# Patient Record
Sex: MALE | Race: WHITE | NOT HISPANIC OR LATINO | Employment: OTHER | URBAN - METROPOLITAN AREA
[De-identification: names, ages, dates, MRNs, and addresses within clinical notes are randomized per-mention and may not be internally consistent; named-entity substitution may affect disease eponyms.]

---

## 2017-01-12 ENCOUNTER — OFFICE VISIT (OUTPATIENT)
Dept: CARDIAC SURGERY | Facility: CLINIC | Age: 82
End: 2017-01-12

## 2017-01-12 VITALS
SYSTOLIC BLOOD PRESSURE: 119 MMHG | BODY MASS INDEX: 22.99 KG/M2 | HEIGHT: 69 IN | OXYGEN SATURATION: 98 % | WEIGHT: 155.2 LBS | RESPIRATION RATE: 20 BRPM | DIASTOLIC BLOOD PRESSURE: 66 MMHG | TEMPERATURE: 98.1 F | HEART RATE: 60 BPM

## 2017-01-12 DIAGNOSIS — I51.9 LV DYSFUNCTION: ICD-10-CM

## 2017-01-12 DIAGNOSIS — I25.10 CAD IN NATIVE ARTERY: ICD-10-CM

## 2017-01-12 DIAGNOSIS — I63.10 CEREBROVASCULAR ACCIDENT (CVA) DUE TO EMBOLISM OF PRECEREBRAL ARTERY (HCC): Primary | ICD-10-CM

## 2017-01-12 DIAGNOSIS — Z95.0 PACEMAKER: ICD-10-CM

## 2017-01-12 DIAGNOSIS — R06.09 DYSPNEA ON EXERTION: ICD-10-CM

## 2017-01-12 PROCEDURE — 99214 OFFICE O/P EST MOD 30 MIN: CPT | Performed by: THORACIC SURGERY (CARDIOTHORACIC VASCULAR SURGERY)

## 2017-01-12 RX ORDER — POLYETHYLENE GLYCOL 3350 17 G/17G
POWDER, FOR SOLUTION ORAL
Refills: 6 | COMMUNITY
Start: 2016-11-16 | End: 2019-01-01

## 2017-01-12 RX ORDER — ATENOLOL 25 MG/1
TABLET ORAL
COMMUNITY
Start: 2016-11-16 | End: 2017-01-12

## 2017-01-12 RX ORDER — CLOPIDOGREL BISULFATE 75 MG/1
TABLET ORAL
COMMUNITY
Start: 2016-11-16 | End: 2017-01-12 | Stop reason: ALTCHOICE

## 2017-01-12 RX ORDER — APIXABAN 2.5 MG/1
TABLET, FILM COATED ORAL
Refills: 5 | COMMUNITY
Start: 2016-12-16 | End: 2017-01-12

## 2017-01-12 RX ORDER — WARFARIN SODIUM 4 MG/1
TABLET ORAL
Refills: 1 | COMMUNITY
Start: 2016-11-04 | End: 2017-01-12

## 2017-01-12 RX ORDER — SULFAMETHOXAZOLE AND TRIMETHOPRIM 800; 160 MG/1; MG/1
TABLET ORAL
Refills: 0 | COMMUNITY
Start: 2016-12-20 | End: 2017-01-12 | Stop reason: ALTCHOICE

## 2017-01-12 RX ORDER — ISOSORBIDE MONONITRATE 30 MG/1
TABLET, EXTENDED RELEASE ORAL
Refills: 5 | COMMUNITY
Start: 2016-12-16 | End: 2019-01-01

## 2017-01-12 NOTE — MR AVS SNAPSHOT
Julio César Braxton   1/12/2017 11:30 AM   Office Visit    Dept Phone:  [phone]   Encounter #:  [id number]    Provider:  Santi Bean MD   Department:  [facility] CARDIAC SURGERY                Your Full Care Plan              Today's Medication Changes          These changes are accurate as of: 1/12/17 12:35 PM.  If you have any questions, ask your nurse or doctor.               Stop taking medication(s)listed here:     atenolol 25 MG tablet   Commonly known as:  TENORMIN   Stopped by:  Santi Bean MD           clopidogrel 75 MG tablet   Commonly known as:  PLAVIX   Stopped by:  Santi Bean MD           ELIQUIS 2.5 MG tablet tablet   Generic drug:  apixaban   Stopped by:  Santi Bean MD           gabapentin 300 MG capsule   Commonly known as:  NEURONTIN   Stopped by:  Santi Bean MD           metoprolol tartrate 25 MG tablet   Commonly known as:  LOPRESSOR   Stopped by:  Santi Bean MD           sulfamethoxazole-trimethoprim 800-160 MG per tablet   Commonly known as:  BACTRIM DS,SEPTRA DS   Stopped by:  Santi Bean MD           warfarin 4 MG tablet   Commonly known as:  COUMADIN   Stopped by:  Santi Bean MD           warfarin 5 MG tablet   Commonly known as:  COUMADIN   Stopped by:  Santi Bean MD                      Your Updated Medication List          This list is accurate as of: 1/12/17 12:35 PM.  Always use your most recent med list.                aspirin 81 MG chewable tablet   Chew 1 tablet Daily.       atorvastatin 40 MG tablet   Commonly known as:  LIPITOR   Take 1 tablet by mouth Every Night.       furosemide 20 MG tablet   Commonly known as:  LASIX   Take 1 tablet by mouth Daily.       isosorbide mononitrate 30 MG 24 hr tablet   Commonly known as:  IMDUR       metoprolol succinate XL 25 MG 24 hr tablet   Commonly known as:  TOPROL-XL   Take 1 tablet by mouth Daily.       polyethylene glycol powder   Commonly known  "as:  MIRALAX       pregabalin 50 MG capsule   Commonly known as:  LYRICA       ramipril 1.25 MG capsule   Commonly known as:  ALTACE   Take 1 capsule by mouth Daily.               Instructions     None    Patient Instructions History      Upcoming Appointments     Visit Type Date Time Department    NEW PATIENT 2017 11:30 AM [facility] [place]      PatientFocus Signup     [facility] PatientFocus allows you to send messages to your doctor, view your test results, renew your prescriptions, schedule appointments, and more. To sign up, go to [employer] and click on the Sign Up Now link in the New User? box. Enter your PatientFocus Activation Code exactly as it appears below along with the last four digits of your Social Security Number and your Date of Birth () to complete the sign-up process. If you do not sign up before the expiration date, you must request a new code.    PatientFocus Activation Code: [email or web address]  Expires: 2017 12:35 PM    If you have questions, you can email [email or web address] or call [phone] to talk to our PatientFocus staff. Remember, PatientFocus is NOT to be used for urgent needs. For medical emergencies, dial 911.               Other Info from Your Visit           Allergies     No Known Allergies      Vital Signs     Blood Pressure Pulse Temperature Respirations Height Weight    119/66 (BP Location: Right arm, Patient Position: Sitting, Cuff Size: Adult) 60 98.1 °F (36.7 °C) (Oral) 20 69\" (175.3 cm) 155 lb 3.2 oz (70.4 kg)    Oxygen Saturation Body Mass Index Smoking Status             98% 22.92 kg/m2 Former Smoker           "

## 2017-01-12 NOTE — LETTER
January 15, 2017     Eduardo Medina MD  [address]   [address]  [place] [address]    Patient: Julio César Braxton   YOB: 1932   Date of Visit: 1/12/2017       Dear Dr. Adam MD:    Thank you for referring Julio César Braxton to me for evaluation. Below are the relevant portions of my assessment and plan of care.    If you have questions, please do not hesitate to call me. I look forward to following Julio César along with you.         Sincerely,        Santi Bean MD        CC: MD Santi Hamilton MD  1/15/2017  1:08 PM  Sign at close encounter  1/15/2017      Chief Complaint:     Follow up evaluation of CAD    History of Present Illness:       Dear Dr. Mejia and Colleagues,  It was nice to see Julio César Braxton in follow up evaluation for his multivessel CAD. He is a 84 y.o. male with a history of a recent stroke treated with lytics, SSS with a PCM, who presents with progressive dyspnea and fatigue. He denies active chest pain, syncope, orthopnea or LE edema. He was admitted recently for the stroke and a cath was done as well that showed severe 3 vessel CAD with a 80 % mid LAD, 80 % OM and diffuse non dominant  RCA disease and EF of 35%. He is very active and takes care of a sick wife. 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 He is here for re-evaluation and possible surgical treatment    Patient Active Problem List   Diagnosis   • Stroke (cerebrum)   • CAD in native artery   • LV dysfunction   • Dyspnea on exertion   • Pacemaker       Past Medical History   Diagnosis Date   • Coronary artery disease    • Neuropathy    • Stroke        Past Surgical History   Procedure Laterality Date   • Back surgery     • Coronary stent placement     • Spinal fusion     • Cholecystectomy     • Angioplasty         No Known Allergies      Current Outpatient Prescriptions:   •  aspirin 81 MG chewable tablet, Chew 1 tablet Daily.,  Disp: 30 tablet, Rfl: 0  •  atorvastatin (LIPITOR) 40 MG tablet, Take 1 tablet by mouth Every Night., Disp: 30 tablet, Rfl: 0  •  furosemide (LASIX) 20 MG tablet, Take 1 tablet by mouth Daily., Disp: 30 tablet, Rfl: 0  •  isosorbide mononitrate (IMDUR) 30 MG 24 hr tablet, TK 1 T PO QD, Disp: , Rfl: 5  •  metoprolol succinate XL (TOPROL-XL) 25 MG 24 hr tablet, Take 1 tablet by mouth Daily., Disp: 30 tablet, Rfl: 0  •  polyethylene glycol (MIRALAX) powder, MIX 17GM IN 8 OZ OF WATER AND DRINK ONCE A DAY, Disp: , Rfl: 6  •  pregabalin (LYRICA) 50 MG capsule, Take 50 mg by mouth 2 (Two) Times a Day., Disp: , Rfl:   •  ramipril (ALTACE) 1.25 MG capsule, Take 1 capsule by mouth Daily., Disp: 30 capsule, Rfl: 0    Social History     Social History   • Marital status:      Spouse name: N/A   • Number of children: N/A   • Years of education: N/A     Occupational History   • Not on file.     Social History Main Topics   • Smoking status: Former Smoker     Types: Cigarettes   • Smokeless tobacco: Not on file      Comment: Quit 40 years go   • Alcohol use No   • Drug use: No   • Sexual activity: Not on file     Other Topics Concern   • Not on file     Social History Narrative    - spouse has Parkinson's.  He lives at home with spouse and grandson.       Family History   Problem Relation Age of Onset   • Coronary artery disease Brother      Review of Systems   Constitutional: Positive for fatigue.   Respiratory: Positive for shortness of breath. Negative for stridor.    Cardiovascular: Negative for chest pain and leg swelling.   Neurological: Negative for dizziness, tremors and weakness.   All other systems reviewed and are negative.      Physical Exam:    Vital Signs:  Weight: 155 lb 3.2 oz (70.4 kg)   Body mass index is 22.92 kg/(m^2).  Temp: 98.1 °F (36.7 °C)   Heart Rate: 60   BP: 119/66     Physical Exam   Constitutional: He is oriented to person, place, and time. He appears well-developed and well-nourished.    HENT:   Head: Normocephalic and atraumatic.   Nose: Nose normal.   Mouth/Throat: Oropharynx is clear and moist.   Eyes: Conjunctivae are normal. Pupils are equal, round, and reactive to light.   Neck: Normal range of motion. Neck supple. No JVD present. No thyromegaly present.   Cardiovascular: Normal rate, regular rhythm, normal heart sounds and intact distal pulses.  Exam reveals no gallop and no friction rub.    No murmur heard.  Pulmonary/Chest: Effort normal and breath sounds normal. He has no rales.   Abdominal: Soft. Bowel sounds are normal. He exhibits no distension and no mass. There is no tenderness.   Musculoskeletal: Normal range of motion. He exhibits no tenderness or deformity.   Lymphadenopathy:     He has no cervical adenopathy.   Neurological: He is alert and oriented to person, place, and time. He has normal reflexes.   Skin: Skin is warm and dry. No rash noted. No erythema.   Psychiatric: He has a normal mood and affect. His behavior is normal.        Assessment:     Problem List Items Addressed This Visit        Cardiovascular and Mediastinum    Stroke (cerebrum) - Primary    CAD in native artery    Relevant Medications    isosorbide mononitrate (IMDUR) 30 MG 24 hr tablet    LV dysfunction    Relevant Medications    isosorbide mononitrate (IMDUR) 30 MG 24 hr tablet       Respiratory    Dyspnea on exertion       Other    Pacemaker        S/P stroke with recovery  3 vessel CAD with LV dysfunction, minimal symptoms  S/P PCM insertion    Recommendation/Plan:     I discussed the risks and benefits and alternatives of surgery. He is not very symptomatic and he is concerned that if a complication occurs who will take care of his wife. I understand the situation and probably medical treatment is the best option for him at this time.    Thank you for allowing me to participate in his care.    Regards,    Santi Bean M.D.

## 2017-01-15 PROBLEM — I51.9 LV DYSFUNCTION: Status: ACTIVE | Noted: 2017-01-15

## 2017-01-15 PROBLEM — R06.09 DYSPNEA ON EXERTION: Status: ACTIVE | Noted: 2017-01-15

## 2017-01-15 PROBLEM — I25.10 CAD IN NATIVE ARTERY: Status: ACTIVE | Noted: 2017-01-15

## 2017-01-15 PROBLEM — Z95.0 PACEMAKER: Status: ACTIVE | Noted: 2017-01-15

## 2017-01-15 NOTE — PROGRESS NOTES
1/15/2017      Chief Complaint:     Follow up evaluation of CAD    History of Present Illness:       Dear Dr. Mejia and Colleagues,  It was nice to see Julio César Braxton in follow up evaluation for his multivessel CAD. He is a 84 y.o. male with a history of a recent stroke treated with lytics, SSS with a PCM, who presents with progressive dyspnea and fatigue. He denies active chest pain, syncope, orthopnea or LE edema. He was admitted recently for the stroke and a cath was done as well that showed severe 3 vessel CAD with a 80 % mid LAD, 80 % OM and diffuse non dominant  RCA disease and EF of 35%. He is very active and takes care of a sick wife. 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 He is here for re-evaluation and possible surgical treatment    Patient Active Problem List   Diagnosis   • Stroke (cerebrum)   • CAD in native artery   • LV dysfunction   • Dyspnea on exertion   • Pacemaker       Past Medical History   Diagnosis Date   • Coronary artery disease    • Neuropathy    • Stroke        Past Surgical History   Procedure Laterality Date   • Back surgery     • Coronary stent placement     • Spinal fusion     • Cholecystectomy     • Angioplasty         No Known Allergies      Current Outpatient Prescriptions:   •  aspirin 81 MG chewable tablet, Chew 1 tablet Daily., Disp: 30 tablet, Rfl: 0  •  atorvastatin (LIPITOR) 40 MG tablet, Take 1 tablet by mouth Every Night., Disp: 30 tablet, Rfl: 0  •  furosemide (LASIX) 20 MG tablet, Take 1 tablet by mouth Daily., Disp: 30 tablet, Rfl: 0  •  isosorbide mononitrate (IMDUR) 30 MG 24 hr tablet, TK 1 T PO QD, Disp: , Rfl: 5  •  metoprolol succinate XL (TOPROL-XL) 25 MG 24 hr tablet, Take 1 tablet by mouth Daily., Disp: 30 tablet, Rfl: 0  •  polyethylene glycol (MIRALAX) powder, MIX 17GM IN 8 OZ OF WATER AND DRINK ONCE A DAY, Disp: , Rfl: 6  •  pregabalin (LYRICA) 50 MG capsule, Take 50 mg by mouth 2 (Two)  Times a Day., Disp: , Rfl:   •  ramipril (ALTACE) 1.25 MG capsule, Take 1 capsule by mouth Daily., Disp: 30 capsule, Rfl: 0    Social History     Social History   • Marital status:      Spouse name: N/A   • Number of children: N/A   • Years of education: N/A     Occupational History   • Not on file.     Social History Main Topics   • Smoking status: Former Smoker     Types: Cigarettes   • Smokeless tobacco: Not on file      Comment: Quit 40 years go   • Alcohol use No   • Drug use: No   • Sexual activity: Not on file     Other Topics Concern   • Not on file     Social History Narrative    - spouse has Parkinson's.  He lives at home with spouse and grandson.       Family History   Problem Relation Age of Onset   • Coronary artery disease Brother      Review of Systems   Constitutional: Positive for fatigue.   Respiratory: Positive for shortness of breath. Negative for stridor.    Cardiovascular: Negative for chest pain and leg swelling.   Neurological: Negative for dizziness, tremors and weakness.   All other systems reviewed and are negative.      Physical Exam:    Vital Signs:  Weight: 155 lb 3.2 oz (70.4 kg)   Body mass index is 22.92 kg/(m^2).  Temp: 98.1 °F (36.7 °C)   Heart Rate: 60   BP: 119/66     Physical Exam   Constitutional: He is oriented to person, place, and time. He appears well-developed and well-nourished.   HENT:   Head: Normocephalic and atraumatic.   Nose: Nose normal.   Mouth/Throat: Oropharynx is clear and moist.   Eyes: Conjunctivae are normal. Pupils are equal, round, and reactive to light.   Neck: Normal range of motion. Neck supple. No JVD present. No thyromegaly present.   Cardiovascular: Normal rate, regular rhythm, normal heart sounds and intact distal pulses.  Exam reveals no gallop and no friction rub.    No murmur heard.  Pulmonary/Chest: Effort normal and breath sounds normal. He has no rales.   Abdominal: Soft. Bowel sounds are normal. He exhibits no distension and no  mass. There is no tenderness.   Musculoskeletal: Normal range of motion. He exhibits no tenderness or deformity.   Lymphadenopathy:     He has no cervical adenopathy.   Neurological: He is alert and oriented to person, place, and time. He has normal reflexes.   Skin: Skin is warm and dry. No rash noted. No erythema.   Psychiatric: He has a normal mood and affect. His behavior is normal.        Assessment:     Problem List Items Addressed This Visit        Cardiovascular and Mediastinum    Stroke (cerebrum) - Primary    CAD in native artery    Relevant Medications    isosorbide mononitrate (IMDUR) 30 MG 24 hr tablet    LV dysfunction    Relevant Medications    isosorbide mononitrate (IMDUR) 30 MG 24 hr tablet       Respiratory    Dyspnea on exertion       Other    Pacemaker        S/P stroke with recovery  3 vessel CAD with LV dysfunction, minimal symptoms  S/P PCM insertion    Recommendation/Plan:     I discussed the risks and benefits and alternatives of surgery. He is not very symptomatic and he is concerned that if a complication occurs who will take care of his wife. I understand the situation and probably medical treatment is the best option for him at this time.    Thank you for allowing me to participate in his care.    Regards,    Santi Bean M.D.

## 2017-02-02 ENCOUNTER — TELEPHONE (OUTPATIENT)
Dept: NEUROLOGY | Facility: CLINIC | Age: 82
End: 2017-02-02

## 2017-02-02 NOTE — TELEPHONE ENCOUNTER
S/W Mr. Braxton.  He is doing well.  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  He told me he is seeing Dr. Bean for cardiac issues.  He has an appt with Malu CHAVES on Feb. 21, 2017 at 3:30 PM.  He told me his appt with Dr. Bean is after this.  He needs a 3 vessel CABG.  I reminded him to let us know when the CABG will be done.  He has been changed to Eliquis 2.5mg 2 times a day and placed on Isosorbide ER 30 mg daily.  I also asked him to let Dr. Bean know all the meds he is taking.  To call 911 with any stroke signs or symptoms.  MRS 1.  He also told me he has had no stroke symptoms.  He can call me with any questions or concerns.  LOIDA Lozano RN

## 2017-02-06 ENCOUNTER — TELEPHONE (OUTPATIENT)
Dept: NEUROLOGY | Facility: CLINIC | Age: 82
End: 2017-02-06

## 2017-02-06 NOTE — TELEPHONE ENCOUNTER
S/W Mr. Braxton.  He told me he wants to F/U with his neurologist in [place] but he forgot his name.  He also is going to call me with the date for his CABG with .  LOIDA Lozano RN

## 2017-02-07 ENCOUNTER — TELEPHONE (OUTPATIENT)
Dept: CARDIAC SURGERY | Facility: CLINIC | Age: 82
End: 2017-02-07

## 2017-02-10 ENCOUNTER — TELEPHONE (OUTPATIENT)
Dept: CARDIAC SURGERY | Facility: CLINIC | Age: 82
End: 2017-02-10

## 2017-02-10 NOTE — TELEPHONE ENCOUNTER
Call placed to pt to discuss meds prior to surgery.  There are notes in EPIC that he is on Eliquis.  We do not have that listed on Dr. Bean's office note.      I left message for him to call me on Monday at office.

## 2017-02-17 ENCOUNTER — TELEPHONE (OUTPATIENT)
Dept: CARDIAC SURGERY | Facility: CLINIC | Age: 82
End: 2017-02-17

## 2017-02-17 NOTE — TELEPHONE ENCOUNTER
Asked Dr. Bean about bridging his Eliquis.  Dr. Bean reviewed his note and recommended medical mmgt.      [facility] called pt and d/w him via phone.  They decided medical mmgt and would follow up in a couple of months if he decided he wanted surgery.    [facility] told him to resume all his medications.    I asked Jian to cancel surgery.

## 2017-03-16 ENCOUNTER — HOSPITAL ENCOUNTER (OUTPATIENT)
Dept: LAB | Facility: HOSPITAL | Age: 82
Discharge: HOME | End: 2017-03-16
Attending: NURSE PRACTITIONER | Admitting: NURSE PRACTITIONER

## 2017-03-16 LAB
ANION GAP SERPL CALC-SCNC: 15.7 MMOL/L (ref 10–20)
BNP SERPL-MCNC: 381 PG/ML
BUN SERPL-MCNC: 22 MG/DL (ref 8–20)
BUN/CREAT SERPL: 18.3 (ref 6.2–20.3)
CALCIUM SERPL-MCNC: 8.6 MG/DL (ref 8.9–10.3)
CHLORIDE SERPL-SCNC: 100 MMOL/L (ref 101–111)
CONV CO2: 25 MMOL/L (ref 22–32)
CREAT UR-MCNC: 1.2 MG/DL (ref 0.7–1.2)
GLUCOSE SERPL-MCNC: 121 MG/DL (ref 65–99)
MAGNESIUM SERPL-MCNC: 2.1 MG/DL (ref 1.8–2.5)
POTASSIUM SERPL-SCNC: 4.7 MMOL/L (ref 3.6–5.1)
SODIUM SERPL-SCNC: 136 MMOL/L (ref 136–144)

## 2018-03-18 ENCOUNTER — HOSPITAL ENCOUNTER (OUTPATIENT)
Dept: FAMILY MEDICINE CLINIC | Facility: CLINIC | Age: 83
Setting detail: SPECIMEN
Discharge: HOME | End: 2018-03-18
Attending: INTERNAL MEDICINE | Admitting: INTERNAL MEDICINE

## 2018-03-18 LAB
BACTERIA SPEC AEROBE CULT: NORMAL
GRAM STN SPEC: NORMAL
Lab: NORMAL
MICRO REPORT STATUS: NORMAL
SPECIMEN SOURCE: NORMAL

## 2018-03-29 ENCOUNTER — HOSPITAL ENCOUNTER (OUTPATIENT)
Dept: FAMILY MEDICINE CLINIC | Facility: CLINIC | Age: 83
Setting detail: SPECIMEN
Discharge: HOME | End: 2018-03-29
Attending: FAMILY MEDICINE | Admitting: FAMILY MEDICINE

## 2018-03-29 LAB
ALBUMIN SERPL-MCNC: 3.9 G/DL (ref 3.5–4.8)
ALBUMIN/GLOB SERPL: 1.4 {RATIO} (ref 1–1.7)
ALP SERPL-CCNC: 88 IU/L (ref 32–91)
ALT SERPL-CCNC: 12 IU/L (ref 17–63)
ANION GAP SERPL CALC-SCNC: 13.2 MMOL/L (ref 10–20)
AST SERPL-CCNC: 23 IU/L (ref 15–41)
BASOPHILS # BLD AUTO: 0.1 10*3/UL (ref 0–0.2)
BASOPHILS NFR BLD AUTO: 1 % (ref 0–2)
BILIRUB SERPL-MCNC: 1 MG/DL (ref 0.3–1.2)
BUN SERPL-MCNC: 14 MG/DL (ref 8–20)
BUN/CREAT SERPL: 12.7 (ref 6.2–20.3)
CALCIUM SERPL-MCNC: 9.1 MG/DL (ref 8.9–10.3)
CHLORIDE SERPL-SCNC: 102 MMOL/L (ref 101–111)
CHOLEST SERPL-MCNC: 138 MG/DL
CHOLEST/HDLC SERPL: 3.6 {RATIO}
CONV CO2: 26 MMOL/L (ref 22–32)
CONV LDL CHOLESTEROL DIRECT: 71 MG/DL (ref 0–100)
CONV TOTAL PROTEIN: 6.6 G/DL (ref 6.1–7.9)
CREAT UR-MCNC: 1.1 MG/DL (ref 0.7–1.2)
DIFFERENTIAL METHOD BLD: (no result)
EOSINOPHIL # BLD AUTO: 0.2 10*3/UL (ref 0–0.3)
EOSINOPHIL # BLD AUTO: 3 % (ref 0–3)
ERYTHROCYTE [DISTWIDTH] IN BLOOD BY AUTOMATED COUNT: 14.9 % (ref 11.5–14.5)
GLOBULIN UR ELPH-MCNC: 2.7 G/DL (ref 2.5–3.8)
GLUCOSE SERPL-MCNC: 114 MG/DL (ref 65–99)
HCT VFR BLD AUTO: 39.8 % (ref 40–54)
HDLC SERPL-MCNC: 39 MG/DL
HGB BLD-MCNC: 13.3 G/DL (ref 14–18)
INR PPP: 2.4 (ref 2–3)
LDLC/HDLC SERPL: 1.8 {RATIO}
LIPID INTERPRETATION: ABNORMAL
LYMPHOCYTES # BLD AUTO: 1.8 10*3/UL (ref 0.8–4.8)
LYMPHOCYTES NFR BLD AUTO: 37 % (ref 18–42)
MCH RBC QN AUTO: 30 PG (ref 26–32)
MCHC RBC AUTO-ENTMCNC: 33.4 G/DL (ref 32–36)
MCV RBC AUTO: 89.7 FL (ref 80–94)
MONOCYTES # BLD AUTO: 0.5 10*3/UL (ref 0.1–1.3)
MONOCYTES NFR BLD AUTO: 10 % (ref 2–11)
NEUTROPHILS # BLD AUTO: 2.3 10*3/UL (ref 2.3–8.6)
NEUTROPHILS NFR BLD AUTO: 49 % (ref 50–75)
NRBC BLD AUTO-RTO: 0 /100{WBCS}
NRBC/RBC NFR BLD MANUAL: 0 10*3/UL
PLATELET # BLD AUTO: 156 10*3/UL (ref 150–450)
PMV BLD AUTO: 9.8 FL (ref 7.4–10.4)
POTASSIUM SERPL-SCNC: 4.2 MMOL/L (ref 3.6–5.1)
PROTHROMBIN TIME: 24 SEC (ref 19.4–28.5)
RBC # BLD AUTO: 4.44 10*6/UL (ref 4.6–6)
SODIUM SERPL-SCNC: 137 MMOL/L (ref 136–144)
TRIGL SERPL-MCNC: 154 MG/DL
VLDLC SERPL CALC-MCNC: 28 MG/DL
WBC # BLD AUTO: 4.9 10*3/UL (ref 4.5–11.5)

## 2018-08-30 ENCOUNTER — HOSPITAL ENCOUNTER (OUTPATIENT)
Dept: OTHER | Facility: HOSPITAL | Age: 83
Setting detail: SPECIMEN
Discharge: HOME | End: 2018-08-30
Attending: FAMILY MEDICINE | Admitting: FAMILY MEDICINE

## 2018-08-30 LAB
ALBUMIN SERPL-MCNC: 3.7 G/DL (ref 3.5–4.8)
ALBUMIN/GLOB SERPL: 1.4 {RATIO} (ref 1–1.7)
ALP SERPL-CCNC: 77 IU/L (ref 32–91)
ALT SERPL-CCNC: ABNORMAL IU/L (ref 17–63)
ANION GAP SERPL CALC-SCNC: 14.2 MMOL/L (ref 10–20)
AST SERPL-CCNC: ABNORMAL IU/L (ref 15–41)
BASOPHILS # BLD AUTO: 0.1 10*3/UL (ref 0–0.2)
BASOPHILS NFR BLD AUTO: 1 % (ref 0–2)
BILIRUB SERPL-MCNC: ABNORMAL MG/DL (ref 0.3–1.2)
BUN SERPL-MCNC: 20 MG/DL (ref 8–20)
BUN/CREAT SERPL: 20 (ref 6.2–20.3)
CALCIUM SERPL-MCNC: 9.2 MG/DL (ref 8.9–10.3)
CHLORIDE SERPL-SCNC: 98 MMOL/L (ref 101–111)
CONV CO2: 28 MMOL/L (ref 22–32)
CONV TOTAL PROTEIN: 6.4 G/DL (ref 6.1–7.9)
CREAT UR-MCNC: 1 MG/DL (ref 0.7–1.2)
DIFFERENTIAL METHOD BLD: (no result)
EOSINOPHIL # BLD AUTO: 0.2 10*3/UL (ref 0–0.3)
EOSINOPHIL # BLD AUTO: 2 % (ref 0–3)
ERYTHROCYTE [DISTWIDTH] IN BLOOD BY AUTOMATED COUNT: 16.5 % (ref 11.5–14.5)
GLOBULIN UR ELPH-MCNC: 2.7 G/DL (ref 2.5–3.8)
GLUCOSE SERPL-MCNC: 103 MG/DL (ref 65–99)
HCT VFR BLD AUTO: 40.4 % (ref 40–54)
HGB BLD-MCNC: 13.4 G/DL (ref 14–18)
LYMPHOCYTES # BLD AUTO: 1.8 10*3/UL (ref 0.8–4.8)
LYMPHOCYTES NFR BLD AUTO: 22 % (ref 18–42)
MCH RBC QN AUTO: 30 PG (ref 26–32)
MCHC RBC AUTO-ENTMCNC: 33.2 G/DL (ref 32–36)
MCV RBC AUTO: 90.3 FL (ref 80–94)
MONOCYTES # BLD AUTO: 0.6 10*3/UL (ref 0.1–1.3)
MONOCYTES NFR BLD AUTO: 7 % (ref 2–11)
NEUTROPHILS # BLD AUTO: 5.3 10*3/UL (ref 2.3–8.6)
NEUTROPHILS NFR BLD AUTO: 68 % (ref 50–75)
NRBC BLD AUTO-RTO: 0 /100{WBCS}
NRBC/RBC NFR BLD MANUAL: 0 10*3/UL
PLATELET # BLD AUTO: 169 10*3/UL (ref 150–450)
PMV BLD AUTO: 9.7 FL (ref 7.4–10.4)
POTASSIUM SERPL-SCNC: ABNORMAL MMOL/L (ref 3.6–5.1)
RBC # BLD AUTO: 4.48 10*6/UL (ref 4.6–6)
SODIUM SERPL-SCNC: 135 MMOL/L (ref 136–144)
WBC # BLD AUTO: 8 10*3/UL (ref 4.5–11.5)

## 2018-09-12 ENCOUNTER — HOSPITAL ENCOUNTER (OUTPATIENT)
Dept: OTHER | Facility: HOSPITAL | Age: 83
Setting detail: SPECIMEN
Discharge: HOME | End: 2018-09-12
Attending: FAMILY MEDICINE | Admitting: FAMILY MEDICINE

## 2018-09-12 LAB
ALBUMIN SERPL-MCNC: 3.9 G/DL (ref 3.5–4.8)
ALBUMIN/GLOB SERPL: 1.3 {RATIO} (ref 1–1.7)
ALP SERPL-CCNC: 77 IU/L (ref 32–91)
ALT SERPL-CCNC: 12 IU/L (ref 17–63)
ANION GAP SERPL CALC-SCNC: 14.1 MMOL/L (ref 10–20)
AST SERPL-CCNC: 17 IU/L (ref 15–41)
BASOPHILS # BLD AUTO: 0.1 10*3/UL (ref 0–0.2)
BASOPHILS NFR BLD AUTO: 1 % (ref 0–2)
BILIRUB SERPL-MCNC: 0.7 MG/DL (ref 0.3–1.2)
BUN SERPL-MCNC: 24 MG/DL (ref 8–20)
BUN/CREAT SERPL: 24 (ref 6.2–20.3)
CALCIUM SERPL-MCNC: 8.6 MG/DL (ref 8.9–10.3)
CHLORIDE SERPL-SCNC: 96 MMOL/L (ref 101–111)
CONV CO2: 29 MMOL/L (ref 22–32)
CONV TOTAL PROTEIN: 6.8 G/DL (ref 6.1–7.9)
CREAT UR-MCNC: 1 MG/DL (ref 0.7–1.2)
DIFFERENTIAL METHOD BLD: (no result)
EOSINOPHIL # BLD AUTO: 0.1 10*3/UL (ref 0–0.3)
EOSINOPHIL # BLD AUTO: 2 % (ref 0–3)
ERYTHROCYTE [DISTWIDTH] IN BLOOD BY AUTOMATED COUNT: 16 % (ref 11.5–14.5)
ERYTHROCYTE [SEDIMENTATION RATE] IN BLOOD BY WESTERGREN METHOD: 17 MM/HR (ref 0–20)
GLOBULIN UR ELPH-MCNC: 2.9 G/DL (ref 2.5–3.8)
GLUCOSE SERPL-MCNC: 119 MG/DL (ref 65–99)
HCT VFR BLD AUTO: 37.6 % (ref 40–54)
HGB BLD-MCNC: 12.9 G/DL (ref 14–18)
LYMPHOCYTES # BLD AUTO: 1.7 10*3/UL (ref 0.8–4.8)
LYMPHOCYTES NFR BLD AUTO: 27 % (ref 18–42)
MCH RBC QN AUTO: 31.3 PG (ref 26–32)
MCHC RBC AUTO-ENTMCNC: 34.3 G/DL (ref 32–36)
MCV RBC AUTO: 91.1 FL (ref 80–94)
MONOCYTES # BLD AUTO: 0.6 10*3/UL (ref 0.1–1.3)
MONOCYTES NFR BLD AUTO: 9 % (ref 2–11)
NEUTROPHILS # BLD AUTO: 3.8 10*3/UL (ref 2.3–8.6)
NEUTROPHILS NFR BLD AUTO: 61 % (ref 50–75)
NRBC BLD AUTO-RTO: 0 /100{WBCS}
NRBC/RBC NFR BLD MANUAL: 0 10*3/UL
PLATELET # BLD AUTO: 163 10*3/UL (ref 150–450)
PMV BLD AUTO: 9.8 FL (ref 7.4–10.4)
POTASSIUM SERPL-SCNC: 4.1 MMOL/L (ref 3.6–5.1)
RBC # BLD AUTO: 4.13 10*6/UL (ref 4.6–6)
SODIUM SERPL-SCNC: 135 MMOL/L (ref 136–144)
WBC # BLD AUTO: 6.3 10*3/UL (ref 4.5–11.5)

## 2018-09-14 LAB
IRON SATN MFR SERPL: 18 % (ref 20–50)
IRON SERPL-MCNC: 58 UG/DL (ref 45–182)
TIBC SERPL-MCNC: 316 UG/DL (ref 228–428)

## 2018-09-25 ENCOUNTER — HOSPITAL ENCOUNTER (OUTPATIENT)
Dept: FAMILY MEDICINE CLINIC | Facility: CLINIC | Age: 83
Setting detail: SPECIMEN
Discharge: HOME | End: 2018-09-25
Attending: FAMILY MEDICINE | Admitting: FAMILY MEDICINE

## 2018-09-25 LAB
ANION GAP SERPL CALC-SCNC: 13.3 MMOL/L (ref 10–20)
BUN SERPL-MCNC: 18 MG/DL (ref 8–20)
BUN/CREAT SERPL: 16.4 (ref 6.2–20.3)
CALCIUM SERPL-MCNC: 9 MG/DL (ref 8.9–10.3)
CHLORIDE SERPL-SCNC: 98 MMOL/L (ref 101–111)
CONV CO2: 29 MMOL/L (ref 22–32)
CREAT UR-MCNC: 1.1 MG/DL (ref 0.7–1.2)
GLUCOSE SERPL-MCNC: 108 MG/DL (ref 65–99)
INR PPP: 1.3
POTASSIUM SERPL-SCNC: 4.3 MMOL/L (ref 3.6–5.1)
PROTHROMBIN TIME: 14 SEC (ref 9.6–11.7)
SODIUM SERPL-SCNC: 136 MMOL/L (ref 136–144)

## 2019-01-01 ENCOUNTER — READMISSION MANAGEMENT (OUTPATIENT)
Dept: CALL CENTER | Facility: HOSPITAL | Age: 84
End: 2019-01-01

## 2019-01-01 ENCOUNTER — APPOINTMENT (OUTPATIENT)
Dept: GENERAL RADIOLOGY | Facility: HOSPITAL | Age: 84
End: 2019-01-01

## 2019-01-01 ENCOUNTER — HOSPITAL ENCOUNTER (EMERGENCY)
Facility: HOSPITAL | Age: 84
Discharge: NURSING, REHAB OR LONG-TERM CARE | End: 2019-10-10
Attending: EMERGENCY MEDICINE | Admitting: EMERGENCY MEDICINE

## 2019-01-01 ENCOUNTER — LAB REQUISITION (OUTPATIENT)
Dept: LAB | Facility: HOSPITAL | Age: 84
End: 2019-01-01

## 2019-01-01 ENCOUNTER — APPOINTMENT (OUTPATIENT)
Dept: CT IMAGING | Facility: HOSPITAL | Age: 84
End: 2019-01-01

## 2019-01-01 ENCOUNTER — HOSPITAL ENCOUNTER (INPATIENT)
Facility: HOSPITAL | Age: 84
LOS: 6 days | Discharge: NURSING, REHAB OR LONG-TERM CARE | End: 2019-09-13
Attending: EMERGENCY MEDICINE | Admitting: INTERNAL MEDICINE

## 2019-01-01 ENCOUNTER — HOSPITAL ENCOUNTER (INPATIENT)
Facility: HOSPITAL | Age: 84
LOS: 1 days | Discharge: HOME | End: 2019-08-04
Attending: EMERGENCY MEDICINE | Admitting: INTERNAL MEDICINE

## 2019-01-01 ENCOUNTER — EPISODE CHANGES (OUTPATIENT)
Dept: CASE MANAGEMENT | Facility: OTHER | Age: 84
End: 2019-01-01

## 2019-01-01 ENCOUNTER — APPOINTMENT (OUTPATIENT)
Dept: ULTRASOUND IMAGING | Facility: HOSPITAL | Age: 84
End: 2019-01-01

## 2019-01-01 ENCOUNTER — PATIENT OUTREACH (OUTPATIENT)
Dept: CASE MANAGEMENT | Facility: OTHER | Age: 84
End: 2019-01-01

## 2019-01-01 ENCOUNTER — TELEPHONE (OUTPATIENT)
Dept: FAMILY MEDICINE CLINIC | Facility: CLINIC | Age: 84
End: 2019-01-01

## 2019-01-01 ENCOUNTER — HOSPITAL ENCOUNTER (INPATIENT)
Facility: HOSPITAL | Age: 84
LOS: 5 days | End: 2019-10-19
Attending: EMERGENCY MEDICINE | Admitting: FAMILY MEDICINE

## 2019-01-01 ENCOUNTER — INPATIENT HOSPITAL (OUTPATIENT)
Dept: URBAN - METROPOLITAN AREA HOSPITAL 84 | Facility: HOSPITAL | Age: 84
End: 2019-01-01

## 2019-01-01 ENCOUNTER — HOSPITAL ENCOUNTER (INPATIENT)
Facility: HOSPITAL | Age: 84
LOS: 1 days | Discharge: HOME | End: 2019-07-17
Attending: EMERGENCY MEDICINE | Admitting: INTERNAL MEDICINE

## 2019-01-01 VITALS
TEMPERATURE: 97.6 F | BODY MASS INDEX: 19.61 KG/M2 | DIASTOLIC BLOOD PRESSURE: 52 MMHG | HEIGHT: 70 IN | OXYGEN SATURATION: 93 % | SYSTOLIC BLOOD PRESSURE: 123 MMHG | WEIGHT: 137 LBS | RESPIRATION RATE: 16 BRPM | HEART RATE: 70 BPM

## 2019-01-01 VITALS
DIASTOLIC BLOOD PRESSURE: 54 MMHG | BODY MASS INDEX: 18.56 KG/M2 | HEIGHT: 70 IN | RESPIRATION RATE: 19 BRPM | WEIGHT: 129.63 LBS | HEART RATE: 72 BPM | TEMPERATURE: 98.9 F | SYSTOLIC BLOOD PRESSURE: 107 MMHG | OXYGEN SATURATION: 94 %

## 2019-01-01 VITALS
TEMPERATURE: 97.5 F | RESPIRATION RATE: 15 BRPM | BODY MASS INDEX: 19.13 KG/M2 | HEART RATE: 53 BPM | DIASTOLIC BLOOD PRESSURE: 52 MMHG | HEIGHT: 70 IN | SYSTOLIC BLOOD PRESSURE: 109 MMHG | WEIGHT: 133.6 LBS | OXYGEN SATURATION: 97 %

## 2019-01-01 VITALS
SYSTOLIC BLOOD PRESSURE: 109 MMHG | TEMPERATURE: 98.6 F | DIASTOLIC BLOOD PRESSURE: 72 MMHG | WEIGHT: 130.51 LBS | RESPIRATION RATE: 28 BRPM | HEART RATE: 96 BPM | HEIGHT: 66 IN | BODY MASS INDEX: 20.98 KG/M2 | OXYGEN SATURATION: 99 %

## 2019-01-01 VITALS
SYSTOLIC BLOOD PRESSURE: 113 MMHG | HEART RATE: 87 BPM | TEMPERATURE: 97.5 F | HEIGHT: 70 IN | DIASTOLIC BLOOD PRESSURE: 49 MMHG | WEIGHT: 135.36 LBS | OXYGEN SATURATION: 97 % | RESPIRATION RATE: 16 BRPM | BODY MASS INDEX: 19.38 KG/M2

## 2019-01-01 DIAGNOSIS — S13.9XXA NECK SPRAIN, INITIAL ENCOUNTER: ICD-10-CM

## 2019-01-01 DIAGNOSIS — I20.0 UNSTABLE ANGINA (HCC): Primary | ICD-10-CM

## 2019-01-01 DIAGNOSIS — R13.12 DYSPHAGIA, OROPHARYNGEAL PHASE: ICD-10-CM

## 2019-01-01 DIAGNOSIS — R73.9 HYPERGLYCEMIA: ICD-10-CM

## 2019-01-01 DIAGNOSIS — A41.9 SEVERE SEPSIS (HCC): ICD-10-CM

## 2019-01-01 DIAGNOSIS — E87.6 HYPOKALEMIA: ICD-10-CM

## 2019-01-01 DIAGNOSIS — E87.0 HYPEROSMOLALITY AND HYPERNATREMIA: ICD-10-CM

## 2019-01-01 DIAGNOSIS — J69.0 ASPIRATION PNEUMONIA OF RIGHT LOWER LOBE, UNSPECIFIED ASPIRATION PNEUMONIA TYPE (HCC): ICD-10-CM

## 2019-01-01 DIAGNOSIS — A41.9 SEVERE SEPSIS (HCC): Primary | ICD-10-CM

## 2019-01-01 DIAGNOSIS — I25.10 CAD IN NATIVE ARTERY: ICD-10-CM

## 2019-01-01 DIAGNOSIS — J18.9 PNEUMONIA, UNSPECIFIED ORGANISM: ICD-10-CM

## 2019-01-01 DIAGNOSIS — R63.3 FEEDING DIFFICULTIES: ICD-10-CM

## 2019-01-01 DIAGNOSIS — S00.03XA CONTUSION OF SCALP, INITIAL ENCOUNTER: ICD-10-CM

## 2019-01-01 DIAGNOSIS — R07.9 CHEST PAIN, UNSPECIFIED TYPE: ICD-10-CM

## 2019-01-01 DIAGNOSIS — N28.9 ACUTE RENAL INSUFFICIENCY: ICD-10-CM

## 2019-01-01 DIAGNOSIS — R65.20 SEVERE SEPSIS (HCC): ICD-10-CM

## 2019-01-01 DIAGNOSIS — N39.0 ACUTE UTI: Primary | ICD-10-CM
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
## 2019-01-01 DIAGNOSIS — Z00.00 ROUTINE GENERAL MEDICAL EXAMINATION AT A HEALTH CARE FACILITY: ICD-10-CM

## 2019-01-01 DIAGNOSIS — R41.82 ALTERED MENTAL STATUS: ICD-10-CM

## 2019-01-01 DIAGNOSIS — R65.20 SEVERE SEPSIS (HCC): Primary | ICD-10-CM

## 2019-01-01 LAB
ALBUMIN SERPL-MCNC: 2.5 G/DL (ref 3.5–4.8)
ALBUMIN SERPL-MCNC: 2.8 G/DL (ref 3.5–5.2)
ALBUMIN SERPL-MCNC: 2.9 G/DL (ref 3.5–4.8)
ALBUMIN SERPL-MCNC: 2.9 G/DL (ref 3.5–4.8)
ALBUMIN SERPL-MCNC: 3 G/DL (ref 3.5–4.8)
ALBUMIN SERPL-MCNC: 3.4 G/DL (ref 3.5–5.2)
ALBUMIN SERPL-MCNC: 3.9 G/DL (ref 3.5–5.2)
ALBUMIN/GLOB SERPL: 0.8 G/DL (ref 1–1.7)
ALBUMIN/GLOB SERPL: 0.9 G/DL
ALBUMIN/GLOB SERPL: 1 G/DL (ref 1–1.7)
ALBUMIN/GLOB SERPL: 1.1 G/DL (ref 1–1.7)
ALBUMIN/GLOB SERPL: 1.2 G/DL (ref 1–1.7)
ALBUMIN/GLOB SERPL: 1.7 G/DL
ALBUMIN/GLOB SERPL: 1.9 G/DL
ALP SERPL-CCNC: 109 U/L (ref 32–91)
ALP SERPL-CCNC: 53 U/L (ref 32–91)
ALP SERPL-CCNC: 66 U/L (ref 32–91)
ALP SERPL-CCNC: 67 U/L (ref 32–91)
ALP SERPL-CCNC: 74 U/L (ref 39–117)
ALP SERPL-CCNC: 81 U/L (ref 39–117)
ALP SERPL-CCNC: 92 U/L (ref 39–117)
ALT SERPL W P-5'-P-CCNC: 10 U/L (ref 17–63)
ALT SERPL W P-5'-P-CCNC: 10 U/L (ref 1–41)
ALT SERPL W P-5'-P-CCNC: 10 U/L (ref 1–41)
ALT SERPL W P-5'-P-CCNC: 13 U/L (ref 17–63)
ALT SERPL W P-5'-P-CCNC: 15 U/L (ref 1–41)
ALT SERPL W P-5'-P-CCNC: 21 U/L (ref 17–63)
ALT SERPL W P-5'-P-CCNC: 25 U/L (ref 17–63)
ANION GAP SERPL CALCULATED.3IONS-SCNC: 12.3 MMOL/L (ref 5–15)
ANION GAP SERPL CALCULATED.3IONS-SCNC: 13.3 MMOL/L (ref 5–15)
ANION GAP SERPL CALCULATED.3IONS-SCNC: 13.6 MMOL/L (ref 5–15)
ANION GAP SERPL CALCULATED.3IONS-SCNC: 13.9 MMOL/L (ref 5–15)
ANION GAP SERPL CALCULATED.3IONS-SCNC: 14.4 MMOL/L (ref 5–15)
ANION GAP SERPL CALCULATED.3IONS-SCNC: 14.7 MMOL/L (ref 5–15)
ANION GAP SERPL CALCULATED.3IONS-SCNC: 14.8 MMOL/L (ref 5–15)
ANION GAP SERPL CALCULATED.3IONS-SCNC: 14.9 MMOL/L (ref 5–15)
ANION GAP SERPL CALCULATED.3IONS-SCNC: 15 MMOL/L (ref 5–15)
ANION GAP SERPL CALCULATED.3IONS-SCNC: 15.1 MMOL/L (ref 5–15)
ANION GAP SERPL CALCULATED.3IONS-SCNC: 15.3 MMOL/L (ref 5–15)
ANION GAP SERPL CALCULATED.3IONS-SCNC: 15.8 MMOL/L (ref 5–15)
ANION GAP SERPL CALCULATED.3IONS-SCNC: 16 MMOL/L (ref 5–15)
ANION GAP SERPL CALCULATED.3IONS-SCNC: 16.3 MMOL/L (ref 5–15)
ANION GAP SERPL CALCULATED.3IONS-SCNC: 16.4 MMOL/L (ref 5–15)
ANION GAP SERPL CALCULATED.3IONS-SCNC: 18.3 MMOL/L (ref 5–15)
ANION GAP SERPL CALCULATED.3IONS-SCNC: 18.8 MMOL/L (ref 5–15)
ANION GAP SERPL CALCULATED.3IONS-SCNC: 19.6 MMOL/L (ref 5–15)
ANION GAP SERPL CALCULATED.3IONS-SCNC: 20.9 MMOL/L (ref 5–15)
ANION GAP SERPL CALCULATED.3IONS-SCNC: 21.6 MMOL/L (ref 5–15)
ANISOCYTOSIS BLD QL: ABNORMAL
APTT PPP: 22.4 SECONDS (ref 24–31)
APTT PPP: 23.1 SECONDS (ref 24–31)
ARTERIAL PATENCY WRIST A: ABNORMAL
ARTERIAL PATENCY WRIST A: POSITIVE
AST SERPL-CCNC: 13 U/L (ref 1–40)
AST SERPL-CCNC: 14 U/L (ref 1–40)
AST SERPL-CCNC: 17 U/L (ref 1–40)
AST SERPL-CCNC: 18 U/L (ref 15–41)
AST SERPL-CCNC: 21 U/L (ref 15–41)
AST SERPL-CCNC: 35 U/L (ref 15–41)
AST SERPL-CCNC: 41 U/L (ref 15–41)
ATMOSPHERIC PRESS: ABNORMAL MM[HG]
ATMOSPHERIC PRESS: ABNORMAL MM[HG]
B PERT DNA SPEC QL NAA+PROBE: NOT DETECTED
BACTERIA SPEC AEROBE CULT: ABNORMAL
BACTERIA SPEC AEROBE CULT: ABNORMAL
BACTERIA SPEC AEROBE CULT: NORMAL
BACTERIA UR QL AUTO: ABNORMAL /HPF
BASE EXCESS BLDA CALC-SCNC: 6.2 MMOL/L (ref 0–3)
BASE EXCESS BLDA CALC-SCNC: 9.4 MMOL/L (ref 0–3)
BASOPHILS # BLD AUTO: 0 10*3/MM3 (ref 0–0.2)
BASOPHILS # BLD AUTO: 0.03 10*3/MM3 (ref 0–0.2)
BASOPHILS NFR BLD AUTO: 0 % (ref 0–1.5)
BASOPHILS NFR BLD AUTO: 0.2 % (ref 0–1.5)
BASOPHILS NFR BLD AUTO: 0.3 % (ref 0–1.5)
BASOPHILS NFR BLD AUTO: 0.3 % (ref 0–1.5)
BDY SITE: ABNORMAL
BDY SITE: ABNORMAL
BILIRUB SERPL-MCNC: 0.5 MG/DL (ref 0.3–1.2)
BILIRUB SERPL-MCNC: 0.6 MG/DL (ref 0.2–1.2)
BILIRUB SERPL-MCNC: 0.7 MG/DL (ref 0.2–1.2)
BILIRUB SERPL-MCNC: 0.7 MG/DL (ref 0.3–1.2)
BILIRUB SERPL-MCNC: 0.8 MG/DL (ref 0.2–1.2)
BILIRUB SERPL-MCNC: 1 MG/DL (ref 0.3–1.2)
BILIRUB SERPL-MCNC: 1.3 MG/DL (ref 0.3–1.2)
BILIRUB UR QL STRIP: NEGATIVE
BNP SERPL-MCNC: 375 PG/ML
BNP SERPL-MCNC: 402 PG/ML
BNP SERPL-MCNC: 668 PG/ML
BUN BLD-MCNC: 10 MG/DL (ref 8–20)
BUN BLD-MCNC: 10 MG/DL (ref 8–20)
BUN BLD-MCNC: 12 MG/DL (ref 8–20)
BUN BLD-MCNC: 13 MG/DL (ref 8–20)
BUN BLD-MCNC: 15 MG/DL (ref 8–20)
BUN BLD-MCNC: 17 MG/DL (ref 8–20)
BUN BLD-MCNC: 19 MG/DL (ref 8–20)
BUN BLD-MCNC: 23 MG/DL (ref 8–20)
BUN BLD-MCNC: 26 MG/DL (ref 8–20)
BUN BLD-MCNC: 33 MG/DL (ref 8–20)
BUN BLD-MCNC: 34 MG/DL (ref 8–20)
BUN BLD-MCNC: 36 MG/DL (ref 8–20)
BUN BLD-MCNC: 37 MG/DL (ref 8–20)
BUN BLD-MCNC: 46 MG/DL (ref 8–20)
BUN BLD-MCNC: 47 MG/DL (ref 8–23)
BUN BLD-MCNC: 50 MG/DL (ref 8–23)
BUN BLD-MCNC: 58 MG/DL (ref 8–23)
BUN BLD-MCNC: 60 MG/DL (ref 8–23)
BUN BLD-MCNC: 67 MG/DL (ref 8–23)
BUN BLD-MCNC: 7 MG/DL (ref 8–20)
BUN/CREAT SERPL: 11.1 (ref 6.2–20.3)
BUN/CREAT SERPL: 11.1 (ref 6.2–20.3)
BUN/CREAT SERPL: 11.8 (ref 6.2–20.3)
BUN/CREAT SERPL: 12 (ref 6.2–20.3)
BUN/CREAT SERPL: 16.7 (ref 6.2–20.3)
BUN/CREAT SERPL: 21.3 (ref 6.2–20.3)
BUN/CREAT SERPL: 23.6 (ref 6.2–20.3)
BUN/CREAT SERPL: 23.8 (ref 6.2–20.3)
BUN/CREAT SERPL: 25.6 (ref 6.2–20.3)
BUN/CREAT SERPL: 30.9 (ref 6.2–20.3)
BUN/CREAT SERPL: 32.9 (ref 6.2–20.3)
BUN/CREAT SERPL: 33 (ref 6.2–20.3)
BUN/CREAT SERPL: 36.3 (ref 7–25)
BUN/CREAT SERPL: 37 (ref 6.2–20.3)
BUN/CREAT SERPL: 38.8 (ref 7–25)
BUN/CREAT SERPL: 40 (ref 6.2–20.3)
BUN/CREAT SERPL: 44.4 (ref 7–25)
BUN/CREAT SERPL: 49.5 (ref 7–25)
BUN/CREAT SERPL: 50.8 (ref 7–25)
BUN/CREAT SERPL: 8.8 (ref 6.2–20.3)
C PNEUM DNA NPH QL NAA+NON-PROBE: NOT DETECTED
CA-I SERPL ISE-MCNC: 1.12 MMOL/L (ref 1.2–1.3)
CA-I SERPL ISE-MCNC: 1.22 MMOL/L (ref 1.2–1.3)
CA-I SERPL ISE-MCNC: 1.23 MMOL/L (ref 1.2–1.3)
CA-I SERPL ISE-MCNC: 1.24 MMOL/L (ref 1.2–1.3)
CALCIUM SPEC-SCNC: 7.4 MG/DL (ref 8.9–10.3)
CALCIUM SPEC-SCNC: 7.6 MG/DL (ref 8.9–10.3)
CALCIUM SPEC-SCNC: 7.7 MG/DL (ref 8.9–10.3)
CALCIUM SPEC-SCNC: 7.9 MG/DL (ref 8.9–10.3)
CALCIUM SPEC-SCNC: 8 MG/DL (ref 8.9–10.3)
CALCIUM SPEC-SCNC: 8 MG/DL (ref 8.9–10.3)
CALCIUM SPEC-SCNC: 8.1 MG/DL (ref 8.9–10.3)
CALCIUM SPEC-SCNC: 8.2 MG/DL (ref 8.6–10.5)
CALCIUM SPEC-SCNC: 8.2 MG/DL (ref 8.9–10.3)
CALCIUM SPEC-SCNC: 8.3 MG/DL (ref 8.9–10.3)
CALCIUM SPEC-SCNC: 8.3 MG/DL (ref 8.9–10.3)
CALCIUM SPEC-SCNC: 8.6 MG/DL (ref 8.9–10.3)
CALCIUM SPEC-SCNC: 8.7 MG/DL (ref 8.6–10.5)
CALCIUM SPEC-SCNC: 8.7 MG/DL (ref 8.6–10.5)
CALCIUM SPEC-SCNC: 9.2 MG/DL (ref 8.6–10.5)
CALCIUM SPEC-SCNC: 9.2 MG/DL (ref 8.6–10.5)
CALCIUM SPEC-SCNC: 9.4 MG/DL (ref 8.9–10.3)
CHLORIDE SERPL-SCNC: 100 MMOL/L (ref 101–111)
CHLORIDE SERPL-SCNC: 100 MMOL/L (ref 101–111)
CHLORIDE SERPL-SCNC: 102 MMOL/L (ref 101–111)
CHLORIDE SERPL-SCNC: 107 MMOL/L (ref 101–111)
CHLORIDE SERPL-SCNC: 113 MMOL/L (ref 98–107)
CHLORIDE SERPL-SCNC: 115 MMOL/L (ref 98–107)
CHLORIDE SERPL-SCNC: 118 MMOL/L (ref 98–107)
CHLORIDE SERPL-SCNC: 122 MMOL/L (ref 98–107)
CHLORIDE SERPL-SCNC: 85 MMOL/L (ref 98–107)
CHLORIDE SERPL-SCNC: 91 MMOL/L (ref 101–111)
CHLORIDE SERPL-SCNC: 91 MMOL/L (ref 101–111)
CHLORIDE SERPL-SCNC: 92 MMOL/L (ref 101–111)
CHLORIDE SERPL-SCNC: 93 MMOL/L (ref 101–111)
CHLORIDE SERPL-SCNC: 93 MMOL/L (ref 101–111)
CHLORIDE SERPL-SCNC: 95 MMOL/L (ref 101–111)
CHLORIDE SERPL-SCNC: 96 MMOL/L (ref 101–111)
CHLORIDE SERPL-SCNC: 96 MMOL/L (ref 101–111)
CHLORIDE SERPL-SCNC: 98 MMOL/L (ref 101–111)
CK SERPL-CCNC: 20 U/L (ref 20–200)
CK SERPL-CCNC: 36 U/L (ref 20–200)
CLARITY UR: ABNORMAL
CLARITY UR: CLEAR
CLARITY UR: CLEAR
CO2 BLDA-SCNC: 31.5 MMOL/L (ref 22–29)
CO2 BLDA-SCNC: 35.7 MMOL/L (ref 22–29)
CO2 SERPL-SCNC: 20 MMOL/L (ref 22–29)
CO2 SERPL-SCNC: 20 MMOL/L (ref 22–29)
CO2 SERPL-SCNC: 23 MMOL/L (ref 22–29)
CO2 SERPL-SCNC: 23 MMOL/L (ref 22–32)
CO2 SERPL-SCNC: 23 MMOL/L (ref 22–32)
CO2 SERPL-SCNC: 24 MMOL/L (ref 22–29)
CO2 SERPL-SCNC: 25 MMOL/L (ref 22–32)
CO2 SERPL-SCNC: 25 MMOL/L (ref 22–32)
CO2 SERPL-SCNC: 26 MMOL/L (ref 22–32)
CO2 SERPL-SCNC: 28 MMOL/L (ref 22–32)
CO2 SERPL-SCNC: 29 MMOL/L (ref 22–32)
CO2 SERPL-SCNC: 29 MMOL/L (ref 22–32)
CO2 SERPL-SCNC: 30 MMOL/L (ref 22–32)
CO2 SERPL-SCNC: 31 MMOL/L (ref 22–32)
CO2 SERPL-SCNC: 32 MMOL/L (ref 22–32)
CO2 SERPL-SCNC: 34.3 MMOL/L (ref 22–29)
COLOR UR: YELLOW
CREAT BLD-MCNC: 0.8 MG/DL (ref 0.7–1.2)
CREAT BLD-MCNC: 0.9 MG/DL (ref 0.7–1.2)
CREAT BLD-MCNC: 1 MG/DL (ref 0.7–1.2)
CREAT BLD-MCNC: 1.01 MG/DL (ref 0.76–1.27)
CREAT BLD-MCNC: 1.1 MG/DL (ref 0.7–1.2)
CREAT BLD-MCNC: 1.18 MG/DL (ref 0.76–1.27)
CREAT BLD-MCNC: 1.21 MG/DL (ref 0.76–1.27)
CREAT BLD-MCNC: 1.4 MG/DL (ref 0.7–1.2)
CREAT BLD-MCNC: 1.51 MG/DL (ref 0.76–1.27)
CREAT BLD-MCNC: 1.6 MG/DL (ref 0.76–1.27)
CRP SERPL-MCNC: 22.97 MG/DL (ref 0–0.7)
D-LACTATE SERPL-SCNC: 1.9 MMOL/L (ref 0.5–2.2)
D-LACTATE SERPL-SCNC: 2 MMOL/L (ref 0.5–2)
D-LACTATE SERPL-SCNC: 2.6 MMOL/L (ref 0.5–2)
D-LACTATE SERPL-SCNC: 3.2 MMOL/L (ref 0.5–2)
D-LACTATE SERPL-SCNC: 7 MMOL/L (ref 0.5–2.2)
DEPRECATED RDW RBC AUTO: 47.7 FL (ref 37–54)
DEPRECATED RDW RBC AUTO: 48.1 FL (ref 37–54)
DEPRECATED RDW RBC AUTO: 49 FL (ref 37–54)
DEPRECATED RDW RBC AUTO: 49.4 FL (ref 37–54)
DEPRECATED RDW RBC AUTO: 49.4 FL (ref 37–54)
DEPRECATED RDW RBC AUTO: 49.9 FL (ref 37–54)
DEPRECATED RDW RBC AUTO: 49.9 FL (ref 37–54)
DEPRECATED RDW RBC AUTO: 50.3 FL (ref 37–54)
DEPRECATED RDW RBC AUTO: 50.8 FL (ref 37–54)
DEPRECATED RDW RBC AUTO: 51.1 FL (ref 37–54)
DEPRECATED RDW RBC AUTO: 51.2 FL (ref 37–54)
DEPRECATED RDW RBC AUTO: 51.2 FL (ref 37–54)
DEPRECATED RDW RBC AUTO: 52.1 FL (ref 37–54)
DEPRECATED RDW RBC AUTO: 53.8 FL (ref 37–54)
DEPRECATED RDW RBC AUTO: 54.7 FL (ref 37–54)
DEPRECATED RDW RBC AUTO: 55.6 FL (ref 37–54)
DEPRECATED RDW RBC AUTO: 56.4 FL (ref 37–54)
DEPRECATED RDW RBC AUTO: 56.9 FL (ref 37–54)
EOSINOPHIL # BLD AUTO: 0 10*3/MM3 (ref 0–0.4)
EOSINOPHIL # BLD AUTO: 0.1 10*3/MM3 (ref 0–0.4)
EOSINOPHIL # BLD MANUAL: 0.14 10*3/MM3 (ref 0–0.4)
EOSINOPHIL # BLD MANUAL: 0.2 10*3/MM3 (ref 0–0.4)
EOSINOPHIL # BLD MANUAL: 0.21 10*3/MM3 (ref 0–0.4)
EOSINOPHIL NFR BLD AUTO: 0 % (ref 0.3–6.2)
EOSINOPHIL NFR BLD AUTO: 0 % (ref 0.3–6.2)
EOSINOPHIL NFR BLD AUTO: 0.2 % (ref 0.3–6.2)
EOSINOPHIL NFR BLD AUTO: 0.6 % (ref 0.3–6.2)
EOSINOPHIL NFR BLD MANUAL: 1 % (ref 0.3–6.2)
EOSINOPHIL NFR BLD MANUAL: 2 % (ref 0.3–6.2)
EOSINOPHIL NFR BLD MANUAL: 2 % (ref 0.3–6.2)
ERYTHROCYTE [DISTWIDTH] IN BLOOD BY AUTOMATED COUNT: 13.1 % (ref 12.3–15.4)
ERYTHROCYTE [DISTWIDTH] IN BLOOD BY AUTOMATED COUNT: 14.6 % (ref 12.3–15.4)
ERYTHROCYTE [DISTWIDTH] IN BLOOD BY AUTOMATED COUNT: 14.7 % (ref 12.3–15.4)
ERYTHROCYTE [DISTWIDTH] IN BLOOD BY AUTOMATED COUNT: 14.8 % (ref 12.3–15.4)
ERYTHROCYTE [DISTWIDTH] IN BLOOD BY AUTOMATED COUNT: 14.8 % (ref 12.3–15.4)
ERYTHROCYTE [DISTWIDTH] IN BLOOD BY AUTOMATED COUNT: 14.9 % (ref 12.3–15.4)
ERYTHROCYTE [DISTWIDTH] IN BLOOD BY AUTOMATED COUNT: 14.9 % (ref 12.3–15.4)
ERYTHROCYTE [DISTWIDTH] IN BLOOD BY AUTOMATED COUNT: 15 % (ref 12.3–15.4)
ERYTHROCYTE [DISTWIDTH] IN BLOOD BY AUTOMATED COUNT: 15.1 % (ref 12.3–15.4)
ERYTHROCYTE [DISTWIDTH] IN BLOOD BY AUTOMATED COUNT: 15.1 % (ref 12.3–15.4)
ERYTHROCYTE [DISTWIDTH] IN BLOOD BY AUTOMATED COUNT: 15.2 % (ref 12.3–15.4)
ERYTHROCYTE [DISTWIDTH] IN BLOOD BY AUTOMATED COUNT: 15.2 % (ref 12.3–15.4)
ERYTHROCYTE [DISTWIDTH] IN BLOOD BY AUTOMATED COUNT: 15.3 % (ref 12.3–15.4)
ERYTHROCYTE [DISTWIDTH] IN BLOOD BY AUTOMATED COUNT: 15.4 % (ref 12.3–15.4)
ERYTHROCYTE [DISTWIDTH] IN BLOOD BY AUTOMATED COUNT: 15.7 % (ref 12.3–15.4)
ERYTHROCYTE [DISTWIDTH] IN BLOOD BY AUTOMATED COUNT: 15.7 % (ref 12.3–15.4)
ERYTHROCYTE [DISTWIDTH] IN BLOOD BY AUTOMATED COUNT: 15.8 % (ref 12.3–15.4)
ERYTHROCYTE [DISTWIDTH] IN BLOOD BY AUTOMATED COUNT: 15.8 % (ref 12.3–15.4)
FLUAV H1 2009 PAND RNA NPH QL NAA+PROBE: NOT DETECTED
FLUAV H1 HA GENE NPH QL NAA+PROBE: NOT DETECTED
FLUAV H3 RNA NPH QL NAA+PROBE: NOT DETECTED
FLUAV SUBTYP SPEC NAA+PROBE: NOT DETECTED
FLUBV RNA ISLT QL NAA+PROBE: NOT DETECTED
FOLATE SERPL-MCNC: 17 NG/ML (ref 5.9–24.8)
GFR SERPL CREATININE-BSD FRML MDRD: 41 ML/MIN/1.73
GFR SERPL CREATININE-BSD FRML MDRD: 44 ML/MIN/1.73
GFR SERPL CREATININE-BSD FRML MDRD: 48 ML/MIN/1.73
GFR SERPL CREATININE-BSD FRML MDRD: 57 ML/MIN/1.73
GFR SERPL CREATININE-BSD FRML MDRD: 58 ML/MIN/1.73
GFR SERPL CREATININE-BSD FRML MDRD: 63 ML/MIN/1.73
GFR SERPL CREATININE-BSD FRML MDRD: 70 ML/MIN/1.73
GFR SERPL CREATININE-BSD FRML MDRD: 71 ML/MIN/1.73
GFR SERPL CREATININE-BSD FRML MDRD: 80 ML/MIN/1.73
GFR SERPL CREATININE-BSD FRML MDRD: 91 ML/MIN/1.73
GIANT PLATELETS: ABNORMAL
GLOBULIN UR ELPH-MCNC: 1.8 GM/DL
GLOBULIN UR ELPH-MCNC: 2.3 GM/DL
GLOBULIN UR ELPH-MCNC: 2.4 GM/DL (ref 2.5–3.8)
GLOBULIN UR ELPH-MCNC: 2.6 GM/DL (ref 2.5–3.8)
GLOBULIN UR ELPH-MCNC: 2.8 GM/DL (ref 2.5–3.8)
GLOBULIN UR ELPH-MCNC: 3.1 GM/DL
GLOBULIN UR ELPH-MCNC: 3.5 GM/DL (ref 2.5–3.8)
GLUCOSE BLD-MCNC: 1035 MG/DL (ref 65–99)
GLUCOSE BLD-MCNC: 107 MG/DL (ref 65–99)
GLUCOSE BLD-MCNC: 122 MG/DL (ref 65–99)
GLUCOSE BLD-MCNC: 123 MG/DL (ref 65–99)
GLUCOSE BLD-MCNC: 128 MG/DL (ref 65–99)
GLUCOSE BLD-MCNC: 136 MG/DL (ref 65–99)
GLUCOSE BLD-MCNC: 151 MG/DL (ref 65–99)
GLUCOSE BLD-MCNC: 159 MG/DL (ref 65–99)
GLUCOSE BLD-MCNC: 165 MG/DL (ref 65–99)
GLUCOSE BLD-MCNC: 183 MG/DL (ref 65–99)
GLUCOSE BLD-MCNC: 190 MG/DL (ref 65–99)
GLUCOSE BLD-MCNC: 195 MG/DL (ref 65–99)
GLUCOSE BLD-MCNC: 200 MG/DL (ref 65–99)
GLUCOSE BLD-MCNC: 208 MG/DL (ref 65–99)
GLUCOSE BLD-MCNC: 228 MG/DL (ref 65–99)
GLUCOSE BLD-MCNC: 253 MG/DL (ref 65–99)
GLUCOSE BLD-MCNC: 367 MG/DL (ref 65–99)
GLUCOSE BLD-MCNC: 433 MG/DL (ref 65–99)
GLUCOSE BLD-MCNC: 491 MG/DL (ref 65–99)
GLUCOSE BLD-MCNC: 575 MG/DL (ref 65–99)
GLUCOSE BLD-MCNC: 843 MG/DL (ref 65–99)
GLUCOSE BLDC GLUCOMTR-MCNC: 122 MG/DL (ref 70–105)
GLUCOSE BLDC GLUCOMTR-MCNC: 125 MG/DL (ref 70–105)
GLUCOSE BLDC GLUCOMTR-MCNC: 126 MG/DL (ref 70–105)
GLUCOSE BLDC GLUCOMTR-MCNC: 138 MG/DL (ref 70–105)
GLUCOSE BLDC GLUCOMTR-MCNC: 145 MG/DL (ref 70–105)
GLUCOSE BLDC GLUCOMTR-MCNC: 147 MG/DL (ref 70–105)
GLUCOSE BLDC GLUCOMTR-MCNC: 179 MG/DL (ref 70–105)
GLUCOSE BLDC GLUCOMTR-MCNC: 191 MG/DL (ref 70–105)
GLUCOSE BLDC GLUCOMTR-MCNC: 192 MG/DL (ref 70–105)
GLUCOSE BLDC GLUCOMTR-MCNC: 193 MG/DL (ref 70–105)
GLUCOSE BLDC GLUCOMTR-MCNC: 195 MG/DL (ref 70–105)
GLUCOSE BLDC GLUCOMTR-MCNC: 202 MG/DL (ref 70–105)
GLUCOSE BLDC GLUCOMTR-MCNC: 203 MG/DL (ref 70–105)
GLUCOSE BLDC GLUCOMTR-MCNC: 207 MG/DL (ref 70–105)
GLUCOSE BLDC GLUCOMTR-MCNC: 209 MG/DL (ref 70–105)
GLUCOSE BLDC GLUCOMTR-MCNC: 210 MG/DL (ref 70–105)
GLUCOSE BLDC GLUCOMTR-MCNC: 213 MG/DL (ref 70–105)
GLUCOSE BLDC GLUCOMTR-MCNC: 213 MG/DL (ref 70–105)
GLUCOSE BLDC GLUCOMTR-MCNC: 216 MG/DL (ref 70–105)
GLUCOSE BLDC GLUCOMTR-MCNC: 216 MG/DL (ref 70–105)
GLUCOSE BLDC GLUCOMTR-MCNC: 231 MG/DL (ref 70–105)
GLUCOSE BLDC GLUCOMTR-MCNC: 231 MG/DL (ref 70–105)
GLUCOSE BLDC GLUCOMTR-MCNC: 235 MG/DL (ref 70–105)
GLUCOSE BLDC GLUCOMTR-MCNC: 242 MG/DL (ref 70–105)
GLUCOSE BLDC GLUCOMTR-MCNC: 256 MG/DL (ref 70–105)
GLUCOSE BLDC GLUCOMTR-MCNC: 258 MG/DL (ref 70–105)
GLUCOSE BLDC GLUCOMTR-MCNC: 258 MG/DL (ref 70–105)
GLUCOSE BLDC GLUCOMTR-MCNC: 29 MG/DL (ref 70–105)
GLUCOSE BLDC GLUCOMTR-MCNC: 293 MG/DL (ref 70–105)
GLUCOSE BLDC GLUCOMTR-MCNC: 327 MG/DL (ref 70–105)
GLUCOSE BLDC GLUCOMTR-MCNC: 410 MG/DL (ref 70–105)
GLUCOSE BLDC GLUCOMTR-MCNC: 413 MG/DL (ref 70–105)
GLUCOSE BLDC GLUCOMTR-MCNC: 413 MG/DL (ref 70–105)
GLUCOSE BLDC GLUCOMTR-MCNC: 75 MG/DL (ref 70–105)
GLUCOSE BLDC GLUCOMTR-MCNC: 81 MG/DL (ref 70–105)
GLUCOSE BLDC GLUCOMTR-MCNC: >500 MG/DL (ref 70–105)
GLUCOSE BLDC GLUCOMTR-MCNC: >500 MG/DL (ref 70–105)
GLUCOSE UR STRIP-MCNC: ABNORMAL MG/DL
GLUCOSE UR STRIP-MCNC: ABNORMAL MG/DL
GLUCOSE UR STRIP-MCNC: NEGATIVE MG/DL
HADV DNA SPEC NAA+PROBE: NOT DETECTED
HBA1C MFR BLD: 6.3 % (ref 3.5–5.6)
HBA1C MFR BLD: 8.3 % (ref 3.5–5.6)
HCO3 BLDA-SCNC: 30.3 MMOL/L (ref 21–28)
HCO3 BLDA-SCNC: 34.2 MMOL/L (ref 21–28)
HCOV 229E RNA SPEC QL NAA+PROBE: NOT DETECTED
HCOV HKU1 RNA SPEC QL NAA+PROBE: NOT DETECTED
HCOV NL63 RNA SPEC QL NAA+PROBE: NOT DETECTED
HCOV OC43 RNA SPEC QL NAA+PROBE: NOT DETECTED
HCT VFR BLD AUTO: 28.2 % (ref 37.5–51)
HCT VFR BLD AUTO: 28.9 % (ref 37.5–51)
HCT VFR BLD AUTO: 29.3 % (ref 37.5–51)
HCT VFR BLD AUTO: 29.7 % (ref 37.5–51)
HCT VFR BLD AUTO: 30.1 % (ref 37.5–51)
HCT VFR BLD AUTO: 30.6 % (ref 37.5–51)
HCT VFR BLD AUTO: 31.3 % (ref 37.5–51)
HCT VFR BLD AUTO: 31.5 % (ref 37.5–51)
HCT VFR BLD AUTO: 31.8 % (ref 37.5–51)
HCT VFR BLD AUTO: 32.5 % (ref 37.5–51)
HCT VFR BLD AUTO: 32.6 % (ref 37.5–51)
HCT VFR BLD AUTO: 33.4 % (ref 37.5–51)
HCT VFR BLD AUTO: 33.7 % (ref 37.5–51)
HCT VFR BLD AUTO: 33.9 % (ref 37.5–51)
HCT VFR BLD AUTO: 34.5 % (ref 37.5–51)
HCT VFR BLD AUTO: 35.1 % (ref 37.5–51)
HCT VFR BLD AUTO: 35.9 % (ref 37.5–51)
HCT VFR BLD AUTO: 38.1 % (ref 37.5–51)
HCT VFR BLD AUTO: 39 % (ref 37.5–51)
HCT VFR BLD AUTO: 41 % (ref 37.5–51)
HEMODILUTION: NO
HEMODILUTION: NO
HGB BLD-MCNC: 10 G/DL (ref 13–17.7)
HGB BLD-MCNC: 10.2 G/DL (ref 13–17.7)
HGB BLD-MCNC: 10.2 G/DL (ref 13–17.7)
HGB BLD-MCNC: 10.3 G/DL (ref 13–17.7)
HGB BLD-MCNC: 10.5 G/DL (ref 13–17.7)
HGB BLD-MCNC: 10.7 G/DL (ref 13–17.7)
HGB BLD-MCNC: 10.9 G/DL (ref 13–17.7)
HGB BLD-MCNC: 11 G/DL (ref 13–17.7)
HGB BLD-MCNC: 11 G/DL (ref 13–17.7)
HGB BLD-MCNC: 11.4 G/DL (ref 13–17.7)
HGB BLD-MCNC: 11.5 G/DL (ref 13–17.7)
HGB BLD-MCNC: 11.6 G/DL (ref 13–17.7)
HGB BLD-MCNC: 11.7 G/DL (ref 13–17.7)
HGB BLD-MCNC: 11.8 G/DL (ref 13–17.7)
HGB BLD-MCNC: 12 G/DL (ref 13–17.7)
HGB BLD-MCNC: 12.6 G/DL (ref 13–17.7)
HGB BLD-MCNC: 12.6 G/DL (ref 13–17.7)
HGB BLD-MCNC: 13.1 G/DL (ref 13–17.7)
HGB BLD-MCNC: 9.6 G/DL (ref 13–17.7)
HGB BLD-MCNC: 9.6 G/DL (ref 13–17.7)
HGB UR QL STRIP.AUTO: ABNORMAL
HGB UR QL STRIP.AUTO: NEGATIVE
HGB UR QL STRIP.AUTO: NEGATIVE
HMPV RNA NPH QL NAA+NON-PROBE: NOT DETECTED
HOLD SPECIMEN: NORMAL
HOROWITZ INDEX BLD+IHG-RTO: 32 %
HOROWITZ INDEX BLD+IHG-RTO: <21 %
HPIV1 RNA SPEC QL NAA+PROBE: NOT DETECTED
HPIV2 RNA SPEC QL NAA+PROBE: NOT DETECTED
HPIV3 RNA NPH QL NAA+PROBE: NOT DETECTED
HPIV4 P GENE NPH QL NAA+PROBE: NOT DETECTED
HYALINE CASTS UR QL AUTO: ABNORMAL /LPF
IMM GRANULOCYTES # BLD AUTO: 0.3 10*3/MM3 (ref 0–0.05)
IMM GRANULOCYTES NFR BLD AUTO: 2.7 % (ref 0–0.5)
INR PPP: 0.9 (ref 0.9–1.1)
INR PPP: 0.93 (ref 0.9–1.1)
INR PPP: 0.94 (ref 0.9–1.1)
INR PPP: 0.96 (ref 0.9–1.1)
INR PPP: 0.97 (ref 0.9–1.1)
INR PPP: 0.98 (ref 0.9–1.1)
INR PPP: 0.99 (ref 0.9–1.1)
INR PPP: 1.01 (ref 0.9–1.1)
INR PPP: 1.02 (ref 0.9–1.1)
IRON 24H UR-MRATE: 52 MCG/DL (ref 59–158)
KETONES UR QL STRIP: NEGATIVE
LEUKOCYTE ESTERASE UR QL STRIP.AUTO: ABNORMAL
LEUKOCYTE ESTERASE UR QL STRIP.AUTO: NEGATIVE
LEUKOCYTE ESTERASE UR QL STRIP.AUTO: NEGATIVE
LYMPHOCYTES # BLD AUTO: 0.4 10*3/MM3 (ref 0.7–3.1)
LYMPHOCYTES # BLD AUTO: 0.8 10*3/MM3 (ref 0.7–3.1)
LYMPHOCYTES # BLD AUTO: 0.84 10*3/MM3 (ref 0.7–3.1)
LYMPHOCYTES # BLD AUTO: 1.5 10*3/MM3 (ref 0.7–3.1)
LYMPHOCYTES # BLD MANUAL: 0.74 10*3/MM3 (ref 0.7–3.1)
LYMPHOCYTES # BLD MANUAL: 0.76 10*3/MM3 (ref 0.7–3.1)
LYMPHOCYTES # BLD MANUAL: 0.82 10*3/MM3 (ref 0.7–3.1)
LYMPHOCYTES # BLD MANUAL: 1.12 10*3/MM3 (ref 0.7–3.1)
LYMPHOCYTES # BLD MANUAL: 1.16 10*3/MM3 (ref 0.7–3.1)
LYMPHOCYTES # BLD MANUAL: 1.17 10*3/MM3 (ref 0.7–3.1)
LYMPHOCYTES # BLD MANUAL: 1.31 10*3/MM3 (ref 0.7–3.1)
LYMPHOCYTES # BLD MANUAL: 1.54 10*3/MM3 (ref 0.7–3.1)
LYMPHOCYTES # BLD MANUAL: 1.55 10*3/MM3 (ref 0.7–3.1)
LYMPHOCYTES # BLD MANUAL: 1.95 10*3/MM3 (ref 0.7–3.1)
LYMPHOCYTES # BLD MANUAL: 1.96 10*3/MM3 (ref 0.7–3.1)
LYMPHOCYTES # BLD MANUAL: 1.99 10*3/MM3 (ref 0.7–3.1)
LYMPHOCYTES # BLD MANUAL: 2.03 10*3/MM3 (ref 0.7–3.1)
LYMPHOCYTES # BLD MANUAL: 2.1 10*3/MM3 (ref 0.7–3.1)
LYMPHOCYTES # BLD MANUAL: 2.17 10*3/MM3 (ref 0.7–3.1)
LYMPHOCYTES NFR BLD AUTO: 10.7 % (ref 19.6–45.3)
LYMPHOCYTES NFR BLD AUTO: 3.2 % (ref 19.6–45.3)
LYMPHOCYTES NFR BLD AUTO: 7.3 % (ref 19.6–45.3)
LYMPHOCYTES NFR BLD AUTO: 7.4 % (ref 19.6–45.3)
LYMPHOCYTES NFR BLD MANUAL: 1 % (ref 5–12)
LYMPHOCYTES NFR BLD MANUAL: 1 % (ref 5–12)
LYMPHOCYTES NFR BLD MANUAL: 10 % (ref 19.6–45.3)
LYMPHOCYTES NFR BLD MANUAL: 11 % (ref 19.6–45.3)
LYMPHOCYTES NFR BLD MANUAL: 12 % (ref 19.6–45.3)
LYMPHOCYTES NFR BLD MANUAL: 12 % (ref 19.6–45.3)
LYMPHOCYTES NFR BLD MANUAL: 14 % (ref 19.6–45.3)
LYMPHOCYTES NFR BLD MANUAL: 14 % (ref 19.6–45.3)
LYMPHOCYTES NFR BLD MANUAL: 15 % (ref 19.6–45.3)
LYMPHOCYTES NFR BLD MANUAL: 17 % (ref 19.6–45.3)
LYMPHOCYTES NFR BLD MANUAL: 18 % (ref 19.6–45.3)
LYMPHOCYTES NFR BLD MANUAL: 19 % (ref 19.6–45.3)
LYMPHOCYTES NFR BLD MANUAL: 2 % (ref 5–12)
LYMPHOCYTES NFR BLD MANUAL: 20 % (ref 19.6–45.3)
LYMPHOCYTES NFR BLD MANUAL: 3 % (ref 5–12)
LYMPHOCYTES NFR BLD MANUAL: 4 % (ref 5–12)
LYMPHOCYTES NFR BLD MANUAL: 5 % (ref 5–12)
LYMPHOCYTES NFR BLD MANUAL: 6 % (ref 19.6–45.3)
LYMPHOCYTES NFR BLD MANUAL: 6 % (ref 19.6–45.3)
LYMPHOCYTES NFR BLD MANUAL: 6 % (ref 5–12)
LYMPHOCYTES NFR BLD MANUAL: 7 % (ref 19.6–45.3)
LYMPHOCYTES NFR BLD MANUAL: 7 % (ref 5–12)
LYMPHOCYTES NFR BLD MANUAL: 8 % (ref 19.6–45.3)
M PNEUMO IGG SER IA-ACNC: NOT DETECTED
MACROCYTES BLD QL SMEAR: ABNORMAL
MAGNESIUM SERPL-MCNC: 1.6 MG/DL (ref 1.8–2.5)
MAGNESIUM SERPL-MCNC: 1.7 MG/DL (ref 1.8–2.5)
MAGNESIUM SERPL-MCNC: 1.8 MG/DL (ref 1.8–2.5)
MAGNESIUM SERPL-MCNC: 1.8 MG/DL (ref 1.8–2.5)
MAGNESIUM SERPL-MCNC: 1.9 MG/DL (ref 1.6–2.4)
MAGNESIUM SERPL-MCNC: 1.9 MG/DL (ref 1.8–2.5)
MAGNESIUM SERPL-MCNC: 2 MG/DL (ref 1.8–2.5)
MAGNESIUM SERPL-MCNC: 2.1 MG/DL (ref 1.8–2.5)
MAGNESIUM SERPL-MCNC: 2.2 MG/DL (ref 1.6–2.4)
MAGNESIUM SERPL-MCNC: 2.2 MG/DL (ref 1.6–2.4)
MAGNESIUM SERPL-MCNC: 2.4 MG/DL (ref 1.6–2.4)
MAGNESIUM SERPL-MCNC: 2.4 MG/DL (ref 1.8–2.5)
MCH RBC QN AUTO: 31.7 PG (ref 26.6–33)
MCH RBC QN AUTO: 31.8 PG (ref 26.6–33)
MCH RBC QN AUTO: 31.9 PG (ref 26.6–33)
MCH RBC QN AUTO: 32.2 PG (ref 26.6–33)
MCH RBC QN AUTO: 32.3 PG (ref 26.6–33)
MCH RBC QN AUTO: 32.4 PG (ref 26.6–33)
MCH RBC QN AUTO: 32.5 PG (ref 26.6–33)
MCH RBC QN AUTO: 32.5 PG (ref 26.6–33)
MCH RBC QN AUTO: 32.6 PG (ref 26.6–33)
MCH RBC QN AUTO: 32.6 PG (ref 26.6–33)
MCH RBC QN AUTO: 32.8 PG (ref 26.6–33)
MCH RBC QN AUTO: 32.9 PG (ref 26.6–33)
MCH RBC QN AUTO: 33.2 PG (ref 26.6–33)
MCH RBC QN AUTO: 33.2 PG (ref 26.6–33)
MCH RBC QN AUTO: 33.3 PG (ref 26.6–33)
MCHC RBC AUTO-ENTMCNC: 30.7 G/DL (ref 31.5–35.7)
MCHC RBC AUTO-ENTMCNC: 32.4 G/DL (ref 31.5–35.7)
MCHC RBC AUTO-ENTMCNC: 33.1 G/DL (ref 31.5–35.7)
MCHC RBC AUTO-ENTMCNC: 33.1 G/DL (ref 31.5–35.7)
MCHC RBC AUTO-ENTMCNC: 33.4 G/DL (ref 31.5–35.7)
MCHC RBC AUTO-ENTMCNC: 33.6 G/DL (ref 31.5–35.7)
MCHC RBC AUTO-ENTMCNC: 33.7 G/DL (ref 31.5–35.7)
MCHC RBC AUTO-ENTMCNC: 33.8 G/DL (ref 31.5–35.7)
MCHC RBC AUTO-ENTMCNC: 34.1 G/DL (ref 31.5–35.7)
MCHC RBC AUTO-ENTMCNC: 34.1 G/DL (ref 31.5–35.7)
MCHC RBC AUTO-ENTMCNC: 34.3 G/DL (ref 31.5–35.7)
MCHC RBC AUTO-ENTMCNC: 34.4 G/DL (ref 31.5–35.7)
MCHC RBC AUTO-ENTMCNC: 34.7 G/DL (ref 31.5–35.7)
MCHC RBC AUTO-ENTMCNC: 34.7 G/DL (ref 31.5–35.7)
MCV RBC AUTO: 100.6 FL (ref 79–97)
MCV RBC AUTO: 100.7 FL (ref 79–97)
MCV RBC AUTO: 103.3 FL (ref 79–97)
MCV RBC AUTO: 93.5 FL (ref 79–97)
MCV RBC AUTO: 93.9 FL (ref 79–97)
MCV RBC AUTO: 94.3 FL (ref 79–97)
MCV RBC AUTO: 94.4 FL (ref 79–97)
MCV RBC AUTO: 94.4 FL (ref 79–97)
MCV RBC AUTO: 94.5 FL (ref 79–97)
MCV RBC AUTO: 94.5 FL (ref 79–97)
MCV RBC AUTO: 94.7 FL (ref 79–97)
MCV RBC AUTO: 95.3 FL (ref 79–97)
MCV RBC AUTO: 95.5 FL (ref 79–97)
MCV RBC AUTO: 95.7 FL (ref 79–97)
MCV RBC AUTO: 95.9 FL (ref 79–97)
MCV RBC AUTO: 97.3 FL (ref 79–97)
MCV RBC AUTO: 97.4 FL (ref 79–97)
MCV RBC AUTO: 98.4 FL (ref 79–97)
MCV RBC AUTO: 98.8 FL (ref 79–97)
MCV RBC AUTO: 99.5 FL (ref 79–97)
METAMYELOCYTES NFR BLD MANUAL: 1 % (ref 0–0)
METAMYELOCYTES NFR BLD MANUAL: 2 % (ref 0–0)
METAMYELOCYTES NFR BLD MANUAL: 3 % (ref 0–0)
METAMYELOCYTES NFR BLD MANUAL: 6 % (ref 0–0)
MODALITY: ABNORMAL
MODALITY: ABNORMAL
MONOCYTES # BLD AUTO: 0.11 10*3/MM3 (ref 0.1–0.9)
MONOCYTES # BLD AUTO: 0.13 10*3/MM3 (ref 0.1–0.9)
MONOCYTES # BLD AUTO: 0.2 10*3/MM3 (ref 0.1–0.9)
MONOCYTES # BLD AUTO: 0.21 10*3/MM3 (ref 0.1–0.9)
MONOCYTES # BLD AUTO: 0.23 10*3/MM3 (ref 0.1–0.9)
MONOCYTES # BLD AUTO: 0.29 10*3/MM3 (ref 0.1–0.9)
MONOCYTES # BLD AUTO: 0.41 10*3/MM3 (ref 0.1–0.9)
MONOCYTES # BLD AUTO: 0.42 10*3/MM3 (ref 0.1–0.9)
MONOCYTES # BLD AUTO: 0.46 10*3/MM3 (ref 0.1–0.9)
MONOCYTES # BLD AUTO: 0.5 10*3/MM3 (ref 0.1–0.9)
MONOCYTES # BLD AUTO: 0.52 10*3/MM3 (ref 0.1–0.9)
MONOCYTES # BLD AUTO: 0.57 10*3/MM3 (ref 0.1–0.9)
MONOCYTES # BLD AUTO: 0.62 10*3/MM3 (ref 0.1–0.9)
MONOCYTES # BLD AUTO: 0.7 10*3/MM3 (ref 0.1–0.9)
MONOCYTES # BLD AUTO: 0.71 10*3/MM3 (ref 0.1–0.9)
MONOCYTES # BLD AUTO: 0.72 10*3/MM3 (ref 0.1–0.9)
MONOCYTES # BLD AUTO: 1.17 10*3/MM3 (ref 0.1–0.9)
MONOCYTES NFR BLD AUTO: 3.6 % (ref 5–12)
MONOCYTES NFR BLD AUTO: 3.7 % (ref 5–12)
MONOCYTES NFR BLD AUTO: 5 % (ref 5–12)
MONOCYTES NFR BLD AUTO: 6.1 % (ref 5–12)
MYELOCYTES NFR BLD MANUAL: 1 % (ref 0–0)
MYELOCYTES NFR BLD MANUAL: 2 % (ref 0–0)
MYELOCYTES NFR BLD MANUAL: 3 % (ref 0–0)
MYELOCYTES NFR BLD MANUAL: 5 % (ref 0–0)
NEUTROPHILS # BLD AUTO: 11.12 10*3/MM3 (ref 1.7–7)
NEUTROPHILS # BLD AUTO: 11.36 10*3/MM3 (ref 1.7–7)
NEUTROPHILS # BLD AUTO: 11.59 10*3/MM3 (ref 1.7–7)
NEUTROPHILS # BLD AUTO: 11.7 10*3/MM3 (ref 1.7–7)
NEUTROPHILS # BLD AUTO: 12.9 10*3/MM3 (ref 1.7–7)
NEUTROPHILS # BLD AUTO: 13.09 10*3/MM3 (ref 1.7–7)
NEUTROPHILS # BLD AUTO: 16.19 10*3/MM3 (ref 1.7–7)
NEUTROPHILS # BLD AUTO: 7.77 10*3/MM3 (ref 1.7–7)
NEUTROPHILS # BLD AUTO: 8.03 10*3/MM3 (ref 1.7–7)
NEUTROPHILS # BLD AUTO: 8.14 10*3/MM3 (ref 1.7–7)
NEUTROPHILS # BLD AUTO: 8.15 10*3/MM3 (ref 1.7–7)
NEUTROPHILS # BLD AUTO: 8.36 10*3/MM3 (ref 1.7–7)
NEUTROPHILS # BLD AUTO: 8.36 10*3/MM3 (ref 1.7–7)
NEUTROPHILS # BLD AUTO: 8.51 10*3/MM3 (ref 1.7–7)
NEUTROPHILS # BLD AUTO: 8.66 10*3/MM3 (ref 1.7–7)
NEUTROPHILS # BLD AUTO: 9.43 10*3/MM3 (ref 1.7–7)
NEUTROPHILS # BLD AUTO: 9.48 10*3/MM3 (ref 1.7–7)
NEUTROPHILS # BLD AUTO: 9.72 10*3/MM3 (ref 1.7–7)
NEUTROPHILS # BLD AUTO: 9.9 10*3/MM3 (ref 1.7–7)
NEUTROPHILS NFR BLD AUTO: 83.4 % (ref 42.7–76)
NEUTROPHILS NFR BLD AUTO: 86 % (ref 42.7–76)
NEUTROPHILS NFR BLD AUTO: 86.2 % (ref 42.7–76)
NEUTROPHILS NFR BLD AUTO: 93.1 % (ref 42.7–76)
NEUTROPHILS NFR BLD MANUAL: 66 % (ref 42.7–76)
NEUTROPHILS NFR BLD MANUAL: 68 % (ref 42.7–76)
NEUTROPHILS NFR BLD MANUAL: 69 % (ref 42.7–76)
NEUTROPHILS NFR BLD MANUAL: 70 % (ref 42.7–76)
NEUTROPHILS NFR BLD MANUAL: 70 % (ref 42.7–76)
NEUTROPHILS NFR BLD MANUAL: 72 % (ref 42.7–76)
NEUTROPHILS NFR BLD MANUAL: 74 % (ref 42.7–76)
NEUTROPHILS NFR BLD MANUAL: 75 % (ref 42.7–76)
NEUTROPHILS NFR BLD MANUAL: 75 % (ref 42.7–76)
NEUTROPHILS NFR BLD MANUAL: 77 % (ref 42.7–76)
NEUTROPHILS NFR BLD MANUAL: 78 % (ref 42.7–76)
NEUTROPHILS NFR BLD MANUAL: 80 % (ref 42.7–76)
NEUTROPHILS NFR BLD MANUAL: 83 % (ref 42.7–76)
NEUTS BAND NFR BLD MANUAL: 10 % (ref 0–5)
NEUTS BAND NFR BLD MANUAL: 11 % (ref 0–5)
NEUTS BAND NFR BLD MANUAL: 12 % (ref 0–5)
NEUTS BAND NFR BLD MANUAL: 13 % (ref 0–5)
NEUTS BAND NFR BLD MANUAL: 2 % (ref 0–5)
NEUTS BAND NFR BLD MANUAL: 4 % (ref 0–5)
NEUTS BAND NFR BLD MANUAL: 5 % (ref 0–5)
NEUTS BAND NFR BLD MANUAL: 6 % (ref 0–5)
NEUTS BAND NFR BLD MANUAL: 6 % (ref 0–5)
NEUTS BAND NFR BLD MANUAL: 9 % (ref 0–5)
NITRITE UR QL STRIP: NEGATIVE
NRBC BLD AUTO-RTO: 0 /100 WBC (ref 0–0.2)
NRBC BLD AUTO-RTO: 0.1 /100 WBC (ref 0–0.2)
NRBC BLD AUTO-RTO: 0.2 /100 WBC (ref 0–0.2)
NRBC BLD AUTO-RTO: 0.2 /100 WBC (ref 0–0.2)
NRBC SPEC MANUAL: 1 /100 WBC (ref 0–0.2)
OTHER CELLS %: 1 % (ref 0–0)
PCO2 BLDA: 40.6 MM HG (ref 35–48)
PCO2 BLDA: 45.8 MM HG (ref 35–48)
PH BLDA: 7.48 PH UNITS (ref 7.35–7.45)
PH BLDA: 7.48 PH UNITS (ref 7.35–7.45)
PH UR STRIP.AUTO: 5.5 [PH] (ref 5–8)
PH UR STRIP.AUTO: 8 [PH] (ref 5–8)
PH UR STRIP.AUTO: <=5 [PH] (ref 5–8)
PHOSPHATE SERPL-MCNC: 2.5 MG/DL (ref 2.4–4.7)
PHOSPHATE SERPL-MCNC: 3 MG/DL (ref 2.5–4.5)
PHOSPHATE SERPL-MCNC: 3.2 MG/DL (ref 2.4–4.7)
PHOSPHATE SERPL-MCNC: 3.3 MG/DL (ref 2.4–4.7)
PHOSPHATE SERPL-MCNC: 3.6 MG/DL (ref 2.4–4.7)
PHOSPHATE SERPL-MCNC: 3.9 MG/DL (ref 2.5–4.5)
PHOSPHATE SERPL-MCNC: 4 MG/DL (ref 2.4–4.7)
PHOSPHATE SERPL-MCNC: 5.1 MG/DL (ref 2.4–4.7)
PLAT MORPH BLD: NORMAL
PLATELET # BLD AUTO: 148 10*3/MM3 (ref 140–450)
PLATELET # BLD AUTO: 171 10*3/MM3 (ref 140–450)
PLATELET # BLD AUTO: 178 10*3/MM3 (ref 140–450)
PLATELET # BLD AUTO: 179 10*3/MM3 (ref 140–450)
PLATELET # BLD AUTO: 181 10*3/MM3 (ref 140–450)
PLATELET # BLD AUTO: 182 10*3/MM3 (ref 140–450)
PLATELET # BLD AUTO: 187 10*3/MM3 (ref 140–450)
PLATELET # BLD AUTO: 187 10*3/MM3 (ref 140–450)
PLATELET # BLD AUTO: 190 10*3/MM3 (ref 140–450)
PLATELET # BLD AUTO: 191 10*3/MM3 (ref 140–450)
PLATELET # BLD AUTO: 194 10*3/MM3 (ref 140–450)
PLATELET # BLD AUTO: 199 10*3/MM3 (ref 140–450)
PLATELET # BLD AUTO: 209 10*3/MM3 (ref 140–450)
PLATELET # BLD AUTO: 219 10*3/MM3 (ref 140–450)
PLATELET # BLD AUTO: 226 10*3/MM3 (ref 140–450)
PLATELET # BLD AUTO: 231 10*3/MM3 (ref 140–450)
PLATELET # BLD AUTO: 231 10*3/MM3 (ref 140–450)
PLATELET # BLD AUTO: 234 10*3/MM3 (ref 140–450)
PLATELET # BLD AUTO: 251 10*3/MM3 (ref 140–450)
PLATELET # BLD AUTO: 256 10*3/MM3 (ref 140–450)
PMV BLD AUTO: 10.7 FL (ref 6–12)
PMV BLD AUTO: 7.6 FL (ref 6–12)
PMV BLD AUTO: 7.7 FL (ref 6–12)
PMV BLD AUTO: 7.9 FL (ref 6–12)
PMV BLD AUTO: 8 FL (ref 6–12)
PMV BLD AUTO: 8.2 FL (ref 6–12)
PMV BLD AUTO: 8.2 FL (ref 6–12)
PMV BLD AUTO: 8.3 FL (ref 6–12)
PMV BLD AUTO: 8.4 FL (ref 6–12)
PMV BLD AUTO: 8.5 FL (ref 6–12)
PMV BLD AUTO: 8.6 FL (ref 6–12)
PMV BLD AUTO: 8.8 FL (ref 6–12)
PMV BLD AUTO: 8.8 FL (ref 6–12)
PMV BLD AUTO: 8.9 FL (ref 6–12)
PMV BLD AUTO: 9 FL (ref 6–12)
PMV BLD AUTO: 9.3 FL (ref 6–12)
PO2 BLDA: 55.3 MM HG (ref 83–108)
PO2 BLDA: 70.9 MM HG (ref 83–108)
POIKILOCYTOSIS BLD QL SMEAR: ABNORMAL
POIKILOCYTOSIS BLD QL SMEAR: ABNORMAL
POLYCHROMASIA BLD QL SMEAR: ABNORMAL
POTASSIUM BLD-SCNC: 2.6 MMOL/L (ref 3.5–5.2)
POTASSIUM BLD-SCNC: 3.1 MMOL/L (ref 3.5–5.2)
POTASSIUM BLD-SCNC: 3.1 MMOL/L (ref 3.6–5.1)
POTASSIUM BLD-SCNC: 3.3 MMOL/L (ref 3.6–5.1)
POTASSIUM BLD-SCNC: 3.6 MMOL/L (ref 3.5–5.2)
POTASSIUM BLD-SCNC: 3.6 MMOL/L (ref 3.6–5.1)
POTASSIUM BLD-SCNC: 3.6 MMOL/L (ref 3.6–5.1)
POTASSIUM BLD-SCNC: 3.8 MMOL/L (ref 3.6–5.1)
POTASSIUM BLD-SCNC: 3.9 MMOL/L (ref 3.5–5.2)
POTASSIUM BLD-SCNC: 3.9 MMOL/L (ref 3.5–5.2)
POTASSIUM BLD-SCNC: 3.9 MMOL/L (ref 3.6–5.1)
POTASSIUM BLD-SCNC: 3.9 MMOL/L (ref 3.6–5.1)
POTASSIUM BLD-SCNC: 4.3 MMOL/L (ref 3.6–5.1)
POTASSIUM BLD-SCNC: 4.3 MMOL/L (ref 3.6–5.1)
POTASSIUM BLD-SCNC: 4.4 MMOL/L (ref 3.6–5.1)
POTASSIUM BLD-SCNC: 4.4 MMOL/L (ref 3.6–5.1)
POTASSIUM BLD-SCNC: 4.7 MMOL/L (ref 3.6–5.1)
PROT SERPL-MCNC: 5.1 G/DL (ref 6.1–7.9)
PROT SERPL-MCNC: 5.2 G/DL (ref 6–8.5)
PROT SERPL-MCNC: 5.3 G/DL (ref 6.1–7.9)
PROT SERPL-MCNC: 5.8 G/DL (ref 6.1–7.9)
PROT SERPL-MCNC: 5.9 G/DL (ref 6–8.5)
PROT SERPL-MCNC: 6.2 G/DL (ref 6–8.5)
PROT SERPL-MCNC: 6.4 G/DL (ref 6.1–7.9)
PROT UR QL STRIP: ABNORMAL
PROT UR QL STRIP: NEGATIVE
PROT UR QL STRIP: NEGATIVE
PROTHROMBIN TIME: 10 SECONDS (ref 9.6–11.7)
PROTHROMBIN TIME: 10.2 SECONDS (ref 9.6–11.7)
PROTHROMBIN TIME: 10.2 SECONDS (ref 9.6–11.7)
PROTHROMBIN TIME: 10.4 SECONDS (ref 9.6–11.7)
PROTHROMBIN TIME: 10.5 SECONDS (ref 9.6–11.7)
PROTHROMBIN TIME: 9.7 SECONDS (ref 9.6–11.7)
PROTHROMBIN TIME: 9.8 SECONDS (ref 9.6–11.7)
RBC # BLD AUTO: 2.87 10*6/MM3 (ref 4.14–5.8)
RBC # BLD AUTO: 2.96 10*6/MM3 (ref 4.14–5.8)
RBC # BLD AUTO: 3.1 10*6/MM3 (ref 4.14–5.8)
RBC # BLD AUTO: 3.12 10*6/MM3 (ref 4.14–5.8)
RBC # BLD AUTO: 3.13 10*6/MM3 (ref 4.14–5.8)
RBC # BLD AUTO: 3.19 10*6/MM3 (ref 4.14–5.8)
RBC # BLD AUTO: 3.24 10*6/MM3 (ref 4.14–5.8)
RBC # BLD AUTO: 3.31 10*6/MM3 (ref 4.14–5.8)
RBC # BLD AUTO: 3.38 10*6/MM3 (ref 4.14–5.8)
RBC # BLD AUTO: 3.39 10*6/MM3 (ref 4.14–5.8)
RBC # BLD AUTO: 3.45 10*6/MM3 (ref 4.14–5.8)
RBC # BLD AUTO: 3.49 10*6/MM3 (ref 4.14–5.8)
RBC # BLD AUTO: 3.54 10*6/MM3 (ref 4.14–5.8)
RBC # BLD AUTO: 3.57 10*6/MM3 (ref 4.14–5.8)
RBC # BLD AUTO: 3.57 10*6/MM3 (ref 4.14–5.8)
RBC # BLD AUTO: 3.65 10*6/MM3 (ref 4.14–5.8)
RBC # BLD AUTO: 3.66 10*6/MM3 (ref 4.14–5.8)
RBC # BLD AUTO: 3.82 10*6/MM3 (ref 4.14–5.8)
RBC # BLD AUTO: 3.94 10*6/MM3 (ref 4.14–5.8)
RBC # BLD AUTO: 3.97 10*6/MM3 (ref 4.14–5.8)
RBC # UR: ABNORMAL /HPF
RBC MORPH BLD: NORMAL
REF LAB TEST METHOD: ABNORMAL
RETICS # AUTO: 0.09 10*6/MM3 (ref 0.02–0.13)
RETICS/RBC NFR AUTO: 2.33 % (ref 0.7–1.9)
RHINOVIRUS RNA SPEC NAA+PROBE: NOT DETECTED
RSV RNA NPH QL NAA+NON-PROBE: NOT DETECTED
SAO2 % BLDCOA: 90.1 % (ref 94–98)
SAO2 % BLDCOA: 95.1 % (ref 94–98)
SCAN SLIDE: NORMAL
SODIUM BLD-SCNC: 132 MMOL/L (ref 136–144)
SODIUM BLD-SCNC: 133 MMOL/L (ref 136–144)
SODIUM BLD-SCNC: 134 MMOL/L (ref 136–144)
SODIUM BLD-SCNC: 134 MMOL/L (ref 136–145)
SODIUM BLD-SCNC: 135 MMOL/L (ref 136–144)
SODIUM BLD-SCNC: 135 MMOL/L (ref 136–144)
SODIUM BLD-SCNC: 136 MMOL/L (ref 136–144)
SODIUM BLD-SCNC: 137 MMOL/L (ref 136–144)
SODIUM BLD-SCNC: 137 MMOL/L (ref 136–144)
SODIUM BLD-SCNC: 138 MMOL/L (ref 136–144)
SODIUM BLD-SCNC: 138 MMOL/L (ref 136–144)
SODIUM BLD-SCNC: 148 MMOL/L (ref 136–144)
SODIUM BLD-SCNC: 151 MMOL/L (ref 136–145)
SODIUM BLD-SCNC: 152 MMOL/L (ref 136–145)
SODIUM BLD-SCNC: 156 MMOL/L (ref 136–145)
SODIUM BLD-SCNC: 162 MMOL/L (ref 136–145)
SP GR UR STRIP: 1.01 (ref 1–1.03)
SP GR UR STRIP: 1.02 (ref 1–1.03)
SP GR UR STRIP: >=1.03 (ref 1–1.03)
SQUAMOUS #/AREA URNS HPF: ABNORMAL /HPF
TOXIC GRANULATION: ABNORMAL
TROPONIN I SERPL-MCNC: 0.03 NG/ML (ref 0–0.03)
TROPONIN I SERPL-MCNC: 0.08 NG/ML (ref 0–0.03)
TROPONIN I SERPL-MCNC: 0.09 NG/ML (ref 0–0.03)
TROPONIN I SERPL-MCNC: 0.09 NG/ML (ref 0–0.03)
TROPONIN I SERPL-MCNC: 0.11 NG/ML (ref 0–0.03)
TROPONIN I SERPL-MCNC: 0.15 NG/ML (ref 0–0.03)
TROPONIN I SERPL-MCNC: 0.15 NG/ML (ref 0–0.03)
TROPONIN I SERPL-MCNC: 0.16 NG/ML (ref 0–0.03)
TROPONIN I SERPL-MCNC: 0.17 NG/ML (ref 0–0.03)
TROPONIN I SERPL-MCNC: 0.19 NG/ML (ref 0–0.03)
TROPONIN I SERPL-MCNC: 0.2 NG/ML (ref 0–0.03)
TROPONIN I SERPL-MCNC: 0.2 NG/ML (ref 0–0.03)
TROPONIN I SERPL-MCNC: 0.48 NG/ML (ref 0–0.03)
TROPONIN I SERPL-MCNC: 0.5 NG/ML (ref 0–0.03)
TSH SERPL DL<=0.05 MIU/L-ACNC: 0.44 UIU/ML (ref 0.27–4.2)
URATE SERPL-MCNC: 9.3 MG/DL (ref 3.4–7)
UROBILINOGEN UR QL STRIP: ABNORMAL
VANCOMYCIN PEAK SERPL-MCNC: 26.9 MCG/ML (ref 20–40)
VANCOMYCIN SERPL-MCNC: 12.3 MCG/ML (ref 5–40)
VANCOMYCIN SERPL-MCNC: 8.3 MCG/ML (ref 5–25)
VIT B12 BLD-MCNC: 502 PG/ML (ref 180–914)
WBC MORPH BLD: NORMAL
WBC NRBC COR # BLD: 10.2 10*3/MM3 (ref 3.4–10.8)
WBC NRBC COR # BLD: 10.3 10*3/MM3 (ref 3.4–10.8)
WBC NRBC COR # BLD: 10.3 10*3/MM3 (ref 3.4–10.8)
WBC NRBC COR # BLD: 10.5 10*3/MM3 (ref 3.4–10.8)
WBC NRBC COR # BLD: 10.6 10*3/MM3 (ref 3.4–10.8)
WBC NRBC COR # BLD: 10.9 10*3/MM3 (ref 3.4–10.8)
WBC NRBC COR # BLD: 11.3 10*3/MM3 (ref 3.4–10.8)
WBC NRBC COR # BLD: 11.3 10*3/MM3 (ref 3.4–10.8)
WBC NRBC COR # BLD: 11.4 10*3/MM3 (ref 3.4–10.8)
WBC NRBC COR # BLD: 11.5 10*3/MM3 (ref 3.4–10.8)
WBC NRBC COR # BLD: 11.5 10*3/MM3 (ref 3.4–10.8)
WBC NRBC COR # BLD: 12.6 10*3/MM3 (ref 3.4–10.8)
WBC NRBC COR # BLD: 13.9 10*3/MM3 (ref 3.4–10.8)
WBC NRBC COR # BLD: 13.9 10*3/MM3 (ref 3.4–10.8)
WBC NRBC COR # BLD: 14.1 10*3/MM3 (ref 3.4–10.8)
WBC NRBC COR # BLD: 14.2 10*3/MM3 (ref 3.4–10.8)
WBC NRBC COR # BLD: 14.4 10*3/MM3 (ref 3.4–10.8)
WBC NRBC COR # BLD: 15.4 10*3/MM3 (ref 3.4–10.8)
WBC NRBC COR # BLD: 19.5 10*3/MM3 (ref 3.4–10.8)
WBC NRBC COR # BLD: 9.7 10*3/MM3 (ref 3.4–10.8)
WBC UR QL AUTO: ABNORMAL /HPF
WHOLE BLOOD HOLD SPECIMEN: NORMAL

## 2019-01-01 PROCEDURE — 85007 BL SMEAR W/DIFF WBC COUNT: CPT | Performed by: NURSE PRACTITIONER

## 2019-01-01 PROCEDURE — 82607 VITAMIN B-12: CPT | Performed by: FAMILY MEDICINE

## 2019-01-01 PROCEDURE — 83735 ASSAY OF MAGNESIUM: CPT | Performed by: EMERGENCY MEDICINE

## 2019-01-01 PROCEDURE — 85025 COMPLETE CBC W/AUTO DIFF WBC: CPT | Performed by: FAMILY MEDICINE

## 2019-01-01 PROCEDURE — 84100 ASSAY OF PHOSPHORUS: CPT | Performed by: NURSE PRACTITIONER

## 2019-01-01 PROCEDURE — 82962 GLUCOSE BLOOD TEST: CPT

## 2019-01-01 PROCEDURE — 99284 EMERGENCY DEPT VISIT MOD MDM: CPT

## 2019-01-01 PROCEDURE — 99232 SBSQ HOSP IP/OBS MODERATE 35: CPT | Performed by: NURSE PRACTITIONER

## 2019-01-01 PROCEDURE — 80048 BASIC METABOLIC PNL TOTAL CA: CPT

## 2019-01-01 PROCEDURE — 63710000001 INSULIN LISPRO (HUMAN) PER 5 UNITS: Performed by: FAMILY MEDICINE

## 2019-01-01 PROCEDURE — 25010000002 ALBUMIN HUMAN 25% PER 50 ML: Performed by: INTERNAL MEDICINE

## 2019-01-01 PROCEDURE — 83735 ASSAY OF MAGNESIUM: CPT | Performed by: NURSE PRACTITIONER

## 2019-01-01 PROCEDURE — 99222 1ST HOSP IP/OBS MODERATE 55: CPT | Performed by: INTERNAL MEDICINE

## 2019-01-01 PROCEDURE — 82803 BLOOD GASES ANY COMBINATION: CPT

## 2019-01-01 PROCEDURE — 83036 HEMOGLOBIN GLYCOSYLATED A1C: CPT | Performed by: FAMILY MEDICINE

## 2019-01-01 PROCEDURE — 25010000002 HYDROCORTISONE SODIUM SUCCINATE 100 MG RECONSTITUTED SOLUTION: Performed by: INTERNAL MEDICINE

## 2019-01-01 PROCEDURE — 63710000001 DEXAMETHASONE PER 0.25 MG: Performed by: NURSE PRACTITIONER

## 2019-01-01 PROCEDURE — 92526 ORAL FUNCTION THERAPY: CPT

## 2019-01-01 PROCEDURE — C1751 CATH, INF, PER/CENT/MIDLINE: HCPCS

## 2019-01-01 PROCEDURE — 93010 ELECTROCARDIOGRAM REPORT: CPT | Performed by: INTERNAL MEDICINE

## 2019-01-01 PROCEDURE — 99285 EMERGENCY DEPT VISIT HI MDM: CPT

## 2019-01-01 PROCEDURE — 85730 THROMBOPLASTIN TIME PARTIAL: CPT | Performed by: EMERGENCY MEDICINE

## 2019-01-01 PROCEDURE — 85025 COMPLETE CBC W/AUTO DIFF WBC: CPT | Performed by: NURSE PRACTITIONER

## 2019-01-01 PROCEDURE — 71045 X-RAY EXAM CHEST 1 VIEW: CPT

## 2019-01-01 PROCEDURE — 83880 ASSAY OF NATRIURETIC PEPTIDE: CPT | Performed by: NURSE PRACTITIONER

## 2019-01-01 PROCEDURE — 94760 N-INVAS EAR/PLS OXIMETRY 1: CPT

## 2019-01-01 PROCEDURE — 80048 BASIC METABOLIC PNL TOTAL CA: CPT | Performed by: NURSE PRACTITIONER

## 2019-01-01 PROCEDURE — 25010000002 VANCOMYCIN 10 G RECONSTITUTED SOLUTION: Performed by: EMERGENCY MEDICINE

## 2019-01-01 PROCEDURE — 74018 RADEX ABDOMEN 1 VIEW: CPT

## 2019-01-01 PROCEDURE — 85007 BL SMEAR W/DIFF WBC COUNT: CPT | Performed by: EMERGENCY MEDICINE

## 2019-01-01 PROCEDURE — 99239 HOSP IP/OBS DSCHRG MGMT >30: CPT | Performed by: INTERNAL MEDICINE

## 2019-01-01 PROCEDURE — 25010000002 CEFTRIAXONE PER 250 MG: Performed by: NURSE PRACTITIONER

## 2019-01-01 PROCEDURE — 83605 ASSAY OF LACTIC ACID: CPT

## 2019-01-01 PROCEDURE — 25010000002 CEFEPIME PER 500 MG: Performed by: FAMILY MEDICINE

## 2019-01-01 PROCEDURE — 25010000002 PIPERACILLIN SOD-TAZOBACTAM PER 1 G: Performed by: INTERNAL MEDICINE

## 2019-01-01 PROCEDURE — 93005 ELECTROCARDIOGRAM TRACING: CPT | Performed by: EMERGENCY MEDICINE

## 2019-01-01 PROCEDURE — 94799 UNLISTED PULMONARY SVC/PX: CPT

## 2019-01-01 PROCEDURE — 93005 ELECTROCARDIOGRAM TRACING: CPT

## 2019-01-01 PROCEDURE — 84484 ASSAY OF TROPONIN QUANT: CPT | Performed by: NURSE PRACTITIONER

## 2019-01-01 PROCEDURE — 84132 ASSAY OF SERUM POTASSIUM: CPT | Performed by: INTERNAL MEDICINE

## 2019-01-01 PROCEDURE — 85025 COMPLETE CBC W/AUTO DIFF WBC: CPT | Performed by: EMERGENCY MEDICINE

## 2019-01-01 PROCEDURE — 99233 SBSQ HOSP IP/OBS HIGH 50: CPT | Performed by: INTERNAL MEDICINE

## 2019-01-01 PROCEDURE — 97162 PT EVAL MOD COMPLEX 30 MIN: CPT

## 2019-01-01 PROCEDURE — 25010000002 VANCOMYCIN 10 G RECONSTITUTED SOLUTION: Performed by: INTERNAL MEDICINE

## 2019-01-01 PROCEDURE — 82330 ASSAY OF CALCIUM: CPT | Performed by: NURSE PRACTITIONER

## 2019-01-01 PROCEDURE — 84550 ASSAY OF BLOOD/URIC ACID: CPT | Performed by: INTERNAL MEDICINE

## 2019-01-01 PROCEDURE — 83880 ASSAY OF NATRIURETIC PEPTIDE: CPT | Performed by: EMERGENCY MEDICINE

## 2019-01-01 PROCEDURE — 25010000002 ENOXAPARIN PER 10 MG: Performed by: INTERNAL MEDICINE

## 2019-01-01 PROCEDURE — 25010000002 VANCOMYCIN 10 G RECONSTITUTED SOLUTION: Performed by: FAMILY MEDICINE

## 2019-01-01 PROCEDURE — 85025 COMPLETE CBC W/AUTO DIFF WBC: CPT

## 2019-01-01 PROCEDURE — 99223 1ST HOSP IP/OBS HIGH 75: CPT | Performed by: NURSE PRACTITIONER

## 2019-01-01 PROCEDURE — 25010000003 AMPICILLIN-SULBACTAM PER 1.5 G: Performed by: INTERNAL MEDICINE

## 2019-01-01 PROCEDURE — 85610 PROTHROMBIN TIME: CPT | Performed by: EMERGENCY MEDICINE

## 2019-01-01 PROCEDURE — 85610 PROTHROMBIN TIME: CPT | Performed by: NURSE PRACTITIONER

## 2019-01-01 PROCEDURE — 85007 BL SMEAR W/DIFF WBC COUNT: CPT | Performed by: FAMILY MEDICINE

## 2019-01-01 PROCEDURE — 87086 URINE CULTURE/COLONY COUNT: CPT | Performed by: EMERGENCY MEDICINE

## 2019-01-01 PROCEDURE — 80048 BASIC METABOLIC PNL TOTAL CA: CPT | Performed by: EMERGENCY MEDICINE

## 2019-01-01 PROCEDURE — 99232 SBSQ HOSP IP/OBS MODERATE 35: CPT | Performed by: INTERNAL MEDICINE

## 2019-01-01 PROCEDURE — 74230 X-RAY XM SWLNG FUNCJ C+: CPT

## 2019-01-01 PROCEDURE — 99223 1ST HOSP IP/OBS HIGH 75: CPT | Performed by: INTERNAL MEDICINE

## 2019-01-01 PROCEDURE — 82947 ASSAY GLUCOSE BLOOD QUANT: CPT | Performed by: FAMILY MEDICINE

## 2019-01-01 PROCEDURE — 93005 ELECTROCARDIOGRAM TRACING: CPT | Performed by: NURSE PRACTITIONER

## 2019-01-01 PROCEDURE — 70450 CT HEAD/BRAIN W/O DYE: CPT

## 2019-01-01 PROCEDURE — G0378 HOSPITAL OBSERVATION PER HR: HCPCS

## 2019-01-01 PROCEDURE — 81003 URINALYSIS AUTO W/O SCOPE: CPT

## 2019-01-01 PROCEDURE — 25010000002 DIGOXIN PER 500 MCG: Performed by: INTERNAL MEDICINE

## 2019-01-01 PROCEDURE — 83605 ASSAY OF LACTIC ACID: CPT | Performed by: INTERNAL MEDICINE

## 2019-01-01 PROCEDURE — 80202 ASSAY OF VANCOMYCIN: CPT | Performed by: INTERNAL MEDICINE

## 2019-01-01 PROCEDURE — 82330 ASSAY OF CALCIUM: CPT | Performed by: INTERNAL MEDICINE

## 2019-01-01 PROCEDURE — 36600 WITHDRAWAL OF ARTERIAL BLOOD: CPT

## 2019-01-01 PROCEDURE — 92611 MOTION FLUOROSCOPY/SWALLOW: CPT

## 2019-01-01 PROCEDURE — 25010000002 HYDROCORTISONE SODIUM SUCCINATE 100 MG RECONSTITUTED SOLUTION: Performed by: NURSE PRACTITIONER

## 2019-01-01 PROCEDURE — 80053 COMPREHEN METABOLIC PANEL: CPT | Performed by: NURSE PRACTITIONER

## 2019-01-01 PROCEDURE — 82746 ASSAY OF FOLIC ACID SERUM: CPT | Performed by: FAMILY MEDICINE

## 2019-01-01 PROCEDURE — 81003 URINALYSIS AUTO W/O SCOPE: CPT | Performed by: EMERGENCY MEDICINE

## 2019-01-01 PROCEDURE — 25010000002 PIPERACILLIN SOD-TAZOBACTAM PER 1 G: Performed by: FAMILY MEDICINE

## 2019-01-01 PROCEDURE — P9047 ALBUMIN (HUMAN), 25%, 50ML: HCPCS | Performed by: INTERNAL MEDICINE

## 2019-01-01 PROCEDURE — 85025 COMPLETE CBC W/AUTO DIFF WBC: CPT | Performed by: INTERNAL MEDICINE

## 2019-01-01 PROCEDURE — 84484 ASSAY OF TROPONIN QUANT: CPT | Performed by: INTERNAL MEDICINE

## 2019-01-01 PROCEDURE — 84484 ASSAY OF TROPONIN QUANT: CPT | Performed by: EMERGENCY MEDICINE

## 2019-01-01 PROCEDURE — 25010000002 PIPERACILLIN SOD-TAZOBACTAM PER 1 G: Performed by: NURSE PRACTITIONER

## 2019-01-01 PROCEDURE — 86140 C-REACTIVE PROTEIN: CPT | Performed by: EMERGENCY MEDICINE

## 2019-01-01 PROCEDURE — 85045 AUTOMATED RETICULOCYTE COUNT: CPT | Performed by: FAMILY MEDICINE

## 2019-01-01 PROCEDURE — 80053 COMPREHEN METABOLIC PANEL: CPT | Performed by: EMERGENCY MEDICINE

## 2019-01-01 PROCEDURE — 82330 ASSAY OF CALCIUM: CPT | Performed by: EMERGENCY MEDICINE

## 2019-01-01 PROCEDURE — 25010000002 CEFTRIAXONE PER 250 MG: Performed by: EMERGENCY MEDICINE

## 2019-01-01 PROCEDURE — 85027 COMPLETE CBC AUTOMATED: CPT | Performed by: NURSE PRACTITIONER

## 2019-01-01 PROCEDURE — 63710000001 INSULIN LISPRO (HUMAN) PER 5 UNITS: Performed by: INTERNAL MEDICINE

## 2019-01-01 PROCEDURE — 97530 THERAPEUTIC ACTIVITIES: CPT

## 2019-01-01 PROCEDURE — 83036 HEMOGLOBIN GLYCOSYLATED A1C: CPT | Performed by: NURSE PRACTITIONER

## 2019-01-01 PROCEDURE — 97110 THERAPEUTIC EXERCISES: CPT

## 2019-01-01 PROCEDURE — 87040 BLOOD CULTURE FOR BACTERIA: CPT | Performed by: FAMILY MEDICINE

## 2019-01-01 PROCEDURE — 94640 AIRWAY INHALATION TREATMENT: CPT

## 2019-01-01 PROCEDURE — 80048 BASIC METABOLIC PNL TOTAL CA: CPT | Performed by: FAMILY MEDICINE

## 2019-01-01 PROCEDURE — 25010000002 PIPERACILLIN SOD-TAZOBACTAM PER 1 G: Performed by: EMERGENCY MEDICINE

## 2019-01-01 PROCEDURE — 80202 ASSAY OF VANCOMYCIN: CPT | Performed by: FAMILY MEDICINE

## 2019-01-01 PROCEDURE — 72125 CT NECK SPINE W/O DYE: CPT

## 2019-01-01 PROCEDURE — 87040 BLOOD CULTURE FOR BACTERIA: CPT | Performed by: EMERGENCY MEDICINE

## 2019-01-01 PROCEDURE — 97116 GAIT TRAINING THERAPY: CPT

## 2019-01-01 PROCEDURE — 25010000002 ENOXAPARIN PER 10 MG: Performed by: FAMILY MEDICINE

## 2019-01-01 PROCEDURE — 93005 ELECTROCARDIOGRAM TRACING: CPT | Performed by: INTERNAL MEDICINE

## 2019-01-01 PROCEDURE — 83540 ASSAY OF IRON: CPT | Performed by: INTERNAL MEDICINE

## 2019-01-01 PROCEDURE — 84443 ASSAY THYROID STIM HORMONE: CPT | Performed by: INTERNAL MEDICINE

## 2019-01-01 PROCEDURE — 0099U HC BIOFIRE FILMARRAY RESP PANEL 1: CPT | Performed by: EMERGENCY MEDICINE

## 2019-01-01 PROCEDURE — 85007 BL SMEAR W/DIFF WBC COUNT: CPT | Performed by: INTERNAL MEDICINE

## 2019-01-01 PROCEDURE — 25010000003 POTASSIUM CHLORIDE 10 MEQ/100ML SOLUTION: Performed by: FAMILY MEDICINE

## 2019-01-01 PROCEDURE — 80053 COMPREHEN METABOLIC PANEL: CPT | Performed by: FAMILY MEDICINE

## 2019-01-01 PROCEDURE — 84100 ASSAY OF PHOSPHORUS: CPT | Performed by: DIETITIAN, REGISTERED

## 2019-01-01 PROCEDURE — 92610 EVALUATE SWALLOWING FUNCTION: CPT

## 2019-01-01 PROCEDURE — 63710000001 INSULIN LISPRO (HUMAN) PER 5 UNITS: Performed by: EMERGENCY MEDICINE

## 2019-01-01 PROCEDURE — 36410 VNPNXR 3YR/> PHY/QHP DX/THER: CPT

## 2019-01-01 PROCEDURE — 82550 ASSAY OF CK (CPK): CPT | Performed by: INTERNAL MEDICINE

## 2019-01-01 PROCEDURE — 99225 PR SBSQ OBSERVATION CARE/DAY 25 MINUTES: CPT | Performed by: INTERNAL MEDICINE

## 2019-01-01 PROCEDURE — 80053 COMPREHEN METABOLIC PANEL: CPT | Performed by: INTERNAL MEDICINE

## 2019-01-01 PROCEDURE — 25010000002 CEFEPIME PER 500 MG: Performed by: INTERNAL MEDICINE

## 2019-01-01 PROCEDURE — 87040 BLOOD CULTURE FOR BACTERIA: CPT

## 2019-01-01 PROCEDURE — 81001 URINALYSIS AUTO W/SCOPE: CPT | Performed by: EMERGENCY MEDICINE

## 2019-01-01 PROCEDURE — 99222 1ST HOSP IP/OBS MODERATE 55: CPT | Performed by: NURSE PRACTITIONER

## 2019-01-01 PROCEDURE — 63710000001 INSULIN GLARGINE PER 5 UNITS: Performed by: FAMILY MEDICINE

## 2019-01-01 PROCEDURE — 99231 SBSQ HOSP IP/OBS SF/LOW 25: CPT | Performed by: INTERNAL MEDICINE

## 2019-01-01 PROCEDURE — 84100 ASSAY OF PHOSPHORUS: CPT | Performed by: EMERGENCY MEDICINE

## 2019-01-01 PROCEDURE — 76775 US EXAM ABDO BACK WALL LIM: CPT

## 2019-01-01 PROCEDURE — 83735 ASSAY OF MAGNESIUM: CPT | Performed by: FAMILY MEDICINE

## 2019-01-01 PROCEDURE — 80048 BASIC METABOLIC PNL TOTAL CA: CPT | Performed by: INTERNAL MEDICINE

## 2019-01-01 RX ORDER — ASPIRIN 81 MG/1
81 TABLET, CHEWABLE ORAL DAILY
Status: DISCONTINUED | OUTPATIENT
Start: 2019-01-01 | End: 2019-01-01 | Stop reason: HOSPADM

## 2019-01-01 RX ORDER — LOPERAMIDE HYDROCHLORIDE 2 MG/1
2 CAPSULE ORAL DAILY PRN
Status: CANCELLED | OUTPATIENT
Start: 2019-01-01

## 2019-01-01 RX ORDER — SENNA PLUS 8.6 MG/1
2 TABLET ORAL 2 TIMES DAILY
Status: CANCELLED | OUTPATIENT
Start: 2019-01-01

## 2019-01-01 RX ORDER — FUROSEMIDE 40 MG/1
40 TABLET ORAL
Status: DISCONTINUED | OUTPATIENT
Start: 2019-01-01 | End: 2019-01-01 | Stop reason: HOSPADM

## 2019-01-01 RX ORDER — ACETAMINOPHEN 160 MG/5ML
650 SOLUTION ORAL EVERY 4 HOURS PRN
Status: DISCONTINUED | OUTPATIENT
Start: 2019-01-01 | End: 2019-01-01 | Stop reason: HOSPADM

## 2019-01-01 RX ORDER — RAMIPRIL 1.25 MG/1
1.25 CAPSULE ORAL
Qty: 30 CAPSULE | Refills: 0 | Status: SHIPPED | OUTPATIENT
Start: 2019-01-01 | End: 2019-01-01

## 2019-01-01 RX ORDER — LORAZEPAM 0.5 MG/1
0.5 TABLET ORAL EVERY 6 HOURS PRN
Status: DISCONTINUED | OUTPATIENT
Start: 2019-01-01 | End: 2019-01-01 | Stop reason: HOSPADM

## 2019-01-01 RX ORDER — SENNA PLUS 8.6 MG/1
2 TABLET ORAL 2 TIMES DAILY
Status: DISCONTINUED | OUTPATIENT
Start: 2019-01-01 | End: 2019-01-01 | Stop reason: HOSPADM

## 2019-01-01 RX ORDER — NITROGLYCERIN 0.4 MG/1
0.4 TABLET SUBLINGUAL
Status: DISCONTINUED | OUTPATIENT
Start: 2019-01-01 | End: 2019-01-01 | Stop reason: HOSPADM

## 2019-01-01 RX ORDER — SODIUM CHLORIDE 0.9 % (FLUSH) 0.9 %
10 SYRINGE (ML) INJECTION AS NEEDED
Status: DISCONTINUED | OUTPATIENT
Start: 2019-01-01 | End: 2019-01-01 | Stop reason: HOSPADM

## 2019-01-01 RX ORDER — POTASSIUM CHLORIDE 1.5 G/1.77G
40 POWDER, FOR SOLUTION ORAL AS NEEDED
Status: DISCONTINUED | OUTPATIENT
Start: 2019-01-01 | End: 2019-01-01 | Stop reason: HOSPADM

## 2019-01-01 RX ORDER — ACETAMINOPHEN 325 MG/1
650 TABLET ORAL EVERY 4 HOURS PRN
Status: DISCONTINUED | OUTPATIENT
Start: 2019-01-01 | End: 2019-01-01 | Stop reason: HOSPADM

## 2019-01-01 RX ORDER — POLYETHYLENE GLYCOL 3350 17 G/17G
17 POWDER, FOR SOLUTION ORAL DAILY
Status: DISCONTINUED | OUTPATIENT
Start: 2019-01-01 | End: 2019-01-01 | Stop reason: HOSPADM

## 2019-01-01 RX ORDER — GABAPENTIN 300 MG/1
300 CAPSULE ORAL 3 TIMES DAILY
Status: CANCELLED | OUTPATIENT
Start: 2019-01-01

## 2019-01-01 RX ORDER — LORAZEPAM 0.5 MG/1
0.5 TABLET ORAL EVERY 6 HOURS PRN
Status: CANCELLED | OUTPATIENT
Start: 2019-01-01

## 2019-01-01 RX ORDER — DEXAMETHASONE 4 MG/1
2 TABLET ORAL DAILY
Status: DISCONTINUED | OUTPATIENT
Start: 2019-01-01 | End: 2019-01-01 | Stop reason: HOSPADM

## 2019-01-01 RX ORDER — POTASSIUM CHLORIDE 20 MEQ/1
40 TABLET, EXTENDED RELEASE ORAL AS NEEDED
Status: DISCONTINUED | OUTPATIENT
Start: 2019-01-01 | End: 2019-01-01 | Stop reason: HOSPADM

## 2019-01-01 RX ORDER — DEXAMETHASONE 2 MG/1
2 TABLET ORAL DAILY
COMMUNITY

## 2019-01-01 RX ORDER — LORAZEPAM 0.5 MG/1
0.5 TABLET ORAL EVERY 6 HOURS PRN
Qty: 30 TABLET | Refills: 0 | Status: SHIPPED | OUTPATIENT
Start: 2019-01-01

## 2019-01-01 RX ORDER — RANITIDINE 150 MG/1
150 TABLET ORAL DAILY
COMMUNITY

## 2019-01-01 RX ORDER — TRAZODONE HYDROCHLORIDE 100 MG/1
100 TABLET ORAL NIGHTLY
Status: CANCELLED | OUTPATIENT
Start: 2019-01-01

## 2019-01-01 RX ORDER — POTASSIUM CHLORIDE 7.45 MG/ML
10 INJECTION INTRAVENOUS
Status: DISCONTINUED | OUTPATIENT
Start: 2019-01-01 | End: 2019-01-01 | Stop reason: HOSPADM

## 2019-01-01 RX ORDER — CARVEDILOL 3.12 MG/1
3.12 TABLET ORAL 2 TIMES DAILY WITH MEALS
COMMUNITY

## 2019-01-01 RX ORDER — DEXTROSE MONOHYDRATE 25 G/50ML
25 INJECTION, SOLUTION INTRAVENOUS
Status: DISCONTINUED | OUTPATIENT
Start: 2019-01-01 | End: 2019-01-01 | Stop reason: HOSPADM

## 2019-01-01 RX ORDER — SODIUM CHLORIDE 0.9 % (FLUSH) 0.9 %
10 SYRINGE (ML) INJECTION EVERY 12 HOURS SCHEDULED
Status: DISCONTINUED | OUTPATIENT
Start: 2019-01-01 | End: 2019-01-01 | Stop reason: HOSPADM

## 2019-01-01 RX ORDER — CARVEDILOL 3.12 MG/1
3.12 TABLET ORAL 2 TIMES DAILY WITH MEALS
Status: DISCONTINUED | OUTPATIENT
Start: 2019-01-01 | End: 2019-01-01 | Stop reason: HOSPADM

## 2019-01-01 RX ORDER — ISOSORBIDE MONONITRATE 30 MG/1
30 TABLET, EXTENDED RELEASE ORAL DAILY
Status: DISCONTINUED | OUTPATIENT
Start: 2019-01-01 | End: 2019-01-01 | Stop reason: HOSPADM

## 2019-01-01 RX ORDER — HYDROCODONE BITARTRATE AND ACETAMINOPHEN 5; 325 MG/1; MG/1
1 TABLET ORAL EVERY 4 HOURS PRN
Status: ON HOLD | COMMUNITY
End: 2019-01-01 | Stop reason: SDUPTHER

## 2019-01-01 RX ORDER — ACETAMINOPHEN 650 MG/1
650 SUPPOSITORY RECTAL EVERY 4 HOURS PRN
Status: DISCONTINUED | OUTPATIENT
Start: 2019-01-01 | End: 2019-01-01 | Stop reason: HOSPADM

## 2019-01-01 RX ORDER — NITROGLYCERIN 0.4 MG/1
0.4 TABLET SUBLINGUAL
Status: CANCELLED | OUTPATIENT
Start: 2019-01-01

## 2019-01-01 RX ORDER — FUROSEMIDE 20 MG/1
20 TABLET ORAL DAILY
Status: DISCONTINUED | OUTPATIENT
Start: 2019-01-01 | End: 2019-01-01

## 2019-01-01 RX ORDER — GABAPENTIN 300 MG/1
CAPSULE ORAL
Status: COMPLETED
Start: 2019-01-01 | End: 2019-01-01

## 2019-01-01 RX ORDER — ONDANSETRON 4 MG/1
4 TABLET, FILM COATED ORAL EVERY 6 HOURS PRN
Status: DISCONTINUED | OUTPATIENT
Start: 2019-01-01 | End: 2019-01-01 | Stop reason: HOSPADM

## 2019-01-01 RX ORDER — ASPIRIN 81 MG/1
81 TABLET, CHEWABLE ORAL DAILY
Status: DISCONTINUED | OUTPATIENT
Start: 2019-01-01 | End: 2019-01-01

## 2019-01-01 RX ORDER — SODIUM CHLORIDE 450 MG/100ML
100 INJECTION, SOLUTION INTRAVENOUS CONTINUOUS
Status: DISCONTINUED | OUTPATIENT
Start: 2019-01-01 | End: 2019-01-01 | Stop reason: HOSPADM

## 2019-01-01 RX ORDER — DEXAMETHASONE 4 MG/1
2 TABLET ORAL DAILY
Status: CANCELLED | OUTPATIENT
Start: 2019-01-01

## 2019-01-01 RX ORDER — ISOSORBIDE MONONITRATE 30 MG/1
30 TABLET, EXTENDED RELEASE ORAL DAILY
Status: CANCELLED | OUTPATIENT
Start: 2019-01-01

## 2019-01-01 RX ORDER — MAGNESIUM SULFATE HEPTAHYDRATE 40 MG/ML
4 INJECTION, SOLUTION INTRAVENOUS AS NEEDED
Status: DISCONTINUED | OUTPATIENT
Start: 2019-01-01 | End: 2019-01-01 | Stop reason: HOSPADM

## 2019-01-01 RX ORDER — GABAPENTIN 300 MG/1
300 CAPSULE ORAL 3 TIMES DAILY
Qty: 30 CAPSULE | Refills: 0 | Status: SHIPPED | OUTPATIENT
Start: 2019-01-01

## 2019-01-01 RX ORDER — LOPERAMIDE HYDROCHLORIDE 2 MG/1
2 CAPSULE ORAL DAILY PRN
Status: DISCONTINUED | OUTPATIENT
Start: 2019-01-01 | End: 2019-01-01 | Stop reason: HOSPADM

## 2019-01-01 RX ORDER — SODIUM CHLORIDE 0.9 % (FLUSH) 0.9 %
3 SYRINGE (ML) INJECTION EVERY 12 HOURS SCHEDULED
Status: DISCONTINUED | OUTPATIENT
Start: 2019-01-01 | End: 2019-01-01 | Stop reason: HOSPADM

## 2019-01-01 RX ORDER — FAMOTIDINE 20 MG/1
20 TABLET, FILM COATED ORAL DAILY
Status: DISCONTINUED | OUTPATIENT
Start: 2019-01-01 | End: 2019-01-01

## 2019-01-01 RX ORDER — HYDROCODONE BITARTRATE AND ACETAMINOPHEN 5; 325 MG/1; MG/1
1 TABLET ORAL EVERY 4 HOURS PRN
Status: CANCELLED | OUTPATIENT
Start: 2019-01-01

## 2019-01-01 RX ORDER — SODIUM CHLORIDE 0.9 % (FLUSH) 0.9 %
10 SYRINGE (ML) INJECTION EVERY 12 HOURS SCHEDULED
Status: CANCELLED | OUTPATIENT
Start: 2019-01-01

## 2019-01-01 RX ORDER — PANTOPRAZOLE SODIUM 40 MG/1
40 TABLET, DELAYED RELEASE ORAL
Status: CANCELLED | OUTPATIENT
Start: 2019-01-01

## 2019-01-01 RX ORDER — HYDROCODONE BITARTRATE AND ACETAMINOPHEN 5; 325 MG/1; MG/1
1 TABLET ORAL EVERY 8 HOURS PRN
Qty: 45 TABLET | Refills: 0 | Status: SHIPPED | OUTPATIENT
Start: 2019-01-01 | End: 2019-01-01

## 2019-01-01 RX ORDER — SODIUM CHLORIDE 0.9 % (FLUSH) 0.9 %
10 SYRINGE (ML) INJECTION AS NEEDED
Status: CANCELLED | OUTPATIENT
Start: 2019-01-01

## 2019-01-01 RX ORDER — NICOTINE POLACRILEX 4 MG
15 LOZENGE BUCCAL
Status: DISCONTINUED | OUTPATIENT
Start: 2019-01-01 | End: 2019-01-01 | Stop reason: HOSPADM

## 2019-01-01 RX ORDER — LORAZEPAM 0.5 MG/1
0.5 TABLET ORAL EVERY 6 HOURS PRN
Status: ON HOLD | COMMUNITY
End: 2019-01-01 | Stop reason: SDUPTHER

## 2019-01-01 RX ORDER — PREGABALIN 25 MG/1
50 CAPSULE ORAL EVERY 12 HOURS SCHEDULED
Status: DISCONTINUED | OUTPATIENT
Start: 2019-01-01 | End: 2019-01-01 | Stop reason: HOSPADM

## 2019-01-01 RX ORDER — CLOPIDOGREL BISULFATE 75 MG/1
75 TABLET ORAL DAILY
Status: DISCONTINUED | OUTPATIENT
Start: 2019-01-01 | End: 2019-01-01 | Stop reason: HOSPADM

## 2019-01-01 RX ORDER — SODIUM CHLORIDE 9 MG/ML
125 INJECTION, SOLUTION INTRAVENOUS CONTINUOUS
Status: DISCONTINUED | OUTPATIENT
Start: 2019-01-01 | End: 2019-01-01

## 2019-01-01 RX ORDER — ONDANSETRON 2 MG/ML
4 INJECTION INTRAMUSCULAR; INTRAVENOUS EVERY 6 HOURS PRN
Status: CANCELLED | OUTPATIENT
Start: 2019-01-01

## 2019-01-01 RX ORDER — SODIUM CHLORIDE 0.9 % (FLUSH) 0.9 %
3-10 SYRINGE (ML) INJECTION AS NEEDED
Status: DISCONTINUED | OUTPATIENT
Start: 2019-01-01 | End: 2019-01-01 | Stop reason: HOSPADM

## 2019-01-01 RX ORDER — MAGNESIUM SULFATE HEPTAHYDRATE 40 MG/ML
2 INJECTION, SOLUTION INTRAVENOUS AS NEEDED
Status: DISCONTINUED | OUTPATIENT
Start: 2019-01-01 | End: 2019-01-01 | Stop reason: HOSPADM

## 2019-01-01 RX ORDER — CARVEDILOL 3.12 MG/1
3.12 TABLET ORAL 2 TIMES DAILY WITH MEALS
Status: CANCELLED | OUTPATIENT
Start: 2019-01-01

## 2019-01-01 RX ORDER — DEXAMETHASONE 0.5 MG/1
2 TABLET ORAL DAILY
Status: DISCONTINUED | OUTPATIENT
Start: 2019-01-01 | End: 2019-01-01

## 2019-01-01 RX ORDER — ALBUTEROL SULFATE 2.5 MG/3ML
2.5 SOLUTION RESPIRATORY (INHALATION)
Status: DISCONTINUED | OUTPATIENT
Start: 2019-01-01 | End: 2019-01-01 | Stop reason: HOSPADM

## 2019-01-01 RX ORDER — CLOPIDOGREL BISULFATE 75 MG/1
75 TABLET ORAL DAILY
Status: CANCELLED | OUTPATIENT
Start: 2019-01-01

## 2019-01-01 RX ORDER — RAMIPRIL 1.25 MG/1
1.25 CAPSULE ORAL
Status: DISCONTINUED | OUTPATIENT
Start: 2019-01-01 | End: 2019-01-01 | Stop reason: HOSPADM

## 2019-01-01 RX ORDER — CALCIUM GLUCONATE 20 MG/ML
1 INJECTION, SOLUTION INTRAVENOUS ONCE
Status: COMPLETED | OUTPATIENT
Start: 2019-01-01 | End: 2019-01-01

## 2019-01-01 RX ORDER — PANTOPRAZOLE SODIUM 40 MG/1
40 TABLET, DELAYED RELEASE ORAL
Status: DISCONTINUED | OUTPATIENT
Start: 2019-01-01 | End: 2019-01-01 | Stop reason: HOSPADM

## 2019-01-01 RX ORDER — CLINDAMYCIN PHOSPHATE 600 MG/50ML
600 INJECTION, SOLUTION INTRAVENOUS EVERY 8 HOURS
Status: DISCONTINUED | OUTPATIENT
Start: 2019-01-01 | End: 2019-01-01

## 2019-01-01 RX ORDER — SODIUM BICARBONATE 650 MG/1
650 TABLET ORAL 3 TIMES DAILY
Status: ACTIVE | OUTPATIENT
Start: 2019-01-01 | End: 2019-01-01

## 2019-01-01 RX ORDER — GABAPENTIN 300 MG/1
300 CAPSULE ORAL ONCE
Status: COMPLETED | OUTPATIENT
Start: 2019-01-01 | End: 2019-01-01

## 2019-01-01 RX ORDER — NITROGLYCERIN 0.4 MG/1
0.4 TABLET SUBLINGUAL
Qty: 30 TABLET | Refills: 0 | Status: SHIPPED | OUTPATIENT
Start: 2019-01-01

## 2019-01-01 RX ORDER — ACETAMINOPHEN 325 MG/1
650 TABLET ORAL EVERY 4 HOURS PRN
Status: CANCELLED | OUTPATIENT
Start: 2019-01-01

## 2019-01-01 RX ORDER — ATORVASTATIN CALCIUM 40 MG/1
40 TABLET, FILM COATED ORAL NIGHTLY
Status: DISCONTINUED | OUTPATIENT
Start: 2019-01-01 | End: 2019-01-01 | Stop reason: HOSPADM

## 2019-01-01 RX ORDER — GABAPENTIN 300 MG/1
300 CAPSULE ORAL 3 TIMES DAILY
Status: DISCONTINUED | OUTPATIENT
Start: 2019-01-01 | End: 2019-01-01 | Stop reason: HOSPADM

## 2019-01-01 RX ORDER — TRAZODONE HYDROCHLORIDE 100 MG/1
100 TABLET ORAL NIGHTLY
COMMUNITY

## 2019-01-01 RX ORDER — DEXAMETHASONE 0.5 MG/1
2 TABLET ORAL DAILY
Status: DISCONTINUED | OUTPATIENT
Start: 2019-01-01 | End: 2019-01-01 | Stop reason: HOSPADM

## 2019-01-01 RX ORDER — NITROGLYCERIN 20 MG/100ML
10-50 INJECTION INTRAVENOUS
Status: DISCONTINUED | OUTPATIENT
Start: 2019-01-01 | End: 2019-01-01 | Stop reason: HOSPADM

## 2019-01-01 RX ORDER — ALBUTEROL SULFATE 2.5 MG/3ML
10 SOLUTION RESPIRATORY (INHALATION) ONCE
Status: COMPLETED | OUTPATIENT
Start: 2019-01-01 | End: 2019-01-01

## 2019-01-01 RX ORDER — SENNOSIDES 8.6 MG
2 TABLET ORAL 2 TIMES DAILY
COMMUNITY

## 2019-01-01 RX ORDER — DEXTROSE MONOHYDRATE 50 MG/ML
150 INJECTION, SOLUTION INTRAVENOUS CONTINUOUS
Status: DISCONTINUED | OUTPATIENT
Start: 2019-01-01 | End: 2019-01-01

## 2019-01-01 RX ORDER — HYDROCODONE BITARTRATE AND ACETAMINOPHEN 5; 325 MG/1; MG/1
1 TABLET ORAL EVERY 4 HOURS PRN
Status: DISCONTINUED | OUTPATIENT
Start: 2019-01-01 | End: 2019-01-01 | Stop reason: HOSPADM

## 2019-01-01 RX ORDER — FUROSEMIDE 40 MG/1
40 TABLET ORAL
Status: CANCELLED | OUTPATIENT
Start: 2019-01-01

## 2019-01-01 RX ORDER — ALBUMIN (HUMAN) 12.5 G/50ML
25 SOLUTION INTRAVENOUS EVERY 8 HOURS
Status: COMPLETED | OUTPATIENT
Start: 2019-01-01 | End: 2019-01-01

## 2019-01-01 RX ORDER — METOPROLOL SUCCINATE 25 MG/1
25 TABLET, EXTENDED RELEASE ORAL
Status: DISCONTINUED | OUTPATIENT
Start: 2019-01-01 | End: 2019-01-01

## 2019-01-01 RX ORDER — TRAZODONE HYDROCHLORIDE 100 MG/1
100 TABLET ORAL NIGHTLY
Status: DISCONTINUED | OUTPATIENT
Start: 2019-01-01 | End: 2019-01-01 | Stop reason: HOSPADM

## 2019-01-01 RX ORDER — ISOSORBIDE MONONITRATE 30 MG/1
30 TABLET, EXTENDED RELEASE ORAL DAILY
Qty: 30 TABLET | Refills: 0 | Status: SHIPPED | OUTPATIENT
Start: 2019-01-01

## 2019-01-01 RX ORDER — ASPIRIN 81 MG/1
81 TABLET, CHEWABLE ORAL DAILY
COMMUNITY
End: 2019-01-01

## 2019-01-01 RX ORDER — POLYETHYLENE GLYCOL 3350 17 G/17G
17 POWDER, FOR SOLUTION ORAL DAILY
Status: CANCELLED | OUTPATIENT
Start: 2019-01-01

## 2019-01-01 RX ORDER — FUROSEMIDE 40 MG/1
40 TABLET ORAL DAILY
Status: CANCELLED | OUTPATIENT
Start: 2019-01-01

## 2019-01-01 RX ORDER — ONDANSETRON 2 MG/ML
4 INJECTION INTRAMUSCULAR; INTRAVENOUS EVERY 6 HOURS PRN
Status: DISCONTINUED | OUTPATIENT
Start: 2019-01-01 | End: 2019-01-01 | Stop reason: HOSPADM

## 2019-01-01 RX ORDER — INSULIN GLARGINE 100 [IU]/ML
10 INJECTION, SOLUTION SUBCUTANEOUS NIGHTLY
Status: DISCONTINUED | OUTPATIENT
Start: 2019-01-01 | End: 2019-01-01

## 2019-01-01 RX ORDER — CLOPIDOGREL BISULFATE 75 MG/1
75 TABLET ORAL DAILY
Qty: 30 TABLET | Refills: 0 | Status: SHIPPED | OUTPATIENT
Start: 2019-01-01

## 2019-01-01 RX ORDER — FUROSEMIDE 40 MG/1
40 TABLET ORAL 2 TIMES DAILY
COMMUNITY

## 2019-01-01 RX ORDER — DIGOXIN 0.25 MG/ML
125 INJECTION INTRAMUSCULAR; INTRAVENOUS
Status: DISCONTINUED | OUTPATIENT
Start: 2019-01-01 | End: 2019-01-01 | Stop reason: HOSPADM

## 2019-01-01 RX ORDER — FAMOTIDINE 20 MG/1
20 TABLET, FILM COATED ORAL DAILY
Status: DISCONTINUED | OUTPATIENT
Start: 2019-01-01 | End: 2019-01-01 | Stop reason: HOSPADM

## 2019-01-01 RX ORDER — AMOXICILLIN AND CLAVULANATE POTASSIUM 875; 125 MG/1; MG/1
1 TABLET, FILM COATED ORAL EVERY 12 HOURS SCHEDULED
Qty: 2 TABLET | Refills: 0 | Status: SHIPPED | OUTPATIENT
Start: 2019-01-01 | End: 2019-01-01

## 2019-01-01 RX ORDER — ACETAMINOPHEN 160 MG/5ML
650 SOLUTION ORAL EVERY 4 HOURS PRN
Status: CANCELLED | OUTPATIENT
Start: 2019-01-01

## 2019-01-01 RX ORDER — CLOPIDOGREL BISULFATE 75 MG/1
75 TABLET ORAL DAILY
Status: DISCONTINUED | OUTPATIENT
Start: 2019-01-01 | End: 2019-01-01

## 2019-01-01 RX ORDER — LOPERAMIDE HYDROCHLORIDE 2 MG/1
2 CAPSULE ORAL DAILY PRN
COMMUNITY

## 2019-01-01 RX ORDER — POLYETHYLENE GLYCOL 3350 17 G/17G
17 POWDER, FOR SOLUTION ORAL DAILY
COMMUNITY

## 2019-01-01 RX ORDER — ATORVASTATIN CALCIUM 40 MG/1
40 TABLET, FILM COATED ORAL NIGHTLY
Qty: 30 TABLET | Refills: 0 | Status: SHIPPED | OUTPATIENT
Start: 2019-01-01 | End: 2019-01-01

## 2019-01-01 RX ORDER — ACETAMINOPHEN 650 MG/1
650 SUPPOSITORY RECTAL EVERY 4 HOURS PRN
Status: CANCELLED | OUTPATIENT
Start: 2019-01-01

## 2019-01-01 RX ORDER — CHOLECALCIFEROL (VITAMIN D3) 125 MCG
5 CAPSULE ORAL NIGHTLY PRN
Status: DISCONTINUED | OUTPATIENT
Start: 2019-01-01 | End: 2019-01-01 | Stop reason: HOSPADM

## 2019-01-01 RX ORDER — GABAPENTIN 300 MG/1
300 CAPSULE ORAL 3 TIMES DAILY
Status: ON HOLD | COMMUNITY
End: 2019-01-01 | Stop reason: SDUPTHER

## 2019-01-01 RX ORDER — PANTOPRAZOLE SODIUM 40 MG/10ML
40 INJECTION, POWDER, LYOPHILIZED, FOR SOLUTION INTRAVENOUS
Status: DISCONTINUED | OUTPATIENT
Start: 2019-01-01 | End: 2019-01-01

## 2019-01-01 RX ORDER — AMOXICILLIN AND CLAVULANATE POTASSIUM 875; 125 MG/1; MG/1
1 TABLET, FILM COATED ORAL EVERY 12 HOURS SCHEDULED
Status: DISCONTINUED | OUTPATIENT
Start: 2019-01-01 | End: 2019-01-01 | Stop reason: HOSPADM

## 2019-01-01 RX ORDER — ACETAMINOPHEN 325 MG/1
650 TABLET ORAL ONCE
Status: COMPLETED | OUTPATIENT
Start: 2019-01-01 | End: 2019-01-01

## 2019-01-01 RX ORDER — AMMONIA INHALANTS 0.04 G/.3ML
INHALANT RESPIRATORY (INHALATION)
Status: COMPLETED
Start: 2019-01-01 | End: 2019-01-01

## 2019-01-01 RX ORDER — FAMOTIDINE 20 MG/1
20 TABLET, FILM COATED ORAL DAILY
Status: CANCELLED | OUTPATIENT
Start: 2019-01-01

## 2019-01-01 RX ORDER — TRAZODONE HYDROCHLORIDE 50 MG/1
100 TABLET ORAL NIGHTLY
Status: DISCONTINUED | OUTPATIENT
Start: 2019-01-01 | End: 2019-01-01 | Stop reason: HOSPADM

## 2019-01-01 RX ADMIN — SENNOSIDES 2 TABLET: 8.6 TABLET, FILM COATED ORAL at 08:33

## 2019-01-01 RX ADMIN — ISOSORBIDE MONONITRATE 30 MG: 30 TABLET, EXTENDED RELEASE ORAL at 09:20

## 2019-01-01 RX ADMIN — Medication 10 ML: at 21:03

## 2019-01-01 RX ADMIN — HYDROCORTISONE SODIUM SUCCINATE 50 MG: 100 INJECTION, POWDER, FOR SOLUTION INTRAMUSCULAR; INTRAVENOUS at 01:26

## 2019-01-01 RX ADMIN — HYDROCORTISONE SODIUM SUCCINATE 50 MG: 100 INJECTION, POWDER, FOR SOLUTION INTRAMUSCULAR; INTRAVENOUS at 12:08

## 2019-01-01 RX ADMIN — GABAPENTIN 300 MG: 300 CAPSULE ORAL at 19:53

## 2019-01-01 RX ADMIN — CEFEPIME HYDROCHLORIDE 2 G: 2 INJECTION, POWDER, FOR SOLUTION INTRAVENOUS at 16:01

## 2019-01-01 RX ADMIN — ISOSORBIDE MONONITRATE 30 MG: 30 TABLET, EXTENDED RELEASE ORAL at 09:46

## 2019-01-01 RX ADMIN — Medication 3 ML: at 09:43

## 2019-01-01 RX ADMIN — CEFEPIME HYDROCHLORIDE 2 G: 2 INJECTION, POWDER, FOR SOLUTION INTRAVENOUS at 04:04

## 2019-01-01 RX ADMIN — DEXAMETHASONE 2 MG: 0.5 TABLET ORAL at 08:00

## 2019-01-01 RX ADMIN — CEFTRIAXONE 2 G: 2 INJECTION, POWDER, FOR SOLUTION INTRAMUSCULAR; INTRAVENOUS at 19:53

## 2019-01-01 RX ADMIN — PIPERACILLIN AND TAZOBACTAM 3.38 G: 3; .375 INJECTION, POWDER, LYOPHILIZED, FOR SOLUTION INTRAVENOUS at 09:50

## 2019-01-01 RX ADMIN — DEXTROSE MONOHYDRATE 150 ML/HR: 50 INJECTION, SOLUTION INTRAVENOUS at 03:10

## 2019-01-01 RX ADMIN — VANCOMYCIN HYDROCHLORIDE 1000 MG: 10 INJECTION, POWDER, LYOPHILIZED, FOR SOLUTION INTRAVENOUS at 10:36

## 2019-01-01 RX ADMIN — ENOXAPARIN SODIUM 30 MG: 30 INJECTION SUBCUTANEOUS at 17:17

## 2019-01-01 RX ADMIN — CARVEDILOL 3.12 MG: 3.12 TABLET, FILM COATED ORAL at 17:21

## 2019-01-01 RX ADMIN — CEFTRIAXONE SODIUM 2 G: 10 INJECTION, POWDER, FOR SOLUTION INTRAVENOUS at 01:43

## 2019-01-01 RX ADMIN — INSULIN LISPRO 8 UNITS: 100 INJECTION, SOLUTION INTRAVENOUS; SUBCUTANEOUS at 12:34

## 2019-01-01 RX ADMIN — Medication 10 ML: at 09:21

## 2019-01-01 RX ADMIN — POTASSIUM CHLORIDE 10 MEQ: 10 INJECTION, SOLUTION INTRAVENOUS at 13:08

## 2019-01-01 RX ADMIN — FUROSEMIDE 40 MG: 40 TABLET ORAL at 09:19

## 2019-01-01 RX ADMIN — ZINC OXIDE: 200 OINTMENT TOPICAL at 12:57

## 2019-01-01 RX ADMIN — Medication 10 ML: at 08:33

## 2019-01-01 RX ADMIN — DEXAMETHASONE 2 MG: 0.5 TABLET ORAL at 09:29

## 2019-01-01 RX ADMIN — Medication 10 ML: at 19:55

## 2019-01-01 RX ADMIN — INSULIN LISPRO 3 UNITS: 100 INJECTION, SOLUTION INTRAVENOUS; SUBCUTANEOUS at 21:02

## 2019-01-01 RX ADMIN — INSULIN LISPRO 8 UNITS: 100 INJECTION, SOLUTION INTRAVENOUS; SUBCUTANEOUS at 09:51

## 2019-01-01 RX ADMIN — CARVEDILOL 3.12 MG: 3.12 TABLET, FILM COATED ORAL at 08:31

## 2019-01-01 RX ADMIN — Medication 3 ML: at 21:52

## 2019-01-01 RX ADMIN — BARIUM SULFATE 50 ML: 400 SUSPENSION ORAL at 13:11

## 2019-01-01 RX ADMIN — ALBUTEROL SULFATE 2.5 MG: 2.5 SOLUTION RESPIRATORY (INHALATION) at 15:12

## 2019-01-01 RX ADMIN — HYDROCORTISONE SODIUM SUCCINATE 50 MG: 100 INJECTION, POWDER, FOR SOLUTION INTRAMUSCULAR; INTRAVENOUS at 18:32

## 2019-01-01 RX ADMIN — ALBUTEROL SULFATE 2.5 MG: 2.5 SOLUTION RESPIRATORY (INHALATION) at 04:22

## 2019-01-01 RX ADMIN — CARVEDILOL 3.12 MG: 3.12 TABLET, FILM COATED ORAL at 09:53

## 2019-01-01 RX ADMIN — SENNOSIDES 2 TABLET: 8.6 TABLET, FILM COATED ORAL at 21:42

## 2019-01-01 RX ADMIN — ZINC OXIDE: 200 OINTMENT TOPICAL at 21:34

## 2019-01-01 RX ADMIN — ZINC OXIDE 1 EACH: 200 OINTMENT TOPICAL at 09:54

## 2019-01-01 RX ADMIN — CARVEDILOL 3.12 MG: 3.12 TABLET, FILM COATED ORAL at 09:46

## 2019-01-01 RX ADMIN — Medication 10 ML: at 22:01

## 2019-01-01 RX ADMIN — Medication 3 ML: at 22:51

## 2019-01-01 RX ADMIN — POTASSIUM CHLORIDE 40 MEQ: 1500 TABLET, EXTENDED RELEASE ORAL at 13:39

## 2019-01-01 RX ADMIN — ALBUTEROL SULFATE 2.5 MG: 2.5 SOLUTION RESPIRATORY (INHALATION) at 19:11

## 2019-01-01 RX ADMIN — DEXAMETHASONE 2 MG: 0.5 TABLET ORAL at 09:32

## 2019-01-01 RX ADMIN — GABAPENTIN 300 MG: 300 CAPSULE ORAL at 09:32

## 2019-01-01 RX ADMIN — CARVEDILOL 3.12 MG: 3.12 TABLET, FILM COATED ORAL at 17:24

## 2019-01-01 RX ADMIN — FUROSEMIDE 40 MG: 40 TABLET ORAL at 17:09

## 2019-01-01 RX ADMIN — CLINDAMYCIN PHOSPHATE 600 MG: 600 INJECTION, SOLUTION INTRAVENOUS at 04:09

## 2019-01-01 RX ADMIN — TRAZODONE HYDROCHLORIDE 100 MG: 100 TABLET ORAL at 20:08

## 2019-01-01 RX ADMIN — FUROSEMIDE 40 MG: 40 TABLET ORAL at 09:32

## 2019-01-01 RX ADMIN — SENNOSIDES 2 TABLET: 8.6 TABLET, FILM COATED ORAL at 09:33

## 2019-01-01 RX ADMIN — PANTOPRAZOLE SODIUM 40 MG: 40 TABLET, DELAYED RELEASE ORAL at 05:16

## 2019-01-01 RX ADMIN — CEFEPIME HYDROCHLORIDE 2 G: 2 INJECTION, POWDER, FOR SOLUTION INTRAVENOUS at 18:32

## 2019-01-01 RX ADMIN — FAMOTIDINE 20 MG: 20 TABLET, FILM COATED ORAL at 08:34

## 2019-01-01 RX ADMIN — RAMIPRIL 1.25 MG: 1.25 CAPSULE ORAL at 08:34

## 2019-01-01 RX ADMIN — Medication 10 ML: at 21:34

## 2019-01-01 RX ADMIN — AMOXICILLIN AND CLAVULANATE POTASSIUM 1 TABLET: 875; 125 TABLET, FILM COATED ORAL at 08:00

## 2019-01-01 RX ADMIN — Medication 10 ML: at 09:35

## 2019-01-01 RX ADMIN — FUROSEMIDE 40 MG: 40 TABLET ORAL at 06:34

## 2019-01-01 RX ADMIN — ISOSORBIDE MONONITRATE 30 MG: 30 TABLET, EXTENDED RELEASE ORAL at 09:32

## 2019-01-01 RX ADMIN — SENNOSIDES 2 TABLET: 8.6 TABLET, FILM COATED ORAL at 21:48

## 2019-01-01 RX ADMIN — PANTOPRAZOLE SODIUM 40 MG: 40 TABLET, DELAYED RELEASE ORAL at 06:11

## 2019-01-01 RX ADMIN — GABAPENTIN 300 MG: 300 CAPSULE ORAL at 17:47

## 2019-01-01 RX ADMIN — CARVEDILOL 3.12 MG: 3.12 TABLET, FILM COATED ORAL at 17:47

## 2019-01-01 RX ADMIN — CARVEDILOL 3.12 MG: 3.12 TABLET, FILM COATED ORAL at 07:55

## 2019-01-01 RX ADMIN — AMPICILLIN AND SULBACTAM 1.5 G: 1; .5 INJECTION, POWDER, FOR SOLUTION INTRAVENOUS at 12:41

## 2019-01-01 RX ADMIN — CLOPIDOGREL BISULFATE 75 MG: 75 TABLET ORAL at 12:03

## 2019-01-01 RX ADMIN — DEXAMETHASONE 2 MG: 0.5 TABLET ORAL at 07:55

## 2019-01-01 RX ADMIN — FUROSEMIDE 40 MG: 40 TABLET ORAL at 08:31

## 2019-01-01 RX ADMIN — INSULIN LISPRO 2 UNITS: 100 INJECTION, SOLUTION INTRAVENOUS; SUBCUTANEOUS at 18:15

## 2019-01-01 RX ADMIN — Medication 10 ML: at 21:53

## 2019-01-01 RX ADMIN — ALBUTEROL SULFATE 2.5 MG: 2.5 SOLUTION RESPIRATORY (INHALATION) at 06:49

## 2019-01-01 RX ADMIN — CARVEDILOL 3.12 MG: 3.12 TABLET, FILM COATED ORAL at 22:57

## 2019-01-01 RX ADMIN — GABAPENTIN 300 MG: 300 CAPSULE ORAL at 17:24

## 2019-01-01 RX ADMIN — POTASSIUM CHLORIDE 10 MEQ: 10 INJECTION, SOLUTION INTRAVENOUS at 21:02

## 2019-01-01 RX ADMIN — CLINDAMYCIN PHOSPHATE 600 MG: 600 INJECTION, SOLUTION INTRAVENOUS at 05:36

## 2019-01-01 RX ADMIN — DEXTROSE MONOHYDRATE 100 ML/HR: 50 INJECTION, SOLUTION INTRAVENOUS at 09:50

## 2019-01-01 RX ADMIN — GABAPENTIN 300 MG: 300 CAPSULE ORAL at 22:00

## 2019-01-01 RX ADMIN — INSULIN LISPRO 3 UNITS: 100 INJECTION, SOLUTION INTRAVENOUS; SUBCUTANEOUS at 18:25

## 2019-01-01 RX ADMIN — CLINDAMYCIN PHOSPHATE 600 MG: 600 INJECTION, SOLUTION INTRAVENOUS at 13:17

## 2019-01-01 RX ADMIN — SENNOSIDES 2 TABLET: 8.6 TABLET, FILM COATED ORAL at 20:08

## 2019-01-01 RX ADMIN — Medication 10 ML: at 22:00

## 2019-01-01 RX ADMIN — ZINC OXIDE: 200 OINTMENT TOPICAL at 09:18

## 2019-01-01 RX ADMIN — ENOXAPARIN SODIUM 40 MG: 40 INJECTION SUBCUTANEOUS at 16:01

## 2019-01-01 RX ADMIN — ALBUTEROL SULFATE 2.5 MG: 2.5 SOLUTION RESPIRATORY (INHALATION) at 03:47

## 2019-01-01 RX ADMIN — FUROSEMIDE 40 MG: 40 TABLET ORAL at 08:00

## 2019-01-01 RX ADMIN — Medication 10 ML: at 08:34

## 2019-01-01 RX ADMIN — POLYETHYLENE GLYCOL 3350 17 G: 17 POWDER, FOR SOLUTION ORAL at 08:00

## 2019-01-01 RX ADMIN — DEXAMETHASONE 2 MG: 4 TABLET ORAL at 09:46

## 2019-01-01 RX ADMIN — FUROSEMIDE 40 MG: 40 TABLET ORAL at 17:47

## 2019-01-01 RX ADMIN — SENNOSIDES 2 TABLET: 8.6 TABLET, FILM COATED ORAL at 21:01

## 2019-01-01 RX ADMIN — GABAPENTIN 300 MG: 300 CAPSULE ORAL at 08:00

## 2019-01-01 RX ADMIN — CARVEDILOL 3.12 MG: 3.12 TABLET, FILM COATED ORAL at 09:29

## 2019-01-01 RX ADMIN — HYDROCORTISONE SODIUM SUCCINATE 50 MG: 100 INJECTION, POWDER, FOR SOLUTION INTRAMUSCULAR; INTRAVENOUS at 13:50

## 2019-01-01 RX ADMIN — POTASSIUM CHLORIDE 10 MEQ: 10 INJECTION, SOLUTION INTRAVENOUS at 16:52

## 2019-01-01 RX ADMIN — CARVEDILOL 3.12 MG: 3.12 TABLET, FILM COATED ORAL at 17:48

## 2019-01-01 RX ADMIN — Medication 10 ML: at 10:20

## 2019-01-01 RX ADMIN — SENNOSIDES 2 TABLET: 8.6 TABLET, FILM COATED ORAL at 09:30

## 2019-01-01 RX ADMIN — HYDROCORTISONE SODIUM SUCCINATE 50 MG: 100 INJECTION, POWDER, FOR SOLUTION INTRAMUSCULAR; INTRAVENOUS at 13:08

## 2019-01-01 RX ADMIN — SODIUM CHLORIDE 125 ML/HR: 900 INJECTION, SOLUTION INTRAVENOUS at 05:03

## 2019-01-01 RX ADMIN — Medication 10 ML: at 19:54

## 2019-01-01 RX ADMIN — GABAPENTIN 300 MG: 300 CAPSULE ORAL at 07:54

## 2019-01-01 RX ADMIN — GABAPENTIN 300 MG: 300 CAPSULE ORAL at 17:21

## 2019-01-01 RX ADMIN — SODIUM CHLORIDE 100 ML/HR: 450 INJECTION, SOLUTION INTRAVENOUS at 11:19

## 2019-01-01 RX ADMIN — VANCOMYCIN HYDROCHLORIDE 1250 MG: 10 INJECTION, POWDER, LYOPHILIZED, FOR SOLUTION INTRAVENOUS at 17:29

## 2019-01-01 RX ADMIN — POTASSIUM CHLORIDE 10 MEQ: 10 INJECTION, SOLUTION INTRAVENOUS at 18:15

## 2019-01-01 RX ADMIN — ASPIRIN 81 MG 81 MG: 81 TABLET ORAL at 09:46

## 2019-01-01 RX ADMIN — POTASSIUM CHLORIDE 40 MEQ: 1500 TABLET, EXTENDED RELEASE ORAL at 09:30

## 2019-01-01 RX ADMIN — POTASSIUM CHLORIDE 10 MEQ: 10 INJECTION, SOLUTION INTRAVENOUS at 19:28

## 2019-01-01 RX ADMIN — Medication 3 ML: at 03:19

## 2019-01-01 RX ADMIN — PIPERACILLIN AND TAZOBACTAM 3.38 G: 3; .375 INJECTION, POWDER, LYOPHILIZED, FOR SOLUTION INTRAVENOUS at 17:17

## 2019-01-01 RX ADMIN — GABAPENTIN 300 MG: 300 CAPSULE ORAL at 17:01

## 2019-01-01 RX ADMIN — ISOSORBIDE MONONITRATE 30 MG: 30 TABLET, EXTENDED RELEASE ORAL at 08:33

## 2019-01-01 RX ADMIN — FUROSEMIDE 40 MG: 40 TABLET ORAL at 05:12

## 2019-01-01 RX ADMIN — POTASSIUM CHLORIDE 10 MEQ: 10 INJECTION, SOLUTION INTRAVENOUS at 15:07

## 2019-01-01 RX ADMIN — CARVEDILOL 3.12 MG: 3.12 TABLET, FILM COATED ORAL at 08:34

## 2019-01-01 RX ADMIN — GABAPENTIN 300 MG: 300 CAPSULE ORAL at 09:46

## 2019-01-01 RX ADMIN — FAMOTIDINE 20 MG: 20 TABLET, FILM COATED ORAL at 09:54

## 2019-01-01 RX ADMIN — Medication 10 ML: at 09:25

## 2019-01-01 RX ADMIN — ZINC OXIDE: 200 OINTMENT TOPICAL at 15:49

## 2019-01-01 RX ADMIN — Medication 10 ML: at 12:26

## 2019-01-01 RX ADMIN — SENNOSIDES 2 TABLET: 8.6 TABLET, FILM COATED ORAL at 07:54

## 2019-01-01 RX ADMIN — POLYETHYLENE GLYCOL 3350 17 G: 17 POWDER, FOR SOLUTION ORAL at 08:33

## 2019-01-01 RX ADMIN — CARVEDILOL 3.12 MG: 3.12 TABLET, FILM COATED ORAL at 09:33

## 2019-01-01 RX ADMIN — ALBUMIN HUMAN 25 G: 0.25 SOLUTION INTRAVENOUS at 13:51

## 2019-01-01 RX ADMIN — INSULIN LISPRO 2 UNITS: 100 INJECTION, SOLUTION INTRAVENOUS; SUBCUTANEOUS at 12:34

## 2019-01-01 RX ADMIN — PIPERACILLIN AND TAZOBACTAM 3.38 G: 3; .375 INJECTION, POWDER, LYOPHILIZED, FOR SOLUTION INTRAVENOUS at 02:14

## 2019-01-01 RX ADMIN — ASPIRIN 81 MG 81 MG: 81 TABLET ORAL at 08:34

## 2019-01-01 RX ADMIN — INSULIN LISPRO 24 UNITS: 100 INJECTION, SOLUTION INTRAVENOUS; SUBCUTANEOUS at 12:19

## 2019-01-01 RX ADMIN — CLINDAMYCIN PHOSPHATE 600 MG: 600 INJECTION, SOLUTION INTRAVENOUS at 20:37

## 2019-01-01 RX ADMIN — SODIUM CHLORIDE 1836 ML: 900 INJECTION, SOLUTION INTRAVENOUS at 02:13

## 2019-01-01 RX ADMIN — ZINC OXIDE: 200 OINTMENT TOPICAL at 09:51

## 2019-01-01 RX ADMIN — DIGOXIN 125 MCG: 250 INJECTION, SOLUTION INTRAMUSCULAR; INTRAVENOUS; PARENTERAL at 12:09

## 2019-01-01 RX ADMIN — POLYETHYLENE GLYCOL 3350 17 G: 17 POWDER, FOR SOLUTION ORAL at 07:52

## 2019-01-01 RX ADMIN — DEXAMETHASONE 2 MG: 4 TABLET ORAL at 09:38

## 2019-01-01 RX ADMIN — ZINC OXIDE: 200 OINTMENT TOPICAL at 15:20

## 2019-01-01 RX ADMIN — CARVEDILOL 3.12 MG: 3.12 TABLET, FILM COATED ORAL at 09:20

## 2019-01-01 RX ADMIN — ALBUTEROL SULFATE 2.5 MG: 2.5 SOLUTION RESPIRATORY (INHALATION) at 14:59

## 2019-01-01 RX ADMIN — INSULIN LISPRO 4 UNITS: 100 INJECTION, SOLUTION INTRAVENOUS; SUBCUTANEOUS at 17:17

## 2019-01-01 RX ADMIN — Medication 10 ML: at 09:33

## 2019-01-01 RX ADMIN — VANCOMYCIN HYDROCHLORIDE 1000 MG: 10 INJECTION, POWDER, LYOPHILIZED, FOR SOLUTION INTRAVENOUS at 10:32

## 2019-01-01 RX ADMIN — INSULIN LISPRO 3 UNITS: 100 INJECTION, SOLUTION INTRAVENOUS; SUBCUTANEOUS at 10:33

## 2019-01-01 RX ADMIN — GABAPENTIN 300 MG: 300 CAPSULE ORAL at 21:01

## 2019-01-01 RX ADMIN — Medication 10 ML: at 09:51

## 2019-01-01 RX ADMIN — INSULIN LISPRO 3 UNITS: 100 INJECTION, SOLUTION INTRAVENOUS; SUBCUTANEOUS at 20:36

## 2019-01-01 RX ADMIN — ZINC OXIDE: 200 OINTMENT TOPICAL at 20:17

## 2019-01-01 RX ADMIN — INSULIN LISPRO 12 UNITS: 100 INJECTION, SOLUTION INTRAVENOUS; SUBCUTANEOUS at 18:25

## 2019-01-01 RX ADMIN — DEXAMETHASONE 2 MG: 0.5 TABLET ORAL at 08:33

## 2019-01-01 RX ADMIN — GABAPENTIN 300 MG: 300 CAPSULE ORAL at 21:42

## 2019-01-01 RX ADMIN — FUROSEMIDE 40 MG: 40 TABLET ORAL at 17:04

## 2019-01-01 RX ADMIN — Medication 10 ML: at 10:18

## 2019-01-01 RX ADMIN — ZINC OXIDE: 200 OINTMENT TOPICAL at 17:18

## 2019-01-01 RX ADMIN — CALCIUM GLUCONATE 1 G: 20 INJECTION, SOLUTION INTRAVENOUS at 17:22

## 2019-01-01 RX ADMIN — ISOSORBIDE MONONITRATE 30 MG: 30 TABLET, EXTENDED RELEASE ORAL at 09:38

## 2019-01-01 RX ADMIN — DEXTROSE 50 % IN WATER (D50W) INTRAVENOUS SYRINGE 25 G: at 16:44

## 2019-01-01 RX ADMIN — BARIUM SULFATE 183 ML: 960 POWDER, FOR SUSPENSION ORAL at 13:11

## 2019-01-01 RX ADMIN — ATORVASTATIN CALCIUM 40 MG: 40 TABLET, FILM COATED ORAL at 22:48

## 2019-01-01 RX ADMIN — INSULIN LISPRO 3 UNITS: 100 INJECTION, SOLUTION INTRAVENOUS; SUBCUTANEOUS at 22:25

## 2019-01-01 RX ADMIN — ZINC OXIDE: 200 OINTMENT TOPICAL at 21:02

## 2019-01-01 RX ADMIN — DIGOXIN 125 MCG: 250 INJECTION, SOLUTION INTRAMUSCULAR; INTRAVENOUS; PARENTERAL at 18:14

## 2019-01-01 RX ADMIN — PANTOPRAZOLE SODIUM 40 MG: 40 TABLET, DELAYED RELEASE ORAL at 05:35

## 2019-01-01 RX ADMIN — CLOPIDOGREL BISULFATE 75 MG: 75 TABLET ORAL at 08:34

## 2019-01-01 RX ADMIN — SENNOSIDES 2 TABLET: 8.6 TABLET, FILM COATED ORAL at 22:49

## 2019-01-01 RX ADMIN — CLINDAMYCIN PHOSPHATE 600 MG: 600 INJECTION, SOLUTION INTRAVENOUS at 20:18

## 2019-01-01 RX ADMIN — Medication 10 ML: at 09:18

## 2019-01-01 RX ADMIN — ISOSORBIDE MONONITRATE 30 MG: 30 TABLET, EXTENDED RELEASE ORAL at 09:30

## 2019-01-01 RX ADMIN — TRAZODONE HYDROCHLORIDE 100 MG: 100 TABLET ORAL at 21:48

## 2019-01-01 RX ADMIN — FUROSEMIDE 40 MG: 40 TABLET ORAL at 08:34

## 2019-01-01 RX ADMIN — TRAZODONE HYDROCHLORIDE 100 MG: 100 TABLET ORAL at 21:59

## 2019-01-01 RX ADMIN — POLYETHYLENE GLYCOL 3350 17 G: 17 POWDER, FOR SOLUTION ORAL at 09:30

## 2019-01-01 RX ADMIN — ZINC OXIDE: 200 OINTMENT TOPICAL at 10:33

## 2019-01-01 RX ADMIN — GABAPENTIN 300 MG: 300 CAPSULE ORAL at 17:04

## 2019-01-01 RX ADMIN — FUROSEMIDE 40 MG: 40 TABLET ORAL at 17:01

## 2019-01-01 RX ADMIN — FUROSEMIDE 40 MG: 40 TABLET ORAL at 05:23

## 2019-01-01 RX ADMIN — GABAPENTIN 300 MG: 300 CAPSULE ORAL at 02:16

## 2019-01-01 RX ADMIN — ZINC OXIDE: 200 OINTMENT TOPICAL at 16:01

## 2019-01-01 RX ADMIN — PANTOPRAZOLE SODIUM 40 MG: 40 TABLET, DELAYED RELEASE ORAL at 06:36

## 2019-01-01 RX ADMIN — RAMIPRIL 1.25 MG: 1.25 CAPSULE ORAL at 09:46

## 2019-01-01 RX ADMIN — PREGABALIN 50 MG: 25 CAPSULE ORAL at 09:46

## 2019-01-01 RX ADMIN — Medication 10 ML: at 20:02

## 2019-01-01 RX ADMIN — PIPERACILLIN AND TAZOBACTAM 3.38 G: 3; .375 INJECTION, POWDER, LYOPHILIZED, FOR SOLUTION INTRAVENOUS at 18:14

## 2019-01-01 RX ADMIN — HYDROCORTISONE SODIUM SUCCINATE 50 MG: 100 INJECTION, POWDER, FOR SOLUTION INTRAMUSCULAR; INTRAVENOUS at 09:24

## 2019-01-01 RX ADMIN — SENNOSIDES 2 TABLET: 8.6 TABLET, FILM COATED ORAL at 20:01

## 2019-01-01 RX ADMIN — CEFTRIAXONE 2 G: 2 INJECTION, POWDER, FOR SOLUTION INTRAMUSCULAR; INTRAVENOUS at 22:00

## 2019-01-01 RX ADMIN — ISOSORBIDE MONONITRATE 30 MG: 30 TABLET, EXTENDED RELEASE ORAL at 09:54

## 2019-01-01 RX ADMIN — PIPERACILLIN AND TAZOBACTAM 3.38 G: 3; .375 INJECTION, POWDER, LYOPHILIZED, FOR SOLUTION INTRAVENOUS at 09:24

## 2019-01-01 RX ADMIN — INSULIN LISPRO 5 UNITS: 100 INJECTION, SOLUTION INTRAVENOUS; SUBCUTANEOUS at 12:09

## 2019-01-01 RX ADMIN — HYDROCORTISONE SODIUM SUCCINATE 50 MG: 100 INJECTION, POWDER, FOR SOLUTION INTRAMUSCULAR; INTRAVENOUS at 10:32

## 2019-01-01 RX ADMIN — INSULIN LISPRO 7 UNITS: 100 INJECTION, SOLUTION INTRAVENOUS; SUBCUTANEOUS at 10:20

## 2019-01-01 RX ADMIN — INSULIN LISPRO 4 UNITS: 100 INJECTION, SOLUTION INTRAVENOUS; SUBCUTANEOUS at 09:50

## 2019-01-01 RX ADMIN — CLINDAMYCIN PHOSPHATE 600 MG: 600 INJECTION, SOLUTION INTRAVENOUS at 05:02

## 2019-01-01 RX ADMIN — SENNOSIDES 2 TABLET: 8.6 TABLET, FILM COATED ORAL at 21:59

## 2019-01-01 RX ADMIN — SENNOSIDES 2 TABLET: 8.6 TABLET, FILM COATED ORAL at 09:19

## 2019-01-01 RX ADMIN — INSULIN GLARGINE 10 UNITS: 100 INJECTION, SOLUTION SUBCUTANEOUS at 09:53

## 2019-01-01 RX ADMIN — GABAPENTIN 300 MG: 300 CAPSULE ORAL at 09:38

## 2019-01-01 RX ADMIN — CLINDAMYCIN PHOSPHATE 600 MG: 600 INJECTION, SOLUTION INTRAVENOUS at 13:06

## 2019-01-01 RX ADMIN — GABAPENTIN 300 MG: 300 CAPSULE ORAL at 16:33

## 2019-01-01 RX ADMIN — DEXTROSE MONOHYDRATE 100 ML/HR: 50 INJECTION, SOLUTION INTRAVENOUS at 12:49

## 2019-01-01 RX ADMIN — POLYETHYLENE GLYCOL 3350 17 G: 17 POWDER, FOR SOLUTION ORAL at 09:19

## 2019-01-01 RX ADMIN — FUROSEMIDE 40 MG: 40 TABLET ORAL at 17:29

## 2019-01-01 RX ADMIN — NITROGLYCERIN 10 MCG/MIN: 20 INJECTION INTRAVENOUS at 19:00

## 2019-01-01 RX ADMIN — ALBUTEROL SULFATE 2.5 MG: 2.5 SOLUTION RESPIRATORY (INHALATION) at 11:02

## 2019-01-01 RX ADMIN — Medication 3 ML: at 08:34

## 2019-01-01 RX ADMIN — FUROSEMIDE 40 MG: 40 TABLET ORAL at 18:25

## 2019-01-01 RX ADMIN — ASPIRIN 81 MG 81 MG: 81 TABLET ORAL at 09:37

## 2019-01-01 RX ADMIN — Medication 10 ML: at 21:42

## 2019-01-01 RX ADMIN — ALBUMIN HUMAN 25 G: 0.25 SOLUTION INTRAVENOUS at 05:18

## 2019-01-01 RX ADMIN — Medication: at 20:40

## 2019-01-01 RX ADMIN — Medication 10 ML: at 20:01

## 2019-01-01 RX ADMIN — ATORVASTATIN CALCIUM 40 MG: 40 TABLET, FILM COATED ORAL at 21:47

## 2019-01-01 RX ADMIN — ISOSORBIDE MONONITRATE 30 MG: 30 TABLET, EXTENDED RELEASE ORAL at 08:32

## 2019-01-01 RX ADMIN — GABAPENTIN 300 MG: 300 CAPSULE ORAL at 20:01

## 2019-01-01 RX ADMIN — ACETAMINOPHEN 650 MG: 325 TABLET ORAL at 00:09

## 2019-01-01 RX ADMIN — FUROSEMIDE 40 MG: 40 TABLET ORAL at 08:33

## 2019-01-01 RX ADMIN — AMMONIA INHALANTS: 0.04 INHALANT RESPIRATORY (INHALATION) at 16:00

## 2019-01-01 RX ADMIN — Medication 10 ML: at 20:37

## 2019-01-01 RX ADMIN — INSULIN LISPRO 3 UNITS: 100 INJECTION, SOLUTION INTRAVENOUS; SUBCUTANEOUS at 12:49

## 2019-01-01 RX ADMIN — TRAZODONE HYDROCHLORIDE 100 MG: 100 TABLET ORAL at 21:42

## 2019-01-01 RX ADMIN — Medication 3 ML: at 09:54

## 2019-01-01 RX ADMIN — GABAPENTIN 300 MG: 300 CAPSULE ORAL at 09:53

## 2019-01-01 RX ADMIN — HYDROCORTISONE SODIUM SUCCINATE 50 MG: 100 INJECTION, POWDER, FOR SOLUTION INTRAMUSCULAR; INTRAVENOUS at 01:34

## 2019-01-01 RX ADMIN — Medication 3 ML: at 09:47

## 2019-01-01 RX ADMIN — RAMIPRIL 1.25 MG: 1.25 CAPSULE ORAL at 09:55

## 2019-01-01 RX ADMIN — ASPIRIN 81 MG 81 MG: 81 TABLET ORAL at 09:54

## 2019-01-01 RX ADMIN — ALBUTEROL SULFATE 2.5 MG: 2.5 SOLUTION RESPIRATORY (INHALATION) at 08:30

## 2019-01-01 RX ADMIN — CARVEDILOL 3.12 MG: 3.12 TABLET, FILM COATED ORAL at 17:29

## 2019-01-01 RX ADMIN — ALBUTEROL SULFATE 10 MG: 2.5 SOLUTION RESPIRATORY (INHALATION) at 02:16

## 2019-01-01 RX ADMIN — INSULIN LISPRO 2 UNITS: 100 INJECTION, SOLUTION INTRAVENOUS; SUBCUTANEOUS at 00:30

## 2019-01-01 RX ADMIN — Medication 10 ML: at 21:01

## 2019-01-01 RX ADMIN — ATORVASTATIN CALCIUM 40 MG: 40 TABLET, FILM COATED ORAL at 21:45

## 2019-01-01 RX ADMIN — HYDROCORTISONE SODIUM SUCCINATE 50 MG: 100 INJECTION, POWDER, FOR SOLUTION INTRAMUSCULAR; INTRAVENOUS at 18:15

## 2019-01-01 RX ADMIN — ALBUTEROL SULFATE 2.5 MG: 2.5 SOLUTION RESPIRATORY (INHALATION) at 11:09

## 2019-01-01 RX ADMIN — GABAPENTIN 300 MG: 300 CAPSULE ORAL at 08:33

## 2019-01-01 RX ADMIN — GABAPENTIN 300 MG: 300 CAPSULE ORAL at 22:49

## 2019-01-01 RX ADMIN — FAMOTIDINE 20 MG: 20 TABLET, FILM COATED ORAL at 07:53

## 2019-01-01 RX ADMIN — ZINC OXIDE: 200 OINTMENT TOPICAL at 16:06

## 2019-01-01 RX ADMIN — GABAPENTIN 300 MG: 300 CAPSULE ORAL at 08:34

## 2019-01-01 RX ADMIN — GABAPENTIN 300 MG: 300 CAPSULE ORAL at 20:08

## 2019-01-01 RX ADMIN — DEXAMETHASONE 2 MG: 4 TABLET ORAL at 09:53

## 2019-01-01 RX ADMIN — CLINDAMYCIN PHOSPHATE 600 MG: 600 INJECTION, SOLUTION INTRAVENOUS at 21:02

## 2019-01-01 RX ADMIN — POTASSIUM CHLORIDE 10 MEQ: 10 INJECTION, SOLUTION INTRAVENOUS at 11:19

## 2019-01-01 RX ADMIN — POLYETHYLENE GLYCOL 3350 17 G: 17 POWDER, FOR SOLUTION ORAL at 09:32

## 2019-01-01 RX ADMIN — AMOXICILLIN AND CLAVULANATE POTASSIUM 1 TABLET: 875; 125 TABLET, FILM COATED ORAL at 09:32

## 2019-01-01 RX ADMIN — TRAZODONE HYDROCHLORIDE 100 MG: 100 TABLET ORAL at 19:54

## 2019-01-01 RX ADMIN — INSULIN LISPRO 6 UNITS: 100 INJECTION, SOLUTION INTRAVENOUS; SUBCUTANEOUS at 18:04

## 2019-01-01 RX ADMIN — PREGABALIN 50 MG: 25 CAPSULE ORAL at 21:48

## 2019-01-01 RX ADMIN — HYDROCORTISONE SODIUM SUCCINATE 50 MG: 100 INJECTION, POWDER, FOR SOLUTION INTRAMUSCULAR; INTRAVENOUS at 16:55

## 2019-01-01 RX ADMIN — FUROSEMIDE 40 MG: 40 TABLET ORAL at 22:48

## 2019-01-01 RX ADMIN — ALBUTEROL SULFATE 2.5 MG: 2.5 SOLUTION RESPIRATORY (INHALATION) at 06:37

## 2019-01-01 RX ADMIN — GABAPENTIN 300 MG: 300 CAPSULE ORAL at 09:30

## 2019-01-01 RX ADMIN — VANCOMYCIN HYDROCHLORIDE 1250 MG: 10 INJECTION, POWDER, LYOPHILIZED, FOR SOLUTION INTRAVENOUS at 18:39

## 2019-01-01 RX ADMIN — AMOXICILLIN AND CLAVULANATE POTASSIUM 1 TABLET: 875; 125 TABLET, FILM COATED ORAL at 20:00

## 2019-01-01 RX ADMIN — PANTOPRAZOLE SODIUM 40 MG: 40 TABLET, DELAYED RELEASE ORAL at 06:04

## 2019-01-01 RX ADMIN — CARVEDILOL 3.12 MG: 3.12 TABLET, FILM COATED ORAL at 07:59

## 2019-01-01 RX ADMIN — SENNOSIDES 2 TABLET: 8.6 TABLET, FILM COATED ORAL at 09:53

## 2019-01-01 RX ADMIN — FUROSEMIDE 40 MG: 40 TABLET ORAL at 07:54

## 2019-01-01 RX ADMIN — Medication 10 ML: at 10:33

## 2019-01-01 RX ADMIN — Medication 10 ML: at 21:38

## 2019-01-01 RX ADMIN — AMOXICILLIN AND CLAVULANATE POTASSIUM 1 TABLET: 875; 125 TABLET, FILM COATED ORAL at 20:08

## 2019-01-01 RX ADMIN — SENNOSIDES 2 TABLET: 8.6 TABLET, FILM COATED ORAL at 08:00

## 2019-01-01 RX ADMIN — DIGOXIN 125 MCG: 250 INJECTION, SOLUTION INTRAMUSCULAR; INTRAVENOUS; PARENTERAL at 13:08

## 2019-01-01 RX ADMIN — CARVEDILOL 3.12 MG: 3.12 TABLET, FILM COATED ORAL at 17:04

## 2019-01-01 RX ADMIN — TRAZODONE HYDROCHLORIDE 100 MG: 100 TABLET ORAL at 22:49

## 2019-01-01 RX ADMIN — GABAPENTIN 300 MG: 300 CAPSULE ORAL at 17:29

## 2019-01-01 RX ADMIN — PANTOPRAZOLE SODIUM 40 MG: 40 TABLET, DELAYED RELEASE ORAL at 11:44

## 2019-01-01 RX ADMIN — ENOXAPARIN SODIUM 30 MG: 30 INJECTION SUBCUTANEOUS at 15:19

## 2019-01-01 RX ADMIN — ALBUTEROL SULFATE 2.5 MG: 2.5 SOLUTION RESPIRATORY (INHALATION) at 10:52

## 2019-01-01 RX ADMIN — INSULIN LISPRO 7 UNITS: 100 INJECTION, SOLUTION INTRAVENOUS; SUBCUTANEOUS at 13:08

## 2019-01-01 RX ADMIN — GABAPENTIN 300 MG: 300 CAPSULE ORAL at 08:31

## 2019-01-01 RX ADMIN — ZINC OXIDE: 200 OINTMENT TOPICAL at 22:22

## 2019-01-01 RX ADMIN — FUROSEMIDE 40 MG: 40 TABLET ORAL at 17:21

## 2019-01-01 RX ADMIN — HYDROCORTISONE SODIUM SUCCINATE 50 MG: 100 INJECTION, POWDER, FOR SOLUTION INTRAMUSCULAR; INTRAVENOUS at 03:05

## 2019-01-01 RX ADMIN — PIPERACILLIN AND TAZOBACTAM 3.38 G: 3; .375 INJECTION, POWDER, LYOPHILIZED, FOR SOLUTION INTRAVENOUS at 03:05

## 2019-01-01 RX ADMIN — SODIUM CHLORIDE 1836 ML: 900 INJECTION, SOLUTION INTRAVENOUS at 01:43

## 2019-01-01 RX ADMIN — SENNOSIDES 2 TABLET: 8.6 TABLET, FILM COATED ORAL at 09:46

## 2019-01-01 RX ADMIN — PIPERACILLIN AND TAZOBACTAM 3.38 G: 3; .375 INJECTION, POWDER, LYOPHILIZED, FOR SOLUTION INTRAVENOUS at 10:20

## 2019-01-01 RX ADMIN — VANCOMYCIN HYDROCHLORIDE 1250 MG: 10 INJECTION, POWDER, LYOPHILIZED, FOR SOLUTION INTRAVENOUS at 02:15

## 2019-01-01 RX ADMIN — GABAPENTIN 300 MG: 300 CAPSULE ORAL at 21:48

## 2019-01-01 RX ADMIN — RAMIPRIL 1.25 MG: 1.25 CAPSULE ORAL at 11:34

## 2019-01-01 RX ADMIN — AMOXICILLIN AND CLAVULANATE POTASSIUM 1 TABLET: 875; 125 TABLET, FILM COATED ORAL at 09:20

## 2019-01-01 RX ADMIN — POTASSIUM CHLORIDE 10 MEQ: 10 INJECTION, SOLUTION INTRAVENOUS at 14:11

## 2019-01-01 RX ADMIN — PIPERACILLIN AND TAZOBACTAM 3.38 G: 3; .375 INJECTION, POWDER, LYOPHILIZED, FOR SOLUTION INTRAVENOUS at 01:26

## 2019-01-01 RX ADMIN — INSULIN LISPRO 2 UNITS: 100 INJECTION, SOLUTION INTRAVENOUS; SUBCUTANEOUS at 12:35

## 2019-01-01 RX ADMIN — CEFEPIME HYDROCHLORIDE 2 G: 2 INJECTION, POWDER, FOR SOLUTION INTRAVENOUS at 05:36

## 2019-01-01 RX ADMIN — CARVEDILOL 3.12 MG: 3.12 TABLET, FILM COATED ORAL at 18:25

## 2019-01-01 RX ADMIN — VANCOMYCIN HYDROCHLORIDE 1250 MG: 10 INJECTION, POWDER, LYOPHILIZED, FOR SOLUTION INTRAVENOUS at 17:21

## 2019-01-01 RX ADMIN — FUROSEMIDE 40 MG: 40 TABLET ORAL at 09:30

## 2019-01-01 RX ADMIN — ALBUTEROL SULFATE 2.5 MG: 2.5 SOLUTION RESPIRATORY (INHALATION) at 23:21

## 2019-01-01 RX ADMIN — PANTOPRAZOLE SODIUM 40 MG: 40 TABLET, DELAYED RELEASE ORAL at 07:24

## 2019-01-01 RX ADMIN — ISOSORBIDE MONONITRATE 30 MG: 30 TABLET, EXTENDED RELEASE ORAL at 07:55

## 2019-01-01 RX ADMIN — TRAZODONE HYDROCHLORIDE 100 MG: 100 TABLET ORAL at 19:52

## 2019-01-01 RX ADMIN — ZINC OXIDE: 200 OINTMENT TOPICAL at 20:03

## 2019-01-01 RX ADMIN — DEXAMETHASONE 2 MG: 0.5 TABLET ORAL at 08:31

## 2019-01-01 RX ADMIN — SENNOSIDES 2 TABLET: 8.6 TABLET, FILM COATED ORAL at 11:40

## 2019-01-01 RX ADMIN — FAMOTIDINE 20 MG: 20 TABLET, FILM COATED ORAL at 09:46

## 2019-01-01 RX ADMIN — Medication 10 ML: at 20:08

## 2019-01-01 RX ADMIN — FAMOTIDINE 20 MG: 20 TABLET, FILM COATED ORAL at 09:37

## 2019-01-01 RX ADMIN — SODIUM CHLORIDE 125 ML/HR: 900 INJECTION, SOLUTION INTRAVENOUS at 20:02

## 2019-01-01 RX ADMIN — INSULIN LISPRO 12 UNITS: 100 INJECTION, SOLUTION INTRAVENOUS; SUBCUTANEOUS at 02:11

## 2019-01-01 RX ADMIN — SENNOSIDES 2 TABLET: 8.6 TABLET, FILM COATED ORAL at 19:55

## 2019-01-01 RX ADMIN — TRAZODONE HYDROCHLORIDE 100 MG: 100 TABLET ORAL at 21:01

## 2019-01-01 RX ADMIN — SODIUM CHLORIDE 125 ML/HR: 900 INJECTION, SOLUTION INTRAVENOUS at 11:23

## 2019-01-01 RX ADMIN — CARVEDILOL 3.12 MG: 3.12 TABLET, FILM COATED ORAL at 09:37

## 2019-01-01 RX ADMIN — SENNOSIDES 2 TABLET: 8.6 TABLET, FILM COATED ORAL at 19:56

## 2019-01-01 RX ADMIN — HYDROCORTISONE SODIUM SUCCINATE 50 MG: 100 INJECTION, POWDER, FOR SOLUTION INTRAMUSCULAR; INTRAVENOUS at 02:28

## 2019-01-01 RX ADMIN — ALBUTEROL SULFATE 2.5 MG: 2.5 SOLUTION RESPIRATORY (INHALATION) at 23:47

## 2019-01-01 RX ADMIN — AMOXICILLIN AND CLAVULANATE POTASSIUM 1 TABLET: 875; 125 TABLET, FILM COATED ORAL at 21:01

## 2019-01-01 RX ADMIN — ZINC OXIDE: 200 OINTMENT TOPICAL at 10:20

## 2019-01-01 RX ADMIN — DEXAMETHASONE 2 MG: 0.5 TABLET ORAL at 12:34

## 2019-01-01 RX ADMIN — ALBUMIN HUMAN 25 G: 0.25 SOLUTION INTRAVENOUS at 22:22

## 2019-01-01 RX ADMIN — GABAPENTIN 300 MG: 300 CAPSULE ORAL at 17:48

## 2019-01-01 RX ADMIN — PREGABALIN 50 MG: 25 CAPSULE ORAL at 09:38

## 2019-01-01 RX ADMIN — HYDROCORTISONE SODIUM SUCCINATE 50 MG: 100 INJECTION, POWDER, FOR SOLUTION INTRAMUSCULAR; INTRAVENOUS at 09:17

## 2019-01-01 RX ADMIN — GABAPENTIN 300 MG: 300 CAPSULE ORAL at 17:09

## 2019-01-01 RX ADMIN — Medication 10 ML: at 09:30

## 2019-01-01 RX ADMIN — ISOSORBIDE MONONITRATE 30 MG: 30 TABLET, EXTENDED RELEASE ORAL at 08:00

## 2019-01-01 RX ADMIN — POTASSIUM CHLORIDE 10 MEQ: 10 INJECTION, SOLUTION INTRAVENOUS at 13:18

## 2019-01-01 RX ADMIN — CARVEDILOL 3.12 MG: 3.12 TABLET, FILM COATED ORAL at 17:10

## 2019-01-01 RX ADMIN — ALBUTEROL SULFATE 2.5 MG: 2.5 SOLUTION RESPIRATORY (INHALATION) at 19:37

## 2019-01-01 RX ADMIN — GABAPENTIN 300 MG: 300 CAPSULE ORAL at 09:19

## 2019-01-01 RX ADMIN — INSULIN LISPRO 3 UNITS: 100 INJECTION, SOLUTION INTRAVENOUS; SUBCUTANEOUS at 18:18

## 2019-01-01 RX ADMIN — FUROSEMIDE 40 MG: 40 TABLET ORAL at 17:24

## 2019-01-01 RX ADMIN — PREGABALIN 50 MG: 25 CAPSULE ORAL at 21:42

## 2019-01-01 RX ADMIN — HYDROCORTISONE SODIUM SUCCINATE 50 MG: 100 INJECTION, POWDER, FOR SOLUTION INTRAMUSCULAR; INTRAVENOUS at 00:52

## 2019-02-22 NOTE — TELEPHONE ENCOUNTER
Spoke to Mr. Braxton to let him know his surgery has been scheduled for 2-27-17 at [place].He will be contacted by them as to when to come in for his PAT and that Maryellen will contact him to let him know when to stop his ELIQUIS prior to surgery.  Patient expressed verbal understanding and was instructed to call office with any further questions.

## 2019-05-06 NOTE — OUTREACH NOTE
SNF Follow-up Note    Skilled Nursing Facility Discharge Assessment 5/6/2019   Acute Discharge Date 4/3/2019   Name of the Skilled Nursing Facility? [facility]   Purpose of SNF Admission PT;OT;SN   Estimated length of stay for the patient? Notification Alert via Patient Ping   Who is the insurance provider or payor of patient stay? Medicare;Hospice   Progression of Patient? Check for potential hospice status       Dina Coronado RN    5/6/2019, 5:37 PM

## 2019-05-13 NOTE — OUTREACH NOTE
Care Coordination Note    RN-CC outreach to [facility].  Per staff, patient d/c to home with Hospice on 5/11/2019.  Patient was at facility for 5-day respite care.      Dina Coronado RN    5/13/2019, 3:05 PM

## 2019-07-15 PROBLEM — I20.0 UNSTABLE ANGINA (HCC): Status: ACTIVE | Noted: 2019-01-01

## 2019-07-16 PROBLEM — L89.212: Status: ACTIVE | Noted: 2019-01-01

## 2019-07-16 PROBLEM — L89.152 PRESSURE INJURY OF SACRAL REGION, STAGE 2 (HCC): Status: ACTIVE | Noted: 2019-01-01

## 2019-07-18 NOTE — OUTREACH NOTE
Prep Survey      Responses   Facility patient discharged from?  [facility]   Is patient eligible?  No [Home with [facility]]   What are the reasons patient is not eligible?  Hospice/Pallative Care   Discharge diagnosis  NSTEMI   Chronic HF   Does the patient have one of the following disease processes/diagnoses(primary or secondary)?  Acute MI (STEMI,NSTEMI)   Prep survey completed?  Yes          Nkechi Khoury RN

## 2019-08-03 NOTE — ED PROVIDER NOTES
Subjective   Patient is an 87-year-old male complaint chest pain for the past several hours at home.  Describes the pain as moderate in intensity without radiation.  The pain heaviness.  He has mild associated shortness of breath.  He denies headache ears no cough fever vomiting diarrhea or other complaint.            Review of Systems   Constitutional: Negative for chills, diaphoresis and fever.   HENT: Negative for ear pain and sore throat.    Eyes: Negative for visual disturbance.   Respiratory: Negative for cough, choking and shortness of breath.    Cardiovascular: Positive for chest pain. Negative for palpitations.   Gastrointestinal: Negative for abdominal pain, diarrhea, nausea and vomiting.   Endocrine: Negative for polydipsia and polyuria.   Genitourinary: Negative for dysuria and flank pain.   Musculoskeletal: Negative for back pain and myalgias.   Skin: Negative for rash.   Neurological: Negative for dizziness, weakness and headaches.   Psychiatric/Behavioral: Negative for confusion.   All other systems reviewed and are negative.      Past Medical History:   Diagnosis Date   • CHF (congestive heart failure) (CMS/HCC)    • Coronary artery disease    • Neuropathy    • Stroke (CMS/[facility])        No Known Allergies    Past Surgical History:   Procedure Laterality Date   • ANGIOPLASTY     • BACK SURGERY     • CHOLECYSTECTOMY     • CORONARY STENT PLACEMENT     • SPINAL FUSION         Family History   Problem Relation Age of Onset   • Coronary artery disease Brother        Social History     Socioeconomic History   • Marital status:      Spouse name: Not on file   • Number of children: Not on file   • Years of education: Not on file   • Highest education level: Not on file   Tobacco Use   • Smoking status: Former Smoker     Types: Cigarettes   • Smokeless tobacco: Never Used   • Tobacco comment: Quit 40 years go   Substance and Sexual Activity   • Alcohol use: No     Frequency: Never   • Drug use: No    Social History Narrative    - spouse has Parkinson's.  He lives at home with spouse and grandson.           Objective   Physical Exam  HEENT exam shows TMs to be clear.  Oropharynx, spit sclerae nonicteric.  Neck has no adenopathy JVD or bruits.  Lungs are clear.  Heart has a regular rate rhythm without murmur gallop.  Chest is nontender.  Abdomen is soft nontender.  Extremity exam is no cyanosis or edema.  Procedures       My EKG interpretation shows atrial fibrillation at a controlled rate.  Is no acute ST change.    ED Course        Results for orders placed or performed during the hospital encounter of 08/03/19   Basic Metabolic Panel   Result Value Ref Range    Glucose 367 (H) 65 - 99 mg/dL    BUN 34 (H) 8 - 20 mg/dL    Creatinine 1.10 0.70 - 1.20 mg/dL    Sodium 132 (L) 136 - 144 mmol/L    Potassium 3.6 3.6 - 5.1 mmol/L    Chloride 93 (L) 101 - 111 mmol/L    CO2 23.0 22.0 - 32.0 mmol/L    Calcium 8.1 (L) 8.9 - 10.3 mg/dL    eGFR Non African Amer 63 >60 mL/min/1.73    BUN/Creatinine Ratio 30.9 (H) 6.2 - 20.3    Anion Gap 19.6 (H) 5.0 - 15.0 mmol/L   Troponin   Result Value Ref Range    Troponin I 0.110 (H) 0.000 - 0.030 ng/mL   CBC Auto Differential   Result Value Ref Range    WBC 13.90 (H) 3.40 - 10.80 10*3/mm3    RBC 3.57 (L) 4.14 - 5.80 10*6/mm3    Hemoglobin 11.4 (L) 13.0 - 17.7 g/dL    Hematocrit 33.7 (L) 37.5 - 51.0 %    MCV 94.4 79.0 - 97.0 fL    MCH 31.9 26.6 - 33.0 pg    MCHC 33.8 31.5 - 35.7 g/dL    RDW 15.2 12.3 - 15.4 %    RDW-SD 50.8 37.0 - 54.0 fl    MPV 9.3 6.0 - 12.0 fL    Platelets 178 140 - 450 10*3/mm3    Neutrophil % 93.1 (H) 42.7 - 76.0 %    Lymphocyte % 3.2 (L) 19.6 - 45.3 %    Monocyte % 3.7 (L) 5.0 - 12.0 %    Eosinophil % 0.0 (L) 0.3 - 6.2 %    Basophil % 0.0 0.0 - 1.5 %    Neutrophils, Absolute 12.90 (H) 1.70 - 7.00 10*3/mm3    Lymphocytes, Absolute 0.40 (L) 0.70 - 3.10 10*3/mm3    Monocytes, Absolute 0.50 0.10 - 0.90 10*3/mm3    Eosinophils, Absolute 0.00 0.00 - 0.40 10*3/mm3     Basophils, Absolute 0.00 0.00 - 0.20 10*3/mm3    nRBC 0.0 0.0 - 0.2 /100 WBC   Scan Slide   Result Value Ref Range    RBC Morphology Normal Normal    WBC Morphology Normal Normal    Platelet Morphology Normal Normal   Light Blue Top   Result Value Ref Range    Extra Tube hold for add-on      Xr Chest 1 View    Result Date: 8/3/2019  1.Mild pulmonary edema pattern with moderate right and small left pleural effusions. 2.New consolidation within left midlung, which may represents pulmonary edema or pneumonia. 3.Bibasilar opacities, which could represent atelectasis or pneumonia.  Electronically Signed By-DR. Darnell Montilla MD On:8/3/2019 5:38 PM This report was finalized on [id number] by DR. Darnell Montilla MD.              MDM  Number of Diagnoses or Management Options  Diagnosis management comments: Patient has no evidence of acute chronicity based on EKG.  The patient's troponin is borderline elevated.  Chest x-ray shows no acute pulmonary disease including infectious process.  Metabolic panel is normal.  Patient was started on Tridil due to mild discomfort.  Will be admitted for further cardiac evaluation.  I did speak to the on-call hospitalist.    Risk of Complications, Morbidity, and/or Mortality  Presenting problems: high  Diagnostic procedures: high  Management options: high    Patient Progress  Patient progress: stable        Final diagnoses:   Unstable angina (CMS/[facility])            Jj Garcia MD  08/03/19 7445

## 2019-08-03 NOTE — ED NOTES
Pt states that earlier he was having cp. Pt sees dr norton. Pt is denying any cp now.     Tamie Rodriguez, EVELYN  08/03/19 6740

## 2019-08-04 PROBLEM — I20.0 UNSTABLE ANGINA (HCC): Status: RESOLVED | Noted: 2019-01-01 | Resolved: 2019-01-01

## 2019-08-04 NOTE — H&P
[facility] GROUP HOSPITALIST     Eduardo Medina MD    CHIEF COMPLAINT:     Chest pain     HISTORY OF PRESENT ILLNESS:     HPI  87 year old frail appearing male presented to ED with complaints of midsternal chest pain today. He denies nausea, dizziness, syncope or dyspnea. He is a [facility] patient and reports his wife of 66 years  last month. He has a PMH of severe CAD per last heart cath , systolic heart failure EF 25% , PAF no anticoags due to falls , anxiety . He was recently admitted here about 2 weeks ago for NSTEMI , Per records LHC not performed given frailty and medical management offered. Today EKG shows atrial fib rate controlled LBBB, troponin 0.110( was 0.200 last admission) . Blood sugar is 367 with no DM but he is on steroids   For LBP . CXR per radiology shows areas of atelectasis vs pneumonia vs effusions. He is afebrile, [id number] and no complaints cough or congestion. He was placed on a Tridil drip and will be admitted for further treatment and evaluation with cardiology consulted.       Past Medical History:   Diagnosis Date   • CHF (congestive heart failure) (CMS/HCC)    • Coronary artery disease    • Neuropathy    • Stroke (CMS/HCC)      Past Surgical History:   Procedure Laterality Date   • ANGIOPLASTY     • BACK SURGERY     • CHOLECYSTECTOMY     • CORONARY STENT PLACEMENT     • SPINAL FUSION       Family History   Problem Relation Age of Onset   • Coronary artery disease Brother      Social History     Tobacco Use   • Smoking status: Former Smoker     Types: Cigarettes   • Smokeless tobacco: Never Used   • Tobacco comment: Quit 40 years go   Substance Use Topics   • Alcohol use: No     Frequency: Never   • Drug use: No     Medications Prior to Admission   Medication Sig Dispense Refill Last Dose   • aspirin 81 MG chewable tablet Chew 81 mg Daily.   8/3/2019 at Unknown time   • atorvastatin (LIPITOR) 40 MG tablet Take 1 tablet by mouth Every Night. 30 tablet 0 2019 at  Unknown time   • carvedilol (COREG) 3.125 MG tablet Take 3.125 mg by mouth 2 (Two) Times a Day With Meals.   8/3/2019 at Unknown time   • clopidogrel (PLAVIX) 75 MG tablet Take 1 tablet by mouth Daily. 30 tablet 0 8/3/2019 at Unknown time   • dexamethasone (DECADRON) 2 MG tablet Take 2 mg by mouth Daily.   8/3/2019 at Unknown time   • furosemide (LASIX) 40 MG tablet Take 40 mg by mouth 2 (Two) Times a Day.   8/3/2019 at Unknown time   • gabapentin (NEURONTIN) 300 MG capsule Take 300 mg by mouth 3 (Three) Times a Day.   8/3/2019 at Unknown time   • HYDROcodone-acetaminophen (NORCO) 5-325 MG per tablet Take 1 tablet by mouth Every 4 (Four) Hours As Needed.   8/3/2019 at Unknown time   • isosorbide mononitrate (IMDUR) 30 MG 24 hr tablet Take 1 tablet by mouth Daily. 30 tablet 0 8/3/2019 at Unknown time   • LORazepam (ATIVAN) 0.5 MG tablet Take 0.5 mg by mouth Every 6 (Six) Hours As Needed for Anxiety.   8/3/2019 at Unknown time   • ramipril (ALTACE) 1.25 MG capsule Take 1 capsule by mouth Daily. 30 capsule 0 8/3/2019 at Unknown time   • raNITIdine (ZANTAC) 150 MG tablet Take 150 mg by mouth Daily.   8/3/2019 at Unknown time   • senna (SENOKOT) 8.6 MG tablet tablet Take 2 tablets by mouth 2 (Two) Times a Day.   8/3/2019 at Unknown time   • traZODone (DESYREL) 100 MG tablet Take 100 mg by mouth Every Night.   8/2/2019 at Unknown time   • loperamide (IMODIUM) 2 MG capsule Take 2 mg by mouth Daily As Needed for Diarrhea.   Unknown at Unknown time     Allergies:  Patient has no known allergies.    Immunization History   Administered Date(s) Administered   • Flu Vaccine Quad PF >36MO 10/27/2016           REVIEW OF SYSTEMS:     Review of Systems   Constitution: Positive for decreased appetite, weakness and weight loss.   Cardiovascular: Positive for chest pain.   Respiratory: Positive for shortness of breath.    Musculoskeletal: Positive for joint pain, muscle weakness and stiffness.   All other systems reviewed and are  "negative.      Vital Signs  Temp:  [97.5 °F (36.4 °C)] 97.5 °F (36.4 °C)  Heart Rate:  [71-82] 71  Resp:  [16-24] 16  BP: (121-139)/(55-71) 139/64    Flowsheet Rows      First Filed Value   Admission Height  177.8 cm (70\") Documented at 08/03/2019 1612   Admission Weight  64 kg (141 lb 1.5 oz) Documented at 08/03/2019 1612           Physical Exam:    Physical Exam   Constitutional: He is oriented to person, place, and time.   Frail   HENT:   Head: Atraumatic.   Eyes: EOM are normal.   Neck: Neck supple.   Cardiovascular: Normal rate, regular rhythm and normal heart sounds.   Pulmonary/Chest: Effort normal and breath sounds normal.   Abdominal: Soft. Bowel sounds are normal.   Musculoskeletal: Normal range of motion.   Neurological: He is alert and oriented to person, place, and time.   Skin: Skin is warm and dry.   Psychiatric: He has a normal mood and affect. His behavior is normal. Thought content normal.   Vitals reviewed.      Emotional Behavior:    cooperative    Debilities:  Age appropriate      Results Review:    I reviewed the patient's new clinical results.  Lab Results (most recent)     Procedure Component Value Units Date/Time    Extra Tubes [[id number]] Collected:  08/03/19 1643    Specimen:  Blood, Venous Line Updated:  08/03/19 1745    Narrative:       The following orders were created for panel order Extra Tubes.  Procedure                               Abnormality         Status                     ---------                               -----------         ------                     Light Blue Top[[id number]]                                   Final result                 Please view results for these tests on the individual orders.    Light Blue Top [[id number]] Collected:  08/03/19 1643    Specimen:  Blood Updated:  08/03/19 1745     Extra Tube hold for add-on     Comment: Auto resulted       Troponin [[id number]]  (Abnormal) Collected:  08/03/19 1643    Specimen:  Blood Updated:  08/03/19 1733     " Troponin I 0.110 ng/mL     Narrative:       Troponin I Reference Range:    0.00-0.03  Negative.  Repeat testing in 4-6 hours if clinically indicated.    0.04-0.29  Suspicious for myocardial injury. Serial measurements and clinical  correlation may be necessary to confirm or exclude diagnosis of acute  coronary syndrome.  Repeat testing in 4-6 hours if indicated.     >0.29 Consistent with myocardial injury.  Recommend clinical and laboratory correlation.     Results my be falsely decreased if patient taking Biotin.     Basic Metabolic Panel [[id number]]  (Abnormal) Collected:  08/03/19 1643    Specimen:  Blood Updated:  08/03/19 1724     Glucose 367 mg/dL      BUN 34 mg/dL      Creatinine 1.10 mg/dL      Sodium 132 mmol/L      Potassium 3.6 mmol/L      Chloride 93 mmol/L      CO2 23.0 mmol/L      Calcium 8.1 mg/dL      eGFR Non African Amer 63 mL/min/1.73      BUN/Creatinine Ratio 30.9     Anion Gap 19.6 mmol/L     Narrative:       The MDRD GFR formula is only valid for adults with stable renal function between ages 18 and 70.    CBC & Differential [[id number]] Collected:  08/03/19 1643    Specimen:  Blood Updated:  08/03/19 1723    Narrative:       The following orders were created for panel order CBC & Differential.  Procedure                               Abnormality         Status                     ---------                               -----------         ------                     Scan Slide[[id number]]                   Normal              Final result               CBC Auto Differential[[id number]]        Abnormal            Final result                 Please view results for these tests on the individual orders.    CBC Auto Differential [[id number]]  (Abnormal) Collected:  08/03/19 1643    Specimen:  Blood Updated:  08/03/19 1723     WBC 13.90 10*3/mm3      RBC 3.57 10*6/mm3      Hemoglobin 11.4 g/dL      Hematocrit 33.7 %      MCV 94.4 fL      MCH 31.9 pg      MCHC 33.8 g/dL      RDW 15.2 %      RDW-SD 50.8  fl      MPV 9.3 fL      Platelets 178 10*3/mm3      Neutrophil % 93.1 %      Lymphocyte % 3.2 %      Monocyte % 3.7 %      Eosinophil % 0.0 %      Basophil % 0.0 %      Neutrophils, Absolute 12.90 10*3/mm3      Lymphocytes, Absolute 0.40 10*3/mm3      Monocytes, Absolute 0.50 10*3/mm3      Eosinophils, Absolute 0.00 10*3/mm3      Basophils, Absolute 0.00 10*3/mm3      nRBC 0.0 /100 WBC     Scan Slide [[id number]]  (Normal) Collected:  08/03/19 1643    Specimen:  Blood Updated:  08/03/19 1723     RBC Morphology Normal     WBC Morphology Normal     Platelet Morphology Normal    Narrative:       Slide Reviewed          Imaging Results (most recent)     Procedure Component Value Units Date/Time    XR Chest 1 View [[id number]] Collected:  08/03/19 1734     Updated:  08/03/19 1740    Narrative:       Examination: XR CHEST 1 VW-     Date of Exam: 8/3/2019 5:19 PM     Indication: Chest pain.     Comparison: July 15, 2019     Technique: 1 view of the chest      Findings:  There are moderate right and small left pleural effusions with bibasilar  opacities. There is a consolidation in the left midlung that is new. The  heart and mediastinal contours are stable. There is a mild pulmonary  edema pattern. Degenerative changes are present within the thoracic  spine.       Impression:       1.Mild pulmonary edema pattern with moderate right and small left  pleural effusions.  2.New consolidation within left midlung, which may represents pulmonary  edema or pneumonia.  3.Bibasilar opacities, which could represent atelectasis or pneumonia.     Electronically Signed By-DR. Darnell Montilla MD On:8/3/2019 5:38 PM  This report was finalized on [id number] by DR. Darnell Montilla MD.        reviewed    ECG/EMG Results (most recent)     Procedure Component Value Units Date/Time    ECG 12 Lead [[id number]] Collected:  08/03/19 1615     Updated:  08/03/19 1617    Narrative:       HEART RATE= 83  bpm  RR Interval= 722  ms  AL Interval=    ms  P Horizontal Axis=   deg  P Front Axis=   deg  QRSD Interval= 125  ms  QT Interval= 419  ms  QRS Axis= -37  deg  T Wave Axis= 110  deg  - ABNORMAL ECG -  Atrial fibrillation  Paired ventricular premature complexes  Left bundle branch block  Electronically Signed By:   Date and Time of Study: 2019 16:15:27        reviewed         Results for orders placed during the hospital encounter of 10/06/18   Adult Transthoracic Echo Complete W/ Cont if Necessary Per Protocol    Narrative                           Adult Echocardiogram Report          [facility]  Cardiology Department  [address] [place] [address]      Name: DORCAS STANLEY CStudy Date: 10/07/2018 09:37 AM         BP: 100/61 mmHg  MRN: [id number]         Patient Location:   : 1932        Gender: Male                            Height: 70 in  Age: 86 yrs            Account#: [id number]                   Weight: 140 lb  Reason For Study: CHF, DYSPNEA, FALL, WEAKNESS                 BSA: 1.8 m2  Ordering Physician:  EMMIE PORRAS  Referring Physician: NO  PHYSICIAN, NO PHYSICIAN    Performed By: NIKA      M-Mode/2-D Measurements:  LVIDd: 6.0 cm       (3.7-5.7) LVPWd: 0.93 cm       (0.8-1.2)  LVIDs: 5.5 cm       (2.3-3.9)  ACS: 1.6 cm         (1.6-3.7) IVSd: 1.1 cm         (0.7-1.2)  LA dimension: 3.7 cm(1.9-4.0) RVDd: 2.2 cm         (0.7-2.4)  FS: 8.5 %           (21-40%)  Ao root diam: 3.5 cm (2.0-3.7)    Comments  Comments: Technically satisfactory exam for interpretation    Findings: The aortic valve is trileaflet with adequate opening coaptation  Mitral valve shows modest fibrocalcific thickening with decreased leaflet  excursion.  Tricuspid valve structure normal; pulmonic valve grossly normal.  RV chamber size and global contractility satisfactory.  Marked left ventricular enlargement with normal wall thickness. There is  severe generalized hypokinesis; LVEF 20-25%.  Left atrial enlargement is present. Normal  aortic root dimension.  There is a very small pericardial effusion that is not impair right heart  filling. No intracardiac thrombus vegetation or masses are seen.  Very large pleural effusion measuring 7.0 cm is noted.    Supplementary Doppler exam showed normal aortic mitral valve for velocity with  mild AI severe mitral regurgitation and mild TR jet with RVSP of 29 mm Hg.    Interpretation: Highly abnormal echocardiogram shows dilated ischemic  cardiomyopathy with LVEF in the 20-25% range.  Severe mitral regurgitation and mild AI and mild TR jets by Doppler with  normal RV systolic pressure 29 mm Hg.  A large pleural effusion measuring 7.0 cm noted.  Clinical correlation warranted.      Interpretation    MMode/2D Measurements & Calculations  ESV(Teich): 147.9 ml  EF(Teich): 18.5 %                    Ao root area: 9.5 cm2  LVOT diam: 2.0 cm                    EDV(MOD-sp4): 164.5 ml                                       ESV(MOD-sp4): 122.5 ml                                       EF(MOD-sp4): 25.6 %      Doppler Measurements & Calculations  MV max P.0 mmHg                    Ao V2 max: 102.3 cm/sec  MV mean P.8 mmHg                   Ao max P.2 mmHg                                         Ao V2 mean: 73.8 cm/sec                                         Ao mean P.4 mmHg                                         Ao V2 VTI: 20.0 cm                                         FELICIA(I,D): 1.5 cm2                                         FELICIA(V,D): 1.7 cm2  LV V1 max P.1 mmHg                 MR max kyree: 460.1 cm/sec  LV V1 mean P.51 mmHg               MR max P.7 mmHg  LV V1 max: 53.2 cm/sec                 MR VTI: 146.0 cm  LV V1 mean: 32.7 cm/sec  LV V1 VTI: 9.3 cm    MR PISA radius: 0.86 cm                PA max P.3 mmHg  TR max kyree: 215.7 cm/sec  TR max P.6 mmHg  RVSP(TR): 28.6 mmHg    _______________________________________________________________________________      Electronically signed  by: Malik Mejia MD  on 10/09/2018 08:33 AM         XR Chest 1 View  Narrative: Examination: XR CHEST 1 VW-     Date of Exam: 8/3/2019 5:19 PM     Indication: Chest pain.     Comparison: July 15, 2019     Technique: 1 view of the chest      Findings:  There are moderate right and small left pleural effusions with bibasilar  opacities. There is a consolidation in the left midlung that is new. The  heart and mediastinal contours are stable. There is a mild pulmonary  edema pattern. Degenerative changes are present within the thoracic  spine.     Impression: 1.Mild pulmonary edema pattern with moderate right and small left  pleural effusions.  2.New consolidation within left midlung, which may represents pulmonary  edema or pneumonia.  3.Bibasilar opacities, which could represent atelectasis or pneumonia.     Electronically Signed By-DR. Darnell Montilla MD On:8/3/2019 5:38 PM  This report was finalized on [id number] by DR. Darnell Montilla MD.      Assessment/Plan       Unstable angina (CMS/HCC)      Plan    Chest pain  With known severe CAD - R/O ACS- troponin elevated - cardiology consulted - patient comfortable on Tridil drip , hold Imdur continue home meds     CHF systolic EF 25%- continue homem eds no edema or dyspnea , BNP pending     Leukocytosis - CXR  Possible pneumonia but afebnle, no fever, no congestion no cough on steroids UA [pending , no abx at this time likely effusion       Hyperglycemia > 300 no DM on steroids - check A1C       Hypocalcemia - ionized Ca in am     Atrial fib rate controlled- no anticoags d/t falls risk     LBP chronic - on decadron , Norco ( INSPECT verified)    Anxiety on Benzodiazepine ( INSPECT verified) Possibly increased d/t grief recent death of spouse     Frailty - [facility] patient - may benefit from PT consult     DVT prophylaxis scd     Disposition     Patient will be admitted for further evaluation and treatment with cardiology consulted/     I discussed the patients  findings and my recommendations with patient   Zoë Radha, ANDIE  08/03/19  8:01 PM

## 2019-08-04 NOTE — PLAN OF CARE
Problem: Skin Injury Risk (Adult)  Goal: Identify Related Risk Factors and Signs and Symptoms  Outcome: Outcome(s) achieved Date Met: 08/04/19    Goal: Skin Health and Integrity  Outcome: Ongoing (interventions implemented as appropriate)

## 2019-08-04 NOTE — DISCHARGE SUMMARY
Date of Admission: 8/3/2019    Date of Discharge:  2019    Length of stay:  LOS: 1 day     Admission Diagnosis:   Chest pain     Discharge Diagnosis:   Stable angina with chronically elevated troponin and severe CAD  - pt started on tridil drip with relief of his symptoms  - pt was going to have cath 2 weeks ago but decision made against it given his condition and on hospice  - discussed with cardiology who states patient is clear to go home  - pt wants to go home and is only requesting a new prescription for sublingual nitro  - encourage patient to consider code status, unable to get palliative consult on the weekend       CHF systolic EF 25%  - stable not in exacerbation   - continue home meds no edema or dyspnea    Leukocytosis likely d/t steroid use     Hyperglycemia  Likely due to steroids  - check Hgb A1c, follow up results outpatient         Hypocalcemia - ionized Ca wnl     Atrial fib rate controlled- no anticoags d/t falls risk      LBP chronic - on decadron , Norco ( INSPECT verified)     Anxiety on Benzodiazepine ( INSPECT verified) Possibly increased d/t grief recent death of spouse      Frailty - [facility] patient            Hospital Course:  Patient is a 87 y.o.  frail appearing male presented to ED with complaints of midsternal chest pain today. He denies nausea, dizziness, syncope or dyspnea. He is a [facility] patient and reports his wife of 66 years  last month. He has a PMH of severe CAD per last heart cath , systolic heart failure EF 25% , PAF no anticoags due to falls , anxiety . He was recently admitted here about 2 weeks ago for NSTEMI , Per records LHC not performed given frailty and medical management offered. Today EKG shows atrial fib rate controlled LBBB, troponin 0.110( was 0.200 last admission) . Blood sugar is 367 with no DM but he is on steroids   For LBP . CXR per radiology shows areas of atelectasis vs pneumonia vs effusions. He is afebrile, wbc13.9 and no complaints cough or  congestion. He was placed on a Tridil drip and will be admitted for further treatment and evaluation with cardiology consulted.   Patient reports his pain resolved with sublingual nitro but states his pills are old and he needs a new prescription. Patient reports he wants to go home. Patient remains full code and I discussed code status with him, he reports he just wanted 1 compression but tried to explain cpr but patient became agitated. Patient is currently with [facility]. I had placed a consult for case management to get in touch with them and palliative consult. However, discussed with cardiology who states that patient can be discharged home as he requests with no further intervention at this time. Patient will be discharged home in stable but guarded condition with a new prescription for nitro and follow up with Dr. Mejia.            Procedures Performed:         Consults:   Consults     Date and Time Order Name Status Description    8/3/2019 1958 Inpatient Cardiology Consult Completed     8/3/2019 1818 Hospitalist (on-call MD unless specified) Completed     7/15/2019 0155 Inpatient Cardiology Consult Completed     7/15/2019 0143 Hospitalist (on-call MD unless specified) Completed           Vital Signs:  Temp:  [97.3 °F (36.3 °C)-98.5 °F (36.9 °C)] 97.5 °F (36.4 °C)  Heart Rate:  [53-80] 53  Resp:  [15-20] 15  BP: (109-139)/(52-69) 109/52      Physical Exam:  Physical Exam   Constitutional:   Elderly appearing male in NAD   Cardiovascular: Normal rate.   Pulmonary/Chest: Effort normal and breath sounds normal.   Abdominal: Soft. Bowel sounds are normal.   Psychiatric: He has a normal mood and affect.             Pertinent Test Results:   Lab Results (last 72 hours)     Procedure Component Value Units Date/Time    Troponin [[id number]]  (Abnormal) Collected:  08/04/19 1519    Specimen:  Blood Updated:  08/04/19 1608     Troponin I 0.170 ng/mL     Narrative:       Troponin I Reference Range:    0.00-0.03   Negative.  Repeat testing in 4-6 hours if clinically indicated.    0.04-0.29  Suspicious for myocardial injury. Serial measurements and clinical  correlation may be necessary to confirm or exclude diagnosis of acute  coronary syndrome.  Repeat testing in 4-6 hours if indicated.     >0.29 Consistent with myocardial injury.  Recommend clinical and laboratory correlation.     Results my be falsely decreased if patient taking Biotin.     Troponin [[id number]]  (Abnormal) Collected:  08/04/19 1338    Specimen:  Blood Updated:  08/04/19 1440     Troponin I 0.160 ng/mL     Narrative:       Troponin I Reference Range:    0.00-0.03  Negative.  Repeat testing in 4-6 hours if clinically indicated.    0.04-0.29  Suspicious for myocardial injury. Serial measurements and clinical  correlation may be necessary to confirm or exclude diagnosis of acute  coronary syndrome.  Repeat testing in 4-6 hours if indicated.     >0.29 Consistent with myocardial injury.  Recommend clinical and laboratory correlation.     Results my be falsely decreased if patient taking Biotin.     POC Glucose Once [[id number]]  (Abnormal) Collected:  08/04/19 1201    Specimen:  Blood Updated:  08/04/19 1201     Glucose 216 mg/dL      Comment: Serial Number: [id number]:  [id number]       Troponin [[id number]]  (Abnormal) Collected:  08/04/19 0827    Specimen:  Blood Updated:  08/04/19 0931     Troponin I 0.200 ng/mL     Narrative:       Troponin I Reference Range:    0.00-0.03  Negative.  Repeat testing in 4-6 hours if clinically indicated.    0.04-0.29  Suspicious for myocardial injury. Serial measurements and clinical  correlation may be necessary to confirm or exclude diagnosis of acute  coronary syndrome.  Repeat testing in 4-6 hours if indicated.     >0.29 Consistent with myocardial injury.  Recommend clinical and laboratory correlation.     Results my be falsely decreased if patient taking Biotin.     Troponin [[id number]]  (Abnormal) Collected:   08/04/19 0155    Specimen:  Blood Updated:  08/04/19 0257     Troponin I 0.150 ng/mL     Narrative:       Troponin I Reference Range:    0.00-0.03  Negative.  Repeat testing in 4-6 hours if clinically indicated.    0.04-0.29  Suspicious for myocardial injury. Serial measurements and clinical  correlation may be necessary to confirm or exclude diagnosis of acute  coronary syndrome.  Repeat testing in 4-6 hours if indicated.     >0.29 Consistent with myocardial injury.  Recommend clinical and laboratory correlation.     Results my be falsely decreased if patient taking Biotin.     Basic Metabolic Panel [[id number]]  (Abnormal) Collected:  08/04/19 0156    Specimen:  Blood Updated:  08/04/19 0251     Glucose 195 mg/dL      BUN 36 mg/dL      Creatinine 0.90 mg/dL      Sodium 132 mmol/L      Potassium 4.3 mmol/L      Chloride 92 mmol/L      CO2 29.0 mmol/L      Calcium 8.6 mg/dL      eGFR Non African Amer 80 mL/min/1.73      BUN/Creatinine Ratio 40.0     Anion Gap 15.3 mmol/L     Narrative:       The MDRD GFR formula is only valid for adults with stable renal function between ages 18 and 70.    BNP [[id number]]  (Abnormal) Collected:  08/04/19 0155    Specimen:  Blood Updated:  08/04/19 0247     .0 pg/mL      Comment: Results may be falsely decreased if patient taking Biotin.       Calcium, Ionized [[id number]]  (Normal) Collected:  08/04/19 0156    Specimen:  Blood Updated:  08/04/19 0235     Ionized Calcium 1.22 mmol/L     CBC (No Diff) [[id number]]  (Abnormal) Collected:  08/04/19 0155    Specimen:  Blood Updated:  08/04/19 0218     WBC 14.40 10*3/mm3      RBC 3.45 10*6/mm3      Hemoglobin 11.0 g/dL      Hematocrit 32.6 %      MCV 94.5 fL      MCH 31.8 pg      MCHC 33.6 g/dL      RDW 14.6 %      RDW-SD 47.7 fl      MPV 8.8 fL      Platelets 187 10*3/mm3     Troponin [[id number]]  (Abnormal) Collected:  08/03/19 2030    Specimen:  Blood Updated:  08/03/19 2131     Troponin I 0.150 ng/mL     Narrative:       Troponin  I Reference Range:    0.00-0.03  Negative.  Repeat testing in 4-6 hours if clinically indicated.    0.04-0.29  Suspicious for myocardial injury. Serial measurements and clinical  correlation may be necessary to confirm or exclude diagnosis of acute  coronary syndrome.  Repeat testing in 4-6 hours if indicated.     >0.29 Consistent with myocardial injury.  Recommend clinical and laboratory correlation.     Results my be falsely decreased if patient taking Biotin.     Hemoglobin A1c [[id number]] Collected:  08/03/19 2030    Specimen:  Blood Updated:  08/03/19 2044    Extra Tubes [[id number]] Collected:  08/03/19 1643    Specimen:  Blood, Venous Line Updated:  08/03/19 1745    Narrative:       The following orders were created for panel order Extra Tubes.  Procedure                               Abnormality         Status                     ---------                               -----------         ------                     Light Blue Top[[id number]]                                   Final result                 Please view results for these tests on the individual orders.    Light Blue Top [[id number]] Collected:  08/03/19 1643    Specimen:  Blood Updated:  08/03/19 1745     Extra Tube hold for add-on     Comment: Auto resulted       Troponin [[id number]]  (Abnormal) Collected:  08/03/19 1643    Specimen:  Blood Updated:  08/03/19 1733     Troponin I 0.110 ng/mL     Narrative:       Troponin I Reference Range:    0.00-0.03  Negative.  Repeat testing in 4-6 hours if clinically indicated.    0.04-0.29  Suspicious for myocardial injury. Serial measurements and clinical  correlation may be necessary to confirm or exclude diagnosis of acute  coronary syndrome.  Repeat testing in 4-6 hours if indicated.     >0.29 Consistent with myocardial injury.  Recommend clinical and laboratory correlation.     Results my be falsely decreased if patient taking Biotin.     Basic Metabolic Panel [[id number]]  (Abnormal) Collected:  08/03/19  1643    Specimen:  Blood Updated:  08/03/19 1724     Glucose 367 mg/dL      BUN 34 mg/dL      Creatinine 1.10 mg/dL      Sodium 132 mmol/L      Potassium 3.6 mmol/L      Chloride 93 mmol/L      CO2 23.0 mmol/L      Calcium 8.1 mg/dL      eGFR Non African Amer 63 mL/min/1.73      BUN/Creatinine Ratio 30.9     Anion Gap 19.6 mmol/L     Narrative:       The MDRD GFR formula is only valid for adults with stable renal function between ages 18 and 70.    CBC & Differential [[id number]] Collected:  08/03/19 1643    Specimen:  Blood Updated:  08/03/19 1723    Narrative:       The following orders were created for panel order CBC & Differential.  Procedure                               Abnormality         Status                     ---------                               -----------         ------                     Scan Slide[[id number]]                   Normal              Final result               CBC Auto Differential[[id number]]        Abnormal            Final result                 Please view results for these tests on the individual orders.    CBC Auto Differential [[id number]]  (Abnormal) Collected:  08/03/19 1643    Specimen:  Blood Updated:  08/03/19 1723     WBC 13.90 10*3/mm3      RBC 3.57 10*6/mm3      Hemoglobin 11.4 g/dL      Hematocrit 33.7 %      MCV 94.4 fL      MCH 31.9 pg      MCHC 33.8 g/dL      RDW 15.2 %      RDW-SD 50.8 fl      MPV 9.3 fL      Platelets 178 10*3/mm3      Neutrophil % 93.1 %      Lymphocyte % 3.2 %      Monocyte % 3.7 %      Eosinophil % 0.0 %      Basophil % 0.0 %      Neutrophils, Absolute 12.90 10*3/mm3      Lymphocytes, Absolute 0.40 10*3/mm3      Monocytes, Absolute 0.50 10*3/mm3      Eosinophils, Absolute 0.00 10*3/mm3      Basophils, Absolute 0.00 10*3/mm3      nRBC 0.0 /100 WBC     Scan Slide [[id number]]  (Normal) Collected:  08/03/19 1643    Specimen:  Blood Updated:  08/03/19 1723     RBC Morphology Normal     WBC Morphology Normal     Platelet Morphology Normal     Narrative:       Slide Reviewed              Results for orders placed during the hospital encounter of 10/06/18   Adult Transthoracic Echo Complete W/ Cont if Necessary Per Protocol    Narrative                           Adult Echocardiogram Report          [facility]  Cardiology Department  [address] [place]  [address]      Name: DORCAS STANLEY CStudy Date: 10/07/2018 09:37 AM         BP: 100/61 mmHg  MRN: [id number]         Patient Location:   : 1932        Gender: Male                            Height: 70 in  Age: 86 yrs            Account#: [id number]                   Weight: 140 lb  Reason For Study: CHF, DYSPNEA, FALL, WEAKNESS                 BSA: 1.8 m2  Ordering Physician:  EMMIE PORRAS  Referring Physician: NO  PHYSICIAN, NO PHYSICIAN    Performed By: NIKA      M-Mode/2-D Measurements:  LVIDd: 6.0 cm       (3.7-5.7) LVPWd: 0.93 cm       (0.8-1.2)  LVIDs: 5.5 cm       (2.3-3.9)  ACS: 1.6 cm         (1.6-3.7) IVSd: 1.1 cm         (0.7-1.2)  LA dimension: 3.7 cm(1.9-4.0) RVDd: 2.2 cm         (0.7-2.4)  FS: 8.5 %           (21-40%)  Ao root diam: 3.5 cm (2.0-3.7)    Comments  Comments: Technically satisfactory exam for interpretation    Findings: The aortic valve is trileaflet with adequate opening coaptation  Mitral valve shows modest fibrocalcific thickening with decreased leaflet  excursion.  Tricuspid valve structure normal; pulmonic valve grossly normal.  RV chamber size and global contractility satisfactory.  Marked left ventricular enlargement with normal wall thickness. There is  severe generalized hypokinesis; LVEF 20-25%.  Left atrial enlargement is present. Normal aortic root dimension.  There is a very small pericardial effusion that is not impair right heart  filling. No intracardiac thrombus vegetation or masses are seen.  Very large pleural effusion measuring 7.0 cm is noted.    Supplementary Doppler exam showed normal aortic mitral valve for velocity  with  mild AI severe mitral regurgitation and mild TR jet with RVSP of 29 mm Hg.    Interpretation: Highly abnormal echocardiogram shows dilated ischemic  cardiomyopathy with LVEF in the 20-25% range.  Severe mitral regurgitation and mild AI and mild TR jets by Doppler with  normal RV systolic pressure 29 mm Hg.  A large pleural effusion measuring 7.0 cm noted.  Clinical correlation warranted.      Interpretation    MMode/2D Measurements & Calculations  ESV(Teich): 147.9 ml  EF(Teich): 18.5 %                    Ao root area: 9.5 cm2  LVOT diam: 2.0 cm                    EDV(MOD-sp4): 164.5 ml                                       ESV(MOD-sp4): 122.5 ml                                       EF(MOD-sp4): 25.6 %      Doppler Measurements & Calculations  MV max P.0 mmHg                    Ao V2 max: 102.3 cm/sec  MV mean P.8 mmHg                   Ao max P.2 mmHg                                         Ao V2 mean: 73.8 cm/sec                                         Ao mean P.4 mmHg                                         Ao V2 VTI: 20.0 cm                                         FELICIA(I,D): 1.5 cm2                                         FELICIA(V,D): 1.7 cm2  LV V1 max P.1 mmHg                 MR max kyree: 460.1 cm/sec  LV V1 mean P.51 mmHg               MR max P.7 mmHg  LV V1 max: 53.2 cm/sec                 MR VTI: 146.0 cm  LV V1 mean: 32.7 cm/sec  LV V1 VTI: 9.3 cm    MR PISA radius: 0.86 cm                PA max P.3 mmHg  TR max kyree: 215.7 cm/sec  TR max P.6 mmHg  RVSP(TR): 28.6 mmHg    _______________________________________________________________________________      Electronically signed by: Malik Mejia MD  on 10/09/2018 08:33 AM         Imaging Results (all)     Procedure Component Value Units Date/Time    XR Chest 1 View [[id number]] Collected:  19     Updated:  19 441    Narrative:       Examination: XR CHEST 1 VW-     Date of Exam: 8/3/2019 5:19 PM      Indication: Chest pain.     Comparison: July 15, 2019     Technique: 1 view of the chest      Findings:  There are moderate right and small left pleural effusions with bibasilar  opacities. There is a consolidation in the left midlung that is new. The  heart and mediastinal contours are stable. There is a mild pulmonary  edema pattern. Degenerative changes are present within the thoracic  spine.       Impression:       1.Mild pulmonary edema pattern with moderate right and small left  pleural effusions.  2.New consolidation within left midlung, which may represents pulmonary  edema or pneumonia.  3.Bibasilar opacities, which could represent atelectasis or pneumonia.     Electronically Signed By-DR. Darnell Montilla MD On:8/3/2019 5:38 PM  This report was finalized on [id number] by DR. Darnell Montilla MD.                Discharge Disposition:  Hospice/Medical Facility (DC - External)    Discharge Medications:     Discharge Medications      New Medications      Instructions Start Date   nitroglycerin 0.4 MG SL tablet  Commonly known as:  NITROSTAT   0.4 mg, Sublingual, Every 5 Minutes PRN, Take no more than 3 tabs         Continue These Medications      Instructions Start Date   aspirin 81 MG chewable tablet   81 mg, Oral, Daily      atorvastatin 40 MG tablet  Commonly known as:  LIPITOR   40 mg, Oral, Nightly      carvedilol 3.125 MG tablet  Commonly known as:  COREG   3.125 mg, Oral, 2 Times Daily With Meals      clopidogrel 75 MG tablet  Commonly known as:  PLAVIX   75 mg, Oral, Daily      dexamethasone 2 MG tablet  Commonly known as:  DECADRON   2 mg, Oral, Daily      furosemide 40 MG tablet  Commonly known as:  LASIX   40 mg, Oral, 2 Times Daily      gabapentin 300 MG capsule  Commonly known as:  NEURONTIN   300 mg, Oral, 3 Times Daily      HYDROcodone-acetaminophen 5-325 MG per tablet  Commonly known as:  NORCO   1 tablet, Oral, Every 4 Hours PRN      isosorbide mononitrate 30 MG 24 hr tablet  Commonly  known as:  IMDUR   30 mg, Oral, Daily      loperamide 2 MG capsule  Commonly known as:  IMODIUM   2 mg, Oral, Daily PRN      LORazepam 0.5 MG tablet  Commonly known as:  ATIVAN   0.5 mg, Oral, Every 6 Hours PRN      ramipril 1.25 MG capsule  Commonly known as:  ALTACE   1.25 mg, Oral, Every 24 Hours Scheduled      raNITIdine 150 MG tablet  Commonly known as:  ZANTAC   150 mg, Oral, Daily      senna 8.6 MG tablet tablet  Commonly known as:  SENOKOT   2 tablets, Oral, 2 Times Daily      traZODone 100 MG tablet  Commonly known as:  DESYREL   100 mg, Oral, Nightly             Discharge Diet:   Diet Instructions     Diet: Cardiac      Discharge Diet:  Cardiac          Activity at Discharge:   Activity Instructions     Activity as Tolerated            Follow-up Appointments:  Future Appointments   Date Time Provider Department Center   8/16/2019 12:00 PM NANCY Mejia MD [facility]         Test Results Pending at Discharge:  None    Condition on Discharge:    Stable but guarded     Risk for Readmission (LACE): Score: 7 (8/4/2019  6:00 AM)          Lilly Maradiaga DO  08/04/19  7:09 PM    Time: Discharge 32 min

## 2019-08-04 NOTE — PLAN OF CARE
Problem: Patient Care Overview  Goal: Plan of Care Review  Outcome: Ongoing (interventions implemented as appropriate)  Pt wanting to follow any and all recommendations from cardiology, wants full code. Will continue to monitor troponins and treat medically for now. Spoke with [facility] vik Becerra, will maintain hospice status unless patient undergoes cardiac cath.  Goal: Individualization and Mutuality  Outcome: Ongoing (interventions implemented as appropriate)    Goal: Discharge Needs Assessment  Outcome: Ongoing (interventions implemented as appropriate)    Goal: Interprofessional Rounds/Family Conf  Outcome: Ongoing (interventions implemented as appropriate)

## 2019-08-04 NOTE — CONSULTS
Cardiology Consult Note    Patient Identification:  Name: Julio César Braxton  Age: 87 y.o.  Sex: male  :  4/3/1932  MRN: [id number]             Requesting Physician: Hospitalist SHERLEY Jaffe    Reason for Consultation / Chief Complaint: Chest pain relieved by nitroglycerin, CAD    History of Present Illness:      This is an 87-year-old with PMH of    Advanced ischemic CAD, diffuse coronary artery disease not amenable to revascularization, history of remote PCI  Ischemic cardiomyopathy EF of 25%  Chronic persistent atrial fibrillation  LBBB  Anxiety  Diabetes  Previous history of CVA with residual neuropathy  Generalized weakness and ataxia       Presented with complaint of prolonged midsternal chest pain with EKG revealing left bundle branch block which is chronic and troponin of 0.110 and sugars elevated at 367 white count of 13.9.  Patient states that he had one episode of substernal chest pain which was relieved by nitroglycerin therefore called and was advised to go to the emergency room came was admitted because he had elevated troponin.  Patient is pain-free currently      Assessment:      Chest pain, stable angina  CAD  Cardiomyopathy  LBBB  Paroxysmal A. Fib  Diabetes        Recommendations / Plan:          Telemetry  Serial cardiac enzymes  Conservative management  Patient is in hospis  Discussed with hospitalist Dr. Maradiaga    Past Medical History:  Past Medical History:   Diagnosis Date   • CHF (congestive heart failure) (CMS/HCC)    • Coronary artery disease    • Neuropathy    • Stroke (CMS/HCC)      Past Surgical History:  Past Surgical History:   Procedure Laterality Date   • ANGIOPLASTY     • BACK SURGERY     • CHOLECYSTECTOMY     • CORONARY STENT PLACEMENT     • SPINAL FUSION        Allergies:  No Known Allergies  Home Meds:  Medications Prior to Admission   Medication Sig Dispense Refill Last Dose   • aspirin 81 MG chewable tablet Chew 81 mg Daily.   8/3/2019 at Unknown time   • atorvastatin  (LIPITOR) 40 MG tablet Take 1 tablet by mouth Every Night. 30 tablet 0 8/2/2019 at Unknown time   • carvedilol (COREG) 3.125 MG tablet Take 3.125 mg by mouth 2 (Two) Times a Day With Meals.   8/3/2019 at Unknown time   • clopidogrel (PLAVIX) 75 MG tablet Take 1 tablet by mouth Daily. 30 tablet 0 8/3/2019 at Unknown time   • dexamethasone (DECADRON) 2 MG tablet Take 2 mg by mouth Daily.   8/3/2019 at Unknown time   • furosemide (LASIX) 40 MG tablet Take 40 mg by mouth 2 (Two) Times a Day.   8/3/2019 at Unknown time   • gabapentin (NEURONTIN) 300 MG capsule Take 300 mg by mouth 3 (Three) Times a Day.   8/3/2019 at Unknown time   • HYDROcodone-acetaminophen (NORCO) 5-325 MG per tablet Take 1 tablet by mouth Every 4 (Four) Hours As Needed.   8/3/2019 at Unknown time   • isosorbide mononitrate (IMDUR) 30 MG 24 hr tablet Take 1 tablet by mouth Daily. 30 tablet 0 8/3/2019 at Unknown time   • LORazepam (ATIVAN) 0.5 MG tablet Take 0.5 mg by mouth Every 6 (Six) Hours As Needed for Anxiety.   8/3/2019 at Unknown time   • ramipril (ALTACE) 1.25 MG capsule Take 1 capsule by mouth Daily. 30 capsule 0 8/3/2019 at Unknown time   • raNITIdine (ZANTAC) 150 MG tablet Take 150 mg by mouth Daily.   8/3/2019 at Unknown time   • senna (SENOKOT) 8.6 MG tablet tablet Take 2 tablets by mouth 2 (Two) Times a Day.   8/3/2019 at Unknown time   • traZODone (DESYREL) 100 MG tablet Take 100 mg by mouth Every Night.   8/2/2019 at Unknown time   • loperamide (IMODIUM) 2 MG capsule Take 2 mg by mouth Daily As Needed for Diarrhea.   Unknown at Unknown time     Current Meds:     Current Facility-Administered Medications:   •  acetaminophen (TYLENOL) tablet 650 mg, 650 mg, Oral, Q4H PRN, Marking-Zoë Valle, APRN  •  aspirin chewable tablet 81 mg, 81 mg, Oral, Daily, Essing-Zoë Valle, APRN, 81 mg at 08/04/19 0834  •  atorvastatin (LIPITOR) tablet 40 mg, 40 mg, Oral, Nightly, Zoë Garcia APRN, 40 mg at 08/03/19 2247  •  calcium  gluconate 1 g in sodium chloride 0.9 % 100 mL IVPB, 1 g, Intravenous, PRN **AND** calcium gluconate 2 g/100 mL NS IVPB, 2 g, Intravenous, PRN **AND** Calcium, , , PRN, Zoë Garcia, APRN  •  carvedilol (COREG) tablet 3.125 mg, 3.125 mg, Oral, BID With Meals, Zoë Garcia APRN, 3.125 mg at 08/04/19 0834  •  clopidogrel (PLAVIX) tablet 75 mg, 75 mg, Oral, Daily, Zoë Garcia, APRN, 75 mg at 08/04/19 0834  •  dexamethasone (DECADRON) tablet 2 mg, 2 mg, Oral, Daily, Zoë Gacria, APRN, 2 mg at 08/04/19 0833  •  famotidine (PEPCID) tablet 20 mg, 20 mg, Oral, Daily, Zoë Garcia APRN, 20 mg at 08/04/19 0834  •  furosemide (LASIX) tablet 40 mg, 40 mg, Oral, BID, Zoë Garcia, APRN, 40 mg at 08/04/19 0833  •  gabapentin (NEURONTIN) capsule 300 mg, 300 mg, Oral, TID, Zoë Garcia, APRN, 300 mg at 08/04/19 0834  •  HYDROcodone-acetaminophen (NORCO) 5-325 MG per tablet 1 tablet, 1 tablet, Oral, Q4H PRN, Zoë Garcia, APRN  •  loperamide (IMODIUM) capsule 2 mg, 2 mg, Oral, Daily PRN, Zoë Garcia, APRN  •  LORazepam (ATIVAN) tablet 0.5 mg, 0.5 mg, Oral, Q6H PRN, Zoë Garcia, APRN  •  Magnesium Sulfate 2 gram Bolus, followed by 8 gram infusion (total Mg dose 10 grams)- Mg less than or equal to 1mg/dL, 2 g, Intravenous, PRN **OR** Magnesium Sulfate 2 gram / 50mL Infusion (GIVE X 3 BAGS TO EQUAL 6GM TOTAL DOSE) - Mg 1.1 - 1.5 mg/dl, 2 g, Intravenous, PRN **OR** Magnesium Sulfate 4 gram infusion- Mg 1.6-1.9 mg/dL, 4 g, Intravenous, PRN, Zoë Garcia APRN  •  melatonin tablet 5 mg, 5 mg, Oral, Nightly PRN, Zoë Garcia APRN  •  nitroglycerin (NITROSTAT) SL tablet 0.4 mg, 0.4 mg, Sublingual, Q5 Min PRN, Zoë Garcia APRN  •  nitroglycerin 50 mg/250 mL (0.2 mg/mL) infusion, 10-50 mcg/min, Intravenous, Titrated, Jj Garcia MD, Last Rate: 1.5 mL/hr at 08/04/19 0209, 5 mcg/min at 08/04/19 0209  •  ondansetron  (ZOFRAN) tablet 4 mg, 4 mg, Oral, Q6H PRN **OR** ondansetron (ZOFRAN) injection 4 mg, 4 mg, Intravenous, Q6H PRN, Romulo-Zoë Valle, APRN  •  potassium chloride 10 mEq in 100 mL IVPB, 10 mEq, Intravenous, Q1H PRN, Essing-Tori, Zoë, APRN  •  ramipril (ALTACE) capsule 1.25 mg, 1.25 mg, Oral, Q24H, Essing-Tori, Zoë, APRN, 1.25 mg at 08/04/19 0834  •  senna (SENOKOT) tablet 2 tablet, 2 tablet, Oral, BID, Esssalome-Zoë Valle, APRN, 2 tablet at 08/04/19 0833  •  [COMPLETED] Insert peripheral IV, , , Once **AND** sodium chloride 0.9 % flush 10 mL, 10 mL, Intravenous, PRN, Jj Garcia MD  •  sodium chloride 0.9 % flush 3 mL, 3 mL, Intravenous, Q12H, Essing-Tori, Zoë, APRN, 3 mL at 08/04/19 0834  •  sodium chloride 0.9 % flush 3-10 mL, 3-10 mL, Intravenous, PRN, Romulo-Jocelyn Vallean, APRN  •  traZODone (DESYREL) tablet 100 mg, 100 mg, Oral, Nightly, Essing-Tori, Zoë, APRN, 100 mg at 08/03/19 5150  Social History:   Social History     Tobacco Use   • Smoking status: Former Smoker     Types: Cigarettes   • Smokeless tobacco: Never Used   • Tobacco comment: Quit 40 years go   Substance Use Topics   • Alcohol use: No     Frequency: Never      Family History:  Family History   Problem Relation Age of Onset   • Coronary artery disease Brother         Review of Systems    Constitutional: No   fever, rigors, chills   Eyes: No vision changes, eye pain   ENT/oropharynx: No difficulty swallowing, sore throat, epistaxis, changes in hearing   Cardiovascular: c/o chest pain, chest tightness, no palpitations, paroxysmal nocturnal dyspnea, orthopnea, diaphoresis, dizziness / syncopal episode   Respiratory: No shortness of breath, dyspnea on exertion, cough, wheezing hemoptysis   Gastrointestinal: No abdominal pain, nausea, vomiting, diarrhea, bloody stools   Genitourinary: No hematuria, dysuria   Neurological: C/o difficulty with balance   Musculoskeletal: No cramps, myalgias,  C/o DJD   Integument: No rash,  "edema           Constitutional:  Temp:  [97.5 °F (36.4 °C)-98.5 °F (36.9 °C)] 98.5 °F (36.9 °C)  Heart Rate:  [59-82] 59  Resp:  [16-24] 16  BP: (121-139)/(55-71) 129/58    Physical Exam   /58   Pulse 59   Temp 98.5 °F (36.9 °C) (Oral)   Resp 16   Ht 177.8 cm (70\")   Wt 60.6 kg (133 lb 9.6 oz)   SpO2 96%   BMI 19.17 kg/m²   General:  Appears in no acute distress  Eyes: Conjunctivae is clear and sclera is anicteric  HEENT:  No JVD. Thyroid not visibly enlarged. No mucosal pallor or cyanosis  Respiratory: Respirations regular and unlabored at rest.  Bilateral breath sounds with good air entry in all fields.  Cardiovascular: S1,S2, Regular rate and rhythm.  2/6 holosystolic murmur, no  rub or gallop auscultated.   Gastrointestinal: Abdomen soft, flat, non tender.  No hepatosplenomegaly. No ascites  Musculoskeletal:  No abnormal movements  Extremities: No digital clubbing or cyanosis  Skin: Color pink. Skin warm and dry to touch. No rashes  No xanthoma  Neuro: Alert and awake, no lateralizing deficits appreciated              Cardiographics  ECG: EKG tracing was  personally reviewed by me  ECG 12 Lead   Preliminary Result   HEART RATE= 83  bpm   RR Interval= 722  ms   CA Interval=   ms   P Horizontal Axis=   deg   P Front Axis=   deg   QRSD Interval= 125  ms   QT Interval= 419  ms   QRS Axis= -37  deg   T Wave Axis= 110  deg   - ABNORMAL ECG -   Atrial fibrillation   Paired ventricular premature complexes   Left bundle branch block   Electronically Signed By:    Date and Time of Study: 2019-08-03 16:15:27          Telemetry:    Echocardiogram:   Results for orders placed during the hospital encounter of 10/06/18   Adult Transthoracic Echo Complete W/ Cont if Necessary Per Protocol    Narrative                           Adult Echocardiogram Report          [facility]  Cardiology Department  [address] [place]  [address]      Name: DORCAS STANLEY CStudy Date: 10/07/2018 09:37 AM         " BP: 100/61 mmHg  MRN: [id number]         Patient Location: 2B  : 1932        Gender: Male                            Height: 70 in  Age: 86 yrs            Account#: [id number]                   Weight: 140 lb  Reason For Study: CHF, DYSPNEA, FALL, WEAKNESS                 BSA: 1.8 m2  Ordering Physician:  EMMIE PORRAS  Referring Physician: NO  PHYSICIAN, NO PHYSICIAN    Performed By: NIKA      M-Mode/2-D Measurements:  LVIDd: 6.0 cm       (3.7-5.7) LVPWd: 0.93 cm       (0.8-1.2)  LVIDs: 5.5 cm       (2.3-3.9)  ACS: 1.6 cm         (1.6-3.7) IVSd: 1.1 cm         (0.7-1.2)  LA dimension: 3.7 cm(1.9-4.0) RVDd: 2.2 cm         (0.7-2.4)  FS: 8.5 %           (21-40%)  Ao root diam: 3.5 cm (2.0-3.7)    Comments  Comments: Technically satisfactory exam for interpretation    Findings: The aortic valve is trileaflet with adequate opening coaptation  Mitral valve shows modest fibrocalcific thickening with decreased leaflet  excursion.  Tricuspid valve structure normal; pulmonic valve grossly normal.  RV chamber size and global contractility satisfactory.  Marked left ventricular enlargement with normal wall thickness. There is  severe generalized hypokinesis; LVEF 20-25%.  Left atrial enlargement is present. Normal aortic root dimension.  There is a very small pericardial effusion that is not impair right heart  filling. No intracardiac thrombus vegetation or masses are seen.  Very large pleural effusion measuring 7.0 cm is noted.    Supplementary Doppler exam showed normal aortic mitral valve for velocity with  mild AI severe mitral regurgitation and mild TR jet with RVSP of 29 mm Hg.    Interpretation: Highly abnormal echocardiogram shows dilated ischemic  cardiomyopathy with LVEF in the 20-25% range.  Severe mitral regurgitation and mild AI and mild TR jets by Doppler with  normal RV systolic pressure 29 mm Hg.  A large pleural effusion measuring 7.0 cm noted.  Clinical correlation  warranted.      Interpretation    MMode/2D Measurements & Calculations  ESV(Teich): 147.9 ml  EF(Teich): 18.5 %                    Ao root area: 9.5 cm2  LVOT diam: 2.0 cm                    EDV(MOD-sp4): 164.5 ml                                       ESV(MOD-sp4): 122.5 ml                                       EF(MOD-sp4): 25.6 %      Doppler Measurements & Calculations  MV max P.0 mmHg                    Ao V2 max: 102.3 cm/sec  MV mean P.8 mmHg                   Ao max P.2 mmHg                                         Ao V2 mean: 73.8 cm/sec                                         Ao mean P.4 mmHg                                         Ao V2 VTI: 20.0 cm                                         FELICIA(I,D): 1.5 cm2                                         FELICIA(V,D): 1.7 cm2  LV V1 max P.1 mmHg                 MR max kyree: 460.1 cm/sec  LV V1 mean P.51 mmHg               MR max P.7 mmHg  LV V1 max: 53.2 cm/sec                 MR VTI: 146.0 cm  LV V1 mean: 32.7 cm/sec  LV V1 VTI: 9.3 cm    MR PISA radius: 0.86 cm                PA max P.3 mmHg  TR max kyree: 215.7 cm/sec  TR max P.6 mmHg  RVSP(TR): 28.6 mmHg    _______________________________________________________________________________      Electronically signed by: Malik Mejia MD  on 10/09/2018 08:33 AM         Imaging  Chest X-ray:   Imaging Results (last 24 hours)     Procedure Component Value Units Date/Time    XR Chest 1 View [[id number]] Collected:  19 173     Updated:  19 174    Narrative:       Examination: XR CHEST 1 VW-     Date of Exam: 8/3/2019 5:19 PM     Indication: Chest pain.     Comparison: July 15, 2019     Technique: 1 view of the chest      Findings:  There are moderate right and small left pleural effusions with bibasilar  opacities. There is a consolidation in the left midlung that is new. The  heart and mediastinal contours are stable. There is a mild pulmonary  edema pattern. Degenerative  changes are present within the thoracic  spine.       Impression:       1.Mild pulmonary edema pattern with moderate right and small left  pleural effusions.  2.New consolidation within left midlung, which may represents pulmonary  edema or pneumonia.  3.Bibasilar opacities, which could represent atelectasis or pneumonia.     Electronically Signed By-DR. Darnell Montilla MD On:8/3/2019 5:38 PM  This report was finalized on [id number] by DR. Darnell Montilla MD.          Lab Review: I have reviewed the labs  Results from last 7 days   Lab Units 08/04/19  0155 08/03/19  2030 08/03/19  1643   TROPONIN I ng/mL 0.150* 0.150* 0.110*         Results from last 7 days   Lab Units 08/04/19  0156   SODIUM mmol/L 132*   POTASSIUM mmol/L 4.3   BUN mg/dL 36*   CREATININE mg/dL 0.90   CALCIUM mg/dL 8.6*     @[id number]@  Results from last 7 days   Lab Units 08/04/19  0155 08/03/19  1643   WBC 10*3/mm3 14.40* 13.90*   HEMOGLOBIN g/dL 11.0* 11.4*   HEMATOCRIT % 32.6* 33.7*   PLATELETS 10*3/mm3 187 178               Vadim Matthew MD  8/4/2019, 9:21 AM      EMR Dragon/Transcription:   Dictated utilizing Dragon dictation

## 2019-08-04 NOTE — PLAN OF CARE
Problem: Fall Risk (Adult)  Goal: Identify Related Risk Factors and Signs and Symptoms  Outcome: Outcome(s) achieved Date Met: 08/04/19    Goal: Absence of Fall  Outcome: Ongoing (interventions implemented as appropriate)

## 2019-08-16 PROBLEM — E78.2 HYPERLIPIDEMIA, MIXED: Status: ACTIVE | Noted: 2019-01-01

## 2019-08-16 PROBLEM — I48.91 ATRIAL FIBRILLATION (HCC): Status: ACTIVE | Noted: 2018-03-28

## 2019-08-16 PROBLEM — I10 ESSENTIAL HYPERTENSION: Status: ACTIVE | Noted: 2019-01-01

## 2019-09-07 PROBLEM — N39.0 ACUTE UTI: Status: ACTIVE | Noted: 2019-01-01

## 2019-09-07 NOTE — H&P
[facility] HOSPITALIST     Eduardo Medina MD    CHIEF COMPLAINT:     Chief Complaint   Patient presents with   • Chest Pain           HISTORY OF PRESENT ILLNESS:    The patient is a 87 year old male with a PMH of CAD s/p PCI and CVA.  The patient presented to the ED with complaint of chest pain and left upper extremity pain.  Denies diaphoresis or nausea.  The patient has family at the bedside to assist with history.  The patient is under the care of [facility].  The patient denies urinary symptoms, fever or chills.  Denies an alleviating or exacerbating factors.  The patient is chest pain free at this time.    Cardiology: Roberto          Past Medical History:   Diagnosis Date   • CHF (congestive heart failure) (CMS/HCC)    • Coronary artery disease    • Neuropathy    • Stroke (CMS/HCC)      Past Surgical History:   Procedure Laterality Date   • ANGIOPLASTY     • BACK SURGERY     • CHOLECYSTECTOMY     • CORONARY STENT PLACEMENT     • SPINAL FUSION       Family History   Problem Relation Age of Onset   • Coronary artery disease Brother      Social History     Tobacco Use   • Smoking status: Former Smoker     Types: Cigarettes   • Smokeless tobacco: Never Used   • Tobacco comment: Quit 40 years go   Substance Use Topics   • Alcohol use: No     Frequency: Never   • Drug use: No       (Not in a hospital admission)  Allergies:  Patient has no known allergies.    Immunization History   Administered Date(s) Administered   • Flu Vaccine Quad PF >36MO 10/27/2016           REVIEW OF SYSTEMS:     Review of Systems   Cardiovascular: Positive for chest pain and near-syncope.        Left upper extremity pain   Respiratory: Negative for shortness of breath.    Genitourinary: Negative for dysuria and frequency.   All other systems reviewed and are negative.      Vital Signs  Temp:  [98.8 °F (37.1 °C)] 98.8 °F (37.1 °C)  Heart Rate:  [75-82] 81  Resp:  [28] 28  BP: ()/(47-75) 102/47    Flowsheet Rows       "First Filed Value   Admission Height  177.8 cm (70\") Documented at 09/06/2019 2336   Admission Weight  61.2 kg (135 lb) Documented at 09/06/2019 2336           Physical Exam:    Physical Exam   Constitutional: He is oriented to person, place, and time. He appears well-developed.   HENT:   Head: Normocephalic.   Eyes: Pupils are equal, round, and reactive to light.   Neck: Normal range of motion. Neck supple.   Cardiovascular: Normal rate and regular rhythm.   Pulmonary/Chest: Effort normal and breath sounds normal.   Abdominal: Soft. Bowel sounds are normal.   Musculoskeletal: Normal range of motion.   Neurological: He is alert and oriented to person, place, and time.   Skin: Skin is warm and dry. Capillary refill takes less than 2 seconds.   Psychiatric: He has a normal mood and affect. His behavior is normal.   Nursing note and vitals reviewed.      Debilities:  Age appropriate      Results Review:    I reviewed the patient's new clinical results.  Lab Results (most recent)     Procedure Component Value Units Date/Time    Respiratory Panel, PCR - Swab, Nasopharynx [[id number]]  (Normal) Collected:  09/07/19 0012    Specimen:  Swab from Nasopharynx Updated:  09/07/19 0426     ADENOVIRUS, PCR Not Detected     Coronavirus 229E Not Detected     Coronavirus HKU1 Not Detected     Coronavirus NL63 Not Detected     Coronavirus OC43 Not Detected     Human Metapneumovirus Not Detected     Human Rhinovirus/Enterovirus Not Detected     Influenza B PCR Not Detected     Parainfluenza Virus 1 Not Detected     Parainfluenza Virus 2 Not Detected     Parainfluenza Virus 3 Not Detected     Parainfluenza Virus 4 Not Detected     Bordetella pertussis pcr Not Detected     Influenza A H1 2009 PCR Not Detected     Chlamydophila pneumoniae PCR Not Detected     Mycoplasma pneumo by PCR Not Detected     Influenza A PCR Not Detected     Influenza A H3 Not Detected     Influenza A H1 Not Detected     RSV, PCR Not Detected    Lactic Acid, " Reflex Timer (This will reflex a repeat order 3-3:15 hours after ordered.) [[id number]] Collected:  09/07/19 0023    Specimen:  Blood Updated:  09/07/19 0330     Extra Tube Hold for add-ons.     Comment: Auto resulted.       Troponin [[id number]]  (Abnormal) Collected:  09/06/19 2353    Specimen:  Blood Updated:  09/07/19 0104     Troponin I 0.090 ng/mL     Narrative:       Troponin I Reference Range:    0.00-0.03  Negative.  Repeat testing in 4-6 hours if clinically indicated.    0.04-0.29  Suspicious for myocardial injury. Serial measurements and clinical  correlation may be necessary to confirm or exclude diagnosis of acute  coronary syndrome.  Repeat testing in 4-6 hours if indicated.     >0.29 Consistent with myocardial injury.  Recommend clinical and laboratory correlation.     Results my be falsely decreased if patient taking Biotin.     BNP [[id number]]  (Abnormal) Collected:  09/06/19 2353    Specimen:  Blood Updated:  09/07/19 0048     .0 pg/mL      Comment: Results may be falsely decreased if patient taking Biotin.       CBC & Differential [[id number]] Collected:  09/06/19 2353    Specimen:  Blood Updated:  09/07/19 0036    Narrative:       The following orders were created for panel order CBC & Differential.  Procedure                               Abnormality         Status                     ---------                               -----------         ------                     CBC Auto Differential[[id number]]        Abnormal            Final result                 Please view results for these tests on the individual orders.    CBC Auto Differential [[id number]]  (Abnormal) Collected:  09/06/19 2353    Specimen:  Blood Updated:  09/07/19 0036     WBC 11.40 10*3/mm3      RBC 3.57 10*6/mm3      Hemoglobin 11.5 g/dL      Hematocrit 33.4 %      MCV 93.5 fL      MCH 32.2 pg      MCHC 34.4 g/dL      RDW 14.9 %      RDW-SD 48.1 fl      MPV 7.7 fL      Platelets 219 10*3/mm3     Narrative:       The  previously reported component NRBC is no longer being reported.    Scan Slide [[id number]] Collected:  09/06/19 2353    Specimen:  Blood Updated:  09/07/19 0036     Scan Slide --     Comment: See Manual Differential Results       Manual Differential [[id number]]  (Abnormal) Collected:  09/06/19 2353    Specimen:  Blood Updated:  09/07/19 0036     Neutrophil % 66.0 %      Lymphocyte % 19.0 %      Monocyte % 4.0 %      Bands %  10.0 %      Metamyelocyte % 1.0 %      Neutrophils Absolute 8.66 10*3/mm3      Lymphocytes Absolute 2.17 10*3/mm3      Monocytes Absolute 0.46 10*3/mm3      RBC Morphology Normal     Toxic Granulation Slight/1+     Giant Platelets Slight/1+    Blood Culture - Blood, Arm, Right [[id number]] Collected:  09/07/19 0010    Specimen:  Blood from Arm, Right Updated:  09/07/19 0026    Blood Culture - Blood, Arm, Left [[id number]] Collected:  09/07/19 0010    Specimen:  Blood from Arm, Left Updated:  09/07/19 0025    Urinalysis With Culture If Indicated - Urine, Clean Catch [[id number]]  (Abnormal) Collected:  09/07/19 0012    Specimen:  Urine, Clean Catch Updated:  09/07/19 0024     Color, UA Yellow     Appearance, UA Cloudy     Comment: Result checked         pH, UA 8.0     Specific Gravity, UA 1.010     Glucose, UA Negative     Ketones, UA Negative     Bilirubin, UA Negative     Blood, UA Trace     Protein, UA 30 mg/dL (1+)     Leuk Esterase, UA Large (3+)     Nitrite, UA Negative     Urobilinogen, UA 1.0 E.U./dL    Urinalysis, Microscopic Only - Urine, Clean Catch [[id number]]  (Abnormal) Collected:  09/07/19 0012    Specimen:  Urine, Clean Catch Updated:  09/07/19 0024     RBC, UA 0-2 /HPF      WBC, UA Too Numerous to Count /HPF      Bacteria, UA 3+ /HPF      Squamous Epithelial Cells, UA None Seen /HPF      Hyaline Casts, UA None Seen /LPF      Methodology Manual Light Microscopy    POC Lactate [[id number]]  (Abnormal) Collected:  09/07/19 0023    Specimen:  Blood Updated:  09/07/19 0024     Lactate 2.6  mmol/L      Comment: Serial Number: [id number]:  [id number]       Urine Culture - Urine, Urine, Clean Catch [[id number]] Collected:  09/07/19 0012    Specimen:  Urine, Clean Catch Updated:  09/07/19 0024    POC Lactate [[id number]]  (Abnormal) Collected:  09/07/19 0024    Specimen:  Blood Updated:  09/07/19 0024    Comprehensive Metabolic Panel [[id number]]  (Abnormal) Collected:  09/06/19 2353    Specimen:  Blood Updated:  09/07/19 0023     Glucose 128 mg/dL      BUN 13 mg/dL      Creatinine 1.10 mg/dL      Sodium 134 mmol/L      Potassium 3.8 mmol/L      Chloride 96 mmol/L      CO2 23.0 mmol/L      Calcium 8.0 mg/dL      Total Protein 5.8 g/dL      Albumin 3.00 g/dL      ALT (SGPT) 13 U/L      AST (SGOT) 21 U/L      Alkaline Phosphatase 67 U/L      Total Bilirubin 1.0 mg/dL      eGFR Non African Amer 63 mL/min/1.73      Globulin 2.8 gm/dL      A/G Ratio 1.1 g/dL      BUN/Creatinine Ratio 11.8     Anion Gap 18.8 mmol/L     Narrative:       The MDRD GFR formula is only valid for adults with stable renal function between ages 18 and 70.    Protime-INR [[id number]]  (Normal) Collected:  09/06/19 2353    Specimen:  Blood Updated:  09/07/19 0021     Protime 9.8 Seconds      INR 0.94    aPTT [[id number]]  (Abnormal) Collected:  09/06/19 2353    Specimen:  Blood Updated:  09/07/19 0021     PTT 23.1 seconds           Imaging Results (most recent)     Procedure Component Value Units Date/Time    XR Chest 1 View [[id number]] Resulted:  09/07/19 0130     Updated:  09/07/19 0130        reviewed    ECG/EMG Results (most recent)     None        reviewed         Results for orders placed during the hospital encounter of 10/06/18   Adult Transthoracic Echo Complete W/ Cont if Necessary Per Protocol    Narrative                           Adult Echocardiogram Report          [facility]  Cardiology Department  [address] [place]  [address]      Name: DORCAS STANLEY CStudy Date: 10/07/2018 09:37 AM         BP:  100/61 mmHg  MRN: [id number]         Patient Location: 2B  : 1932        Gender: Male                            Height: 70 in  Age: 86 yrs            Account#: [id number]                   Weight: 140 lb  Reason For Study: CHF, DYSPNEA, FALL, WEAKNESS                 BSA: 1.8 m2  Ordering Physician:  EMMIE PORRAS  Referring Physician: NO  PHYSICIAN, NO PHYSICIAN    Performed By: NIKA      M-Mode/2-D Measurements:  LVIDd: 6.0 cm       (3.7-5.7) LVPWd: 0.93 cm       (0.8-1.2)  LVIDs: 5.5 cm       (2.3-3.9)  ACS: 1.6 cm         (1.6-3.7) IVSd: 1.1 cm         (0.7-1.2)  LA dimension: 3.7 cm(1.9-4.0) RVDd: 2.2 cm         (0.7-2.4)  FS: 8.5 %           (21-40%)  Ao root diam: 3.5 cm (2.0-3.7)    Comments  Comments: Technically satisfactory exam for interpretation    Findings: The aortic valve is trileaflet with adequate opening coaptation  Mitral valve shows modest fibrocalcific thickening with decreased leaflet  excursion.  Tricuspid valve structure normal; pulmonic valve grossly normal.  RV chamber size and global contractility satisfactory.  Marked left ventricular enlargement with normal wall thickness. There is  severe generalized hypokinesis; LVEF 20-25%.  Left atrial enlargement is present. Normal aortic root dimension.  There is a very small pericardial effusion that is not impair right heart  filling. No intracardiac thrombus vegetation or masses are seen.  Very large pleural effusion measuring 7.0 cm is noted.    Supplementary Doppler exam showed normal aortic mitral valve for velocity with  mild AI severe mitral regurgitation and mild TR jet with RVSP of 29 mm Hg.    Interpretation: Highly abnormal echocardiogram shows dilated ischemic  cardiomyopathy with LVEF in the 20-25% range.  Severe mitral regurgitation and mild AI and mild TR jets by Doppler with  normal RV systolic pressure 29 mm Hg.  A large pleural effusion measuring 7.0 cm noted.  Clinical correlation  warranted.      Interpretation    MMode/2D Measurements & Calculations  ESV(Teich): 147.9 ml  EF(Teich): 18.5 %                    Ao root area: 9.5 cm2  LVOT diam: 2.0 cm                    EDV(MOD-sp4): 164.5 ml                                       ESV(MOD-sp4): 122.5 ml                                       EF(MOD-sp4): 25.6 %      Doppler Measurements & Calculations  MV max P.0 mmHg                    Ao V2 max: 102.3 cm/sec  MV mean P.8 mmHg                   Ao max P.2 mmHg                                         Ao V2 mean: 73.8 cm/sec                                         Ao mean P.4 mmHg                                         Ao V2 VTI: 20.0 cm                                         FELICIA(I,D): 1.5 cm2                                         FELICIA(V,D): 1.7 cm2  LV V1 max P.1 mmHg                 MR max kyree: 460.1 cm/sec  LV V1 mean P.51 mmHg               MR max P.7 mmHg  LV V1 max: 53.2 cm/sec                 MR VTI: 146.0 cm  LV V1 mean: 32.7 cm/sec  LV V1 VTI: 9.3 cm    MR PISA radius: 0.86 cm                PA max P.3 mmHg  TR max kyree: 215.7 cm/sec  TR max P.6 mmHg  RVSP(TR): 28.6 mmHg    _______________________________________________________________________________      Electronically signed by: Malik Mejia MD  on 10/09/2018 08:33 AM         XR Chest 1 View  Narrative: Examination: XR CHEST 1 VW-     Date of Exam: 8/3/2019 5:19 PM     Indication: Chest pain.     Comparison: July 15, 2019     Technique: 1 view of the chest      Findings:  There are moderate right and small left pleural effusions with bibasilar  opacities. There is a consolidation in the left midlung that is new. The  heart and mediastinal contours are stable. There is a mild pulmonary  edema pattern. Degenerative changes are present within the thoracic  spine.     Impression: 1.Mild pulmonary edema pattern with moderate right and small left  pleural effusions.  2.New consolidation within left  midlung, which may represents pulmonary  edema or pneumonia.  3.Bibasilar opacities, which could represent atelectasis or pneumonia.     Electronically Signed By-DR. Darnell Montilla MD On:8/3/2019 5:38 PM  This report was finalized on [id number] by DR. Darnell Montilla MD.      Assessment/Plan       Acute UTI      Plan    Chest pain  - r/o ACS  - initial troponin 0.09  - 8/4/19 troponin noted at 0.17  - serial troponin  - EKG negative for acute abnormality  - consider cardiac consultation    S/p PCI    UTI  - u/a showed 3+ bacteria and large leukocytes  - lactic acid 2.6  - pt received fluid bolus in ED  - IV rocephin  - recheck lactic acid    CAD  - cont home coreg, imdur, and NTG    H/O CVA and MI    Chronic pain  - cont home gabapentin and hydrocodone    CHF  -   - cont home lasix    Chronic Anxiety  - cont home ativan (INSPECT verified)    Chronic constipation  - cont home miralax and senokot    GERD  - cont home zantac    Chronic insomnia  - cont home trazodone    VTE prophylaxis  - bilateral SCDs    Disposition     The patient is stable at this time.    I discussed the patients findings and my recommendations with patient and he is agreeable to the plan.     Kacy Gallardo, ANDIE  09/07/19  4:27 AM

## 2019-09-07 NOTE — ED NOTES
Pt. Reported midsternal CP with no radation about 1.5 hours ago. States took 2 SL nitro with positive results and had 4 baby aspirin from EMS. Pt. Denies any [ain at this time. States at times SOA.      Estrella Yost, RN  09/07/19 0005

## 2019-09-07 NOTE — ED PROVIDER NOTES
"Subjective   SS chest pain, \"pain\", moderate, resolved with 2 sl ntg, asa 81 po x 4 per ems, radiated to left arm, c/w prior angina.  Associated with soa.            Review of Systems   Constitutional: Positive for chills.   Respiratory: Positive for shortness of breath.    Cardiovascular: Positive for chest pain and leg swelling.   Neurological: Positive for tremors.   All other systems reviewed and are negative.      Past Medical History:   Diagnosis Date   • CHF (congestive heart failure) (CMS/[facility])    • Coronary artery disease    • Neuropathy    • Stroke (CMS/HCC)        No Known Allergies    Past Surgical History:   Procedure Laterality Date   • ANGIOPLASTY     • BACK SURGERY     • CHOLECYSTECTOMY     • CORONARY STENT PLACEMENT     • SPINAL FUSION         Family History   Problem Relation Age of Onset   • Coronary artery disease Brother        Social History     Socioeconomic History   • Marital status:      Spouse name: Not on file   • Number of children: Not on file   • Years of education: Not on file   • Highest education level: Not on file   Tobacco Use   • Smoking status: Former Smoker     Types: Cigarettes   • Smokeless tobacco: Never Used   • Tobacco comment: Quit 40 years go   Substance and Sexual Activity   • Alcohol use: No     Frequency: Never   • Drug use: No   Social History Narrative    - spouse has Parkinson's.  He lives at home with spouse and grandson.           Objective   Physical Exam   Constitutional: He is oriented to person, place, and time. He appears well-developed and well-nourished.   HENT:   Head: Normocephalic and atraumatic.   Eyes: EOM are normal. Pupils are equal, round, and reactive to light.   Neck: Normal range of motion. Neck supple.   Cardiovascular:   Irregular, no murmur   Pulmonary/Chest: Effort normal and breath sounds normal.   Abdominal: Soft. Bowel sounds are normal. He exhibits no distension. There is no tenderness.   Musculoskeletal:   Pedal edema, " symmetric bilateral lower extremities, nontender   Neurological: He is alert and oriented to person, place, and time. No cranial nerve deficit.   Motor and sensation intact   Skin: Skin is warm and dry. Capillary refill takes less than 2 seconds.   Psychiatric:   Possibly anxious, tells me he feels shaky secondary to chills, will check rectal temp       Procedures           ED Course  ED Course as of Sep 07 0215   Fri Sep 06, 2019   2352 EKG interpretation: Atrial fibrillation rate 70s, no acute ST elevations  [JR]      ED Course User Index  [JR] Julio César Manuel MD                  MDM  Number of Diagnoses or Management Options  Acute UTI:   Chest pain, unspecified type:   Severe sepsis (CMS/HCC):   Diagnosis management comments: Results for orders placed or performed during the hospital encounter of 09/06/19  -Comprehensive Metabolic Panel       Result                      Value             Ref Range           Glucose                     128 (H)           65 - 99 mg/dL       BUN                         13                8 - 20 mg/dL        Creatinine                  1.10              0.70 - 1.20 *       Sodium                      134 (L)           136 - 144 mm*       Potassium                   3.8               3.6 - 5.1 mm*       Chloride                    96 (L)            101 - 111 mm*       CO2                         23.0              22.0 - 32.0 *       Calcium                     8.0 (L)           8.9 - 10.3 m*       Total Protein               5.8 (L)           6.1 - 7.9 g/*       Albumin                     3.00 (L)          3.50 - 4.80 *       ALT (SGPT)                  13 (L)            17 - 63 U/L         AST (SGOT)                  21                15 - 41 U/L         Alkaline Phosphatase        67                32 - 91 U/L         Total Bilirubin             1.0               0.3 - 1.2 mg*       eGFR Non  Amer       63                >60 mL/min/1*       Globulin                    2.8                2.5 - 3.8 gm*       A/G Ratio                   1.1               1.0 - 1.7 g/*       BUN/Creatinine Ratio        11.8              6.2 - 20.3          Anion Gap                   18.8 (H)          5.0 - 15.0 m*  -Protime-INR       Result                      Value             Ref Range           Protime                     9.8               9.6 - 11.7 S*       INR                         0.94              0.90 - 1.10    -aPTT       Result                      Value             Ref Range           PTT                         23.1 (L)          24.0 - 31.0 *  -Troponin       Result                      Value             Ref Range           Troponin I                  0.090 (H)         0.000 - 0.03*  -BNP       Result                      Value             Ref Range           BNP                         375.0 (H)         <=100.0 pg/mL  -CBC Auto Differential       Result                      Value             Ref Range           WBC                         11.40 (H)         3.40 - 10.80*       RBC                         3.57 (L)          4.14 - 5.80 *       Hemoglobin                  11.5 (L)          13.0 - 17.7 *       Hematocrit                  33.4 (L)          37.5 - 51.0 %       MCV                         93.5              79.0 - 97.0 *       MCH                         32.2              26.6 - 33.0 *       MCHC                        34.4              31.5 - 35.7 *       RDW                         14.9              12.3 - 15.4 %       RDW-SD                      48.1              37.0 - 54.0 *       MPV                         7.7               6.0 - 12.0 fL       Platelets                   219               140 - 450 10*  -Scan Slide       Result                      Value             Ref Range           Scan Slide                                                   -Urinalysis With Culture If Indicated - Urine, Clean Catch       Result                      Value             Ref Range            Color, UA                   Yellow            Yellow, Straw       Appearance, UA              Cloudy (A)        Clear               pH, UA                      8.0               5.0 - 8.0           Specific [place], UA        1.010             1.005 - 1.030       Glucose, UA                 Negative          Negative            Ketones, UA                 Negative          Negative            Bilirubin, UA               Negative          Negative            Blood, UA                   Trace (A)         Negative            Protein, UA                                   Negative        30 mg/dL (1+) (A)       Leuk Esterase, UA                             Negative        Large (3+) (A)       Nitrite, UA                 Negative          Negative            Urobilinogen, UA            1.0 E.U./dL       0.2 - 1.0 E.*  -Urinalysis, Microscopic Only - Urine, Clean Catch       Result                      Value             Ref Range           RBC, UA                     0-2 (A)           None Seen /H*       WBC, UA                                       None Seen /H*   Too Numerous to Count (A)       Bacteria, UA                3+ (A)            None Seen /H*       Squamous Epithelial Ce*     None Seen         None Seen, 0*       Hyaline Casts, UA           None Seen         None Seen /L*       Methodology                                                   Manual Light Microscopy  -POC Lactate       Result                      Value             Ref Range           Lactate                     2.6 (C)           0.5 - 2.0 mm*  -Manual Differential       Result                      Value             Ref Range           Neutrophil %                66.0              42.7 - 76.0 %       Lymphocyte %                19.0 (L)          19.6 - 45.3 %       Monocyte %                  4.0 (L)           5.0 - 12.0 %        Bands %                     10.0 (H)          0.0 - 5.0 %         Metamyelocyte %             1.0 (H)           0.0 -  0.0 %         Neutrophils Absolute        8.66 (H)          1.70 - 7.00 *       Lymphocytes Absolute        2.17              0.70 - 3.10 *       Monocytes Absolute          0.46              0.10 - 0.90 *       RBC Morphology              Normal            Normal              Toxic Granulation           Slight/1+         None Seen           Giant Platelets             Slight/1+         None Seen        Feels better, no cp, chills relieved, BP stable, patient clarifies he is a full code       Amount and/or Complexity of Data Reviewed  Clinical lab tests: reviewed  Tests in the radiology section of CPT®: reviewed  Independent visualization of images, tracings, or specimens: yes        Final diagnoses:   Acute UTI   Severe sepsis (CMS/[facility])   Chest pain, unspecified type              Julio César Manuel MD  09/07/19 0640

## 2019-09-07 NOTE — PLAN OF CARE
Problem: Skin Injury Risk (Adult)  Goal: Identify Related Risk Factors and Signs and Symptoms  Outcome: Ongoing (interventions implemented as appropriate)    Goal: Skin Health and Integrity  Outcome: Ongoing (interventions implemented as appropriate)      Problem: Fall Risk (Adult)  Goal: Identify Related Risk Factors and Signs and Symptoms  Outcome: Ongoing (interventions implemented as appropriate)    Goal: Absence of Fall  Outcome: Ongoing (interventions implemented as appropriate)

## 2019-09-07 NOTE — CONSULTS
"Adult Nutrition  Assessment/PES    Patient Name:  Julio César Braxton  YOB: 1932  MRN: [id number]  Admit Date:  9/6/2019    Assessment Date:  9/7/2019    Comments:  Changing diet to Healthy Heart (removed Consistent carb to liberalize diet - no hx of DM in chart noted at this time), ordering Boost + BID to provide additional nutrients with diet order.    Reason for Assessment     Row Name 09/07/19 1039          Reason for Assessment    Reason For Assessment MST 4, Nursing Admission Screen Consult (9/7)       Diagnosis PMH: CAD s/p PCI and VA, CHF, Neuropathy, Anxiety, Chronic Constipation, GERD, [facility], [facility]    Current: admit r/t chest pain, EKG negative for acute abnormailites - possible cardio consult, UTI         Nutrition/Diet History     Row Name 09/07/19 1040          Nutrition/Diet History    Typical Food/Fluid Intake  Unable to review at this time, pt sleeping during visit attempt - RN states she was told pt has had weight loss and decreased intake r/t losing wife in April     Food Allergies  No known allergies per EMR         Anthropometrics     Row Name 09/07/19 1041        Anthropometrics    Height  177.8 cm (70\")     Weight  60.2 kg (132 lb 11.5 oz)    (9/7/19)        Admit Weight    Admit Weight  61.2 kg (135 lb)    (9/6/19)        Ideal Body Weight (IBW)    Ideal Body Weight (IBW)  166 lb     % Ideal Body Weight  80%        Usual Body Weight (UBW)    Usual Body Weight  UTD     % Usual Body Weight  UTD%     Weight Loss Time Frame 133 lb (8/4/19)  140 lb (7/15/19)  132 lb (10/208)  126 lb (9/2018)  152 lb (3/2018)        Body Mass Index (BMI)    BMI (kg/m2)  19.08          Labs/Tests/Procedures/Meds     Row Name 09/07/19 1042          Labs/Procedures/Meds    Lab Results Reviewed  reviewed, pertinent     Lab Results Comments  Gluc 123, Na 137, K 3.3 L, BUN 12, Crt 1.0, Ca 7.4 L, Ph 2.5, Mg 1.7 L, Hgb 11.0 L, Hct 31.8 L        Medications    Pertinent Medications Reviewed  reviewed  "    Pertinent Medications Comments  Rocephrin, Pepcid, Lasix, Miralax         Physical Findings     Row Name 09/07/19 1042          Physical Findings    Overall Physical Appearance  NFPE unable to be completed at this time, noted in 10/2018 pt was found to be severely malnourished (r/t chronic disease)     Gastrointestinal  Unable to review N/V, no BM recorded x1 day     Oral/Mouth Cavity  Unable to review if any concerns     Skin  Sacral spine - stage 2         Estimated/Assessed Needs     Row Name 09/07/19 1043        Calculation Measurements    Weight Used For Calculations      Height         Estimated/Assessed Needs    Additional Documentation         KCAL/KG    KCAL/KG      30 Kcal/Kg (kcal)         Protein Requirements    Est Protein Requirement Amount (gms/kg)      Estimated Protein Requirements (gms/day)         Fluid Requirements    Estimated Fluid Requirements (mL/day)       Nutrition Prescription Ordered     Row Name 09/07/19 1044          Nutrition Prescription PO    Current PO Diet  Healthy Heart, Consistent Carb     Supplement  None at this time (previous admits pt has Boost + ordered)         Evaluation of Received Nutrient/Fluid Intake     Row Name 09/07/19 1045          PO Evaluation    PO Intake Evaluated  N/A     % PO Intake  No po intake recorded at this time         Problem/Interventions:  Problem 1     Row Name 09/07/19 1045          Nutrition Diagnoses Problem 1    Problem 1  Inadequate oral intake     Etiology (related to)  decreased ability to consume sufficient energy needs     Signs/Symptoms (evidenced by)  estimates of insufficient po intake recorded (none recorded)         Intervention Goal     Row Name 09/07/19 1046          Intervention Goal    PO  PO intake (%)     PO Intake %  >50 %         Nutrition Intervention     Row Name 09/07/19 1046          Nutrition Intervention    RD/Tech Action  Recommend/ordered     Recommended/Ordered  Supplement         Nutrition Prescription     Row  Name 09/07/19 1046          Nutrition Prescription PO    PO Prescription  Begin/change supplement Change Diet Order - ? Why pt is on a Consistent Carb diet (no hx of DM in chart), RN states she is unsure as well - removing from diet order to liberalize diet, consider removing Healthy heart as well based on intakes at follow-up    Supplement  Boost Plus     Supplement Frequency  2 times a day         Education/Evaluation     Row Name 09/07/19 1046          Monitor/Evaluation    Monitor  PO intake;Supplement intake;Weight;Skin status;Pertinent labs/meds; BMs; NFPE if able           Electronically signed by:  Lisa Reddy RD  09/07/19 10:46 AM

## 2019-09-08 NOTE — PROGRESS NOTES
Pharmacokinetic Consult - Vancomycin Dosing    Julio César Braxton is a 87 y.o. male who has been consulted for vancomycin dosing for UTI.    Relevant clinical data and objective history reviewed:  Lab Results   Component Value Date/Time    CREATININE 0.90 09/08/2019 04:17 AM    CREATININE 1.00 09/07/2019 06:14 AM    CREATININE 1.10 09/06/2019 11:53 PM    BUN 10 09/08/2019 04:17 AM    BUN 12 09/07/2019 06:14 AM    BUN 13 09/06/2019 11:53 PM     Estimated Creatinine Clearance: 50.6 mL/min (by C-G formula based on SCr of 0.9 mg/dL).  I/O last 3 completed shifts:  In: 1836 [IV Piggyback:1836]  Out: 700 [Urine:700]  Lab Results   Component Value Date/Time    WBC 10.20 09/08/2019 04:17 AM    HGB 10.5 (L) 09/08/2019 04:17 AM    HCT 30.6 (L) 09/08/2019 04:17 AM    MCV 94.4 09/08/2019 04:17 AM     09/08/2019 04:17 AM     Temp Readings from Last 3 Encounters:   09/08/19 97.6 °F (36.4 °C) (Axillary)   08/04/19 97.5 °F (36.4 °C) (Oral)   07/17/19 98.9 °F (37.2 °C) (Oral)     Weight: 61.9 kg (136 lb 7.4 oz)      Assessment/Plan  1. Vancomycin day 1: 1250 mg every 24 hrs. Trough 9/11 1500 and peak 9/10 2000.    2. Ceftriaxone day 2: 2 grams every 24 hrs.    3. Pharmacy will dose adjust antibiotics and monitor cultures.    Aura McnallyD, BCPS

## 2019-09-08 NOTE — PROGRESS NOTES
Code stroke called in for facial asymmetry, right-sided lower face looked asymmetric, and unresponsiveness.  As soon as I arrived at the patient bedside estimated him he started talking, was angry trying to punch.  I examined him again after few minutes and he was improving significantly.  Dr. Pena was coming to see the patient and explained everything to him.  He is septic and has other issues and likely metabolic problems.  He was not an alteplase candidate did not look like large vessel.  I went again and check on him at 1436 and he was laughing and having his meal and did not look anything focal.  Code stroke canceled.

## 2019-09-08 NOTE — PROGRESS NOTES
"[facility] Hospitalist Team      Patient Care Team:  Eduardo Medina MD as PCP - General (Family Medicine)  NANCY Mejia MD as PCP - Claims Attributed        Chief Complaint:  cp    Subjective:   The patient is a 87 year old male with a PMH of CAD s/p PCI and CVA.  The patient presented to the ED with complaint of chest pain and left upper extremity pain.  Denies diaphoresis or nausea.  The patient has family at the bedside to assist with history.  The patient is under the care of [facility].  The patient denies urinary symptoms, fever or chills.  Denies an alleviating or exacerbating factors.  The patient is chest pain free at this time.      Objective: No more Chest pain        Vital Signs  Temp:  [97.1 °F (36.2 °C)-98.7 °F (37.1 °C)] 98 °F (36.7 °C)  Heart Rate:  [68-84] 69  Resp:  [16-20] 18  BP: (107-122)/(57-77) 117/59  Oxygen Therapy  SpO2: 95 %  Pulse Oximetry Type: Intermittent  Device (Oxygen Therapy): room air  Flowsheet Rows      First Filed Value   Admission Height  177.8 cm (70\") Documented at 09/06/2019 2336   Admission Weight  61.2 kg (135 lb) Documented at 09/06/2019 2336        Intake & Output (last 3 days)       09/05 0701 - 09/06 0700 09/06 0701 - 09/07 0700 09/07 0701 - 09/08 0700 09/08 0701 - 09/09 0700    IV Piggyback  1836      Total Intake(mL/kg)  1836 (30.5)      Urine (mL/kg/hr)  250 450 (0.3)     Stool   0     Total Output  250 450     Net  +1586 -450             Urine Unmeasured Occurrence   300 x     Stool Unmeasured Occurrence   3 x         Lines, Drains & Airways    Active LDAs     Name:   Placement date:   Placement time:   Site:   Days:    Peripheral IV 09/07/19 2015 Anterior;Left Forearm   09/07/19 2015    Forearm   less than 1                Physical Exam:    General Appearance:    Alert, cooperative, in no acute distress   Head:    Normocephalic, without obvious abnormality, atraumatic   Eyes:            Lids and lashes normal, conjunctivae and sclerae normal, no   " icterus, no pallor, corneas clear, PERRLA   Back:     No kyphosis present, no scoliosis present, no skin lesions,      erythema or scars, no tenderness to percussion or                   palpation,   range of motion normal   Lungs:     Clear to auscultation,respirations regular, even and                  unlabored    Heart:    Regular rhythm and normal rate, normal S1 and S2, no            murmur, no gallop, no rub, no click   Chest Wall:    No abnormalities observed   Abdomen:     Normal bowel sounds, no masses, no organomegaly, soft        non-tender, non-distended, no guarding, no rebound                tenderness   Extremities:   Moves all extremities well, no edema, no cyanosis, no             redness       Results Review:       Lab Results (last 24 hours)     Procedure Component Value Units Date/Time    CBC & Differential [[id number]] Collected:  09/08/19 0417    Specimen:  Blood Updated:  09/08/19 0620    Narrative:       The following orders were created for panel order CBC & Differential.  Procedure                               Abnormality         Status                     ---------                               -----------         ------                     CBC Auto Differential[[id number]]        Abnormal            Final result                 Please view results for these tests on the individual orders.    CBC Auto Differential [[id number]]  (Abnormal) Collected:  09/08/19 0417    Specimen:  Blood Updated:  09/08/19 0620     WBC 10.20 10*3/mm3      RBC 3.24 10*6/mm3      Hemoglobin 10.5 g/dL      Hematocrit 30.6 %      MCV 94.4 fL      MCH 32.3 pg      MCHC 34.3 g/dL      RDW 15.1 %      RDW-SD 50.3 fl      MPV 7.9 fL      Platelets 187 10*3/mm3     Narrative:       The previously reported component NRBC is no longer being reported.    Scan Slide [[id number]] Collected:  09/08/19 0417    Specimen:  Blood Updated:  09/08/19 0620     Scan Slide --     Comment: See Manual Differential Results       Manual  Differential [[id number]]  (Abnormal) Collected:  09/08/19 0417    Specimen:  Blood Updated:  09/08/19 0620     Neutrophil % 70.0 %      Lymphocyte % 11.0 %      Monocyte % 2.0 %      Eosinophil % 2.0 %      Bands %  12.0 %      Myelocyte % 3.0 %      Neutrophils Absolute 8.36 10*3/mm3      Lymphocytes Absolute 1.12 10*3/mm3      Monocytes Absolute 0.20 10*3/mm3      Eosinophils Absolute 0.20 10*3/mm3      Anisocytosis Slight/1+     Polychromasia Slight/1+     WBC Morphology Normal     Platelet Morphology Normal    Phosphorus [[id number]]  (Normal) Collected:  09/08/19 0417    Specimen:  Blood Updated:  09/08/19 0539     Phosphorus 3.2 mg/dL     Magnesium [[id number]]  (Normal) Collected:  09/08/19 0417    Specimen:  Blood Updated:  09/08/19 0517     Magnesium 1.8 mg/dL     Comprehensive Metabolic Panel [[id number]]  (Abnormal) Collected:  09/08/19 0417    Specimen:  Blood Updated:  09/08/19 0517     Glucose 159 mg/dL      BUN 10 mg/dL      Creatinine 0.90 mg/dL      Sodium 136 mmol/L      Potassium 3.9 mmol/L      Chloride 100 mmol/L      CO2 25.0 mmol/L      Calcium 7.6 mg/dL      Total Protein 5.1 g/dL      Albumin 2.50 g/dL      ALT (SGPT) 10 U/L      AST (SGOT) 18 U/L      Alkaline Phosphatase 53 U/L      Total Bilirubin 0.7 mg/dL      eGFR Non African Amer 80 mL/min/1.73      Globulin 2.6 gm/dL      A/G Ratio 1.0 g/dL      BUN/Creatinine Ratio 11.1     Anion Gap 14.9 mmol/L     Narrative:       The MDRD GFR formula is only valid for adults with stable renal function between ages 18 and 70.    Protime-INR [[id number]]  (Normal) Collected:  09/08/19 0417    Specimen:  Blood Updated:  09/08/19 0453     Protime 9.7 Seconds      INR 0.93    Blood Culture - Blood, Arm, Left [[id number]] Collected:  09/07/19 0010    Specimen:  Blood from Arm, Left Updated:  09/08/19 0030     Blood Culture No growth at 24 hours    Blood Culture - Blood, Arm, Right [[id number]] Collected:  09/07/19 0010    Specimen:  Blood from Arm, Right  Updated:  09/08/19 0030     Blood Culture No growth at 24 hours    Troponin [[id number]]  (Abnormal) Collected:  09/07/19 1333    Specimen:  Blood Updated:  09/07/19 1455     Troponin I 0.080 ng/mL     Narrative:       Troponin I Reference Range:    0.00-0.03  Negative.  Repeat testing in 4-6 hours if clinically indicated.    0.04-0.29  Suspicious for myocardial injury. Serial measurements and clinical  correlation may be necessary to confirm or exclude diagnosis of acute  coronary syndrome.  Repeat testing in 4-6 hours if indicated.     >0.29 Consistent with myocardial injury.  Recommend clinical and laboratory correlation.     Results my be falsely decreased if patient taking Biotin.           Imaging Results (last 24 hours)     ** No results found for the last 24 hours. **          Blood Culture   Date Value Ref Range Status   09/07/2019 No growth at 24 hours  Preliminary   09/07/2019 No growth at 24 hours  Preliminary                           I reviewed the patient's new clinical results.          ECG/EMG Results (most recent)     None            Medication Review:       Current Facility-Administered Medications:   •  acetaminophen (TYLENOL) tablet 650 mg, 650 mg, Oral, Q4H PRN **OR** acetaminophen (TYLENOL) 160 MG/5ML solution 650 mg, 650 mg, Oral, Q4H PRN **OR** acetaminophen (TYLENOL) suppository 650 mg, 650 mg, Rectal, Q4H PRN, Kacy Gallardo APRN  •  carvedilol (COREG) tablet 3.125 mg, 3.125 mg, Oral, BID With Meals, Kacy Gallardo APRN, 3.125 mg at 09/08/19 0831  •  cefTRIAXone (ROCEPHIN) 2 g in sodium chloride 0.9 % 100 mL IVPB, 2 g, Intravenous, Q24H, Kacy Gallardo APRN, Last Rate: 200 mL/hr at 09/07/19 1953, 2 g at 09/07/19 1953  •  dexamethasone (DECADRON) tablet 2 mg, 2 mg, Oral, Daily, Kacy Gallardo APRN, 2 mg at 09/08/19 0831  •  furosemide (LASIX) tablet 40 mg, 40 mg, Oral, BID, Kacy Gallardo APRN, 40 mg at 09/08/19 0831  •  gabapentin (NEURONTIN) capsule 300 mg, 300 mg,  Oral, TID, Kacy Gallardo APRN, 300 mg at 09/08/19 0831  •  HYDROcodone-acetaminophen (NORCO) 5-325 MG per tablet 1 tablet, 1 tablet, Oral, Q4H PRN, Kacy Gallardo APRN  •  isosorbide mononitrate (IMDUR) 24 hr tablet 30 mg, 30 mg, Oral, Daily, Kacy Gallardo APRN, 30 mg at 09/08/19 0832  •  loperamide (IMODIUM) capsule 2 mg, 2 mg, Oral, Daily PRN, Kacy Gallardo APRN  •  LORazepam (ATIVAN) tablet 0.5 mg, 0.5 mg, Oral, Q6H PRN, Kacy Gallardo APRN  •  nitroglycerin (NITROSTAT) SL tablet 0.4 mg, 0.4 mg, Sublingual, Q5 Min PRN, Kacy Gallardo APRN  •  ondansetron (ZOFRAN) injection 4 mg, 4 mg, Intravenous, Q6H PRN, Kacy Gallardo APRN  •  pantoprazole (PROTONIX) EC tablet 40 mg, 40 mg, Oral, Q AM, Stanley Pena Jr., MD, 40 mg at 09/08/19 0535  •  polyethylene glycol (MIRALAX) powder 17 g, 17 g, Oral, Daily, Kacy Gallardo APRN, 17 g at 09/08/19 0833  •  senna (SENOKOT) tablet 2 tablet, 2 tablet, Oral, BID, Kacy Gallardo APRN, 2 tablet at 09/08/19 0833  •  sodium chloride 0.9 % flush 10 mL, 10 mL, Intravenous, Q12H, aKcy Gallardo APRN, 10 mL at 09/07/19 1955  •  sodium chloride 0.9 % flush 10 mL, 10 mL, Intravenous, PRN, Kacy Gallardo APRN  •  sodium chloride 0.9 % flush 10 mL, 10 mL, Intravenous, Q12H, Kacy Gallardo APRN, 10 mL at 09/08/19 0833  •  sodium chloride 0.9 % flush 10 mL, 10 mL, Intravenous, PRN, Kacy Gallardo APRN  •  traZODone (DESYREL) tablet 100 mg, 100 mg, Oral, Nightly, Kacy Gallardo APRN, 100 mg at 09/07/19 1954    I have reviewed the patient's current medication list    Assessment/Plan     Chest pain  - r/o ACS  - initial troponin 0.09  - 8/4/19 troponin noted at 0.17  - serial troponin  - EKG negative for acute abnormality  - consider cardiac consultation     S/p PCI     UTI  - u/a showed 3+ bacteria and large leukocytes  - lactic acid 2.6  - pt received fluid bolus in ED  - IV rocephin  - recheck lactic acid     CAD  - cont home  coreg, imdur, and NTG     H/O CVA and MI     Chronic pain  - cont home gabapentin and hydrocodone     CHF  -   - cont home lasix     Chronic Anxiety  - cont home ativan (INSPECT verified)     Chronic constipation  - cont home miralax and senokot     GERD  - cont home zantac     Chronic insomnia  - cont home trazodone     VTE prophylaxis  - bilateral SCDs      Plan for disposition:hermelinda Pena Jr., MD  09/08/19  9:44 AM      Time:

## 2019-09-09 PROBLEM — L98.429 STAGE 2 SKIN ULCER OF SACRAL REGION (HCC): Status: ACTIVE | Noted: 2019-01-01

## 2019-09-09 PROBLEM — L30.8 DERMATITIS ASSOCIATED WITH MOISTURE: Status: ACTIVE | Noted: 2019-01-01

## 2019-09-09 NOTE — PROGRESS NOTES
Continued Stay Note  SONU Ibrahim     Patient Name: Julio César Braxton  MRN: [id number]  Today's Date: 9/9/2019    Admit Date: 9/6/2019    Discharge Plan     Row Name 09/09/19 1312       Plan    Plan  DC Plan: Current w/ [facility] per facility.     Plan Comments  SW reached out to [facility] liaison (Mila) about pt's care in their program. Liaison instructed SW to call main number @ [phone], which SW did & it was confirmed that pt [facility]. Notified liaison as facility couldn't confirm if pt was supposed to be a GIP admit. Liaison stated she will need to gather infor & call it in to their preauth department. SW waiting for update from liaison.         MATEO Salgado    [phone]

## 2019-09-09 NOTE — PROGRESS NOTES
"Pharmacy Dosing Service  Antibiotics  Vancomycin  Ceftriaxone and Vancomycin    Subjective:  Julio César Braxton is a 87 y.o.male admitted with UTI. Culture showing Aerococcus urinae. Pharmacy to dose vancomycin.  Goal trough 10-20 mcg/mL and goal -600 mcg*h/mL.      Assessment/Plan  1. Day #2 Vancomycin: Patient currently on 1250 mg (19mg/kg ABW) IV q24h. Will obtain pharmacokinetic levels prior to 4th dose. Peak 9/10 @ 2000 and trough 9/11 @ 1500.     2. Day #3 Ceftriaxone: 2g IV q24h.     Continue to monitor drug levels, renal function, culture and sensitivities, and patient clinical status.       Objective:  Relevant clinical data and objective history reviewed:  177.8 cm (70\")   64.5 kg (142 lb 3.2 oz)   Ideal body weight: 73 kg (160 lb 15 oz)  Body mass index is 20.4 kg/m².        Results from last 7 days   Lab Units 09/09/19  0255 09/08/19  0417 09/07/19  0614   CREATININE mg/dL 0.90 0.90 1.00     Estimated Creatinine Clearance: 52.8 mL/min (by C-G formula based on SCr of 0.9 mg/dL).  I/O last 3 completed shifts:  In: 480 [P.O.:480]  Out: 500 [Urine:500]    WBC   Date Value Ref Range Status   09/09/2019 10.30 3.40 - 10.80 10*3/mm3 Final   09/08/2019 10.20 3.40 - 10.80 10*3/mm3 Final   09/07/2019 9.70 3.40 - 10.80 10*3/mm3 Final     Temperature    09/09/19 0236 09/09/19 0641 09/09/19 1100   Temp: 97.8 °F (36.6 °C) 98.4 °F (36.9 °C) 99.3 °F (37.4 °C)     Baseline culture/source/susceptibility:  Microbiology Results (last 10 days)       Procedure Component Value - Date/Time    Respiratory Panel, PCR - Swab, Nasopharynx [[id number]]  (Normal) Collected:  09/07/19 0012    Lab Status:  Final result Specimen:  Swab from Nasopharynx Updated:  09/07/19 0426     ADENOVIRUS, PCR Not Detected     Coronavirus 229E Not Detected     Coronavirus HKU1 Not Detected     Coronavirus NL63 Not Detected     Coronavirus OC43 Not Detected     Human Metapneumovirus Not Detected     Human Rhinovirus/Enterovirus Not Detected     " Influenza B PCR Not Detected     Parainfluenza Virus 1 Not Detected     Parainfluenza Virus 2 Not Detected     Parainfluenza Virus 3 Not Detected     Parainfluenza Virus 4 Not Detected     Bordetella pertussis pcr Not Detected     Influenza A H1 2009 PCR Not Detected     Chlamydophila pneumoniae PCR Not Detected     Mycoplasma pneumo by PCR Not Detected     Influenza A PCR Not Detected     Influenza A H3 Not Detected     Influenza A H1 Not Detected     RSV, PCR Not Detected    Urine Culture - Urine, Urine, Clean Catch [[id number]]  (Abnormal) Collected:  09/07/19 0012    Lab Status:  Final result Specimen:  Urine, Clean Catch Updated:  09/09/19 1213     Urine Culture >100,000 CFU/mL Aerococcus urinae     Comment: Aerococcus urinae are usually susceptible to Penicillin G, Vancomycin, and Tetracycline and Resistant to Trimethoprim sulfamethoxazole and Gentamicin.           25,000 CFU/mL Mixed Halima Isolated    Narrative:       Specimen contains mixed organisms of questionable pathogenicity which indicates contamination with commensal halima.       Blood Culture - Blood, Arm, Left [[id number]] Collected:  09/07/19 0010    Lab Status:  Preliminary result Specimen:  Blood from Arm, Left Updated:  09/09/19 0033     Blood Culture No growth at 2 days    Blood Culture - Blood, Arm, Right [[id number]] Collected:  09/07/19 0010    Lab Status:  Preliminary result Specimen:  Blood from Arm, Right Updated:  09/09/19 0033     Blood Culture No growth at 2 days             Anti-Infectives (From admission, onward)      Ordered     Dose/Rate Route Frequency Start Stop    09/08/19 1452  !Vancomycin Level Draw Needed     Ordering Provider:  Stanley Pena Jr., MD     Does not apply Once 09/11/19 1500      09/08/19 1452  !Vancomycin Level Draw Needed     Ordering Provider:  Stanley Pena Jr., MD     Does not apply Once 09/10/19 2000      09/08/19 1447  vancomycin 1250 mg/250 mL 0.9% NS IVPB ([facility])     Ordering Provider:  Stanley Pena  MD Danita    1,250 mg  over 90 Minutes Intravenous Every 24 Hours 09/08/19 1600 09/15/19 1559    09/08/19 1307  Pharmacy to dose vancomycin     Ordering Provider:  Stanley Pena Jr., MD     Does not apply Continuous PRN 09/08/19 1306 09/11/19 1305    09/07/19 0447  cefTRIAXone (ROCEPHIN) 2 g in sodium chloride 0.9 % 100 mL IVPB     Ordering Provider:  Kacy Gallardo APRN    2 g  200 mL/hr over 30 Minutes Intravenous Every 24 Hours Scheduled 09/07/19 2100 09/11/19 2059    09/07/19 0121  cefTRIAXone (ROCEPHIN) in SWFI 2 GRAMS/20ml IV PUSH syringe     Ordering Provider:  Julio César Manuel MD    2 g  over 5 Minutes Intravenous Once 09/07/19 0123 09/07/19 0148           Florida Robertson, Janis  09/09/19 2:26 PM

## 2019-09-09 NOTE — PROGRESS NOTES
Nutrition Services    Patient Name:  Julio César Braxton  YOB: 1932  MRN: [id number]  Admit Date:  9/6/2019    Patient sleeping during visit. Family member in room but unable to provide information about intakes at meals. 75% and 50% intakes recorded. Per RN patient has been eating well. Has boost plus ordered but RN unaware. Encouraged RN to give ONS as ordered. Will continue to monitor intakes and will continue with follow up as planned.     Electronically signed by:  Juan J Lakhani RD  09/09/19 2:32 PM

## 2019-09-09 NOTE — PROGRESS NOTES
"[facility] Hospitalist Team      Patient Care Team:  Eduardo Medina MD as PCP - General (Family Medicine)  NANCY Mejia MD as PCP - Claims Attributed    Chief Complaint:  cp    Subjective:   The patient is a 87 year old male with a PMH of CAD s/p PCI and CVA.  The patient presented to the ED with complaint of chest pain and left upper extremity pain.  Denies diaphoresis or nausea.  The patient has family at the bedside to assist with history.  The patient is under the care of [facility].  The patient denies urinary symptoms, fever or chills.  Denies an alleviating or exacerbating factors.  The patient is chest pain free at this time.    9/8-Code stroke called in for facial asymmetry, right-sided lower face looked asymmetric, and unresponsiveness.  As soon as I arrived at the patient bedside estimated him he started talking, was angry trying to punch.  I examined him again after few minutes and he was improving significantly.  He is septic and has other issues and likely metabolic problems.  He was not an alteplase candidate did not look like large vessel.  I went again and check on him at 1436 and he was laughing and having his meal and did not look anything focal.  Code stroke canceled    9/9 says feels better, no new complaints, denies chest pain    Objective: No more Chest pain    Vital Signs  Temp:  [97.6 °F (36.4 °C)-98.4 °F (36.9 °C)] 98.4 °F (36.9 °C)  Heart Rate:  [61-75] 61  Resp:  [14-26] 16  BP: (120-131)/(54-65) 120/64  Oxygen Therapy  SpO2: 96 %  Pulse Oximetry Type: Intermittent  Device (Oxygen Therapy): room air  Flowsheet Rows      First Filed Value   Admission Height  177.8 cm (70\") Documented at 09/06/2019 2336   Admission Weight  61.2 kg (135 lb) Documented at 09/06/2019 2336        Intake & Output (last 3 days)       09/06 0701 - 09/07 0700 09/07 0701 - 09/08 0700 09/08 0701 - 09/09 0700 09/09 0701 - 09/10 0700    P.O.   480     IV Piggyback 1836       Total Intake(mL/kg) 1836 (30.5)  480 " (7.4)     Urine (mL/kg/hr) 250 450 (0.3) 350 (0.2) 200 (1.1)    Stool  0 0     Total Output 250 450 350 200    Net +1586 -450 +130 -200            Urine Unmeasured Occurrence  300 x 2 x     Stool Unmeasured Occurrence  3 x 1 x         Lines, Drains & Airways    Active LDAs     Name:   Placement date:   Placement time:   Site:   Days:    Peripheral IV 09/07/19 2015 Anterior;Left Forearm   09/07/19 2015    Forearm   less than 1                Physical Exam:    General Appearance:    Alert, cooperative, in no acute distress   Head:    Normocephalic, without obvious abnormality, atraumatic   Eyes:            Lids and lashes normal, conjunctivae and sclerae normal, no   icterus, no pallor, corneas clear, PERRLA   Back:     No kyphosis present, no scoliosis present, no skin lesions,      erythema or scars, no tenderness to percussion or                   palpation,   range of motion normal   Lungs:     Clear to auscultation,respirations regular, even and                  unlabored    Heart:    Regular rhythm and normal rate, normal S1 and S2, no            murmur, no gallop, no rub, no click   Chest Wall:    No abnormalities observed   Abdomen:     Normal bowel sounds, no masses, no organomegaly, soft        non-tender, non-distended, no guarding, no rebound                tenderness   Extremities:   Moves all extremities well, no edema, no cyanosis, no             redness       Results Review:       Lab Results (last 24 hours)     Procedure Component Value Units Date/Time    CBC & Differential [[id number]] Collected:  09/09/19 0255    Specimen:  Blood Updated:  09/09/19 0744    Narrative:       The following orders were created for panel order CBC & Differential.  Procedure                               Abnormality         Status                     ---------                               -----------         ------                     CBC Auto Differential[[id number]]        Abnormal            Final result                  Please view results for these tests on the individual orders.    CBC Auto Differential [[id number]]  (Abnormal) Collected:  09/09/19 0255    Specimen:  Blood Updated:  09/09/19 0744     WBC 10.30 10*3/mm3      RBC 3.31 10*6/mm3      Hemoglobin 10.7 g/dL      Hematocrit 31.3 %      MCV 94.7 fL      MCH 32.5 pg      MCHC 34.3 g/dL      RDW 15.0 %      RDW-SD 49.4 fl      MPV 7.6 fL      Platelets 179 10*3/mm3     Narrative:       The previously reported component NRBC is no longer being reported.    Scan Slide [[id number]] Collected:  09/09/19 0255    Specimen:  Blood Updated:  09/09/19 0744     Scan Slide --     Comment: See Manual Differential Results       Manual Differential [[id number]]  (Abnormal) Collected:  09/09/19 0255    Specimen:  Blood Updated:  09/09/19 0744     Neutrophil % 72.0 %      Lymphocyte % 15.0 %      Monocyte % 5.0 %      Bands %  6.0 %      Metamyelocyte % 1.0 %      Myelocyte % 1.0 %      Neutrophils Absolute 8.03 10*3/mm3      Lymphocytes Absolute 1.55 10*3/mm3      Monocytes Absolute 0.52 10*3/mm3      nRBC 1.0 /100 WBC      Anisocytosis Slight/1+     Polychromasia Slight/1+     WBC Morphology Normal     Platelet Morphology Normal    Basic Metabolic Panel [[id number]]  (Abnormal) Collected:  09/09/19 0255    Specimen:  Blood Updated:  09/09/19 0353     Glucose 165 mg/dL      BUN 10 mg/dL      Creatinine 0.90 mg/dL      Sodium 138 mmol/L      Potassium 3.6 mmol/L      Chloride 100 mmol/L      CO2 28.0 mmol/L      Calcium 7.9 mg/dL      eGFR Non African Amer 80 mL/min/1.73      BUN/Creatinine Ratio 11.1     Anion Gap 13.6 mmol/L     Narrative:       The MDRD GFR formula is only valid for adults with stable renal function between ages 18 and 70.    Phosphorus [[id number]]  (Normal) Collected:  09/09/19 0255    Specimen:  Blood Updated:  09/09/19 0353     Phosphorus 3.2 mg/dL     Magnesium [[id number]]  (Normal) Collected:  09/09/19 0255    Specimen:  Blood Updated:  09/09/19 0353     Magnesium  1.8 mg/dL     Protime-INR [[id number]]  (Normal) Collected:  09/09/19 0255    Specimen:  Blood Updated:  09/09/19 0333     Protime 10.0 Seconds      INR 0.97    Blood Culture - Blood, Arm, Right [[id number]] Collected:  09/07/19 0010    Specimen:  Blood from Arm, Right Updated:  09/09/19 0033     Blood Culture No growth at 2 days    Blood Culture - Blood, Arm, Left [[id number]] Collected:  09/07/19 0010    Specimen:  Blood from Arm, Left Updated:  09/09/19 0033     Blood Culture No growth at 2 days    POC Glucose Once [[id number]]  (Abnormal) Collected:  09/08/19 1253    Specimen:  Blood Updated:  09/08/19 1255     Glucose 231 mg/dL      Comment: Serial Number: [id number]:  [id number]       Urine Culture - Urine, Urine, Clean Catch [[id number]]  (Abnormal) Collected:  09/07/19 0012    Specimen:  Urine, Clean Catch Updated:  09/08/19 1144     Urine Culture >100,000 CFU/mL Gram Positive Cocci      25,000 CFU/mL Mixed Halima Isolated    Narrative:       Specimen contains mixed organisms of questionable pathogenicity which indicates contamination with commensal halima.             Imaging Results (last 24 hours)     ** No results found for the last 24 hours. **          Blood Culture   Date Value Ref Range Status   09/07/2019 No growth at 24 hours  Preliminary   09/07/2019 No growth at 24 hours  Preliminary                           I reviewed the patient's new clinical results.          ECG/EMG Results (most recent)     Procedure Component Value Units Date/Time    ECG 12 Lead [[id number]] Collected:  09/06/19 2337     Updated:  09/08/19 1532    Narrative:       HEART RATE= 77  bpm  RR Interval= 781  ms  ID Interval=   ms  P Horizontal Axis=   deg  P Front Axis=   deg  QRSD Interval= 117  ms  QT Interval= 371  ms  QRS Axis= -32  deg  T Wave Axis= 118  deg  - ABNORMAL ECG -  Atrial fibrillation  Incomplete left bundle branch block  ST depr, consider ischemia, anterolateral lds  Electronically Signed By: Julio César Manuel (Shamar)  07-Sep-2019 06:18:29  Date and Time of Study: 2019-09-06 23:37:48    ECG 12 Lead [[id number]] Collected:  09/07/19 0932     Updated:  09/08/19 1533    Narrative:       HEART RATE= 78  bpm  RR Interval= 773  ms  IA Interval=   ms  P Horizontal Axis=   deg  P Front Axis=   deg  QRSD Interval= 121  ms  QT Interval= 390  ms  QRS Axis= -40  deg  T Wave Axis= 147  deg  - ABNORMAL ECG -  Atrial fibrillation  Ventricular premature complex  Left bundle branch block  Electronically Signed By:   Date and Time of Study: 2019-09-07 09:32:55            Medication Review:       Current Facility-Administered Medications:   •  [START ON 9/11/2019] !Vancomycin Level Draw Needed, , Does not apply, Once, Stanley Pena Jr., MD  •  [START ON 9/10/2019] !Vancomycin Level Draw Needed, , Does not apply, Once, Stanley Pena Jr., MD  •  acetaminophen (TYLENOL) tablet 650 mg, 650 mg, Oral, Q4H PRN **OR** acetaminophen (TYLENOL) 160 MG/5ML solution 650 mg, 650 mg, Oral, Q4H PRN **OR** acetaminophen (TYLENOL) suppository 650 mg, 650 mg, Rectal, Q4H PRN, Kacy Gallardo APRN  •  carvedilol (COREG) tablet 3.125 mg, 3.125 mg, Oral, BID With Meals, Kacy Gallardo APRN, 3.125 mg at 09/09/19 0834  •  cefTRIAXone (ROCEPHIN) 2 g in sodium chloride 0.9 % 100 mL IVPB, 2 g, Intravenous, Q24H, Kacy Gallardo APRN, Stopped at 09/09/19 0735  •  dexamethasone (DECADRON) tablet 2 mg, 2 mg, Oral, Daily, Kacy Gallardo APRN, 2 mg at 09/09/19 0833  •  furosemide (LASIX) tablet 40 mg, 40 mg, Oral, BID, Kacy Gallardo APRN, 40 mg at 09/09/19 0834  •  gabapentin (NEURONTIN) capsule 300 mg, 300 mg, Oral, TID, Kacy Gallardo APRN, 300 mg at 09/09/19 0833  •  HYDROcodone-acetaminophen (NORCO) 5-325 MG per tablet 1 tablet, 1 tablet, Oral, Q4H PRN, Kacy Gallardo APRN  •  isosorbide mononitrate (IMDUR) 24 hr tablet 30 mg, 30 mg, Oral, Daily, Kacy Gallardo APRN, 30 mg at 09/09/19 0833  •  loperamide (IMODIUM) capsule 2 mg, 2 mg, Oral,  Daily PRN, Kacy Gallardo APRN  •  LORazepam (ATIVAN) tablet 0.5 mg, 0.5 mg, Oral, Q6H PRN, Kacy Gallardo APRN  •  nitroglycerin (NITROSTAT) SL tablet 0.4 mg, 0.4 mg, Sublingual, Q5 Min PRN, Kacy Gallardo APRN  •  ondansetron (ZOFRAN) injection 4 mg, 4 mg, Intravenous, Q6H PRN, Kacy Gallardo APRN  •  pantoprazole (PROTONIX) EC tablet 40 mg, 40 mg, Oral, Q AM, Stanley Pena Jr., MD, 40 mg at 09/09/19 0516  •  Pharmacy to dose vancomycin, , Does not apply, Continuous PRN, Stanley Pena Jr., MD  •  polyethylene glycol (MIRALAX) powder 17 g, 17 g, Oral, Daily, Kacy Gallardo APRN, 17 g at 09/09/19 0833  •  senna (SENOKOT) tablet 2 tablet, 2 tablet, Oral, BID, Kacy Gallardo APRN, 2 tablet at 09/08/19 1956  •  sodium chloride 0.9 % flush 10 mL, 10 mL, Intravenous, Q12H, Kacy Gallardo APRN, 10 mL at 09/08/19 1954  •  sodium chloride 0.9 % flush 10 mL, 10 mL, Intravenous, PRN, Kacy Gallardo APRN  •  sodium chloride 0.9 % flush 10 mL, 10 mL, Intravenous, Q12H, Kacy Gallardo APRN, 10 mL at 09/09/19 0834  •  sodium chloride 0.9 % flush 10 mL, 10 mL, Intravenous, PRN, Kacy Gallardo APRN  •  traZODone (DESYREL) tablet 100 mg, 100 mg, Oral, Nightly, Kacy Gallardo APRN, 100 mg at 09/08/19 1952  •  vancomycin 1250 mg/250 mL 0.9% NS IVPB ([facility]), 1,250 mg, Intravenous, Q24H, Stanley Pena Jr., MD, 1,250 mg at 09/08/19 1839    I have reviewed the patient's current medication list    Assessment/Plan     Chest pain- r/o ACS/  - initial troponin 0.09-0.09-0.08  - 8/4/19 troponin noted at 0.17  - EKG negative for acute abnormality  -conservative management>[facility] follows at home      UTI  - u/a showed 3+ bacteria and large leukocytes  - lactic acid 2.6  - pt received fluid bolus in ED  - IV rocephin  - recheck lactic acid-1.9     CAD/S/p PCI  - cont home coreg, imdur, and NTG     H/O CVA and MI     Chronic pain  - cont home gabapentin and hydrocodone     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

## 2019-09-09 NOTE — PROGRESS NOTES
Wound Initial Evaluation   [facility]     Patient Name: Julio César Braxton  : 4/3/1932  MRN: [id number]  Today's Date: 2019 Room Number: 2127/1      Admit Date: 2019  Attending: Stanley Pena Jr., MD    Consult Requested By: Dr Pena    Reason For Consult: sacral stge 2 PI with MAD    Chief Complaint: patient resting in bed .  Has alternating air surface in place    Visit Dx:    ICD-10-CM ICD-9-CM   1. Acute UTI N39.0 599.0   2. Severe sepsis (CMS/HCC) A41.9 038.9    R65.20 995.92   3. Chest pain, unspecified type R07.9 786.50     Patient Active Problem List   Diagnosis   • Stroke (cerebrum) (CMS/HCC)   • CAD in native artery   • LV dysfunction   • Dyspnea on exertion   • Pacemaker   • Decubitus ulcer of trochanteric region of right hip, stage 2   • Pressure injury of sacral region, stage 2   • Atrial fibrillation (CMS/HCC)   • Bradycardia   • Cardiomyopathy (CMS/HCC)   • Diabetes mellitus, type II (CMS/HCC)   • Long term current use of anticoagulant therapy   • PVCs (premature ventricular contractions)   • Second degree heart block   • Essential hypertension   • Hyperlipidemia, mixed   • Acute UTI   • Dermatitis associated with moisture       History:   Past Medical History:   Diagnosis Date   • CHF (congestive heart failure) (CMS/HCC)    • Coronary artery disease    • Neuropathy    • Stroke (CMS/HCC)      Past Surgical History:   Procedure Laterality Date   • ANGIOPLASTY     • BACK SURGERY     • CHOLECYSTECTOMY     • CORONARY STENT PLACEMENT     • SPINAL FUSION       Social History     Socioeconomic History   • Marital status:      Spouse name: Not on file   • Number of children: Not on file   • Years of education: Not on file   • Highest education level: Not on file   Tobacco Use   • Smoking status: Former Smoker     Types: Cigarettes   • Smokeless tobacco: Never Used   • Tobacco comment: Quit 40 years go   Substance and Sexual Activity   • Alcohol use: No     Frequency: Never   • Drug use: No    Social History Narrative    - spouse has Parkinson's.  He lives at home with spouse and grandson.       Allergies:  No Known Allergies    Medications:    Current Facility-Administered Medications:   •  [START ON 9/11/2019] !Vancomycin Level Draw Needed, , Does not apply, Once, Stanley Pena Jr., MD  •  [START ON 9/10/2019] !Vancomycin Level Draw Needed, , Does not apply, Once, Stanley Pena Jr., MD  •  acetaminophen (TYLENOL) tablet 650 mg, 650 mg, Oral, Q4H PRN **OR** acetaminophen (TYLENOL) 160 MG/5ML solution 650 mg, 650 mg, Oral, Q4H PRN **OR** acetaminophen (TYLENOL) suppository 650 mg, 650 mg, Rectal, Q4H PRN, Kacy Gallardo APRN  •  carvedilol (COREG) tablet 3.125 mg, 3.125 mg, Oral, BID With Meals, Kacy Gallardo APRN, 3.125 mg at 09/09/19 0834  •  cefTRIAXone (ROCEPHIN) 2 g in sodium chloride 0.9 % 100 mL IVPB, 2 g, Intravenous, Q24H, Kacy Gallardo APRN, Stopped at 09/09/19 0735  •  dexamethasone (DECADRON) tablet 2 mg, 2 mg, Oral, Daily, Kacy Gallardo APRN, 2 mg at 09/09/19 0833  •  furosemide (LASIX) tablet 40 mg, 40 mg, Oral, BID, Kacy Gallardo APRN, 40 mg at 09/09/19 0834  •  gabapentin (NEURONTIN) capsule 300 mg, 300 mg, Oral, TID, Kacy Gallardo APRN, 300 mg at 09/09/19 0833  •  HYDROcodone-acetaminophen (NORCO) 5-325 MG per tablet 1 tablet, 1 tablet, Oral, Q4H PRN, Kacy Gallardo APRN  •  isosorbide mononitrate (IMDUR) 24 hr tablet 30 mg, 30 mg, Oral, Daily, Kacy Gallardo APRN, 30 mg at 09/09/19 0833  •  loperamide (IMODIUM) capsule 2 mg, 2 mg, Oral, Daily PRN, Kacy Gallardo APRN  •  LORazepam (ATIVAN) tablet 0.5 mg, 0.5 mg, Oral, Q6H PRN, Kacy Gallardo APRN  •  magic butt paste, , Topical, BID, Isidra Flores APRN  •  nitroglycerin (NITROSTAT) SL tablet 0.4 mg, 0.4 mg, Sublingual, Q5 Min PRN, Kacy Gallardo APRN  •  ondansetron (ZOFRAN) injection 4 mg, 4 mg, Intravenous, Q6H PRN, Kacy Gallardo APRN  •  pantoprazole (PROTONIX) EC  tablet 40 mg, 40 mg, Oral, Q AM, Stanley Pena Jr., MD, 40 mg at 09/09/19 0516  •  Pharmacy to dose vancomycin, , Does not apply, Continuous PRN, Stanley Pena Jr., MD  •  polyethylene glycol (MIRALAX) powder 17 g, 17 g, Oral, Daily, Kacy Gallardo APRN, 17 g at 09/09/19 0833  •  senna (SENOKOT) tablet 2 tablet, 2 tablet, Oral, BID, Kacy Gallardo APRN, 2 tablet at 09/08/19 1956  •  sodium chloride 0.9 % flush 10 mL, 10 mL, Intravenous, Q12H, Kacy Gallardo APRN, 10 mL at 09/08/19 1954  •  sodium chloride 0.9 % flush 10 mL, 10 mL, Intravenous, PRN, Kacy Gallardo, APRN  •  sodium chloride 0.9 % flush 10 mL, 10 mL, Intravenous, Q12H, Kacy Gallardo APRN, 10 mL at 09/09/19 0834  •  sodium chloride 0.9 % flush 10 mL, 10 mL, Intravenous, PRN, Kacy Gallardo, APRN  •  traZODone (DESYREL) tablet 100 mg, 100 mg, Oral, Nightly, Kacy Gallardo APRN, 100 mg at 09/08/19 1952  •  vancomycin 1250 mg/250 mL 0.9% NS IVPB ([facility]), 1,250 mg, Intravenous, Q24H, Stanley Pena Jr., MD, 1,250 mg at 09/08/19 1839    Results Review:  Lab Results (last 48 hours)     Procedure Component Value Units Date/Time    CBC & Differential [[id number]] Collected:  09/09/19 0255    Specimen:  Blood Updated:  09/09/19 0744    Narrative:       The following orders were created for panel order CBC & Differential.  Procedure                               Abnormality         Status                     ---------                               -----------         ------                     CBC Auto Differential[[id number]]        Abnormal            Final result                 Please view results for these tests on the individual orders.    CBC Auto Differential [[id number]]  (Abnormal) Collected:  09/09/19 0255    Specimen:  Blood Updated:  09/09/19 0744     WBC 10.30 10*3/mm3      RBC 3.31 10*6/mm3      Hemoglobin 10.7 g/dL      Hematocrit 31.3 %      MCV 94.7 fL      MCH 32.5 pg      MCHC 34.3 g/dL      RDW 15.0 %      RDW-SD  49.4 fl      MPV 7.6 fL      Platelets 179 10*3/mm3     Narrative:       The previously reported component NRBC is no longer being reported.    Scan Slide [[id number]] Collected:  09/09/19 0255    Specimen:  Blood Updated:  09/09/19 0744     Scan Slide --     Comment: See Manual Differential Results       Manual Differential [[id number]]  (Abnormal) Collected:  09/09/19 0255    Specimen:  Blood Updated:  09/09/19 0744     Neutrophil % 72.0 %      Lymphocyte % 15.0 %      Monocyte % 5.0 %      Bands %  6.0 %      Metamyelocyte % 1.0 %      Myelocyte % 1.0 %      Neutrophils Absolute 8.03 10*3/mm3      Lymphocytes Absolute 1.55 10*3/mm3      Monocytes Absolute 0.52 10*3/mm3      nRBC 1.0 /100 WBC      Anisocytosis Slight/1+     Polychromasia Slight/1+     WBC Morphology Normal     Platelet Morphology Normal    Basic Metabolic Panel [[id number]]  (Abnormal) Collected:  09/09/19 0255    Specimen:  Blood Updated:  09/09/19 0353     Glucose 165 mg/dL      BUN 10 mg/dL      Creatinine 0.90 mg/dL      Sodium 138 mmol/L      Potassium 3.6 mmol/L      Chloride 100 mmol/L      CO2 28.0 mmol/L      Calcium 7.9 mg/dL      eGFR Non African Amer 80 mL/min/1.73      BUN/Creatinine Ratio 11.1     Anion Gap 13.6 mmol/L     Narrative:       The MDRD GFR formula is only valid for adults with stable renal function between ages 18 and 70.    Phosphorus [[id number]]  (Normal) Collected:  09/09/19 0255    Specimen:  Blood Updated:  09/09/19 0353     Phosphorus 3.2 mg/dL     Magnesium [[id number]]  (Normal) Collected:  09/09/19 0255    Specimen:  Blood Updated:  09/09/19 0353     Magnesium 1.8 mg/dL     Protime-INR [[id number]]  (Normal) Collected:  09/09/19 0255    Specimen:  Blood Updated:  09/09/19 0333     Protime 10.0 Seconds      INR 0.97    Blood Culture - Blood, Arm, Right [[id number]] Collected:  09/07/19 0010    Specimen:  Blood from Arm, Right Updated:  09/09/19 0033     Blood Culture No growth at 2 days    Blood Culture - Blood, Arm,  Left [[id number]] Collected:  09/07/19 0010    Specimen:  Blood from Arm, Left Updated:  09/09/19 0033     Blood Culture No growth at 2 days    POC Glucose Once [[id number]]  (Abnormal) Collected:  09/08/19 1253    Specimen:  Blood Updated:  09/08/19 1255     Glucose 231 mg/dL      Comment: Serial Number: [id number]:  [id number]       Urine Culture - Urine, Urine, Clean Catch [[id number]]  (Abnormal) Collected:  09/07/19 0012    Specimen:  Urine, Clean Catch Updated:  09/08/19 1144     Urine Culture >100,000 CFU/mL Gram Positive Cocci      25,000 CFU/mL Mixed Halima Isolated    Narrative:       Specimen contains mixed organisms of questionable pathogenicity which indicates contamination with commensal halima.       CBC & Differential [[id number]] Collected:  09/08/19 0417    Specimen:  Blood Updated:  09/08/19 0620    Narrative:       The following orders were created for panel order CBC & Differential.  Procedure                               Abnormality         Status                     ---------                               -----------         ------                     CBC Auto Differential[[id number]]        Abnormal            Final result                 Please view results for these tests on the individual orders.    CBC Auto Differential [[id number]]  (Abnormal) Collected:  09/08/19 0417    Specimen:  Blood Updated:  09/08/19 0620     WBC 10.20 10*3/mm3      RBC 3.24 10*6/mm3      Hemoglobin 10.5 g/dL      Hematocrit 30.6 %      MCV 94.4 fL      MCH 32.3 pg      MCHC 34.3 g/dL      RDW 15.1 %      RDW-SD 50.3 fl      MPV 7.9 fL      Platelets 187 10*3/mm3     Narrative:       The previously reported component NRBC is no longer being reported.    Scan Slide [[id number]] Collected:  09/08/19 0417    Specimen:  Blood Updated:  09/08/19 0620     Scan Slide --     Comment: See Manual Differential Results       Manual Differential [[id number]]  (Abnormal) Collected:  09/08/19 0417    Specimen:  Blood Updated:   09/08/19 0620     Neutrophil % 70.0 %      Lymphocyte % 11.0 %      Monocyte % 2.0 %      Eosinophil % 2.0 %      Bands %  12.0 %      Myelocyte % 3.0 %      Neutrophils Absolute 8.36 10*3/mm3      Lymphocytes Absolute 1.12 10*3/mm3      Monocytes Absolute 0.20 10*3/mm3      Eosinophils Absolute 0.20 10*3/mm3      Anisocytosis Slight/1+     Polychromasia Slight/1+     WBC Morphology Normal     Platelet Morphology Normal    Phosphorus [[id number]]  (Normal) Collected:  09/08/19 0417    Specimen:  Blood Updated:  09/08/19 0539     Phosphorus 3.2 mg/dL     Magnesium [[id number]]  (Normal) Collected:  09/08/19 0417    Specimen:  Blood Updated:  09/08/19 0517     Magnesium 1.8 mg/dL     Comprehensive Metabolic Panel [[id number]]  (Abnormal) Collected:  09/08/19 0417    Specimen:  Blood Updated:  09/08/19 0517     Glucose 159 mg/dL      BUN 10 mg/dL      Creatinine 0.90 mg/dL      Sodium 136 mmol/L      Potassium 3.9 mmol/L      Chloride 100 mmol/L      CO2 25.0 mmol/L      Calcium 7.6 mg/dL      Total Protein 5.1 g/dL      Albumin 2.50 g/dL      ALT (SGPT) 10 U/L      AST (SGOT) 18 U/L      Alkaline Phosphatase 53 U/L      Total Bilirubin 0.7 mg/dL      eGFR Non African Amer 80 mL/min/1.73      Globulin 2.6 gm/dL      A/G Ratio 1.0 g/dL      BUN/Creatinine Ratio 11.1     Anion Gap 14.9 mmol/L     Narrative:       The MDRD GFR formula is only valid for adults with stable renal function between ages 18 and 70.    Protime-INR [[id number]]  (Normal) Collected:  09/08/19 0417    Specimen:  Blood Updated:  09/08/19 0453     Protime 9.7 Seconds      INR 0.93    Troponin [[id number]]  (Abnormal) Collected:  09/07/19 1333    Specimen:  Blood Updated:  09/07/19 1455     Troponin I 0.080 ng/mL     Narrative:       Troponin I Reference Range:    0.00-0.03  Negative.  Repeat testing in 4-6 hours if clinically indicated.    0.04-0.29  Suspicious for myocardial injury. Serial measurements and clinical  correlation may be necessary to  confirm or exclude diagnosis of acute  coronary syndrome.  Repeat testing in 4-6 hours if indicated.     >0.29 Consistent with myocardial injury.  Recommend clinical and laboratory correlation.     Results my be falsely decreased if patient taking Biotin.         Imaging Results (last 72 hours)     Procedure Component Value Units Date/Time    XR Chest 1 View [[id number]] Collected:  09/07/19 0823     Updated:  09/07/19 0826    Narrative:       Examination: XR CHEST 1 VW-     Date of Exam: 9/7/2019 12:07 AM     Indication: Chest pain.     Comparison: 08/03/2019.     Technique: Single radiographic view of the chest was obtained.     Findings:  There is a stable small to moderate right pleural effusion. There is  significantly improved aeration of the left lung base with mild residual  scarring. There is mild stable biapical pleural thickening. The heart  appears enlarged. Pulmonary vasculature is within normal limits. No  acute osseous abnormalities. No pneumothorax       Impression:       .  1. Stable small to moderate right pleural effusion.  2. Interval resolution of left-sided pneumonia with residual basilar  scarring.  3. Suspected cardiomegaly.     Electronically Signed By-Jett Harden On:9/7/2019 8:24 AM  This report was finalized on [id number] by  Jett Harden, .          Review of Systems:  Review of Systems   Constitutional: Negative for fever.   Respiratory: Negative for cough and shortness of breath.    Cardiovascular: Negative for chest pain and leg swelling.   Genitourinary:        Incontient   Skin: Positive for rash and wound.   Psychiatric/Behavioral:        Confusion       Physical Assessment:                                           Sacral stage 2 PI with MAD:  The right buttock with several denuded areas of skin there is a yeast rash noted to folds andperi area.  Small paritalthickness open stage 2 sacralPI with pink base surrounded by mild excoriation approx size 1.2x.8    Recommendation and  Plan  Place DION surface and implement PIP strategies such as dry flow pads, foam wedge, offloadingboots, waffle chair cushion if oob.  Treat all areas with Dayana's butt cream.      Isidra Flores, ANDIE   9/9/2019   10:58 AM

## 2019-09-09 NOTE — H&P
[facility] HOSPITALIST     Eduardo Medina MD    CHIEF COMPLAINT:     Chest Pain         HISTORY OF PRESENT ILLNESS:     87 year old male with a PMH of CAD s/p PCI and CVA.  The patient presented to the ED with complaint of chest pain and left upper extremity pain.  Denies diaphoresis or nausea.  The patient has family at the bedside to assist with history.  The patient is under the care of [facility].  The patient denies urinary symptoms, fever or chills.  Denies an alleviating or exacerbating factors.  The patient is chest pain free at this time.     9/8-Code stroke called in for facial asymmetry, right-sided lower face looked asymmetric, and unresponsiveness.  likely metabolic problems.  He was not an alteplase candidate did not look like large vessel.  ghing and having his meal and did not look anything focal.  Code stroke canceled     9/9 says feels better, no new complaints, denies chest pain  Hospice was consulted,he was made GIP hospice       Past Medical History:   Diagnosis Date   • CHF (congestive heart failure) (CMS/[facility])    • Coronary artery disease    • Neuropathy    • Stroke (CMS/HCC)      Past Surgical History:   Procedure Laterality Date   • ANGIOPLASTY     • BACK SURGERY     • CHOLECYSTECTOMY     • CORONARY STENT PLACEMENT     • SPINAL FUSION       Family History   Problem Relation Age of Onset   • Coronary artery disease Brother      Social History     Tobacco Use   • Smoking status: Former Smoker     Types: Cigarettes   • Smokeless tobacco: Never Used   • Tobacco comment: Quit 40 years go   Substance Use Topics   • Alcohol use: No     Frequency: Never   • Drug use: No     Medications Prior to Admission   Medication Sig Dispense Refill Last Dose   • polyethylene glycol (MIRALAX) packet Take 17 g by mouth Daily.      • carvedilol (COREG) 3.125 MG tablet Take 3.125 mg by mouth 2 (Two) Times a Day With Meals.   8/3/2019 at Unknown time   • clopidogrel (PLAVIX) 75 MG tablet Take 1  tablet by mouth Daily. 30 tablet 0 8/3/2019 at Unknown time   • dexamethasone (DECADRON) 2 MG tablet Take 2 mg by mouth Daily.   8/3/2019 at Unknown time   • furosemide (LASIX) 40 MG tablet Take 40 mg by mouth 2 (Two) Times a Day.   8/3/2019 at Unknown time   • gabapentin (NEURONTIN) 300 MG capsule Take 300 mg by mouth 3 (Three) Times a Day.   8/3/2019 at Unknown time   • HYDROcodone-acetaminophen (NORCO) 5-325 MG per tablet Take 1 tablet by mouth Every 4 (Four) Hours As Needed.   8/3/2019 at Unknown time   • isosorbide mononitrate (IMDUR) 30 MG 24 hr tablet Take 1 tablet by mouth Daily. 30 tablet 0 8/3/2019 at Unknown time   • loperamide (IMODIUM) 2 MG capsule Take 2 mg by mouth Daily As Needed for Diarrhea.   Unknown at Unknown time   • LORazepam (ATIVAN) 0.5 MG tablet Take 0.5 mg by mouth Every 6 (Six) Hours As Needed for Anxiety.   8/3/2019 at Unknown time   • nitroglycerin (NITROSTAT) 0.4 MG SL tablet Place 1 tablet under the tongue Every 5 (Five) Minutes As Needed for Chest Pain (Only if SBP Greater Than 100). Take no more than 3 tabs 30 tablet 0    • raNITIdine (ZANTAC) 150 MG tablet Take 150 mg by mouth Daily.   8/3/2019 at Unknown time   • senna (SENOKOT) 8.6 MG tablet tablet Take 2 tablets by mouth 2 (Two) Times a Day.   8/3/2019 at Unknown time   • traZODone (DESYREL) 100 MG tablet Take 100 mg by mouth Every Night.   8/2/2019 at Unknown time     Allergies:  Patient has no known allergies.  Immunization History   Administered Date(s) Administered   • Flu Vaccine Quad PF >36MO 10/27/2016       REVIEW OF SYSTEMS:  Please see the above history of present illness for pertinent positives and negatives.   Review of Systems   The remainder of the patient's systems have been reviewed and are negative.   Vital Signs  Temp:  [97.6 °F (36.4 °C)-99.3 °F (37.4 °C)] 97.6 °F (36.4 °C)  Heart Rate:  [61-76] 76  Resp:  [16-26] 20  BP: (109-126)/(48-65) 109/65    Flowsheet Rows      First Filed Value   Admission Height   "177.8 cm (70\") Documented at 09/06/2019 2336   Admission Weight  61.2 kg (135 lb) Documented at 09/06/2019 2336           Physical Exam:  Physical Exam   Constitutional: Patient appears well-developed and well-nourished and in no acute distress     HEENT:   Head: Normocephalic and atraumatic.   Eyes:  Pupils are equal, round, and reactive to light. EOM are intact. Sclera are anicteric and non-injected.  Mouth and Throat: Patient has moist mucous membranes. Oropharynx is clear of any erythema or exudate.       Neck: Neck supple.  No thyromegaly present. No lymphadenopathy present. No  masses.     Cardiovascular: Inspection: No JVD present. Palpation: No parasternal heave. Pedal pulses +1 bilaterally. No leg edema. Auscultation: Regular rate, regular rhythm, S1 normal and S2 normal. reveals no gallop and no friction rub. No Carotid bruit bilaterally.    Pulmonary/Chest: Inspection: No distress, no use of accessory muscles. Lungs are clear to auscultation bilaterally. No respiratory distress. No wheezes. No rhonchi. No rales.     Abdomen /Gastrointestinal: Inspection: no distension. Palpation: no masses, no organomegaly. Soft. There is no tenderness. Bowel sounds are normal.     Musculoskeletal: Normal Muscle tone. Age appropriate, no deformities.    Neurological: Patient is alert and oriented to person, place, and time. Cranial nerves II-XII are grossly intact with no focal deficits. Sensori-motor exam is normal. No cerebellar signs.    Skin: Skin is warm. No rash noted. Nails show no clubbing.  No cyanosis or erythema. No bruising.    Results Review:     Lab Results (most recent)     Procedure Component Value Units Date/Time    Urine Culture - Urine, Urine, Clean Catch [[id number]]  (Abnormal) Collected:  09/07/19 0012    Specimen:  Urine, Clean Catch Updated:  09/09/19 1213     Urine Culture >100,000 CFU/mL Aerococcus urinae     Comment: Aerococcus urinae are usually susceptible to Penicillin G, Vancomycin, and " Tetracycline and Resistant to Trimethoprim sulfamethoxazole and Gentamicin.           25,000 CFU/mL Mixed Halima Isolated    Narrative:       Specimen contains mixed organisms of questionable pathogenicity which indicates contamination with commensal halima.       CBC & Differential [[id number]] Collected:  09/09/19 0255    Specimen:  Blood Updated:  09/09/19 0744    Narrative:       The following orders were created for panel order CBC & Differential.  Procedure                               Abnormality         Status                     ---------                               -----------         ------                     CBC Auto Differential[[id number]]        Abnormal            Final result                 Please view results for these tests on the individual orders.    CBC Auto Differential [[id number]]  (Abnormal) Collected:  09/09/19 0255    Specimen:  Blood Updated:  09/09/19 0744     WBC 10.30 10*3/mm3      RBC 3.31 10*6/mm3      Hemoglobin 10.7 g/dL      Hematocrit 31.3 %      MCV 94.7 fL      MCH 32.5 pg      MCHC 34.3 g/dL      RDW 15.0 %      RDW-SD 49.4 fl      MPV 7.6 fL      Platelets 179 10*3/mm3     Narrative:       The previously reported component NRBC is no longer being reported.    Scan Slide [[id number]] Collected:  09/09/19 0255    Specimen:  Blood Updated:  09/09/19 0744     Scan Slide --     Comment: See Manual Differential Results       Manual Differential [[id number]]  (Abnormal) Collected:  09/09/19 0255    Specimen:  Blood Updated:  09/09/19 0744     Neutrophil % 72.0 %      Lymphocyte % 15.0 %      Monocyte % 5.0 %      Bands %  6.0 %      Metamyelocyte % 1.0 %      Myelocyte % 1.0 %      Neutrophils Absolute 8.03 10*3/mm3      Lymphocytes Absolute 1.55 10*3/mm3      Monocytes Absolute 0.52 10*3/mm3      nRBC 1.0 /100 WBC      Anisocytosis Slight/1+     Polychromasia Slight/1+     WBC Morphology Normal     Platelet Morphology Normal    Basic Metabolic Panel [[id number]]  (Abnormal)  Collected:  09/09/19 0255    Specimen:  Blood Updated:  09/09/19 0353     Glucose 165 mg/dL      BUN 10 mg/dL      Creatinine 0.90 mg/dL      Sodium 138 mmol/L      Potassium 3.6 mmol/L      Chloride 100 mmol/L      CO2 28.0 mmol/L      Calcium 7.9 mg/dL      eGFR Non African Amer 80 mL/min/1.73      BUN/Creatinine Ratio 11.1     Anion Gap 13.6 mmol/L     Narrative:       The MDRD GFR formula is only valid for adults with stable renal function between ages 18 and 70.    Phosphorus [[id number]]  (Normal) Collected:  09/09/19 0255    Specimen:  Blood Updated:  09/09/19 0353     Phosphorus 3.2 mg/dL     Magnesium [[id number]]  (Normal) Collected:  09/09/19 0255    Specimen:  Blood Updated:  09/09/19 0353     Magnesium 1.8 mg/dL     Protime-INR [[id number]]  (Normal) Collected:  09/09/19 0255    Specimen:  Blood Updated:  09/09/19 0333     Protime 10.0 Seconds      INR 0.97    Blood Culture - Blood, Arm, Right [[id number]] Collected:  09/07/19 0010    Specimen:  Blood from Arm, Right Updated:  09/09/19 0033     Blood Culture No growth at 2 days    Blood Culture - Blood, Arm, Left [[id number]] Collected:  09/07/19 0010    Specimen:  Blood from Arm, Left Updated:  09/09/19 0033     Blood Culture No growth at 2 days    POC Glucose Once [[id number]]  (Abnormal) Collected:  09/08/19 1253    Specimen:  Blood Updated:  09/08/19 1255     Glucose 231 mg/dL      Comment: Serial Number: [id number]:  [id number]       CBC & Differential [[id number]] Collected:  09/08/19 0417    Specimen:  Blood Updated:  09/08/19 0620    Narrative:       The following orders were created for panel order CBC & Differential.  Procedure                               Abnormality         Status                     ---------                               -----------         ------                     CBC Auto Differential[[id number]]        Abnormal            Final result                 Please view results for these tests on the individual orders.    CBC  Auto Differential [[id number]]  (Abnormal) Collected:  09/08/19 0417    Specimen:  Blood Updated:  09/08/19 0620     WBC 10.20 10*3/mm3      RBC 3.24 10*6/mm3      Hemoglobin 10.5 g/dL      Hematocrit 30.6 %      MCV 94.4 fL      MCH 32.3 pg      MCHC 34.3 g/dL      RDW 15.1 %      RDW-SD 50.3 fl      MPV 7.9 fL      Platelets 187 10*3/mm3     Narrative:       The previously reported component NRBC is no longer being reported.    Scan Slide [[id number]] Collected:  09/08/19 0417    Specimen:  Blood Updated:  09/08/19 0620     Scan Slide --     Comment: See Manual Differential Results       Manual Differential [[id number]]  (Abnormal) Collected:  09/08/19 0417    Specimen:  Blood Updated:  09/08/19 0620     Neutrophil % 70.0 %      Lymphocyte % 11.0 %      Monocyte % 2.0 %      Eosinophil % 2.0 %      Bands %  12.0 %      Myelocyte % 3.0 %      Neutrophils Absolute 8.36 10*3/mm3      Lymphocytes Absolute 1.12 10*3/mm3      Monocytes Absolute 0.20 10*3/mm3      Eosinophils Absolute 0.20 10*3/mm3      Anisocytosis Slight/1+     Polychromasia Slight/1+     WBC Morphology Normal     Platelet Morphology Normal    Phosphorus [[id number]]  (Normal) Collected:  09/08/19 0417    Specimen:  Blood Updated:  09/08/19 0539     Phosphorus 3.2 mg/dL     Magnesium [[id number]]  (Normal) Collected:  09/08/19 0417    Specimen:  Blood Updated:  09/08/19 0517     Magnesium 1.8 mg/dL     Comprehensive Metabolic Panel [[id number]]  (Abnormal) Collected:  09/08/19 0417    Specimen:  Blood Updated:  09/08/19 0517     Glucose 159 mg/dL      BUN 10 mg/dL      Creatinine 0.90 mg/dL      Sodium 136 mmol/L      Potassium 3.9 mmol/L      Chloride 100 mmol/L      CO2 25.0 mmol/L      Calcium 7.6 mg/dL      Total Protein 5.1 g/dL      Albumin 2.50 g/dL      ALT (SGPT) 10 U/L      AST (SGOT) 18 U/L      Alkaline Phosphatase 53 U/L      Total Bilirubin 0.7 mg/dL      eGFR Non African Amer 80 mL/min/1.73      Globulin 2.6 gm/dL      A/G Ratio 1.0 g/dL       BUN/Creatinine Ratio 11.1     Anion Gap 14.9 mmol/L     Narrative:       The MDRD GFR formula is only valid for adults with stable renal function between ages 18 and 70.    Protime-INR [[id number]]  (Normal) Collected:  09/08/19 0417    Specimen:  Blood Updated:  09/08/19 0453     Protime 9.7 Seconds      INR 0.93    Troponin [[id number]]  (Abnormal) Collected:  09/07/19 1333    Specimen:  Blood Updated:  09/07/19 1455     Troponin I 0.080 ng/mL     Narrative:       Troponin I Reference Range:    0.00-0.03  Negative.  Repeat testing in 4-6 hours if clinically indicated.    0.04-0.29  Suspicious for myocardial injury. Serial measurements and clinical  correlation may be necessary to confirm or exclude diagnosis of acute  coronary syndrome.  Repeat testing in 4-6 hours if indicated.     >0.29 Consistent with myocardial injury.  Recommend clinical and laboratory correlation.     Results my be falsely decreased if patient taking Biotin.     Troponin [[id number]]  (Abnormal) Collected:  09/07/19 0614    Specimen:  Blood Updated:  09/07/19 0722     Troponin I 0.090 ng/mL     Narrative:       Troponin I Reference Range:    0.00-0.03  Negative.  Repeat testing in 4-6 hours if clinically indicated.    0.04-0.29  Suspicious for myocardial injury. Serial measurements and clinical  correlation may be necessary to confirm or exclude diagnosis of acute  coronary syndrome.  Repeat testing in 4-6 hours if indicated.     >0.29 Consistent with myocardial injury.  Recommend clinical and laboratory correlation.     Results my be falsely decreased if patient taking Biotin.     Basic Metabolic Panel [[id number]]  (Abnormal) Collected:  09/07/19 0614    Specimen:  Blood Updated:  09/07/19 0701     Glucose 123 mg/dL      BUN 12 mg/dL      Creatinine 1.00 mg/dL      Sodium 137 mmol/L      Potassium 3.3 mmol/L      Chloride 102 mmol/L      CO2 25.0 mmol/L      Calcium 7.4 mg/dL      eGFR Non African Amer 71 mL/min/1.73      BUN/Creatinine Ratio  12.0     Anion Gap 13.3 mmol/L     Narrative:       The MDRD GFR formula is only valid for adults with stable renal function between ages 18 and 70.    Lactic Acid, Reflex [[id number]]  (Normal) Collected:  09/07/19 0614    Specimen:  Blood Updated:  09/07/19 0639     Lactate 1.9 mmol/L     Respiratory Panel, PCR - Swab, Nasopharynx [[id number]]  (Normal) Collected:  09/07/19 0012    Specimen:  Swab from Nasopharynx Updated:  09/07/19 0426     ADENOVIRUS, PCR Not Detected     Coronavirus 229E Not Detected     Coronavirus HKU1 Not Detected     Coronavirus NL63 Not Detected     Coronavirus OC43 Not Detected     Human Metapneumovirus Not Detected     Human Rhinovirus/Enterovirus Not Detected     Influenza B PCR Not Detected     Parainfluenza Virus 1 Not Detected     Parainfluenza Virus 2 Not Detected     Parainfluenza Virus 3 Not Detected     Parainfluenza Virus 4 Not Detected     Bordetella pertussis pcr Not Detected     Influenza A H1 2009 PCR Not Detected     Chlamydophila pneumoniae PCR Not Detected     Mycoplasma pneumo by PCR Not Detected     Influenza A PCR Not Detected     Influenza A H3 Not Detected     Influenza A H1 Not Detected     RSV, PCR Not Detected    Lactic Acid, Reflex Timer (This will reflex a repeat order 3-3:15 hours after ordered.) [[id number]] Collected:  09/07/19 0023    Specimen:  Blood Updated:  09/07/19 0330     Extra Tube Hold for add-ons.     Comment: Auto resulted.       BNP [[id number]]  (Abnormal) Collected:  09/06/19 2353    Specimen:  Blood Updated:  09/07/19 0048     .0 pg/mL      Comment: Results may be falsely decreased if patient taking Biotin.       Urinalysis With Culture If Indicated - Urine, Clean Catch [[id number]]  (Abnormal) Collected:  09/07/19 0012    Specimen:  Urine, Clean Catch Updated:  09/07/19 0024     Color, UA Yellow     Appearance, UA Cloudy     Comment: Result checked         pH, UA 8.0     Specific Gravity, UA 1.010     Glucose, UA Negative     Ketones, UA  Negative     Bilirubin, UA Negative     Blood, UA Trace     Protein, UA 30 mg/dL (1+)     Leuk Esterase, UA Large (3+)     Nitrite, UA Negative     Urobilinogen, UA 1.0 E.U./dL    Urinalysis, Microscopic Only - Urine, Clean Catch [[id number]]  (Abnormal) Collected:  09/07/19 0012    Specimen:  Urine, Clean Catch Updated:  09/07/19 0024     RBC, UA 0-2 /HPF      WBC, UA Too Numerous to Count /HPF      Bacteria, UA 3+ /HPF      Squamous Epithelial Cells, UA None Seen /HPF      Hyaline Casts, UA None Seen /LPF      Methodology Manual Light Microscopy    POC Lactate [[id number]]  (Abnormal) Collected:  09/07/19 0023    Specimen:  Blood Updated:  09/07/19 0024     Lactate 2.6 mmol/L      Comment: Serial Number: [id number]:  [id number]       POC Lactate [[id number]]  (Abnormal) Collected:  09/07/19 0024    Specimen:  Blood Updated:  09/07/19 0024    Comprehensive Metabolic Panel [[id number]]  (Abnormal) Collected:  09/06/19 2353    Specimen:  Blood Updated:  09/07/19 0023     Glucose 128 mg/dL      BUN 13 mg/dL      Creatinine 1.10 mg/dL      Sodium 134 mmol/L      Potassium 3.8 mmol/L      Chloride 96 mmol/L      CO2 23.0 mmol/L      Calcium 8.0 mg/dL      Total Protein 5.8 g/dL      Albumin 3.00 g/dL      ALT (SGPT) 13 U/L      AST (SGOT) 21 U/L      Alkaline Phosphatase 67 U/L      Total Bilirubin 1.0 mg/dL      eGFR Non African Amer 63 mL/min/1.73      Globulin 2.8 gm/dL      A/G Ratio 1.1 g/dL      BUN/Creatinine Ratio 11.8     Anion Gap 18.8 mmol/L     Narrative:       The MDRD GFR formula is only valid for adults with stable renal function between ages 18 and 70.    aPTT [[id number]]  (Abnormal) Collected:  09/06/19 2353    Specimen:  Blood Updated:  09/07/19 0021     PTT 23.1 seconds           Imaging Results (most recent)     Procedure Component Value Units Date/Time    XR Chest 1 View [[id number]] Collected:  09/07/19 0823     Updated:  09/07/19 0826    Narrative:       Examination: XR CHEST 1 VW-     Date of  Exam: 9/7/2019 12:07 AM     Indication: Chest pain.     Comparison: 08/03/2019.     Technique: Single radiographic view of the chest was obtained.     Findings:  There is a stable small to moderate right pleural effusion. There is  significantly improved aeration of the left lung base with mild residual  scarring. There is mild stable biapical pleural thickening. The heart  appears enlarged. Pulmonary vasculature is within normal limits. No  acute osseous abnormalities. No pneumothorax       Impression:       .  1. Stable small to moderate right pleural effusion.  2. Interval resolution of left-sided pneumonia with residual basilar  scarring.  3. Suspected cardiomegaly.     Electronically Signed By-Jett Harden On:9/7/2019 8:24 AM  This report was finalized on [id number] by  Jett Harden, .          ECG/EMG Results (most recent)     Procedure Component Value Units Date/Time    ECG 12 Lead [[id number]] Collected:  09/06/19 2337     Updated:  09/08/19 1532    Narrative:       HEART RATE= 77  bpm  RR Interval= 781  ms  IL Interval=   ms  P Horizontal Axis=   deg  P Front Axis=   deg  QRSD Interval= 117  ms  QT Interval= 371  ms  QRS Axis= -32  deg  T Wave Axis= 118  deg  - ABNORMAL ECG -  Atrial fibrillation  Incomplete left bundle branch block  ST depr, consider ischemia, anterolateral lds  Electronically Signed By: Julio César Manuel (Shamar) 07-Sep-2019 06:18:29  Date and Time of Study: 2019-09-06 23:37:48    ECG 12 Lead [[id number]] Collected:  09/07/19 0932     Updated:  09/09/19 1044    Narrative:       HEART RATE= 78  bpm  RR Interval= 773  ms  IL Interval=   ms  P Horizontal Axis=   deg  P Front Axis=   deg  QRSD Interval= 121  ms  QT Interval= 390  ms  QRS Axis= -40  deg  T Wave Axis= 147  deg  - ABNORMAL ECG -  Atrial fibrillation  Ventricular premature complex  Left bundle branch block  No significant changes compared to prior EKGs that was done on September 6, 2019  Electronically Signed By: Shanika Byers  K (RAUL) 09-Sep-2019 10:44:12  Date and Time of Study: 2019-09-07 09:32:55          I reviewed the patient's new clinical results.    Assessment/Plan       Acute UTI    Dermatitis associated with moisture    Stage 2 skin ulcer of sacral region (CMS/HCC)     Chest pain- r/o ACS/  - initial troponin 0.09-0.09-0.08  - 8/4/19 troponin noted at 0.17  - EKG negative for acute abnormality  -conservative management>[facility] follows at home      UTI  - u/a showed 3+ bacteria and large leukocytes  - lactic acid 2.6  - pt received fluid bolus in ED  - IV rocephin  - recheck lactic acid-1.9     CAD/S/p PCI  - cont home coreg, imdur, and NTG     H/O CVA and MI     Chronic pain  - cont home gabapentin and hydrocodone     CHFrEF chronic no AE  -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     Ricardo Avila MD  09/09/19  4:39 PM

## 2019-09-09 NOTE — PROGRESS NOTES
Continued Stay Note  [facility]     Patient Name: Julio César Braxton  MRN: [id number]  Today's Date: 9/9/2019    Admit Date: 9/6/2019    Discharge Plan     Row Name 09/09/19 1630       Plan    Plan  DC Plan: Current with [facility] per liaison (Mila).     Plan Comments  SW was informed by facility liaison (Mila) w/ [facility] that she met w/ pt at bedside. Per Mila, since he is admitted to the hospital related to his hospice diagnosis they change to be the primary payor for this patient while in the hospital. Confirmed w/ Bee (manager of Care Coordination) if we needed the discharge/re-admit orders and she stated since he is not GIP (comfort care), that registration can just change his primary payor to [facility] [place]. Called registration (ext. [phone]) & confirmed payor source would be updated. Notified charge RN (Pam) that we didn't need to flip/re-admit.      MATEO Salgado   [phone]

## 2019-09-09 NOTE — PROGRESS NOTES
Discharge Planning Assessment   Patrice     Patient Name: Julio César Braxton  MRN: [id number]  Today's Date: 9/9/2019    Admit Date: 9/6/2019    Discharge Needs Assessment     Row Name 09/09/19 1514       Discharge Needs Assessment    Discharge Coordination/Progress  patient states he is able to get dressed per self, [facility] helps with household duties and helps with showering    Row Name 09/09/19 1512       Living Environment    Lives With  alone    Current Living Arrangements  home/apartment/condo    Primary Care Provided by  self;homecare agency    Provides Primary Care For  no one    Family Caregiver if Needed  none    Family Caregiver Names  patient is current with [facility] hosp[facility]    Able to Return to Prior Arrangements  yes       Resource/Environmental Concerns    Resource/Environmental Concerns  none    Transportation Concerns  car, none       Transition Planning    Patient/Family Anticipates Transition to  home    Patient/Family Anticipated Services at Transition  ;hospice care    Transportation Anticipated  family or friend will provide       Discharge Needs Assessment    Readmission Within the Last 30 Days  no previous admission in last 30 days    Concerns to be Addressed  discharge planning    Equipment Currently Used at Home  walker, rolling;commode;oxygen    Anticipated Changes Related to Illness  none    Equipment Needed After Discharge  none    Offered/Gave Vendor List  yes        Discharge Plan     Row Name 09/09/19 1515       Plan    Plan  DC plan: curret with [facility] hosp[facility] per facility    Row Name 09/09/19 1312       Plan    Plan  DC Plan: Current w/ [facility] hosp[facility] per facility.     Plan Comments  LINO reached out to [facility] liaison (Mila) about pt's care in their program. Liaison instructed SW to call main number @ [phone], which SW did & it was confirmed that pt was part of [facility] hospice. Notified liaison as facility couldn't confirm if pt was supposed to be a GIP admit.  Liaison stated she will need to gather infor & call it in to their preauth department. SW waiting for update from liaison.         Destination      No service coordination in this encounter.      Durable Medical Equipment      No service coordination in this encounter.      Dialysis/Infusion      No service coordination in this encounter.      Home Medical Care      Service Provider Request Status Selected Services Address Phone Number Fax Number    [facility] Pending - Request Sent N/A [address] [facility] [place] [address] [phone]                  Deb Suarez RN

## 2019-09-09 NOTE — DISCHARGE SUMMARY
Date of Admission: 9/6/2019    Date of Discharge:  9/9/2019    Length of stay:  LOS: 0 days     Discharge Diagnosis:     Chest pain- r/o ACS/  - initial troponin 0.09-0.09-0.08  - 8/4/19 troponin noted at 0.17  - EKG negative for acute abnormality  -conservative management>[facility] follows at home      UTI  - u/a showed 3+ bacteria and large leukocytes  - lactic acid 2.6  - pt received fluid bolus in ED  - IV rocephin  - recheck lactic acid-1.9     CAD/S/p PCI  - cont home coreg, imdur, and NTG     H/O CVA and MI     Chronic pain  - cont home gabapentin and hydrocodone     CHFrEF chronic no AE  -Highly abnormal echocardiogram shows dilated ischemic  cardiomyopathy with LVEF in the 20-25% range  -   - cont home lasix     Chronic Anxiety  - cont home ativan (INSPECT verified)     Chronic constipation  - cont home miralax and senokot     GERD  - cont home zantac     Chronic insomnia  - cont home trazodone     VTE prophylaxis  - bilateral SCDs          Presenting Problem/History of Present Illness    87 year old male with a PMH of CAD s/p PCI and CVA.  The patient presented to the ED with complaint of chest pain and left upper extremity pain.  Denies diaphoresis or nausea.  The patient has family at the bedside to assist with history.  The patient is under the care of [facility].  The patient denies urinary symptoms, fever or chills.  Denies an alleviating or exacerbating factors.  The patient is chest pain free at this time.     9/8-Code stroke called in for facial asymmetry, right-sided lower face looked asymmetric, and unresponsiveness.  likely metabolic problems.  He was not an alteplase candidate did not look like large vessel.  ghing and having his meal and did not look anything focal.  Code stroke canceled     9/9 says feels better, no new complaints, denies chest pain  Hospice was consulted,he was made [facility] hospice     Active Hospital Problems    Diagnosis  POA   • **Acute UTI [N39.0]  Yes   • Dermatitis  associated with moisture [L30.8]  Unknown   • Stage 2 skin ulcer of sacral region (CMS/HCC) [L98.429]  Yes      Resolved Hospital Problems   No resolved problems to display.      Hospital Course  Patient admitted to the medical floor, he was on IV antibiotics.   Code stroke called in for facial asymmetry, right-sided lower face looked asymmetric, and unresponsiveness.  As soon as I arrived at the patient bedside estimated him he started talking, was angry trying to punch.  I examined him again after few minutes and he was improving significantly.  Dr. Pena was coming to see the patient and explained everything to him.  He is septic and has other issues and likely metabolic problems.  He was not an alteplase candidate did not look like large vessel.  I went again and check on him at 1436 and he was laughing and having his meal and did not look anything focal.  Code stroke canceled.  Neurology was consulted. Hospice saw the patient was made [facility]IP    Past Medical History:     Past Medical History:   Diagnosis Date   • CHF (congestive heart failure) (CMS/HCC)    • Coronary artery disease    • Neuropathy    • Stroke (CMS/HCC)        Past Surgical History:     Past Surgical History:   Procedure Laterality Date   • ANGIOPLASTY     • BACK SURGERY     • CHOLECYSTECTOMY     • CORONARY STENT PLACEMENT     • SPINAL FUSION         Social History:   Social History     Socioeconomic History   • Marital status:      Spouse name: Not on file   • Number of children: Not on file   • Years of education: Not on file   • Highest education level: Not on file   Tobacco Use   • Smoking status: Former Smoker     Types: Cigarettes   • Smokeless tobacco: Never Used   • Tobacco comment: Quit 40 years go   Substance and Sexual Activity   • Alcohol use: No     Frequency: Never   • Drug use: No   Social History Narrative    - spouse has Parkinson's.  He lives at home with spouse and grandson.       Procedures Performed          Consults:   Consults     Date and Time Order Name Status Description    9/7/2019 0214 Inpatient Hospitalist Consult Completed           Pertinent Test Results:     Lab Results (most recent)     Procedure Component Value Units Date/Time    Urine Culture - Urine, Urine, Clean Catch [[id number]]  (Abnormal) Collected:  09/07/19 0012    Specimen:  Urine, Clean Catch Updated:  09/09/19 1213     Urine Culture >100,000 CFU/mL Aerococcus urinae     Comment: Aerococcus urinae are usually susceptible to Penicillin G, Vancomycin, and Tetracycline and Resistant to Trimethoprim sulfamethoxazole and Gentamicin.           25,000 CFU/mL Mixed Halima Isolated    Narrative:       Specimen contains mixed organisms of questionable pathogenicity which indicates contamination with commensal halima.       CBC & Differential [[id number]] Collected:  09/09/19 0255    Specimen:  Blood Updated:  09/09/19 0744    Narrative:       The following orders were created for panel order CBC & Differential.  Procedure                               Abnormality         Status                     ---------                               -----------         ------                     CBC Auto Differential[[id number]]        Abnormal            Final result                 Please view results for these tests on the individual orders.    CBC Auto Differential [[id number]]  (Abnormal) Collected:  09/09/19 0255    Specimen:  Blood Updated:  09/09/19 0744     WBC 10.30 10*3/mm3      RBC 3.31 10*6/mm3      Hemoglobin 10.7 g/dL      Hematocrit 31.3 %      MCV 94.7 fL      MCH 32.5 pg      MCHC 34.3 g/dL      RDW 15.0 %      RDW-SD 49.4 fl      MPV 7.6 fL      Platelets 179 10*3/mm3     Narrative:       The previously reported component NRBC is no longer being reported.    Scan Slide [[id number]] Collected:  09/09/19 0255    Specimen:  Blood Updated:  09/09/19 0744     Scan Slide --     Comment: See Manual Differential Results       Manual Differential [[id number]]   (Abnormal) Collected:  09/09/19 0255    Specimen:  Blood Updated:  09/09/19 0744     Neutrophil % 72.0 %      Lymphocyte % 15.0 %      Monocyte % 5.0 %      Bands %  6.0 %      Metamyelocyte % 1.0 %      Myelocyte % 1.0 %      Neutrophils Absolute 8.03 10*3/mm3      Lymphocytes Absolute 1.55 10*3/mm3      Monocytes Absolute 0.52 10*3/mm3      nRBC 1.0 /100 WBC      Anisocytosis Slight/1+     Polychromasia Slight/1+     WBC Morphology Normal     Platelet Morphology Normal    Basic Metabolic Panel [[id number]]  (Abnormal) Collected:  09/09/19 0255    Specimen:  Blood Updated:  09/09/19 0353     Glucose 165 mg/dL      BUN 10 mg/dL      Creatinine 0.90 mg/dL      Sodium 138 mmol/L      Potassium 3.6 mmol/L      Chloride 100 mmol/L      CO2 28.0 mmol/L      Calcium 7.9 mg/dL      eGFR Non African Amer 80 mL/min/1.73      BUN/Creatinine Ratio 11.1     Anion Gap 13.6 mmol/L     Narrative:       The MDRD GFR formula is only valid for adults with stable renal function between ages 18 and 70.    Phosphorus [[id number]]  (Normal) Collected:  09/09/19 0255    Specimen:  Blood Updated:  09/09/19 0353     Phosphorus 3.2 mg/dL     Magnesium [[id number]]  (Normal) Collected:  09/09/19 0255    Specimen:  Blood Updated:  09/09/19 0353     Magnesium 1.8 mg/dL     Protime-INR [[id number]]  (Normal) Collected:  09/09/19 0255    Specimen:  Blood Updated:  09/09/19 0333     Protime 10.0 Seconds      INR 0.97    Blood Culture - Blood, Arm, Right [[id number]] Collected:  09/07/19 0010    Specimen:  Blood from Arm, Right Updated:  09/09/19 0033     Blood Culture No growth at 2 days    Blood Culture - Blood, Arm, Left [[id number]] Collected:  09/07/19 0010    Specimen:  Blood from Arm, Left Updated:  09/09/19 0033     Blood Culture No growth at 2 days    POC Glucose Once [[id number]]  (Abnormal) Collected:  09/08/19 1253    Specimen:  Blood Updated:  09/08/19 1255     Glucose 231 mg/dL      Comment: Serial Number: [id number]:  [id number]        CBC & Differential [[id number]] Collected:  09/08/19 0417    Specimen:  Blood Updated:  09/08/19 0620    Narrative:       The following orders were created for panel order CBC & Differential.  Procedure                               Abnormality         Status                     ---------                               -----------         ------                     CBC Auto Differential[[id number]]        Abnormal            Final result                 Please view results for these tests on the individual orders.    CBC Auto Differential [[id number]]  (Abnormal) Collected:  09/08/19 0417    Specimen:  Blood Updated:  09/08/19 0620     WBC 10.20 10*3/mm3      RBC 3.24 10*6/mm3      Hemoglobin 10.5 g/dL      Hematocrit 30.6 %      MCV 94.4 fL      MCH 32.3 pg      MCHC 34.3 g/dL      RDW 15.1 %      RDW-SD 50.3 fl      MPV 7.9 fL      Platelets 187 10*3/mm3     Narrative:       The previously reported component NRBC is no longer being reported.    Scan Slide [[id number]] Collected:  09/08/19 0417    Specimen:  Blood Updated:  09/08/19 0620     Scan Slide --     Comment: See Manual Differential Results       Manual Differential [[id number]]  (Abnormal) Collected:  09/08/19 0417    Specimen:  Blood Updated:  09/08/19 0620     Neutrophil % 70.0 %      Lymphocyte % 11.0 %      Monocyte % 2.0 %      Eosinophil % 2.0 %      Bands %  12.0 %      Myelocyte % 3.0 %      Neutrophils Absolute 8.36 10*3/mm3      Lymphocytes Absolute 1.12 10*3/mm3      Monocytes Absolute 0.20 10*3/mm3      Eosinophils Absolute 0.20 10*3/mm3      Anisocytosis Slight/1+     Polychromasia Slight/1+     WBC Morphology Normal     Platelet Morphology Normal    Phosphorus [[id number]]  (Normal) Collected:  09/08/19 0417    Specimen:  Blood Updated:  09/08/19 0539     Phosphorus 3.2 mg/dL     Magnesium [[id number]]  (Normal) Collected:  09/08/19 0417    Specimen:  Blood Updated:  09/08/19 0517     Magnesium 1.8 mg/dL     Comprehensive Metabolic Panel [[id number]]   (Abnormal) Collected:  09/08/19 0417    Specimen:  Blood Updated:  09/08/19 0517     Glucose 159 mg/dL      BUN 10 mg/dL      Creatinine 0.90 mg/dL      Sodium 136 mmol/L      Potassium 3.9 mmol/L      Chloride 100 mmol/L      CO2 25.0 mmol/L      Calcium 7.6 mg/dL      Total Protein 5.1 g/dL      Albumin 2.50 g/dL      ALT (SGPT) 10 U/L      AST (SGOT) 18 U/L      Alkaline Phosphatase 53 U/L      Total Bilirubin 0.7 mg/dL      eGFR Non African Amer 80 mL/min/1.73      Globulin 2.6 gm/dL      A/G Ratio 1.0 g/dL      BUN/Creatinine Ratio 11.1     Anion Gap 14.9 mmol/L     Narrative:       The MDRD GFR formula is only valid for adults with stable renal function between ages 18 and 70.    Protime-INR [[id number]]  (Normal) Collected:  09/08/19 0417    Specimen:  Blood Updated:  09/08/19 0453     Protime 9.7 Seconds      INR 0.93    Troponin [[id number]]  (Abnormal) Collected:  09/07/19 1333    Specimen:  Blood Updated:  09/07/19 1455     Troponin I 0.080 ng/mL     Narrative:       Troponin I Reference Range:    0.00-0.03  Negative.  Repeat testing in 4-6 hours if clinically indicated.    0.04-0.29  Suspicious for myocardial injury. Serial measurements and clinical  correlation may be necessary to confirm or exclude diagnosis of acute  coronary syndrome.  Repeat testing in 4-6 hours if indicated.     >0.29 Consistent with myocardial injury.  Recommend clinical and laboratory correlation.     Results my be falsely decreased if patient taking Biotin.     Troponin [[id number]]  (Abnormal) Collected:  09/07/19 0614    Specimen:  Blood Updated:  09/07/19 0722     Troponin I 0.090 ng/mL     Narrative:       Troponin I Reference Range:    0.00-0.03  Negative.  Repeat testing in 4-6 hours if clinically indicated.    0.04-0.29  Suspicious for myocardial injury. Serial measurements and clinical  correlation may be necessary to confirm or exclude diagnosis of acute  coronary syndrome.  Repeat testing in 4-6 hours if indicated.      >0.29 Consistent with myocardial injury.  Recommend clinical and laboratory correlation.     Results my be falsely decreased if patient taking Biotin.     Basic Metabolic Panel [[id number]]  (Abnormal) Collected:  09/07/19 0614    Specimen:  Blood Updated:  09/07/19 0701     Glucose 123 mg/dL      BUN 12 mg/dL      Creatinine 1.00 mg/dL      Sodium 137 mmol/L      Potassium 3.3 mmol/L      Chloride 102 mmol/L      CO2 25.0 mmol/L      Calcium 7.4 mg/dL      eGFR Non African Amer 71 mL/min/1.73      BUN/Creatinine Ratio 12.0     Anion Gap 13.3 mmol/L     Narrative:       The MDRD GFR formula is only valid for adults with stable renal function between ages 18 and 70.    Lactic Acid, Reflex [[id number]]  (Normal) Collected:  09/07/19 0614    Specimen:  Blood Updated:  09/07/19 0639     Lactate 1.9 mmol/L     Respiratory Panel, PCR - Swab, Nasopharynx [[id number]]  (Normal) Collected:  09/07/19 0012    Specimen:  Swab from Nasopharynx Updated:  09/07/19 0426     ADENOVIRUS, PCR Not Detected     Coronavirus 229E Not Detected     Coronavirus HKU1 Not Detected     Coronavirus NL63 Not Detected     Coronavirus OC43 Not Detected     Human Metapneumovirus Not Detected     Human Rhinovirus/Enterovirus Not Detected     Influenza B PCR Not Detected     Parainfluenza Virus 1 Not Detected     Parainfluenza Virus 2 Not Detected     Parainfluenza Virus 3 Not Detected     Parainfluenza Virus 4 Not Detected     Bordetella pertussis pcr Not Detected     Influenza A H1 2009 PCR Not Detected     Chlamydophila pneumoniae PCR Not Detected     Mycoplasma pneumo by PCR Not Detected     Influenza A PCR Not Detected     Influenza A H3 Not Detected     Influenza A H1 Not Detected     RSV, PCR Not Detected    Lactic Acid, Reflex Timer (This will reflex a repeat order 3-3:15 hours after ordered.) [[id number]] Collected:  09/07/19 0023    Specimen:  Blood Updated:  09/07/19 0330     Extra Tube Hold for add-ons.     Comment: Auto resulted.        BNP [[id number]]  (Abnormal) Collected:  09/06/19 2353    Specimen:  Blood Updated:  09/07/19 0048     .0 pg/mL      Comment: Results may be falsely decreased if patient taking Biotin.       Urinalysis With Culture If Indicated - Urine, Clean Catch [[id number]]  (Abnormal) Collected:  09/07/19 0012    Specimen:  Urine, Clean Catch Updated:  09/07/19 0024     Color, UA Yellow     Appearance, UA Cloudy     Comment: Result checked         pH, UA 8.0     Specific Gravity, UA 1.010     Glucose, UA Negative     Ketones, UA Negative     Bilirubin, UA Negative     Blood, UA Trace     Protein, UA 30 mg/dL (1+)     Leuk Esterase, UA Large (3+)     Nitrite, UA Negative     Urobilinogen, UA 1.0 E.U./dL    Urinalysis, Microscopic Only - Urine, Clean Catch [[id number]]  (Abnormal) Collected:  09/07/19 0012    Specimen:  Urine, Clean Catch Updated:  09/07/19 0024     RBC, UA 0-2 /HPF      WBC, UA Too Numerous to Count /HPF      Bacteria, UA 3+ /HPF      Squamous Epithelial Cells, UA None Seen /HPF      Hyaline Casts, UA None Seen /LPF      Methodology Manual Light Microscopy    POC Lactate [[id number]]  (Abnormal) Collected:  09/07/19 0023    Specimen:  Blood Updated:  09/07/19 0024     Lactate 2.6 mmol/L      Comment: Serial Number: [id number]:  [id number]       POC Lactate [[id number]]  (Abnormal) Collected:  09/07/19 0024    Specimen:  Blood Updated:  09/07/19 0024    Comprehensive Metabolic Panel [[id number]]  (Abnormal) Collected:  09/06/19 2353    Specimen:  Blood Updated:  09/07/19 0023     Glucose 128 mg/dL      BUN 13 mg/dL      Creatinine 1.10 mg/dL      Sodium 134 mmol/L      Potassium 3.8 mmol/L      Chloride 96 mmol/L      CO2 23.0 mmol/L      Calcium 8.0 mg/dL      Total Protein 5.8 g/dL      Albumin 3.00 g/dL      ALT (SGPT) 13 U/L      AST (SGOT) 21 U/L      Alkaline Phosphatase 67 U/L      Total Bilirubin 1.0 mg/dL      eGFR Non African Amer 63 mL/min/1.73      Globulin 2.8 gm/dL      A/G Ratio 1.1 g/dL       BUN/Creatinine Ratio 11.8     Anion Gap 18.8 mmol/L     Narrative:       The MDRD GFR formula is only valid for adults with stable renal function between ages 18 and 70.    aPTT [[id number]]  (Abnormal) Collected:  19    Specimen:  Blood Updated:  19 002     PTT 23.1 seconds            Results for orders placed during the hospital encounter of 10/06/18   Adult Transthoracic Echo Complete W/ Cont if Necessary Per Protocol    Narrative                           Adult Echocardiogram Report          [facility]  Cardiology Department  [address] [place] [address]      Name: DORCAS STANLEY CStudy Date: 10/07/2018 09:37 AM         BP: 100/61 mmHg  MRN: [id number]         Patient Location:   : 1932        Gender: Male                            Height: 70 in  Age: 86 yrs            Account#: [id number]                   Weight: 140 lb  Reason For Study: CHF, DYSPNEA, FALL, WEAKNESS                 BSA: 1.8 m2  Ordering Physician:  EMMIE PORRAS  Referring Physician: NO  PHYSICIAN, NO PHYSICIAN    Performed By: NIKA      M-Mode/2-D Measurements:  LVIDd: 6.0 cm       (3.7-5.7) LVPWd: 0.93 cm       (0.8-1.2)  LVIDs: 5.5 cm       (2.3-3.9)  ACS: 1.6 cm         (1.6-3.7) IVSd: 1.1 cm         (0.7-1.2)  LA dimension: 3.7 cm(1.9-4.0) RVDd: 2.2 cm         (0.7-2.4)  FS: 8.5 %           (21-40%)  Ao root diam: 3.5 cm (2.0-3.7)    Comments  Comments: Technically satisfactory exam for interpretation    Findings: The aortic valve is trileaflet with adequate opening coaptation  Mitral valve shows modest fibrocalcific thickening with decreased leaflet  excursion.  Tricuspid valve structure normal; pulmonic valve grossly normal.  RV chamber size and global contractility satisfactory.  Marked left ventricular enlargement with normal wall thickness. There is  severe generalized hypokinesis; LVEF 20-25%.  Left atrial enlargement is present. Normal aortic root dimension.  There is a  very small pericardial effusion that is not impair right heart  filling. No intracardiac thrombus vegetation or masses are seen.  Very large pleural effusion measuring 7.0 cm is noted.    Supplementary Doppler exam showed normal aortic mitral valve for velocity with  mild AI severe mitral regurgitation and mild TR jet with RVSP of 29 mm Hg.    Interpretation: Highly abnormal echocardiogram shows dilated ischemic  cardiomyopathy with LVEF in the 20-25% range.  Severe mitral regurgitation and mild AI and mild TR jets by Doppler with  normal RV systolic pressure 29 mm Hg.  A large pleural effusion measuring 7.0 cm noted.  Clinical correlation warranted.      Interpretation    MMode/2D Measurements & Calculations  ESV(Teich): 147.9 ml  EF(Teich): 18.5 %                    Ao root area: 9.5 cm2  LVOT diam: 2.0 cm                    EDV(MOD-sp4): 164.5 ml                                       ESV(MOD-sp4): 122.5 ml                                       EF(MOD-sp4): 25.6 %      Doppler Measurements & Calculations  MV max P.0 mmHg                    Ao V2 max: 102.3 cm/sec  MV mean P.8 mmHg                   Ao max P.2 mmHg                                         Ao V2 mean: 73.8 cm/sec                                         Ao mean P.4 mmHg                                         Ao V2 VTI: 20.0 cm                                         FELICIA(I,D): 1.5 cm2                                         FELICIA(V,D): 1.7 cm2  LV V1 max P.1 mmHg                 MR max kyree: 460.1 cm/sec  LV V1 mean P.51 mmHg               MR max P.7 mmHg  LV V1 max: 53.2 cm/sec                 MR VTI: 146.0 cm  LV V1 mean: 32.7 cm/sec  LV V1 VTI: 9.3 cm    MR PISA radius: 0.86 cm                PA max P.3 mmHg  TR max kyree: 215.7 cm/sec  TR max P.6 mmHg  RVSP(TR): 28.6 mmHg    _______________________________________________________________________________      Electronically signed by: Mlaik Mejia MD  on 10/09/2018  08:33 AM         Imaging Results (all)     Procedure Component Value Units Date/Time    XR Chest 1 View [[id number]] Collected:  09/07/19 0823     Updated:  09/07/19 0826    Narrative:       Examination: XR CHEST 1 VW-     Date of Exam: 9/7/2019 12:07 AM     Indication: Chest pain.     Comparison: 08/03/2019.     Technique: Single radiographic view of the chest was obtained.     Findings:  There is a stable small to moderate right pleural effusion. There is  significantly improved aeration of the left lung base with mild residual  scarring. There is mild stable biapical pleural thickening. The heart  appears enlarged. Pulmonary vasculature is within normal limits. No  acute osseous abnormalities. No pneumothorax       Impression:       .  1. Stable small to moderate right pleural effusion.  2. Interval resolution of left-sided pneumonia with residual basilar  scarring.  3. Suspected cardiomegaly.     Electronically Signed By-Jett Harden On:9/7/2019 8:24 AM  This report was finalized on [id number] by  Jett Harden, .          ECG/EMG Results (most recent)     Procedure Component Value Units Date/Time    ECG 12 Lead [[id number]] Collected:  09/06/19 2337     Updated:  09/08/19 1532    Narrative:       HEART RATE= 77  bpm  RR Interval= 781  ms  RI Interval=   ms  P Horizontal Axis=   deg  P Front Axis=   deg  QRSD Interval= 117  ms  QT Interval= 371  ms  QRS Axis= -32  deg  T Wave Axis= 118  deg  - ABNORMAL ECG -  Atrial fibrillation  Incomplete left bundle branch block  ST depr, consider ischemia, anterolateral lds  Electronically Signed By: Julio César Manuel (Shamar) 07-Sep-2019 06:18:29  Date and Time of Study: 2019-09-06 23:37:48    ECG 12 Lead [[id number]] Collected:  09/07/19 0932     Updated:  09/09/19 1044    Narrative:       HEART RATE= 78  bpm  RR Interval= 773  ms  RI Interval=   ms  P Horizontal Axis=   deg  P Front Axis=   deg  QRSD Interval= 121  ms  QT Interval= 390  ms  QRS Axis= -40  deg  T Wave Axis= 147   deg  - ABNORMAL ECG -  Atrial fibrillation  Ventricular premature complex  Left bundle branch block  No significant changes compared to prior EKGs that was done on September 6, 2019  Electronically Signed By: Shanika Byers ([facility]) 09-Sep-2019 10:44:12  Date and Time of Study: 2019-09-07 09:32:55          Condition on Discharge:  stable    Vital Signs  Temp:  [97.8 °F (36.6 °C)-99.3 °F (37.4 °C)] 99.3 °F (37.4 °C)  Heart Rate:  [61-75] 71  Resp:  [16-26] 18  BP: (115-126)/(48-64) 115/48    Physical Exam:     General Appearance:    Alert, cooperative, in no acute distress   Head:    Normocephalic, without obvious abnormality, atraumatic   Eyes:            Lids and lashes normal, conjunctivae and sclerae normal, no   icterus, no pallor, corneas clear, PERRLA   Neck:   No adenopathy, supple, trachea midline, no thyromegaly, no   carotid bruit, no JVD   Back:     No kyphosis present, no scoliosis present, no skin lesions,      erythema or scars, no tenderness to percussion or                   palpation,   range of motion normal   Lungs:     Clear to auscultation,respirations regular, even and                  unlabored    Heart:    Regular rhythm and normal rate, normal S1 and S2, no            murmur, no gallop, no rub, no click   Chest Wall:    No abnormalities observed   Abdomen:     Normal bowel sounds, no masses, no organomegaly, soft        non-tender, non-distended, no guarding, no rebound                tenderness   Extremities:   Moves all extremities well, no edema, no cyanosis, no             redness   Skin:   No bleeding, bruising or rash   Neurologic:   Cranial nerves 2 - 12 grossly intact, sensation intact, DTR       present and equal bilaterally       Discharge Disposition  [facility] Term Hospital ([facility])    Discharge Medications     Discharge Medications      ASK your doctor about these medications      Instructions Start Date   carvedilol 3.125 MG tablet  Commonly known as:  COREG    3.125 mg, Oral, 2 Times Daily With Meals      clopidogrel 75 MG tablet  Commonly known as:  PLAVIX   75 mg, Oral, Daily      dexamethasone 2 MG tablet  Commonly known as:  DECADRON   2 mg, Oral, Daily      furosemide 40 MG tablet  Commonly known as:  LASIX   40 mg, Oral, 2 Times Daily      gabapentin 300 MG capsule  Commonly known as:  NEURONTIN   300 mg, Oral, 3 Times Daily      HYDROcodone-acetaminophen 5-325 MG per tablet  Commonly known as:  NORCO   1 tablet, Oral, Every 4 Hours PRN      isosorbide mononitrate 30 MG 24 hr tablet  Commonly known as:  IMDUR   30 mg, Oral, Daily      loperamide 2 MG capsule  Commonly known as:  IMODIUM   2 mg, Oral, Daily PRN      LORazepam 0.5 MG tablet  Commonly known as:  ATIVAN   0.5 mg, Oral, Every 6 Hours PRN      nitroglycerin 0.4 MG SL tablet  Commonly known as:  NITROSTAT   0.4 mg, Sublingual, Every 5 Minutes PRN, Take no more than 3 tabs      polyethylene glycol packet  Commonly known as:  MIRALAX   17 g, Oral, Daily      raNITIdine 150 MG tablet  Commonly known as:  ZANTAC   150 mg, Oral, Daily      senna 8.6 MG tablet tablet  Commonly known as:  SENOKOT   2 tablets, Oral, 2 Times Daily      traZODone 100 MG tablet  Commonly known as:  DESYREL   100 mg, Oral, Nightly             Discharge Diet:     Activity at Discharge:     Follow-up Appointments  No future appointments.      Test Results Pending at Discharge   Order Current Status    Blood Culture - Blood, Arm, Left Preliminary result    Blood Culture - Blood, Arm, Right Preliminary result           Risk for Readmission (LACE) Score: 8 (9/9/2019  6:00 AM)          Ricardo Avila MD  09/09/19  4:17 PM    Time: Discharge 35 min

## 2019-09-10 NOTE — PROGRESS NOTES
Continued Stay Note  SONU Ibrahim     Patient Name: Julio César Braxton  MRN: [id number]  Today's Date: 9/10/2019    Admit Date: 9/6/2019    Discharge Plan     Row Name 09/10/19 1636       Plan    Plan Comments  Attempted to speak to patient today regarding rehab choices but patient asleep and unable to be aroused by CM's voice. Lisa ( from [facility]) obtained rehab choice of [facility]. See SARAI Castro's note for details on referral and insurance clarification since case being sent to EHR.  sent Dr. Avila a secure chat that included UR RN, MSW, and Care coordination manager asking that he discontinue previous discharge-readmit order that he placed to make [facility] primary payor. Also notified that patient will need re-eval to see if he qualifies for inpt status and patient wanted [facility] for rehab. PT recommending inpt.        Kelsey Pratt RN       Office Phone: [phone]  Office Cell: [phone]

## 2019-09-10 NOTE — PROGRESS NOTES
"/Hospitalist Team      Patient Care Team:  Eduardo Medina MD as PCP - General (Family Medicine)  NANCY Mejia MD as PCP - Claims Attributed      Interval History and ROS: 87 year old male with a PMH of CAD s/p PCI and CVA.  The patient presented to the ED with complaint of chest pain and left upper extremity pain.  Denies diaphoresis or nausea.  The patient has family at the bedside to assist with history.  The patient is under the care of [facility].  The patient denies urinary symptoms, fever or chills.  Denies an alleviating or exacerbating factors.  The patient is chest pain free at this time.     9/8-Code stroke called in for facial asymmetry, right-sided lower face looked asymmetric, and unresponsiveness.  likely metabolic problems.  He was not an alteplase candidate did not look like large vessel.  ghing and having his meal and did not look anything focal.  Code stroke canceled     9/9 says feels better, no new complaints, denies chest pain  Hospice was consulted,he was made [facility] hospice    9/10 Subjective:weakness, leg pain     History taken from: patient    Review of Systems   Constitutional: Positive for fatigue.   Musculoskeletal: Positive for arthralgias.   Neurological: Positive for weakness.       Objective    Vital Signs  Temp:  [98.3 °F (36.8 °C)-98.8 °F (37.1 °C)] 98.5 °F (36.9 °C)  Heart Rate:  [] 83  Resp:  [17-20] 18  BP: ()/(46-61) 118/59  Oxygen Therapy  SpO2: 96 %  Pulse Oximetry Type: Intermittent  Device (Oxygen Therapy): room air  Flowsheet Rows      First Filed Value   Admission Height  177.8 cm (70\") Documented at 09/06/2019 2336   Admission Weight  61.2 kg (135 lb) Documented at 09/06/2019 2336        Intake & Output (last 3 days)       09/07 0701 - 09/08 0700 09/08 0701 - 09/09 0700 09/09 0701 - 09/10 0700 09/10 0701 - 09/11 0700    P.O.  480      IV Piggyback   100     Total Intake(mL/kg)  480 (7.4) 100 (1.6)     Urine (mL/kg/hr) 450 (0.3) 350 (0.2) 200 (0.1)     Stool " 0 0  2    Total Output 450 350 200 2    Net -450 +130 -100 -2            Urine Unmeasured Occurrence 300 x 2 x      Stool Unmeasured Occurrence 3 x 1 x          Lines, Drains & Airways    Active LDAs     Name:   Placement date:   Placement time:   Site:   Days:    Peripheral IV 09/07/19 2015 Anterior;Left Forearm   09/07/19 2015    Forearm   2                Physical Exam:    General Appearance:    Alert, cooperative, in no acute distress   Head:    Normocephalic, without obvious abnormality, atraumatic   Eyes:            Lids and lashes normal, conjunctivae and sclerae normal, no   icterus, no pallor, corneas clear, PERRLA   Neck:   No adenopathy, supple, trachea midline, no thyromegaly, no   carotid bruit, no JVD   Back:     No kyphosis present, no scoliosis present, no skin lesions,      erythema or scars, no tenderness to percussion or                   palpation,   range of motion normal   Lungs:     Clear to auscultation,respirations regular, even and                  unlabored    Heart:    Regular rhythm and normal rate, normal S1 and S2, no            murmur, no gallop, no rub, no click   Chest Wall:    No abnormalities observed   Abdomen:     Normal bowel sounds, no masses, no organomegaly, soft        non-tender, non-distended, no guarding, no rebound                tenderness   Extremities:   Moves all extremities well, no edema, no cyanosis, no             redness   Skin:   No bleeding, bruising or rash   Neurologic:   Cranial nerves 2 - 12 grossly intact, sensation intact, DTR       present and equal bilaterally       Results Review:      Lab Results (last 24 hours)     Procedure Component Value Units Date/Time    Basic Metabolic Panel [[id number]]  (Abnormal) Collected:  09/10/19 0251    Specimen:  Blood Updated:  09/10/19 0345     Glucose 190 mg/dL      BUN 7 mg/dL      Creatinine 0.80 mg/dL      Sodium 137 mmol/L      Potassium 3.3 mmol/L      Chloride 98 mmol/L      CO2 30.0 mmol/L      Calcium  7.7 mg/dL      eGFR Non African Amer 91 mL/min/1.73      BUN/Creatinine Ratio 8.8     Anion Gap 12.3 mmol/L     Narrative:       The MDRD GFR formula is only valid for adults with stable renal function between ages 18 and 70.    Magnesium [[id number]]  (Abnormal) Collected:  09/10/19 0251    Specimen:  Blood Updated:  09/10/19 0345     Magnesium 1.6 mg/dL     Phosphorus [[id number]]  (Normal) Collected:  09/10/19 0251    Specimen:  Blood Updated:  09/10/19 0345     Phosphorus 3.2 mg/dL     Protime-INR [[id number]]  (Normal) Collected:  09/10/19 0251    Specimen:  Blood Updated:  09/10/19 0322     Protime 10.4 Seconds      INR 1.01    CBC & Differential [[id number]] Collected:  09/10/19 0251    Specimen:  Blood Updated:  09/10/19 0318    Narrative:       The following orders were created for panel order CBC & Differential.  Procedure                               Abnormality         Status                     ---------                               -----------         ------                     CBC Auto Differential[[id number]]        Abnormal            Final result                 Please view results for these tests on the individual orders.    CBC Auto Differential [[id number]]  (Abnormal) Collected:  09/10/19 0251    Specimen:  Blood Updated:  09/10/19 0318     WBC 14.10 10*3/mm3      RBC 3.19 10*6/mm3      Hemoglobin 10.3 g/dL      Hematocrit 30.1 %      MCV 94.3 fL      MCH 32.4 pg      MCHC 34.3 g/dL      RDW 14.7 %      RDW-SD 49.0 fl      MPV 8.0 fL      Platelets 181 10*3/mm3      Neutrophil % 83.4 %      Lymphocyte % 10.7 %      Monocyte % 5.0 %      Eosinophil % 0.6 %      Basophil % 0.3 %      Neutrophils, Absolute 11.70 10*3/mm3      Lymphocytes, Absolute 1.50 10*3/mm3      Monocytes, Absolute 0.70 10*3/mm3      Eosinophils, Absolute 0.10 10*3/mm3      Basophils, Absolute 0.00 10*3/mm3      nRBC 0.2 /100 WBC     Blood Culture - Blood, Arm, Left [[id number]] Collected:  09/07/19 0010    Specimen:  Blood from  Arm, Left Updated:  09/10/19 0031     Blood Culture No growth at 3 days    Blood Culture - Blood, Arm, Right [[id number]] Collected:  09/07/19 0010    Specimen:  Blood from Arm, Right Updated:  09/10/19 0031     Blood Culture No growth at 3 days    Calcium, Ionized [[id number]]  (Abnormal) Collected:  09/09/19 1906    Specimen:  Blood Updated:  09/09/19 1942     Ionized Calcium 1.12 mmol/L           Imaging Results (last 24 hours)     ** No results found for the last 24 hours. **          Blood Culture   Date Value Ref Range Status   09/07/2019 No growth at 3 days  Preliminary   09/07/2019 No growth at 3 days  Preliminary                 Urine Culture   Date Value Ref Range Status   09/07/2019 >100,000 CFU/mL Aerococcus urinae (A)  Final     Comment:     Aerococcus urinae are usually susceptible to Penicillin G, Vancomycin, and Tetracycline and Resistant to Trimethoprim sulfamethoxazole and Gentamicin.     09/07/2019 25,000 CFU/mL Mixed Shannon Isolated  Final            ECG/EMG Results (most recent)     Procedure Component Value Units Date/Time    ECG 12 Lead [[id number]] Collected:  09/06/19 2337     Updated:  09/08/19 1532    Narrative:       HEART RATE= 77  bpm  RR Interval= 781  ms  TX Interval=   ms  P Horizontal Axis=   deg  P Front Axis=   deg  QRSD Interval= 117  ms  QT Interval= 371  ms  QRS Axis= -32  deg  T Wave Axis= 118  deg  - ABNORMAL ECG -  Atrial fibrillation  Incomplete left bundle branch block  ST depr, consider ischemia, anterolateral lds  Electronically Signed By: Julio César Manuel) 07-Sep-2019 06:18:29  Date and Time of Study: 2019-09-06 23:37:48    ECG 12 Lead [[id number]] Collected:  09/07/19 0932     Updated:  09/09/19 1044    Narrative:       HEART RATE= 78  bpm  RR Interval= 773  ms  TX Interval=   ms  P Horizontal Axis=   deg  P Front Axis=   deg  QRSD Interval= 121  ms  QT Interval= 390  ms  QRS Axis= -40  deg  T Wave Axis= 147  deg  - ABNORMAL ECG -  Atrial fibrillation  Ventricular  premature complex  Left bundle branch block  No significant changes compared to prior EKGs that was done on September 6, 2019  Electronically Signed By: Shanika Byers (RAUL) 09-Sep-2019 10:44:12  Date and Time of Study: 2019-09-07 09:32:55          I reviewed the patient's new clinical results.    Medication Review:     Current Facility-Administered Medications:   •  [START ON 9/11/2019] !Vancomycin Level Draw Needed, , Does not apply, Once, Stanley Pena Jr., MD  •  !Vancomycin Level Draw Needed, , Does not apply, Once, Stanley Pena Jr., MD  •  acetaminophen (TYLENOL) tablet 650 mg, 650 mg, Oral, Q4H PRN **OR** acetaminophen (TYLENOL) 160 MG/5ML solution 650 mg, 650 mg, Oral, Q4H PRN **OR** acetaminophen (TYLENOL) suppository 650 mg, 650 mg, Rectal, Q4H PRN, Kacy Gallardo APRN  •  amoxicillin-clavulanate (AUGMENTIN) 875-125 MG per tablet 1 tablet, 1 tablet, Oral, Q12H, Ricardo Avila MD  •  calcium gluconate 1g/50ml 0.675% NaCl IV SOLN, 1 g, Intravenous, Once, Ricardo Avila MD  •  carvedilol (COREG) tablet 3.125 mg, 3.125 mg, Oral, BID With Meals, Kacy Gallardo APRN, 3.125 mg at 09/10/19 0929  •  dexamethasone (DECADRON) tablet 2 mg, 2 mg, Oral, Daily, Kacy Gallardo APRN, 2 mg at 09/10/19 0929  •  furosemide (LASIX) tablet 40 mg, 40 mg, Oral, BID, Kacy Gallardo APRN, 40 mg at 09/10/19 0930  •  gabapentin (NEURONTIN) capsule 300 mg, 300 mg, Oral, TID, Kacy Gallardo APRN, 300 mg at 09/10/19 0930  •  HYDROcodone-acetaminophen (NORCO) 5-325 MG per tablet 1 tablet, 1 tablet, Oral, Q4H PRN, Kacy Gallardo APRN  •  isosorbide mononitrate (IMDUR) 24 hr tablet 30 mg, 30 mg, Oral, Daily, Kacy Gallardo APRN, 30 mg at 09/10/19 0930  •  loperamide (IMODIUM) capsule 2 mg, 2 mg, Oral, Daily PRN, Kacy Gallardo APRN  •  LORazepam (ATIVAN) tablet 0.5 mg, 0.5 mg, Oral, Q6H PRN, Kacy Gallardo APRN  •  magic butt paste, , Topical, BID, Isidra Flores, ANDIE  •   Magnesium Sulfate 2 gram Bolus, followed by 8 gram infusion (total Mg dose 10 grams)- Mg less than or equal to 1mg/dL, 2 g, Intravenous, PRN **OR** Magnesium Sulfate 2 gram / 50mL Infusion (GIVE X 3 BAGS TO EQUAL 6GM TOTAL DOSE) - Mg 1.1 - 1.5 mg/dl, 2 g, Intravenous, PRN **OR** Magnesium Sulfate 4 gram infusion- Mg 1.6-1.9 mg/dL, 4 g, Intravenous, PRN, Silvia Smallwood, DEXTER  •  nitroglycerin (NITROSTAT) SL tablet 0.4 mg, 0.4 mg, Sublingual, Q5 Min PRN, Kacy Gallardo APRN  •  ondansetron (ZOFRAN) injection 4 mg, 4 mg, Intravenous, Q6H PRN, Kacy Gallardo APRN  •  pantoprazole (PROTONIX) EC tablet 40 mg, 40 mg, Oral, Q AM, Stanley Pena Jr., MD, 40 mg at 09/10/19 0724  •  Pharmacy to dose vancomycin, , Does not apply, Continuous PRN, Stanley Pena Jr., MD  •  polyethylene glycol (MIRALAX) powder 17 g, 17 g, Oral, Daily, Kacy Gallardo APRN, 17 g at 09/10/19 0930  •  potassium chloride (K-DUR,KLOR-CON) CR tablet 40 mEq, 40 mEq, Oral, PRN, 40 mEq at 09/10/19 1339 **OR** potassium chloride (KLOR-CON) packet 40 mEq, 40 mEq, Oral, PRN **OR** potassium chloride 10 mEq in 100 mL IVPB, 10 mEq, Intravenous, Q1H PRN, Silvia Smallwood FNP  •  senna (SENOKOT) tablet 2 tablet, 2 tablet, Oral, BID, Kacy Gallardo APRN, 2 tablet at 09/10/19 0930  •  sodium chloride 0.9 % flush 10 mL, 10 mL, Intravenous, Q12H, Kacy Gallardo APRN, 10 mL at 09/09/19 2201  •  sodium chloride 0.9 % flush 10 mL, 10 mL, Intravenous, PRN, Kacy Gallardo APRN  •  sodium chloride 0.9 % flush 10 mL, 10 mL, Intravenous, Q12H, Kacy Gallardo APRN, 10 mL at 09/10/19 0930  •  sodium chloride 0.9 % flush 10 mL, 10 mL, Intravenous, PRN, Kacy Gallardo APRN  •  traZODone (DESYREL) tablet 100 mg, 100 mg, Oral, Nightly, Kacy Gallardo APRN, 100 mg at 09/09/19 2159  •  vancomycin 1250 mg/250 mL 0.9% NS IVPB ([facility]), 1,250 mg, Intravenous, Q24H, Stanley Pena Jr., MD, 1,250 mg at 09/09/19 1729    I have reviewed the patient's current  medication list    Assessment/Plan     Acute UTI    Dermatitis associated with moisture    Stage 2 skin ulcer of sacral region (CMS/HCC)     Significant weakness  -Pt/OT  -refer to NH     Chest pain- r/o ACS/  - initial troponin 0.09-0.09-0.08  - 8/4/19 troponin noted at 0.17  - EKG negative for acute abnormality  -conservative management>[facility] follows at home      UTI  - u/a showed 3+ bacteria and large leukocytes  - lactic acid 2.6  - pt received fluid bolus in ED  - IV rocephin> to po am  - recheck lactic acid-1.9     CAD/S/p PCI  - cont home coreg, imdur, and NTG    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      Ricardo Avila MD  09/10/19  4:27 PM

## 2019-09-10 NOTE — PROGRESS NOTES
"Pharmacy Dosing Service  Antibiotics  Vancomycin  Ceftriaxone and Vancomycin    Subjective:  Julio César Braxton is a 87 y.o.male admitted with UTI. Culture showing Aerococcus urinae. Pharmacy to dose vancomycin.  Goal trough 10-20 mcg/mL and goal -600 mcg*h/mL.      Assessment/Plan  1. Day #3 Vancomycin: Patient currently on 1250 mg (19mg/kg ABW) IV q24h. Will obtain pharmacokinetic levels prior to 4th dose. Peak 9/10 @ 2000 and trough 9/11 @ 1500. Will evaluate need for abx discontinuation 9/11.     2. Day #4 Ceftriaxone: 2g IV q24h.     Continue to monitor drug levels, renal function, culture and sensitivities, and patient clinical status.       Objective:  Relevant clinical data and objective history reviewed:  177.8 cm (70\")   61.5 kg (135 lb 9.3 oz)   Ideal body weight: 73 kg (160 lb 15 oz)  Body mass index is 19.45 kg/m².        Results from last 7 days   Lab Units 09/10/19  0251 09/09/19  0255 09/08/19  0417   CREATININE mg/dL 0.80 0.90 0.90     Estimated Creatinine Clearance: 56.6 mL/min (by C-G formula based on SCr of 0.8 mg/dL).  I/O last 3 completed shifts:  In: 100 [IV Piggyback:100]  Out: 400 [Urine:400]    WBC   Date Value Ref Range Status   09/10/2019 14.10 (H) 3.40 - 10.80 10*3/mm3 Final   09/09/2019 10.30 3.40 - 10.80 10*3/mm3 Final   09/08/2019 10.20 3.40 - 10.80 10*3/mm3 Final     Temperature    09/09/19 2236 09/10/19 0317 09/10/19 0652   Temp: 98.8 °F (37.1 °C) 98.6 °F (37 °C) 98.3 °F (36.8 °C)     Baseline culture/source/susceptibility:  Microbiology Results (last 10 days)       Procedure Component Value - Date/Time    Respiratory Panel, PCR - Swab, Nasopharynx [[id number]]  (Normal) Collected:  09/07/19 0012    Lab Status:  Final result Specimen:  Swab from Nasopharynx Updated:  09/07/19 0426     ADENOVIRUS, PCR Not Detected     Coronavirus 229E Not Detected     Coronavirus HKU1 Not Detected     Coronavirus NL63 Not Detected     Coronavirus OC43 Not Detected     Human Metapneumovirus Not " Detected     Human Rhinovirus/Enterovirus Not Detected     Influenza B PCR Not Detected     Parainfluenza Virus 1 Not Detected     Parainfluenza Virus 2 Not Detected     Parainfluenza Virus 3 Not Detected     Parainfluenza Virus 4 Not Detected     Bordetella pertussis pcr Not Detected     Influenza A H1 2009 PCR Not Detected     Chlamydophila pneumoniae PCR Not Detected     Mycoplasma pneumo by PCR Not Detected     Influenza A PCR Not Detected     Influenza A H3 Not Detected     Influenza A H1 Not Detected     RSV, PCR Not Detected    Urine Culture - Urine, Urine, Clean Catch [[id number]]  (Abnormal) Collected:  09/07/19 0012    Lab Status:  Final result Specimen:  Urine, Clean Catch Updated:  09/09/19 1213     Urine Culture >100,000 CFU/mL Aerococcus urinae     Comment: Aerococcus urinae are usually susceptible to Penicillin G, Vancomycin, and Tetracycline and Resistant to Trimethoprim sulfamethoxazole and Gentamicin.           25,000 CFU/mL Mixed Halima Isolated    Narrative:       Specimen contains mixed organisms of questionable pathogenicity which indicates contamination with commensal halima.       Blood Culture - Blood, Arm, Left [[id number]] Collected:  09/07/19 0010    Lab Status:  Preliminary result Specimen:  Blood from Arm, Left Updated:  09/10/19 0031     Blood Culture No growth at 3 days    Blood Culture - Blood, Arm, Right [[id number]] Collected:  09/07/19 0010    Lab Status:  Preliminary result Specimen:  Blood from Arm, Right Updated:  09/10/19 0031     Blood Culture No growth at 3 days             Anti-Infectives (From admission, onward)      Ordered     Dose/Rate Route Frequency Start Stop    09/08/19 1452  !Vancomycin Level Draw Needed     Ordering Provider:  Stanley Pena Jr., MD     Does not apply Once 09/11/19 1500      09/08/19 1452  !Vancomycin Level Draw Needed     Ordering Provider:  Stanley Pena Jr., MD     Does not apply Once 09/10/19 2000      09/08/19 1447  vancomycin 1250 mg/250 mL  0.9% NS IVPB ([facility])     Ordering Provider:  Stanley ePna Jr., MD    1,250 mg  over 90 Minutes Intravenous Every 24 Hours 09/08/19 1600 09/15/19 1559    09/08/19 1307  Pharmacy to dose vancomycin     Ordering Provider:  Stanley Pena Jr., MD     Does not apply Continuous PRN 09/08/19 1306 09/11/19 1305    09/07/19 0447  cefTRIAXone (ROCEPHIN) 2 g in sodium chloride 0.9 % 100 mL IVPB     Ordering Provider:  Kacy Gallardo APRN    2 g  200 mL/hr over 30 Minutes Intravenous Every 24 Hours Scheduled 09/07/19 2100 09/11/19 2059    09/07/19 0121  cefTRIAXone (ROCEPHIN) in SWFI 2 GRAMS/20ml IV PUSH syringe     Ordering Provider:  Julio César Manuel MD    2 g  over 5 Minutes Intravenous Once 09/07/19 0123 09/07/19 0148           Florida Robertson, Janis  09/10/19 8:59 AM

## 2019-09-10 NOTE — PLAN OF CARE
Problem: Patient Care Overview  Goal: Plan of Care Review   09/10/19 7999   Coping/Psychosocial   Plan of Care Reviewed With patient   OTHER   Outcome Summary 88 yo male adm for uti, sepsis. Not safe for home alone, as pt's ambulation is unsteady & he is high risk for injurious falls. Will need IP rehab at d/c. Will follow 3xwk.

## 2019-09-10 NOTE — PLAN OF CARE
Problem: Skin Injury Risk (Adult)  Goal: Identify Related Risk Factors and Signs and Symptoms  Outcome: Ongoing (interventions implemented as appropriate)    Goal: Skin Health and Integrity  Outcome: Ongoing (interventions implemented as appropriate)      Problem: Fall Risk (Adult)  Goal: Absence of Fall  Outcome: Ongoing (interventions implemented as appropriate)

## 2019-09-10 NOTE — DISCHARGE PLACEMENT REQUEST
"Dorcas Stanley (87 y.o. Male)     Date of Birth Social Security Number Address Home Phone MRN    04/03/1932  [facility] [place] [address] [phone] [id number]    Nondenominational Marital Status          Non-Gnosticism        Admission Date Admission Type Admitting Provider Attending Provider Department, Room/Bed    9/6/19 Emergency Stanley Pena Jr., MD Salcedo, Federico N, MD [facility], 2127/1    Discharge Date Discharge Disposition Discharge Destination         [facility] ([facility]              Attending Provider:  Ricardo Avila MD    Allergies:  No Known Allergies    Isolation:  None   Infection:  None   Code Status:  CPR    Ht:  177.8 cm (70\")   Wt:  61.5 kg (135 lb 9.3 oz)    Admission Cmt:  None   Principal Problem:  Acute UTI [N39.0]                 Active Insurance as of 9/6/2019     Primary Coverage     Payor Plan Insurance Group Employer/Plan Group    MEDICARE MEDICARE A & B      Payor Plan Address Payor Plan Phone Number Payor Plan Fax Number Effective Dates    [address] [phone]  4/1/1997 - None Entered    [place] [address]       Subscriber Name Subscriber Birth Date Member ID       DORCAS STANLEY 4/3/1932 [id number]                 Emergency Contacts      (Rel.) Home Phone Work Phone Mobile Phone    Jose Velasquez (Grandchild) [phone] -- --    Garcia Stanley (Daughter) [phone] -- --    Tonya Rodrigues (Sister) [phone] -- --              "

## 2019-09-10 NOTE — PROGRESS NOTES
Continued Stay Note  SONU Patrice     Patient Name: Julio César Braxton  MRN: [id number]  Today's Date: 9/10/2019    Admit Date: 9/6/2019    Discharge Plan     Row Name 09/10/19 1605       Plan    Plan  DC Plan: Insurance currently being clarified. [facility]- referral pending. PASRR queued for review. No precert needed (but needs qualifying stay of 3 midnights).     Patient/Family in Agreement with Plan  yes    Plan Comments  SW received call from Lisa ([employer], [phone]) stating that pt has now decided he wants to go to rehab & has revoked [facility] services effective today (9/10). LINO called UR (Aura, 1509) about change of payor source. Informed MD ([place]) had current d/c-readmit order that needs to be discontinued. Further discussed whether pt will meet inpatient status under Medicare. MD will need to place inpatient order in order for rehab to be pursued, which was pt's choice. UR is going to forward case to EHR, which will provided an answer within 24 hours of approval fo pt's status. CM (Kelsey) secure chatted MD about needed order & discontinuation of old order. PT worked w/ pt & recommended rehab. CM/UR/Lisa informed SW that pt wanted rehab at [facility]. LINO made referral to [facility] liaison for [facility] via text/Epic. LINO called patient registration (spoke w/ Leila, 9/10 @ 8277) who is flipping primary payor back to Medicare. During conversations today, manager (Rand) was consulted for assistance.         MATEO Salgado   [phone]

## 2019-09-11 NOTE — PROGRESS NOTES
Continued Stay Note  SONU Ibrahim     Patient Name: Julio César Braxton  MRN: [id number]  Today's Date: 9/11/2019    Admit Date: 9/6/2019    Discharge Plan     Row Name 09/11/19 1217       Plan    Plan  DC Plan: Accepted to [facility] pending qualifying stay of 3 midnights. PASRR per MSW, no precert needed.    Patient/Family in Agreement with Plan  yes    Plan Comments  [facility] liason verified acceptance.          Kelsey Pratt RN       Office Phone: [phone]  Office Cell: [phone]

## 2019-09-11 NOTE — PROGRESS NOTES
"/Hospitalist Team      Patient Care Team:  Eduardo Medina MD as PCP - General (Family Medicine)  NANCY Mejia MD as PCP - Claims Attributed      Interval History and ROS: 87 year old male with a PMH of CAD s/p PCI and CVA.  The patient presented to the ED with complaint of chest pain and left upper extremity pain.  Denies diaphoresis or nausea.  The patient has family at the bedside to assist with history.  The patient is under the care of [facility].  The patient denies urinary symptoms, fever or chills.  Denies an alleviating or exacerbating factors.  The patient is chest pain free at this time.     9/8-Code stroke called in for facial asymmetry, right-sided lower face looked asymmetric, and unresponsiveness.  likely metabolic problems.  He was not an alteplase candidate did not look like large vessel.  ghing and having his meal and did not look anything focal.  Code stroke canceled     9/9 says feels better, no new complaints, denies chest pain  Hospice was consulted,he was made [facility] hospice    9/10 Subjective:weakness, leg pain   9/11 chest pain this am    History taken from: patient    Review of Systems   Constitutional: Positive for fatigue.   Musculoskeletal: Positive for arthralgias.   Neurological: Positive for weakness.       Objective    Vital Signs  Temp:  [97.8 °F (36.6 °C)-98.5 °F (36.9 °C)] 97.8 °F (36.6 °C)  Heart Rate:  [70-83] 70  Resp:  [16-20] 16  BP: ()/(50-63) 108/63  Oxygen Therapy  SpO2: 97 %  Pulse Oximetry Type: Intermittent  Device (Oxygen Therapy): room air  Flowsheet Rows      First Filed Value   Admission Height  177.8 cm (70\") Documented at 09/06/2019 2336   Admission Weight  61.2 kg (135 lb) Documented at 09/06/2019 2336        Intake & Output (last 3 days)       09/08 0701 - 09/09 0700 09/09 0701 - 09/10 0700 09/10 0701 - 09/11 0700 09/11 0701 - 09/12 0700    P.O. 480   360    IV Piggyback  100      Total Intake(mL/kg) 480 (7.4) 100 (1.6)  360 (5.7)    Urine (mL/kg/hr) 350 " (0.2) 200 (0.1) 100 (0.1)     Stool 0  2     Total Output 350 200 102     Net +130 -100 -102 +360            Urine Unmeasured Occurrence 2 x       Stool Unmeasured Occurrence 1 x           Lines, Drains & Airways    Active LDAs     Name:   Placement date:   Placement time:   Site:   Days:    Peripheral IV 09/07/19 2015 Anterior;Left Forearm   09/07/19 2015    Forearm   2                Physical Exam:    General Appearance:    Alert, cooperative, in no acute distress   Head:    Normocephalic, without obvious abnormality, atraumatic   Eyes:            Lids and lashes normal, conjunctivae and sclerae normal, no   icterus, no pallor, corneas clear, PERRLA   Neck:   No adenopathy, supple, trachea midline, no thyromegaly, no   carotid bruit, no JVD   Back:     No kyphosis present, no scoliosis present, no skin lesions,      erythema or scars, no tenderness to percussion or                   palpation,   range of motion normal   Lungs:     Clear to auscultation,respirations regular, even and                  unlabored    Heart:    Regular rhythm and normal rate, normal S1 and S2, no            murmur, no gallop, no rub, no click   Chest Wall:    No abnormalities observed   Abdomen:     Normal bowel sounds, no masses, no organomegaly, soft        non-tender, non-distended, no guarding, no rebound                tenderness   Extremities:   Moves all extremities well, no edema, no cyanosis, no             redness   Skin:   No bleeding, bruising or rash   Neurologic:   Cranial nerves 2 - 12 grossly intact, sensation intact, DTR       present and equal bilaterally       Results Review:      Lab Results (last 24 hours)     Procedure Component Value Units Date/Time    CBC & Differential [[id number]] Collected:  09/11/19 0439    Specimen:  Blood Updated:  09/11/19 4485    Narrative:       The following orders were created for panel order CBC & Differential.  Procedure                               Abnormality         Status                      ---------                               -----------         ------                     CBC Auto Differential[[id number]]        Abnormal            Final result                 Please view results for these tests on the individual orders.    Scan Slide [[id number]] Collected:  09/11/19 0439    Specimen:  Blood Updated:  09/11/19 0743     Scan Slide --     Comment: See Manual Differential Results       Manual Differential [[id number]]  (Abnormal) Collected:  09/11/19 0439    Specimen:  Blood Updated:  09/11/19 0743     Neutrophil % 69.0 %      Lymphocyte % 8.0 %      Monocyte % 7.0 %      Eosinophil % 2.0 %      Bands %  10.0 %      Metamyelocyte % 3.0 %      Other Cells % 1.0 %      Neutrophils Absolute 8.14 10*3/mm3      Lymphocytes Absolute 0.82 10*3/mm3      Monocytes Absolute 0.72 10*3/mm3      Eosinophils Absolute 0.21 10*3/mm3      RBC Morphology Normal     Toxic Granulation Slight/1+     Platelet Morphology Normal    Narrative:       Slide Reviewed    CBC Auto Differential [[id number]]  (Abnormal) Collected:  09/11/19 0439    Specimen:  Blood Updated:  09/11/19 0743     WBC 10.30 10*3/mm3      RBC 2.96 10*6/mm3      Hemoglobin 9.6 g/dL      Hematocrit 28.2 %      MCV 95.5 fL      MCH 32.5 pg      MCHC 34.1 g/dL      RDW 14.8 %      RDW-SD 49.9 fl      MPV 7.9 fL      Platelets 171 10*3/mm3     Narrative:       The previously reported component NRBC is no longer being reported.    Basic Metabolic Panel [[id number]]  (Abnormal) Collected:  09/11/19 0439    Specimen:  Blood Updated:  09/11/19 0538     Glucose 183 mg/dL      BUN 15 mg/dL      Creatinine 0.90 mg/dL      Sodium 132 mmol/L      Potassium 4.4 mmol/L      Chloride 96 mmol/L      CO2 26.0 mmol/L      Calcium 8.0 mg/dL      eGFR Non African Amer 80 mL/min/1.73      BUN/Creatinine Ratio 16.7     Anion Gap 14.4 mmol/L     Narrative:       The MDRD GFR formula is only valid for adults with stable renal function between ages 18 and 70.     Protime-INR [[id number]]  (Normal) Collected:  09/11/19 0439    Specimen:  Blood Updated:  09/11/19 0530     Protime 10.2 Seconds      INR 0.99    Magnesium [[id number]]  (Normal) Collected:  09/11/19 0439    Specimen:  Blood Updated:  09/11/19 0528     Magnesium 1.9 mg/dL     Phosphorus [[id number]]  (Normal) Collected:  09/11/19 0439    Specimen:  Blood Updated:  09/11/19 0528     Phosphorus 3.3 mg/dL     Blood Culture - Blood, Arm, Left [[id number]] Collected:  09/07/19 0010    Specimen:  Blood from Arm, Left Updated:  09/11/19 0034     Blood Culture No growth at 4 days    Blood Culture - Blood, Arm, Right [[id number]] Collected:  09/07/19 0010    Specimen:  Blood from Arm, Right Updated:  09/11/19 0034     Blood Culture No growth at 4 days    Vancomycin, Peak [[id number]]  (Normal) Collected:  09/10/19 2029    Specimen:  Blood Updated:  09/10/19 2135     Vancomycin Peak 26.90 mcg/mL     Potassium [[id number]]  (Normal) Collected:  09/10/19 1848    Specimen:  Blood Updated:  09/10/19 1919     Potassium 4.7 mmol/L      Comment: Result checked to Trena             Imaging Results (last 24 hours)     ** No results found for the last 24 hours. **          Blood Culture   Date Value Ref Range Status   09/07/2019 No growth at 3 days  Preliminary   09/07/2019 No growth at 3 days  Preliminary                 Urine Culture   Date Value Ref Range Status   09/07/2019 >100,000 CFU/mL Aerococcus urinae (A)  Final     Comment:     Aerococcus urinae are usually susceptible to Penicillin G, Vancomycin, and Tetracycline and Resistant to Trimethoprim sulfamethoxazole and Gentamicin.     09/07/2019 25,000 CFU/mL Mixed Shannon Isolated  Final            ECG/EMG Results (most recent)     Procedure Component Value Units Date/Time    ECG 12 Lead [[id number]] Collected:  09/06/19 2337     Updated:  09/08/19 1532    Narrative:       HEART RATE= 77  bpm  RR Interval= 781  ms  W[place] Interval=   ms  P Horizontal Axis=   deg  P Front Axis=   deg  QRSD  Interval= 117  ms  QT Interval= 371  ms  QRS Axis= -32  deg  T Wave Axis= 118  deg  - ABNORMAL ECG -  Atrial fibrillation  Incomplete left bundle branch block  ST depr, consider ischemia, anterolateral lds  Electronically Signed By: Julio César Manuel ([facility]) 07-Sep-2019 06:18:29  Date and Time of Study: 2019-09-06 23:37:48    ECG 12 Lead [[id number]] Collected:  09/07/19 0932     Updated:  09/09/19 1044    Narrative:       HEART RATE= 78  bpm  RR Interval= 773  ms  ND Interval=   ms  P Horizontal Axis=   deg  P Front Axis=   deg  QRSD Interval= 121  ms  QT Interval= 390  ms  QRS Axis= -40  deg  T Wave Axis= 147  deg  - ABNORMAL ECG -  Atrial fibrillation  Ventricular premature complex  Left bundle branch block  No significant changes compared to prior EKGs that was done on September 6, 2019  Electronically Signed By: Shanika Byers ([facility]) 09-Sep-2019 10:44:12  Date and Time of Study: 2019-09-07 09:32:55          I reviewed the patient's new clinical results.    Medication Review:     Current Facility-Administered Medications:   •  !Vancomycin Level Draw Needed, , Does not apply, Once, Stanley Pena Jr., MD  •  acetaminophen (TYLENOL) tablet 650 mg, 650 mg, Oral, Q4H PRN **OR** acetaminophen (TYLENOL) 160 MG/5ML solution 650 mg, 650 mg, Oral, Q4H PRN **OR** acetaminophen (TYLENOL) suppository 650 mg, 650 mg, Rectal, Q4H PRN, Kacy Gallardo APRN  •  amoxicillin-clavulanate (AUGMENTIN) 875-125 MG per tablet 1 tablet, 1 tablet, Oral, Q12H, Ricardo Avila MD, 1 tablet at 09/11/19 0800  •  carvedilol (COREG) tablet 3.125 mg, 3.125 mg, Oral, BID With Meals, Kacy Gallardo APRN, 3.125 mg at 09/11/19 0759  •  dexamethasone (DECADRON) tablet 2 mg, 2 mg, Oral, Daily, Kacy Gallardo APRN, 2 mg at 09/11/19 0800  •  furosemide (LASIX) tablet 40 mg, 40 mg, Oral, BID, Kacy Gallardo APRN, 40 mg at 09/11/19 0800  •  gabapentin (NEURONTIN) capsule 300 mg, 300 mg, Oral, TID, Kacy Gallardo APRN, 300 mg at  09/11/19 0800  •  HYDROcodone-acetaminophen (NORCO) 5-325 MG per tablet 1 tablet, 1 tablet, Oral, Q4H PRN, Kacy Gallardo APRN  •  isosorbide mononitrate (IMDUR) 24 hr tablet 30 mg, 30 mg, Oral, Daily, Kacy Gallardo APRN, 30 mg at 09/11/19 0800  •  loperamide (IMODIUM) capsule 2 mg, 2 mg, Oral, Daily PRN, Kacy Gallardo APRN  •  LORazepam (ATIVAN) tablet 0.5 mg, 0.5 mg, Oral, Q6H PRN, Kacy Gallardo APRN  •  magic butt paste, , Topical, BID, Isidra Flores APRN  •  Magnesium Sulfate 2 gram Bolus, followed by 8 gram infusion (total Mg dose 10 grams)- Mg less than or equal to 1mg/dL, 2 g, Intravenous, PRN **OR** Magnesium Sulfate 2 gram / 50mL Infusion (GIVE X 3 BAGS TO EQUAL 6GM TOTAL DOSE) - Mg 1.1 - 1.5 mg/dl, 2 g, Intravenous, PRN **OR** Magnesium Sulfate 4 gram infusion- Mg 1.6-1.9 mg/dL, 4 g, Intravenous, PRN, Silvia Smallwood FNP  •  nitroglycerin (NITROSTAT) SL tablet 0.4 mg, 0.4 mg, Sublingual, Q5 Min PRN, Kacy Gallardo APRN  •  ondansetron (ZOFRAN) injection 4 mg, 4 mg, Intravenous, Q6H PRN, Kacy Gallardo APRN  •  pantoprazole (PROTONIX) EC tablet 40 mg, 40 mg, Oral, Q AM, Stanley Pena Jr., MD, 40 mg at 09/11/19 0604  •  Pharmacy to dose vancomycin, , Does not apply, Continuous PRN, Stanley Pena Jr., MD  •  polyethylene glycol (MIRALAX) powder 17 g, 17 g, Oral, Daily, Kacy Gallardo APRN, 17 g at 09/11/19 0800  •  potassium chloride (K-DUR,KLOR-CON) CR tablet 40 mEq, 40 mEq, Oral, PRN, 40 mEq at 09/10/19 1339 **OR** potassium chloride (KLOR-CON) packet 40 mEq, 40 mEq, Oral, PRN **OR** potassium chloride 10 mEq in 100 mL IVPB, 10 mEq, Intravenous, Q1H PRN, Silvia Smallwood, DEXTER  •  senna (SENOKOT) tablet 2 tablet, 2 tablet, Oral, BID, Kacy Gallardo, APRN, 2 tablet at 09/11/19 0800  •  sodium chloride 0.9 % flush 10 mL, 10 mL, Intravenous, Q12H, Kacy Gallardo, APRN, 10 mL at 09/10/19 2002  •  sodium chloride 0.9 % flush 10 mL, 10 mL, Intravenous, PRN, Kacy Gallardo,  APRN  •  sodium chloride 0.9 % flush 10 mL, 10 mL, Intravenous, Q12H, Kacy Gallardo APRN, 10 mL at 09/10/19 2001  •  sodium chloride 0.9 % flush 10 mL, 10 mL, Intravenous, PRN, Kacy Gallardo, APRN  •  traZODone (DESYREL) tablet 100 mg, 100 mg, Oral, Nightly, Kacy Gallardo APRN, 100 mg at 09/09/19 2159  •  vancomycin 1250 mg/250 mL 0.9% NS IVPB ([facility]), 1,250 mg, Intravenous, Q24H, Stanley Pena Jr., MD, 1,250 mg at 09/10/19 1721    I have reviewed the patient's current medication list    Assessment/Plan     Acute UTI    Dermatitis associated with moisture    Stage 2 skin ulcer of sacral region (CMS/HCC)     Significant weakness/lives alone  -Pt/OT  -refer to NH     Chest pain- r/o ACS/  - initial troponin 0.09-0.09-0.08  - 8/4/19 troponin noted at 0.17  - EKG negative for acute abnormality  -conservative management>[facility] follows at home      UTI  - u/a showed 3+ bacteria and large leukocytes  - lactic acid 2.6  - pt received fluid bolus in ED  - IV rocephin> to po am  - recheck lactic acid-1.9     CAD/S/p PCI  - cont home coreg, imdur, and NTG    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      Ricardo Avila MD  09/11/19  9:54 AM

## 2019-09-11 NOTE — NURSING NOTE
Patient transferred to room 373 by wheelchair and transport. Pt. Was in no acute distress upon leaving the unit. Report was called to Adrienne.

## 2019-09-11 NOTE — PROGRESS NOTES
Continued Stay Note  SONU Ibrahim     Patient Name: Julio César Braxton  MRN: [id number]  Today's Date: 9/11/2019    Admit Date: 9/6/2019    Discharge Plan     Row Name 09/11/19 1423       Plan    Plan  DC Plan: Accepted to [facility]. PASRR approved. No precert required, but needs qualifying stay (3 midnights, will have 9/13).     Patient/Family in Agreement with Plan  yes    Plan Comments  SW faxed revocation form to facility liaison (Yecenia) @ [facility]. Liaison confirmed pt was accepted to facility @ d/c, once qualifying stay has been obtained.        MATEO Salgado   [phone]

## 2019-09-11 NOTE — PROGRESS NOTES
"Pharmacy Dosing Service  Antibiotics  Vancomycin  Ceftriaxone and Vancomycin    Subjective:  Julio César Braxton is a 87 y.o.male admitted with UTI. Culture showing Aerococcus urinae. Pharmacy to dose vancomycin.  Goal trough 10-20 mcg/mL and goal -600 mcg*h/mL.      Assessment/Plan  1. Day #4 Vancomycin: Patient currently on 1250 mg (19mg/kg ABW) IV q24h. Will obtain pharmacokinetic levels prior to 4th dose. Peak 9/10 @ 2000 and trough 9/11 @ 1500. Text out to Dr. Avila to get vanc d/c.     2. Day #4 Ceftriaxone: d/c 9/10    3. Day #2 Augmentin: 875mg q12h    Continue to monitor drug levels, renal function, culture and sensitivities, and patient clinical status.       Objective:  Relevant clinical data and objective history reviewed:  177.8 cm (70\")   63.6 kg (140 lb 3.4 oz)   Ideal body weight: 73 kg (160 lb 15 oz)  Body mass index is 20.12 kg/m².    Results from last 7 days   Lab Units 09/10/19  2029   VANCOMYCIN PK mcg/mL 26.90     Results from last 7 days   Lab Units 09/11/19  0439 09/10/19  0251 09/09/19  0255   CREATININE mg/dL 0.90 0.80 0.90     Estimated Creatinine Clearance: 52 mL/min (by C-G formula based on SCr of 0.9 mg/dL).  I/O last 3 completed shifts:  In: 100 [IV Piggyback:100]  Out: 102 [Urine:100; Stool:2]    WBC   Date Value Ref Range Status   09/11/2019 10.30 3.40 - 10.80 10*3/mm3 Final   09/10/2019 14.10 (H) 3.40 - 10.80 10*3/mm3 Final   09/09/2019 10.30 3.40 - 10.80 10*3/mm3 Final     Temperature    09/10/19 2240 09/11/19 0256 09/11/19 0615   Temp: 97.8 °F (36.6 °C) 97.8 °F (36.6 °C) 97.8 °F (36.6 °C)     Baseline culture/source/susceptibility:  Microbiology Results (last 10 days)       Procedure Component Value - Date/Time    Respiratory Panel, PCR - Swab, Nasopharynx [[id number]]  (Normal) Collected:  09/07/19 0012    Lab Status:  Final result Specimen:  Swab from Nasopharynx Updated:  09/07/19 0426     ADENOVIRUS, PCR Not Detected     Coronavirus 229E Not Detected     Coronavirus " HKU1 Not Detected     Coronavirus NL63 Not Detected     Coronavirus OC43 Not Detected     Human Metapneumovirus Not Detected     Human Rhinovirus/Enterovirus Not Detected     Influenza B PCR Not Detected     Parainfluenza Virus 1 Not Detected     Parainfluenza Virus 2 Not Detected     Parainfluenza Virus 3 Not Detected     Parainfluenza Virus 4 Not Detected     Bordetella pertussis pcr Not Detected     Influenza A H1 2009 PCR Not Detected     Chlamydophila pneumoniae PCR Not Detected     Mycoplasma pneumo by PCR Not Detected     Influenza A PCR Not Detected     Influenza A H3 Not Detected     Influenza A H1 Not Detected     RSV, PCR Not Detected    Urine Culture - Urine, Urine, Clean Catch [[id number]]  (Abnormal) Collected:  09/07/19 0012    Lab Status:  Final result Specimen:  Urine, Clean Catch Updated:  09/09/19 1213     Urine Culture >100,000 CFU/mL Aerococcus urinae     Comment: Aerococcus urinae are usually susceptible to Penicillin G, Vancomycin, and Tetracycline and Resistant to Trimethoprim sulfamethoxazole and Gentamicin.           25,000 CFU/mL Mixed Halima Isolated    Narrative:       Specimen contains mixed organisms of questionable pathogenicity which indicates contamination with commensal halima.       Blood Culture - Blood, Arm, Left [[id number]] Collected:  09/07/19 0010    Lab Status:  Preliminary result Specimen:  Blood from Arm, Left Updated:  09/11/19 0034     Blood Culture No growth at 4 days    Blood Culture - Blood, Arm, Right [[id number]] Collected:  09/07/19 0010    Lab Status:  Preliminary result Specimen:  Blood from Arm, Right Updated:  09/11/19 0034     Blood Culture No growth at 4 days             Anti-Infectives (From admission, onward)      Ordered     Dose/Rate Route Frequency Start Stop    09/08/19 1452  !Vancomycin Level Draw Needed     Ordering Provider:  Stanley Pena Jr., MD     Does not apply Once 09/11/19 1500      09/10/19 1627  amoxicillin-clavulanate (AUGMENTIN) 875-125 MG  per tablet 1 tablet     Ordering Provider:  Ricardo Avila MD    1 tablet Oral Every 12 Hours Scheduled 09/10/19 2100 09/14/19 2059 09/08/19 1452  !Vancomycin Level Draw Needed     Ordering Provider:  Stanley Pena Jr., MD     Does not apply Once 09/10/19 2000 09/10/19 2040    09/08/19 1447  vancomycin 1250 mg/250 mL 0.9% NS IVPB ([facility])     Ordering Provider:  Stanley Pena Jr., MD    1,250 mg  over 90 Minutes Intravenous Every 24 Hours 09/08/19 1600 09/15/19 1559    09/08/19 1307  Pharmacy to dose vancomycin     Ordering Provider:  Stanley Pena Jr., MD     Does not apply Continuous PRN 09/08/19 1306 09/11/19 1305    09/07/19 0121  cefTRIAXone (ROCEPHIN) in SWFI 2 GRAMS/20ml IV PUSH syringe     Ordering Provider:  Julio César Manuel MD    2 g  over 5 Minutes Intravenous Once 09/07/19 0123 09/07/19 0148           Florida Robertson PharmD  09/11/19 9:56 AM

## 2019-09-12 NOTE — PLAN OF CARE
Problem: Patient Care Overview  Goal: Plan of Care Review  Outcome: Ongoing (interventions implemented as appropriate)   09/12/19 1465   OTHER   Outcome Summary Pt shows improvement with activity tolerance- is able to perform multipl standing grooming tasks req min A for balance, perform >5 bouts of STS and ambulate 40' x 3 with min A and RW. Pt requires seated rest breaks due to fatigue. Recommending IP rehab upon d/c.

## 2019-09-12 NOTE — PROGRESS NOTES
"/Hospitalist Team      Patient Care Team:  Eduardo Medina MD as PCP - General (Family Medicine)  NANCY Mejia MD as PCP - Claims Attributed      Interval History and ROS: 87 year old male with a PMH of CAD s/p PCI and CVA.  The patient presented to the ED with complaint of chest pain and left upper extremity pain.  Denies diaphoresis or nausea.  The patient has family at the bedside to assist with history.  The patient is under the care of [facility].  The patient denies urinary symptoms, fever or chills.  Denies an alleviating or exacerbating factors.  The patient is chest pain free at this time.     9/8-Code stroke called in for facial asymmetry, right-sided lower face looked asymmetric, and unresponsiveness.  likely metabolic problems.  He was not an alteplase candidate did not look like large vessel.  ghing and having his meal and did not look anything focal.  Code stroke canceled     9/9 says feels better, no new complaints, denies chest pain  Hospice was consulted,he was made [facility] hosp[facility]    9/10 Subjective:weakness, leg pain   9/11 chest pain this am  9/12 no new complaints, awaiting placement     History taken from: patient    Review of Systems   Constitutional: Positive for fatigue.   Musculoskeletal: Positive for arthralgias.   Neurological: Positive for weakness.       Objective    Vital Signs  Temp:  [97.5 °F (36.4 °C)-98 °F (36.7 °C)] 97.5 °F (36.4 °C)  Heart Rate:  [56-78] 65  Resp:  [13-16] 16  BP: (106-113)/(48-60) 106/50  Oxygen Therapy  SpO2: 95 %  Pulse Oximetry Type: Intermittent  Device (Oxygen Therapy): room air  Flowsheet Rows      First Filed Value   Admission Height  177.8 cm (70\") Documented at 09/06/2019 2336   Admission Weight  61.2 kg (135 lb) Documented at 09/06/2019 2336        Intake & Output (last 3 days)       09/09 0701 - 09/10 0700 09/10 0701 - 09/11 0700 09/11 0701 - 09/12 0700 09/12 0701 - 09/13 0700    P.O.   360 150    IV Piggyback 100       Total Intake(mL/kg) 100 (1.6)  " 360 (5.8) 150 (2.4)    Urine (mL/kg/hr) 200 (0.1) 100 (0.1) 1050 (0.7) 400 (0.7)    Stool  2      Total Output  400    Net -100 -102 -690 -250                Lines, Drains & Airways    Active LDAs     Name:   Placement date:   Placement time:   Site:   Days:    Peripheral IV 09/07/19 2015 Anterior;Left Forearm   09/07/19 2015    Forearm   2                Physical Exam:    General Appearance:    Alert, cooperative, in no acute distress   Head:    Normocephalic, without obvious abnormality, atraumatic   Eyes:            Lids and lashes normal, conjunctivae and sclerae normal, no   icterus, no pallor, corneas clear, PERRLA   Neck:   No adenopathy, supple, trachea midline, no thyromegaly, no   carotid bruit, no JVD   Back:     No kyphosis present, no scoliosis present, no skin lesions,      erythema or scars, no tenderness to percussion or                   palpation,   range of motion normal   Lungs:     Clear to auscultation,respirations regular, even and                  unlabored    Heart:    Regular rhythm and normal rate, normal S1 and S2, no            murmur, no gallop, no rub, no click   Chest Wall:    No abnormalities observed   Abdomen:     Normal bowel sounds, no masses, no organomegaly, soft        non-tender, non-distended, no guarding, no rebound                tenderness   Extremities:   Moves all extremities well, no edema, no cyanosis, no             redness   Skin:   No bleeding, bruising or rash   Neurologic:   Cranial nerves 2 - 12 grossly intact, sensation intact, DTR       present and equal bilaterally       Results Review:      Lab Results (last 24 hours)     Procedure Component Value Units Date/Time    POC Glucose Once [[id number]]  (Abnormal) Collected:  09/12/19 1153    Specimen:  Blood Updated:  09/12/19 1202     Glucose 179 mg/dL      Comment: Serial Number: [id number]:  [id number]       CBC & Differential [[id number]] Collected:  09/12/19 0359    Specimen:  Blood  Updated:  09/12/19 0656    Narrative:       The following orders were created for panel order CBC & Differential.  Procedure                               Abnormality         Status                     ---------                               -----------         ------                     CBC Auto Differential[[id number]]        Abnormal            Final result                 Please view results for these tests on the individual orders.    CBC Auto Differential [[id number]]  (Abnormal) Collected:  09/12/19 0359    Specimen:  Blood Updated:  09/12/19 0656     WBC 11.50 10*3/mm3      RBC 3.12 10*6/mm3      Hemoglobin 10.0 g/dL      Hematocrit 29.7 %      MCV 95.3 fL      MCH 32.2 pg      MCHC 33.8 g/dL      RDW 15.2 %      RDW-SD 50.8 fl      MPV 8.0 fL      Platelets 190 10*3/mm3     Narrative:       The previously reported component NRBC is no longer being reported.    Scan Slide [[id number]] Collected:  09/12/19 0359    Specimen:  Blood Updated:  09/12/19 0656     Scan Slide --     Comment: See Manual Differential Results       Manual Differential [[id number]]  (Abnormal) Collected:  09/12/19 0359    Specimen:  Blood Updated:  09/12/19 0656     Neutrophil % 69.0 %      Lymphocyte % 17.0 %      Monocyte % 2.0 %      Bands %  5.0 %      Metamyelocyte % 6.0 %      Myelocyte % 1.0 %      Neutrophils Absolute 8.51 10*3/mm3      Lymphocytes Absolute 1.96 10*3/mm3      Monocytes Absolute 0.23 10*3/mm3      RBC Morphology Normal     Toxic Granulation Slight/1+     Platelet Morphology Normal    POC Glucose Once [[id number]]  (Abnormal) Collected:  09/12/19 0620    Specimen:  Blood Updated:  09/12/19 0623     Glucose 125 mg/dL      Comment: Serial Number: [id number]:  [id number]       Basic Metabolic Panel [[id number]]  (Abnormal) Collected:  09/12/19 0359    Specimen:  Blood Updated:  09/12/19 0532     Glucose 107 mg/dL      BUN 19 mg/dL      Creatinine 0.80 mg/dL      Sodium 134 mmol/L      Potassium 3.9 mmol/L       Chloride 92 mmol/L      CO2 32.0 mmol/L      Calcium 8.3 mg/dL      eGFR Non African Amer 91 mL/min/1.73      BUN/Creatinine Ratio 23.8     Anion Gap 13.9 mmol/L     Narrative:       The MDRD GFR formula is only valid for adults with stable renal function between ages 18 and 70.    Magnesium [[id number]]  (Normal) Collected:  09/12/19 0359    Specimen:  Blood Updated:  09/12/19 0527     Magnesium 2.1 mg/dL     Phosphorus [[id number]]  (Normal) Collected:  09/12/19 0359    Specimen:  Blood Updated:  09/12/19 0527     Phosphorus 3.6 mg/dL     Protime-INR [[id number]]  (Normal) Collected:  09/12/19 0359    Specimen:  Blood Updated:  09/12/19 0514     Protime 10.0 Seconds      INR 0.96    Blood Culture - Blood, Arm, Left [[id number]] Collected:  09/07/19 0010    Specimen:  Blood from Arm, Left Updated:  09/12/19 0033     Blood Culture No growth at 5 days    Blood Culture - Blood, Arm, Right [[id number]] Collected:  09/07/19 0010    Specimen:  Blood from Arm, Right Updated:  09/12/19 0033     Blood Culture No growth at 5 days    POC Glucose Once [[id number]]  (Abnormal) Collected:  09/12/19 0010    Specimen:  Blood Updated:  09/12/19 0012     Glucose 191 mg/dL      Comment: Serial Number: [id number]:  [id number]       POC Glucose Once [[id number]]  (Abnormal) Collected:  09/11/19 1801    Specimen:  Blood Updated:  09/11/19 1802     Glucose 293 mg/dL      Comment: Serial Number: [id number]:  [id number]             Imaging Results (last 24 hours)     ** No results found for the last 24 hours. **          Blood Culture   Date Value Ref Range Status   09/07/2019 No growth at 3 days  Preliminary   09/07/2019 No growth at 3 days  Preliminary                 Urine Culture   Date Value Ref Range Status   09/07/2019 >100,000 CFU/mL Aerococcus urinae (A)  Final     Comment:     Aerococcus urinae are usually susceptible to Penicillin G, Vancomycin, and Tetracycline and Resistant to Trimethoprim sulfamethoxazole and Gentamicin.      09/07/2019 25,000 CFU/mL Mixed Shannon Isolated  Final            ECG/EMG Results (most recent)     Procedure Component Value Units Date/Time    ECG 12 Lead [[id number]] Collected:  09/06/19 2337     Updated:  09/08/19 1532    Narrative:       HEART RATE= 77  bpm  RR Interval= 781  ms  NM Interval=   ms  P Horizontal Axis=   deg  P Front Axis=   deg  QRSD Interval= 117  ms  QT Interval= 371  ms  QRS Axis= -32  deg  T Wave Axis= 118  deg  - ABNORMAL ECG -  Atrial fibrillation  Incomplete left bundle branch block  ST depr, consider ischemia, anterolateral lds  Electronically Signed By: Julio César Manuel ([facility]) 07-Sep-2019 06:18:29  Date and Time of Study: 2019-09-06 23:37:48    ECG 12 Lead [[id number]] Collected:  09/07/19 0932     Updated:  09/09/19 1044    Narrative:       HEART RATE= 78  bpm  RR Interval= 773  ms  NM Interval=   ms  P Horizontal Axis=   deg  P Front Axis=   deg  QRSD Interval= 121  ms  QT Interval= 390  ms  QRS Axis= -40  deg  T Wave Axis= 147  deg  - ABNORMAL ECG -  Atrial fibrillation  Ventricular premature complex  Left bundle branch block  No significant changes compared to prior EKGs that was done on September 6, 2019  Electronically Signed By: Shanika Byers ([facility]) 09-Sep-2019 10:44:12  Date and Time of Study: 2019-09-07 09:32:55          I reviewed the patient's new clinical results.    Medication Review:     Current Facility-Administered Medications:   •  acetaminophen (TYLENOL) tablet 650 mg, 650 mg, Oral, Q4H PRN **OR** acetaminophen (TYLENOL) 160 MG/5ML solution 650 mg, 650 mg, Oral, Q4H PRN **OR** acetaminophen (TYLENOL) suppository 650 mg, 650 mg, Rectal, Q4H PRN, Kacy Gallardo APRN  •  amoxicillin-clavulanate (AUGMENTIN) 875-125 MG per tablet 1 tablet, 1 tablet, Oral, Q12H, Ricardo Avila MD, 1 tablet at 09/12/19 0932  •  carvedilol (COREG) tablet 3.125 mg, 3.125 mg, Oral, BID With Meals, Kacy Gallardo APRN, 3.125 mg at 09/12/19 0968  •  dexamethasone (DECADRON) tablet 2  mg, 2 mg, Oral, Daily, Kacy Gallardo APRN, 2 mg at 09/12/19 0932  •  furosemide (LASIX) tablet 40 mg, 40 mg, Oral, BID, Kacy Gallardo APRN, 40 mg at 09/12/19 0932  •  gabapentin (NEURONTIN) capsule 300 mg, 300 mg, Oral, TID, Kacy Gallardo, APRN, 300 mg at 09/12/19 0932  •  HYDROcodone-acetaminophen (NORCO) 5-325 MG per tablet 1 tablet, 1 tablet, Oral, Q4H PRN, Kacy Gallardo APRN  •  insulin lispro (humaLOG) injection 0-24 Units, 0-24 Units, Subcutaneous, Q6H, Ricardo Avila MD  •  isosorbide mononitrate (IMDUR) 24 hr tablet 30 mg, 30 mg, Oral, Daily, Kacy Gallardo APRN, 30 mg at 09/12/19 0932  •  loperamide (IMODIUM) capsule 2 mg, 2 mg, Oral, Daily PRN, Kacy Gallardo APRN  •  LORazepam (ATIVAN) tablet 0.5 mg, 0.5 mg, Oral, Q6H PRN, Kacy Gallardo APRN  •  magic butt paste, , Topical, BID, Isidra Flores APRN  •  Magnesium Sulfate 2 gram Bolus, followed by 8 gram infusion (total Mg dose 10 grams)- Mg less than or equal to 1mg/dL, 2 g, Intravenous, PRN **OR** Magnesium Sulfate 2 gram / 50mL Infusion (GIVE X 3 BAGS TO EQUAL 6GM TOTAL DOSE) - Mg 1.1 - 1.5 mg/dl, 2 g, Intravenous, PRN **OR** Magnesium Sulfate 4 gram infusion- Mg 1.6-1.9 mg/dL, 4 g, Intravenous, PRN, Silvia Smallwood FNP  •  nitroglycerin (NITROSTAT) SL tablet 0.4 mg, 0.4 mg, Sublingual, Q5 Min PRN, Kacy Gallardo, ANDIE  •  ondansetron (ZOFRAN) injection 4 mg, 4 mg, Intravenous, Q6H PRN, [place], Kacy, APRN  •  pantoprazole (PROTONIX) EC tablet 40 mg, 40 mg, Oral, Q AM, Stanley Pena Jr., MD, 40 mg at 09/12/19 0636  •  polyethylene glycol (MIRALAX) powder 17 g, 17 g, Oral, Daily, Kacy Gallrado, APRN, 17 g at 09/12/19 0932  •  potassium chloride (K-DUR,KLOR-CON) CR tablet 40 mEq, 40 mEq, Oral, PRN, 40 mEq at 09/10/19 1339 **OR** potassium chloride (KLOR-CON) packet 40 mEq, 40 mEq, Oral, PRN **OR** potassium chloride 10 mEq in 100 mL IVPB, 10 mEq, Intravenous, Q1H PRN, Silvia Smallwood FNP  •  senna (SENOKOT)  tablet 2 tablet, 2 tablet, Oral, BID, Kacy Gallardo, APRN, 2 tablet at 09/12/19 0933  •  sodium chloride 0.9 % flush 10 mL, 10 mL, Intravenous, Q12H, Sami, Kacy, APRN, 10 mL at 09/12/19 0935  •  sodium chloride 0.9 % flush 10 mL, 10 mL, Intravenous, PRN, Sami, Kacy, APRN  •  sodium chloride 0.9 % flush 10 mL, 10 mL, Intravenous, Q12H, Lenoir, Kacy, APRN, 10 mL at 09/12/19 0933  •  sodium chloride 0.9 % flush 10 mL, 10 mL, Intravenous, PRN, John Gallardoelle, APRN  •  traZODone (DESYREL) tablet 100 mg, 100 mg, Oral, Nightly, Lenoir, Kacy, APRN, 100 mg at 09/11/19 2101    I have reviewed the patient's current medication list    Assessment/Plan     Acute UTI    Dermatitis associated with moisture    Stage 2 skin ulcer of sacral region (CMS/HCC)     Significant weakness/lives alone  -Pt/OT  -referred to NH     Chest pain- r/o ACS/  - initial troponin 0.09-0.09-0.08  - 8/4/19 troponin noted at 0.17  - EKG negative for acute abnormality  -conservative management>[facility] follows at home      UTI  - u/a showed 3+ bacteria and large leukocytes,>100,000 CFU/mL Aerococcus urinae  - lactic acid 2.6  - pt received fluid bolus in ED  - IV rocephin> to oral augmentin  - recheck lactic acid-1.9     CAD/S/p PCI  - cont home coreg, imdur, and NTG    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      Ricardo Avila MD  09/12/19  3:43 PM

## 2019-09-12 NOTE — PLAN OF CARE
Problem: Skin Injury Risk (Adult)  Goal: Skin Health and Integrity  Outcome: Ongoing (interventions implemented as appropriate)      Problem: Patient Care Overview  Goal: Plan of Care Review  Outcome: Ongoing (interventions implemented as appropriate)   09/12/19 0403 09/12/19 9653   Coping/Psychosocial   Plan of Care Reviewed With --  patient   OTHER   Outcome Summary --  Pt admitted with UTI, and needs one more midnight before hes discharged.    Plan of Care Review   Progress no change --        Problem: Urinary Tract Infection (Adult)  Goal: Signs and Symptoms of Listed Potential Problems Will be Absent, Minimized or Managed (Urinary Tract Infection)  Outcome: Ongoing (interventions implemented as appropriate)

## 2019-09-12 NOTE — PROGRESS NOTES
"Adult Nutrition  Assessment/PES    Patient Name:  Julio Cséar Braxton  YOB: 1932  MRN: [id number]  Admit Date:  9/6/2019    Assessment Date:  9/12/2019    Comments:  Boost + BID to provide additional nutrients with diet order.    Reason for Assessment     Row Name           Reason for Assessment    Reason For Assessment MST 4, Nursing Admission Screen Consult (9/7)       Diagnosis PMH: CAD s/p PCI and VA, CHF, Neuropathy, Anxiety, Chronic Constipation, GERD, [place], [facility]    Current: Acute UTI, Chronic pain, Chronic Anxiety, admit r/t chest pain, EKG negative for acute abnormailites - possible cardio consult, UTI         Nutrition/Diet History     Row Name           Nutrition/Diet History    Typical Food/Fluid Intake  Unable to review at this time, pt sleeping during visit attempt - RN states she was told pt has had weight loss and decreased intake r/t losing wife in April     Food Allergies  No known allergies per EMR         Anthropometrics     Row Name         Anthropometrics    Height  177.8 cm (70\")     Weight  61.8 kg (136 lb 3.9 oz)    (9/12/19)        Admit Weight    Admit Weight  61.2 kg (135 lb)    (9/6/19)        Ideal Body Weight (IBW)    Ideal Body Weight (IBW)  166 lb     % Ideal Body Weight  80%        Usual Body Weight (UBW)    Usual Body Weight  UTD     % Usual Body Weight  UTD%     Weight Loss Time Frame 133 lb (8/4/19)  140 lb (7/15/19)  132 lb (10/208)  126 lb (9/2018)  152 lb (3/2018)        Body Mass Index (BMI)    BMI (kg/m2)  19.55          Labs/Tests/Procedures/Meds     Row Name           Labs/Procedures/Meds    Lab Results Reviewed  reviewed, pertinent     Lab Results Comments Gluc 125 (H), Na 134 (L), Ca 8.3 (L), Mg/Phos WNL, H/H 10/29.7 (L)        Medications    Pertinent Medications Reviewed  reviewed     Pertinent Medications Comments Augmentin, Lasix, Humalog, Protonix, Miralax, Senokot         Physical Findings     Row Name           Physical Findings    Overall " Physical Appearance 9/12 Completed NFPE see MSA     9/7 NFPE unable to be completed at this time, noted in 10/2018 pt was found to be severely malnourished (r/t chronic disease)     Gastrointestinal  Unable to review N/V, Bm x 2 9/11/19     Oral/Mouth Cavity  Unable to review if any concerns     Skin  Sacral spine - stage 2         Estimated/Assessed Needs     Row Name         Calculation Measurements    Weight Used For Calculations      Height         Estimated/Assessed Needs    Additional Documentation         KCAL/KG    KCAL/KG      30 Kcal/Kg (kcal)         Protein Requirements    Est Protein Requirement Amount (gms/kg)      Estimated Protein Requirements (gms/day)         Fluid Requirements    Estimated Fluid Requirements (mL/day)       Nutrition Prescription Ordered     Row Name           Nutrition Prescription PO    Current PO Diet  Healthy Heart, Consistent Carb     Supplement  None at this time (previous admits pt has Boost + ordered)         Evaluation of Received Nutrient/Fluid Intake     Row Name           PO Evaluation    PO Intake Evaluated  N/A     % PO Intake  No po intake recorded at this time         Problem/Interventions:       Row Name               Problem 1  Inadequate oral intake     Etiology (related to)  decreased ability to consume sufficient energy needs     Signs/Symptoms (evidenced by)  estimates of insufficient po intake recorded (none recorded)           Problem 2 Chronic severe malnutrition     Etiology (related to) Physiological causes (CAD s/p PCI & CVA)     Sign/Symptoms Severe muscle wasting & moderate fat loss.                Intervention Goal     Row Name           Intervention Goal    PO  PO intake (%)     PO Intake %  >50 %         Nutrition Intervention     Row Name           Nutrition Intervention    RD/Tech Action  Recommend/ordered     Recommended/Ordered  Supplement         Nutrition Prescription     Row Name           Nutrition Prescription PO    PO Prescription Continue  ONS of Change Diet Order - ? Why pt is on a Consistent Carb diet (no hx of DM in chart), RN states she is unsure as well - removing from diet order to liberalize diet, consider removing Healthy heart as well based on intakes at follow-up    Supplement  Boost Plus     Supplement Frequency  2 times a day         Education/Evaluation     Row Name           Monitor/Evaluation    Monitor  PO intake;Supplement intake;Weight;Skin status;Pertinent labs/meds; BMs; NFPE if able           Electronically signed by:  Viviane Mtz RD  09/12/19 10:33 AM

## 2019-09-12 NOTE — THERAPY TREATMENT NOTE
Acute Care - Physical Therapy Treatment Note   Pat[facility]     Patient Name: Julio César Braxton  : 4/3/1932  MRN: [id number]  Today's Date: 2019             Admit Date: 2019    Visit Dx:    ICD-10-CM ICD-9-CM   1. Acute UTI N39.0 599.0   2. Severe sepsis (CMS/HCC) A41.9 038.9    R65.20 995.92   3. Chest pain, unspecified type R07.9 786.50     Patient Active Problem List   Diagnosis   • Stroke (cerebrum) (CMS/HCC)   • CAD in native artery   • LV dysfunction   • Dyspnea on exertion   • Pacemaker   • Decubitus ulcer of trochanteric region of right hip, stage 2   • Pressure injury of sacral region, stage 2   • Atrial fibrillation (CMS/HCC)   • Bradycardia   • Cardiomyopathy (CMS/HCC)   • Diabetes mellitus, type II (CMS/HCC)   • Long term current use of anticoagulant therapy   • PVCs (premature ventricular contractions)   • Second degree heart block   • Essential hypertension   • Hyperlipidemia, mixed   • Acute UTI   • Dermatitis associated with moisture   • Stage 2 skin ulcer of sacral region (CMS/HCC)       Therapy Treatment    Rehabilitation Treatment Summary     Row Name 19 1000             Treatment Time/Intention    Document Type  evaluation  -SS      Mode of Treatment  physical therapy  -SS      Therapy Frequency (PT Clinical Impression)  3 times/wk  -SS      Existing Precautions/Restrictions  fall  -SS      Recorded by [SS] Ramonita Gillette, PT 19 1037      Row Name 19 1000             Vital Signs    Posttreatment Heart Rate (beats/min)  58  -SS      Post SpO2 (%)  95  -SS      O2 Delivery Post Treatment  room air  -SS      Recorded by [SS] Ramonita Gillette, PT 19 1037      Row Name 19 1000             Cognitive Assessment/Intervention- PT/OT    Orientation Status (Cognition)  oriented x 4  -SS      Recorded by [SS] Ramonita Gillette, PT 19 1037      Row Name 19 1000             Safety Issues, Functional Mobility    Impairments Affecting Function (Mobility)   balance;coordination;endurance/activity tolerance;postural/trunk control;motor control;strength  -SS      Recorded by [SS] Ramonita Gillette, PT 09/12/19 1037      Row Name 09/12/19 1000             Bed Mobility Assessment/Treatment    Bed Mobility Assessment/Treatment  supine-sit  -SS      Supine-Sit [place] (Bed Mobility)  minimum assist (75% patient effort)  -SS      Comment (Bed Mobility)  assist for trunk for supine to sit  -SS      Recorded by [SS] Ramonita Gillette, PT 09/12/19 1037      Row Name 09/12/19 1000             Transfer Assessment/Treatment    Transfer Assessment/Treatment  sit-stand transfer  -SS      Comment (Transfers)  pt performs 5 bouts of STS throughout session with increasing fatigue   -SS2      Recorded by [SS] Ramonita Gillette, PT 09/12/19 1037  [SS2] Ramonita Gillette, PT 09/12/19 1041      Row Name 09/12/19 1000             Sit-Stand Transfer    Sit-Stand [place] (Transfers)  contact guard;minimum assist (75% patient effort)  -SS      Recorded by [SS] Ramonita Gillette, PT 09/12/19 1041      Row Name 09/12/19 1000             Gait/Stairs Assessment/Training    [place] Level (Gait)  minimum assist (75% patient effort)  -SS      Assistive Device (Gait)  walker, front-wheeled  -SS      Distance in Feet (Gait)  50 25  -SS      Comment (Gait/Stairs)  postural instability noted, variability of stepping  -SS      Recorded by [SS] Ramonita Gillette, PT 09/12/19 1041      Row Name 09/12/19 1000             Dynamic Standing Balance    Level of [place], Reaches Outside Midline (Standing, Dynamic Balance)  minimal assist, 75% patient effort pt performs standing balance whlie bathing in standing  -SS      Recorded by [SS] Ramonita Gillette, PT 09/12/19 1041      Row Name                Wound 09/07/19 0430 sacral spine Pressure Injury    Wound - Properties Group Date first assessed: 09/07/19 [CW] Time first assessed: 0430 [CW] Present on Hospital Admission: Y [CW]  Side: -- [CW2] Location: sacral spine [CW2] Primary Wound Type: Pressure inj [CW] Stage, Pressure Injury: Stage 2 [CW] Recorded by:  [CW] Fatimah Menchaca RN 09/07/19 0726 [CW2] Fatimah Menchaca RN 09/07/19 0728    Row Name 09/12/19 1000             Outcome Summary/Treatment Plan (PT)    Daily Summary of Progress (PT)  progress toward functional goals is good  -SS      Anticipated Discharge Disposition (PT)  skilled nursing facility  -SS      Recorded by [SS] Ramonita Gillette, PT 09/12/19 1153        User Key  (r) = Recorded By, (t) = Taken By, (c) = Cosigned By    Initials Name Effective Dates Discipline     Ramonita Gillette, PT 06/19/19 -  PT    CW Fatimah Menchaca RN 03/01/19 -  Nurse          Wound 09/07/19 0430 sacral spine Pressure Injury (Active)   Dressing Appearance dry;intact 9/12/2019  7:20 AM   Base pink 9/12/2019  7:20 AM   Periwound non-blanchable 9/12/2019  7:20 AM   Periwound Temperature cool 9/12/2019  7:20 AM   Periwound Skin Turgor soft 9/12/2019  7:20 AM   Drainage Amount none 9/12/2019  7:20 AM           Physical Therapy Education     Title: PT OT SLP Therapies (In Progress)     Topic: Physical Therapy (Done)     Point: Mobility training (Done)     Learning Progress Summary           Patient Acceptance, E, VU by SS at 9/12/2019 11:54 AM    Acceptance, E,TB, VU by VB at 9/11/2019  4:48 PM    Acceptance, E,TB, VU by CM at 9/10/2019  5:04 PM                   Point: Home exercise program (Done)     Learning Progress Summary           Patient Acceptance, E,TB, VU by VB at 9/11/2019  4:48 PM                   Point: Body mechanics (Done)     Learning Progress Summary           Patient Acceptance, E,TB, VU by VB at 9/11/2019  4:48 PM    Acceptance, E,TB, VU by CM at 9/10/2019  5:04 PM                   Point: Precautions (Done)     Learning Progress Summary           Patient Acceptance, E,TB, VU by VB at 9/11/2019  4:48 PM    Acceptance, E,TB, VU by CM at 9/10/2019  5:04 PM                                User Key     Initials Effective Dates Name Provider Type Discipline    SS 06/19/19 -  Ramonita Gillette, PT Physical Therapist PT    CM 03/01/19 -  Payton Almendarez PT Physical Therapist PT    VB 07/19/19 -  Prabha Whitten RN Registered Nurse Nurse                PT Recommendation and Plan  Anticipated Discharge Disposition (PT): skilled nursing facility  Therapy Frequency (PT Clinical Impression): 3 times/wk  Outcome Summary/Treatment Plan (PT)  Daily Summary of Progress (PT): progress toward functional goals is good  Anticipated Discharge Disposition (PT): skilled nursing facility  Outcome Summary: Pt shows improvement with activity tolerance- is able to perform multipl standing grooming tasks req min A for balance, perform >5 bouts of STS and ambulate 40' x 3 with min A and RW. Pt requires seated rest breaks due to fatigue. Recommending IP rehab upon d/c.      Time Calculation:   PT Charges     Row Name 09/12/19 1159             Time Calculation    Start Time  1000  -SS      Stop Time  1032  -SS      Time Calculation (min)  32 min  -SS      PT Received On  09/12/19  -      PT - Next Appointment  09/14/19  -        User Key  (r) = Recorded By, (t) = Taken By, (c) = Cosigned By    Initials Name Provider Type     Ramonita Gillette, PT Physical Therapist        Therapy Charges for Today     Code Description Service Date Service Provider Modifiers Qty    35684558963 HC GAIT TRAINING EA 15 MIN 9/12/2019 Ramonita Gillette, PT GP 1    43938376164 HC PT THER PROC EA 15 MIN 9/12/2019 Ramonita Gillette, PT GP 1    85103918797 HC PT THERAPEUTIC ACT EA 15 MIN 9/12/2019 Ramonita Gillette, PT GP 1          PT G-Codes  Outcome Measure Options: Modified [place]  Modified [place] Scale: 4 - Moderately severe disability.  Unable to walk without assistance, and unable to attend to own bodily needs without assistance.    Ramonita Gillette PT  9/12/2019

## 2019-09-12 NOTE — PROGRESS NOTES
Malnutrition Severity Assessment    Patient Name:  Julio César Braxton  YOB: 1932  MRN: [id number]  Admit Date:  9/6/2019    Patient meets criteria for : Severe Malnutrition    Comments:  Chronic severe malnutrition RT physiological causes (CAD s/t PCI & CVA) AEB severe muscle wasting and moderate fat loss.     Malnutrition Severity Assessment  Malnutrition Type: Chronic Disease - Related Malnutrition     Malnutrition Type (last 8 hours)      Malnutrition Severity Assessment     Row Name 09/12/19 1054       Malnutrition Severity Assessment    Malnutrition Type  Chronic Disease - Related Malnutrition    Row Name 09/12/19 1054       Muscle Loss    Loss of Muscle Mass Findings  Severe    [place] Region  Severe - deep hollowing/scooping, lack of muscle to touch, facial bones well defined    Clavicle Bone Region  Severe - protruding prominent bone    Acromion Bone Region  Moderate - acromion may slightly protrude    Scapular Bone Region  Severe - prominent bones, depressions easily visible between ribs, scapula, spine, shoulders    Dorsal Hand Region  Moderate - slight depression    Patellar Region  Severe - prominent bone, square looking, very little muscle definition    Anterior Thigh Region  Severe - line/depression along thigh, obviously thin    Posterior Calf Region  Severe - thin with very little definition/firmness    Row Name 09/12/19 1054       Fat Loss    Subcutaneous Fat Loss Findings  Moderate    Orbital Region   Moderate -  somewhat hollowness, slightly dark circles    Upper Arm Region  Moderate - some fat tissue, not ample    Thoracic & Lumbar Region  None    Row Name 09/12/19 1054       Criteria Met (Must meet criteria for severity in at least 2 of these categories: M Wasting, Fat Loss, Fluid, Secondary Signs, Wt. Status, Intake)    Patient meets criteria for   Severe Malnutrition          Electronically signed by:  Viviane Mtz RD  09/12/19 10:55 AM

## 2019-09-12 NOTE — PLAN OF CARE
Problem: Skin Injury Risk (Adult)  Goal: Identify Related Risk Factors and Signs and Symptoms  Outcome: Outcome(s) achieved Date Met: 09/12/19    Goal: Skin Health and Integrity  Outcome: Ongoing (interventions implemented as appropriate)   09/12/19 0403   Skin Injury Risk (Adult)   Skin Health and Integrity making progress toward outcome       Problem: Fall Risk (Adult)  Goal: Identify Related Risk Factors and Signs and Symptoms  Outcome: Outcome(s) achieved Date Met: 09/12/19    Goal: Absence of Fall  Outcome: Ongoing (interventions implemented as appropriate)   09/12/19 0403   Fall Risk (Adult)   Absence of Fall achieves outcome       Problem: Patient Care Overview  Goal: Plan of Care Review  Outcome: Ongoing (interventions implemented as appropriate)   09/12/19 0403   OTHER   Outcome Summary Pt had no complaints this shift   Plan of Care Review   Progress no change

## 2019-09-13 NOTE — PLAN OF CARE
Problem: Skin Injury Risk (Adult)  Goal: Skin Health and Integrity  Outcome: Ongoing (interventions implemented as appropriate)      Problem: Fall Risk (Adult)  Goal: Absence of Fall  Outcome: Ongoing (interventions implemented as appropriate)      Problem: Patient Care Overview  Goal: Plan of Care Review  Outcome: Ongoing (interventions implemented as appropriate)      Problem: Urinary Tract Infection (Adult)  Goal: Signs and Symptoms of Listed Potential Problems Will be Absent, Minimized or Managed (Urinary Tract Infection)  Outcome: Ongoing (interventions implemented as appropriate)

## 2019-09-13 NOTE — DISCHARGE SUMMARY
Date of Admission: 9/6/2019    Date of Discharge:  9/13/2019    Length of stay:  LOS: 6 days     Discharge Diagnosis:   Acute UTI    Dermatitis associated with moisture    Stage 2 skin ulcer of sacral region (CMS/HCC)     Significant weakness/lives alone  -Pt/O-referred to NH     Chest pain- r/o ACS/  - initial troponin 0.09-0.09-0.08  - 8/4/19 troponin noted at 0.17  - EKG negative for acute abnormality  -conservative management>[facility] follows at home      UTI  - u/a showed 3+ bacteria and large leukocytes,>100,000 CFU/mL Aerococcus urinae  - lactic acid 2.6  - pt received fluid bolus in ED  - IV rocephin> to oral augmentin  -  lactic acid-1.9     CAD/S/p PCI  - cont home coreg, imdur, and NTG     CHFrEF chronic no AE  -Highly abnormal echocardiogram shows dilated ischemic  cardiomyopathy with LVEF in the 20-25% range  -   - cont home lasix     H/O CVA and MI     Chronic pain  - cont home gabapentin and hydrocodone     Chronic Anxiety  - cont home ativan (INSPECT verified)     Chronic constipation  - cont home miralax and senokot     GERD  - cont home zantac     Chronic insomnia  - cont home trazodone     VTE prophylaxis  - bilateral SCDs        Presenting Problem/History of Present Illness    87 year old male with a PMH of CAD s/p PCI and CVA.  The patient presented to the ED with complaint of chest pain and left upper extremity pain.  Denies diaphoresis or nausea.  The patient has family at the bedside to assist with history.  The patient is under the care of [facility].  The patient denies urinary symptoms, fever or chills.  Denies an alleviating or exacerbating factors.  The patient is chest pain free at this time.     Active Hospital Problems    Diagnosis  POA   • **Acute UTI [N39.0]  Yes   • Dermatitis associated with moisture [L30.8]  Unknown   • Stage 2 skin ulcer of sacral region (CMS/HCC) [L98.429]  Yes      Resolved Hospital Problems   No resolved problems to display.      Hospital Course      9/8-Code stroke called in for facial asymmetry, right-sided lower face looked asymmetric, and unresponsiveness.  likely metabolic problems.  He was not an alteplase candidate did not look like large vessel.  ghing and having his meal and did not look anything focal.  Code stroke canceled     9/9 says feels better, no new complaints, denies chest pain  Hospice was consulted,he was made [facility]    9/10 Subjective:weakness, leg pain   9/11 chest pain this am  9/12 no new complaints, awaiting placement   9/13 will dc to [facility]    Past Medical History:     Past Medical History:   Diagnosis Date   • CHF (congestive heart failure) (CMS/[facility])    • Coronary artery disease    • Neuropathy    • Stroke (CMS/[facility])        Past Surgical History:     Past Surgical History:   Procedure Laterality Date   • ANGIOPLASTY     • BACK SURGERY     • CHOLECYSTECTOMY     • CORONARY STENT PLACEMENT     • SPINAL FUSION         Social History:   Social History     Socioeconomic History   • Marital status:      Spouse name: Not on file   • Number of children: Not on file   • Years of education: Not on file   • Highest education level: Not on file   Tobacco Use   • Smoking status: Former Smoker     Types: Cigarettes   • Smokeless tobacco: Never Used   • Tobacco comment: Quit 40 years go   Substance and Sexual Activity   • Alcohol use: No     Frequency: Never   • Drug use: No   Social History Narrative    - spouse has Parkinson's.  He lives at home with spouse and grandson.       Procedures Performed         Consults:   Consults     Date and Time Order Name Status Description    9/7/2019 0214 Inpatient Hospitalist Consult Completed           Pertinent Test Results:     Lab Results (most recent)     Procedure Component Value Units Date/Time    POC Glucose Once [[id number]]  (Abnormal) Collected:  09/13/19 1229    Specimen:  Blood Updated:  09/13/19 1230     Glucose 202 mg/dL      Comment: Serial Number: [id number]:  [id number]        Scan Slide [[id number]] Collected:  09/13/19 0401    Specimen:  Blood Updated:  09/13/19 0746     Scan Slide --     Comment: See Manual Differential Results       Manual Differential [[id number]]  (Abnormal) Collected:  09/13/19 0401    Specimen:  Blood Updated:  09/13/19 0746     Neutrophil % 68.0 %      Lymphocyte % 18.0 %      Monocyte % 5.0 %      Bands %  6.0 %      Metamyelocyte % 2.0 %      Myelocyte % 1.0 %      Neutrophils Absolute 8.36 10*3/mm3      Lymphocytes Absolute 2.03 10*3/mm3      Monocytes Absolute 0.57 10*3/mm3      RBC Morphology Normal     Toxic Granulation Slight/1+     Platelet Morphology Normal    CBC & Differential [[id number]] Collected:  09/13/19 0401    Specimen:  Blood Updated:  09/13/19 0746    Narrative:       The following orders were created for panel order CBC & Differential.  Procedure                               Abnormality         Status                     ---------                               -----------         ------                     CBC Auto Differential[[id number]]        Abnormal            Final result                 Please view results for these tests on the individual orders.    CBC Auto Differential [[id number]]  (Abnormal) Collected:  09/13/19 0401    Specimen:  Blood Updated:  09/13/19 0746     WBC 11.30 10*3/mm3      RBC 3.10 10*6/mm3      Hemoglobin 10.2 g/dL      Hematocrit 29.3 %      MCV 94.5 fL      MCH 32.8 pg      MCHC 34.7 g/dL      RDW 15.3 %      RDW-SD 50.8 fl      MPV 8.0 fL      Platelets 199 10*3/mm3     POC Glucose Once [[id number]]  (Abnormal) Collected:  09/13/19 0612    Specimen:  Blood Updated:  09/13/19 0613     Glucose 147 mg/dL      Comment: Serial Number: [id number]:  [id number]       Phosphorus [[id number]]  (Abnormal) Collected:  09/13/19 0401    Specimen:  Blood Updated:  09/13/19 0541     Phosphorus 5.1 mg/dL     Basic Metabolic Panel [[id number]]  (Abnormal) Collected:  09/13/19 0401    Specimen:  Blood Updated:  09/13/19 0539      Glucose 136 mg/dL      BUN 26 mg/dL      Creatinine 1.10 mg/dL      Sodium 135 mmol/L      Potassium 3.8 mmol/L      Chloride 92 mmol/L      CO2 32.0 mmol/L      Calcium 8.2 mg/dL      eGFR Non African Amer 63 mL/min/1.73      BUN/Creatinine Ratio 23.6     Anion Gap 14.8 mmol/L     Narrative:       The MDRD GFR formula is only valid for adults with stable renal function between ages 18 and 70.    Magnesium [[id number]]  (Normal) Collected:  09/13/19 0401    Specimen:  Blood Updated:  09/13/19 0539     Magnesium 2.0 mg/dL     Protime-INR [[id number]]  (Normal) Collected:  09/13/19 0401    Specimen:  Blood Updated:  09/13/19 0521     Protime 10.5 Seconds      INR 1.02    CBC & Differential [[id number]] Collected:  09/12/19 0359    Specimen:  Blood Updated:  09/12/19 0656    Narrative:       The following orders were created for panel order CBC & Differential.  Procedure                               Abnormality         Status                     ---------                               -----------         ------                     CBC Auto Differential[[id number]]        Abnormal            Final result                 Please view results for these tests on the individual orders.    CBC Auto Differential [[id number]]  (Abnormal) Collected:  09/12/19 0359    Specimen:  Blood Updated:  09/12/19 0656     WBC 11.50 10*3/mm3      RBC 3.12 10*6/mm3      Hemoglobin 10.0 g/dL      Hematocrit 29.7 %      MCV 95.3 fL      MCH 32.2 pg      MCHC 33.8 g/dL      RDW 15.2 %      RDW-SD 50.8 fl      MPV 8.0 fL      Platelets 190 10*3/mm3     Narrative:       The previously reported component NRBC is no longer being reported.    Scan Slide [[id number]] Collected:  09/12/19 0359    Specimen:  Blood Updated:  09/12/19 0656     Scan Slide --     Comment: See Manual Differential Results       Manual Differential [[id number]]  (Abnormal) Collected:  09/12/19 0359    Specimen:  Blood Updated:  09/12/19 0656     Neutrophil % 69.0 %      Lymphocyte  % 17.0 %      Monocyte % 2.0 %      Bands %  5.0 %      Metamyelocyte % 6.0 %      Myelocyte % 1.0 %      Neutrophils Absolute 8.51 10*3/mm3      Lymphocytes Absolute 1.96 10*3/mm3      Monocytes Absolute 0.23 10*3/mm3      RBC Morphology Normal     Toxic Granulation Slight/1+     Platelet Morphology Normal    Basic Metabolic Panel [[id number]]  (Abnormal) Collected:  09/12/19 0359    Specimen:  Blood Updated:  09/12/19 0532     Glucose 107 mg/dL      BUN 19 mg/dL      Creatinine 0.80 mg/dL      Sodium 134 mmol/L      Potassium 3.9 mmol/L      Chloride 92 mmol/L      CO2 32.0 mmol/L      Calcium 8.3 mg/dL      eGFR Non African Amer 91 mL/min/1.73      BUN/Creatinine Ratio 23.8     Anion Gap 13.9 mmol/L     Narrative:       The MDRD GFR formula is only valid for adults with stable renal function between ages 18 and 70.    Magnesium [[id number]]  (Normal) Collected:  09/12/19 0359    Specimen:  Blood Updated:  09/12/19 0527     Magnesium 2.1 mg/dL     Phosphorus [[id number]]  (Normal) Collected:  09/12/19 0359    Specimen:  Blood Updated:  09/12/19 0527     Phosphorus 3.6 mg/dL     Protime-INR [[id number]]  (Normal) Collected:  09/12/19 0359    Specimen:  Blood Updated:  09/12/19 0514     Protime 10.0 Seconds      INR 0.96    Blood Culture - Blood, Arm, Left [[id number]] Collected:  09/07/19 0010    Specimen:  Blood from Arm, Left Updated:  09/12/19 0033     Blood Culture No growth at 5 days    Blood Culture - Blood, Arm, Right [[id number]] Collected:  09/07/19 0010    Specimen:  Blood from Arm, Right Updated:  09/12/19 0033     Blood Culture No growth at 5 days    Vancomycin, Peak [[id number]]  (Normal) Collected:  09/10/19 2029    Specimen:  Blood Updated:  09/10/19 2135     Vancomycin Peak 26.90 mcg/mL     Potassium [[id number]]  (Normal) Collected:  09/10/19 1848    Specimen:  Blood Updated:  09/10/19 1919     Potassium 4.7 mmol/L      Comment: Result checked to Trena       Calcium, Ionized [[id number]]  (Abnormal)  Collected:  09/09/19 1906    Specimen:  Blood Updated:  09/09/19 1942     Ionized Calcium 1.12 mmol/L     Urine Culture - Urine, Urine, Clean Catch [[id number]]  (Abnormal) Collected:  09/07/19 0012    Specimen:  Urine, Clean Catch Updated:  09/09/19 1213     Urine Culture >100,000 CFU/mL Aerococcus urinae     Comment: Aerococcus urinae are usually susceptible to Penicillin G, Vancomycin, and Tetracycline and Resistant to Trimethoprim sulfamethoxazole and Gentamicin.           25,000 CFU/mL Mixed Halima Isolated    Narrative:       Specimen contains mixed organisms of questionable pathogenicity which indicates contamination with commensal halima.       Comprehensive Metabolic Panel [[id number]]  (Abnormal) Collected:  09/08/19 0417    Specimen:  Blood Updated:  09/08/19 0517     Glucose 159 mg/dL      BUN 10 mg/dL      Creatinine 0.90 mg/dL      Sodium 136 mmol/L      Potassium 3.9 mmol/L      Chloride 100 mmol/L      CO2 25.0 mmol/L      Calcium 7.6 mg/dL      Total Protein 5.1 g/dL      Albumin 2.50 g/dL      ALT (SGPT) 10 U/L      AST (SGOT) 18 U/L      Alkaline Phosphatase 53 U/L      Total Bilirubin 0.7 mg/dL      eGFR Non African Amer 80 mL/min/1.73      Globulin 2.6 gm/dL      A/G Ratio 1.0 g/dL      BUN/Creatinine Ratio 11.1     Anion Gap 14.9 mmol/L     Narrative:       The MDRD GFR formula is only valid for adults with stable renal function between ages 18 and 70.    Troponin [[id number]]  (Abnormal) Collected:  09/07/19 1333    Specimen:  Blood Updated:  09/07/19 1455     Troponin I 0.080 ng/mL     Narrative:       Troponin I Reference Range:    0.00-0.03  Negative.  Repeat testing in 4-6 hours if clinically indicated.    0.04-0.29  Suspicious for myocardial injury. Serial measurements and clinical  correlation may be necessary to confirm or exclude diagnosis of acute  coronary syndrome.  Repeat testing in 4-6 hours if indicated.     >0.29 Consistent with myocardial injury.  Recommend clinical and laboratory  correlation.     Results my be falsely decreased if patient taking Biotin.     Troponin [[id number]]  (Abnormal) Collected:  09/07/19 0614    Specimen:  Blood Updated:  09/07/19 0722     Troponin I 0.090 ng/mL     Narrative:       Troponin I Reference Range:    0.00-0.03  Negative.  Repeat testing in 4-6 hours if clinically indicated.    0.04-0.29  Suspicious for myocardial injury. Serial measurements and clinical  correlation may be necessary to confirm or exclude diagnosis of acute  coronary syndrome.  Repeat testing in 4-6 hours if indicated.     >0.29 Consistent with myocardial injury.  Recommend clinical and laboratory correlation.     Results my be falsely decreased if patient taking Biotin.     Lactic Acid, Reflex [[id number]]  (Normal) Collected:  09/07/19 0614    Specimen:  Blood Updated:  09/07/19 0639     Lactate 1.9 mmol/L     Respiratory Panel, PCR - Swab, Nasopharynx [[id number]]  (Normal) Collected:  09/07/19 0012    Specimen:  Swab from Nasopharynx Updated:  09/07/19 0426     ADENOVIRUS, PCR Not Detected     Coronavirus 229E Not Detected     Coronavirus HKU1 Not Detected     Coronavirus NL63 Not Detected     Coronavirus OC43 Not Detected     Human Metapneumovirus Not Detected     Human Rhinovirus/Enterovirus Not Detected     Influenza B PCR Not Detected     Parainfluenza Virus 1 Not Detected     Parainfluenza Virus 2 Not Detected     Parainfluenza Virus 3 Not Detected     Parainfluenza Virus 4 Not Detected     Bordetella pertussis pcr Not Detected     Influenza A H1 2009 PCR Not Detected     Chlamydophila pneumoniae PCR Not Detected     Mycoplasma pneumo by PCR Not Detected     Influenza A PCR Not Detected     Influenza A H3 Not Detected     Influenza A H1 Not Detected     RSV, PCR Not Detected    Lactic Acid, Reflex Timer (This will reflex a repeat order 3-3:15 hours after ordered.) [[id number]] Collected:  09/07/19 0023    Specimen:  Blood Updated:  09/07/19 0330     Extra Tube Hold for add-ons.      Comment: Auto resulted.       BNP [[id number]]  (Abnormal) Collected:  09/06/19 2353    Specimen:  Blood Updated:  09/07/19 0048     .0 pg/mL      Comment: Results may be falsely decreased if patient taking Biotin.       Urinalysis With Culture If Indicated - Urine, Clean Catch [[id number]]  (Abnormal) Collected:  09/07/19 0012    Specimen:  Urine, Clean Catch Updated:  09/07/19 0024     Color, UA Yellow     Appearance, UA Cloudy     Comment: Result checked         pH, UA 8.0     Specific Gravity, UA 1.010     Glucose, UA Negative     Ketones, UA Negative     Bilirubin, UA Negative     Blood, UA Trace     Protein, UA 30 mg/dL (1+)     Leuk Esterase, UA Large (3+)     Nitrite, UA Negative     Urobilinogen, UA 1.0 E.U./dL    Urinalysis, Microscopic Only - Urine, Clean Catch [[id number]]  (Abnormal) Collected:  09/07/19 0012    Specimen:  Urine, Clean Catch Updated:  09/07/19 0024     RBC, UA 0-2 /HPF      WBC, UA Too Numerous to Count /HPF      Bacteria, UA 3+ /HPF      Squamous Epithelial Cells, UA None Seen /HPF      Hyaline Casts, UA None Seen /LPF      Methodology Manual Light Microscopy    POC Lactate [[id number]]  (Abnormal) Collected:  09/07/19 0023    Specimen:  Blood Updated:  09/07/19 0024     Lactate 2.6 mmol/L      Comment: Serial Number: [id number]:  [id number]       POC Lactate [[id number]]  (Abnormal) Collected:  09/07/19 0024    Specimen:  Blood Updated:  09/07/19 0024    Comprehensive Metabolic Panel [[id number]]  (Abnormal) Collected:  09/06/19 2353    Specimen:  Blood Updated:  09/07/19 0023     Glucose 128 mg/dL      BUN 13 mg/dL      Creatinine 1.10 mg/dL      Sodium 134 mmol/L      Potassium 3.8 mmol/L      Chloride 96 mmol/L      CO2 23.0 mmol/L      Calcium 8.0 mg/dL      Total Protein 5.8 g/dL      Albumin 3.00 g/dL      ALT (SGPT) 13 U/L      AST (SGOT) 21 U/L      Alkaline Phosphatase 67 U/L      Total Bilirubin 1.0 mg/dL      eGFR Non African Amer 63 mL/min/1.73      Globulin 2.8  gm/dL      A/G Ratio 1.1 g/dL      BUN/Creatinine Ratio 11.8     Anion Gap 18.8 mmol/L     Narrative:       The MDRD GFR formula is only valid for adults with stable renal function between ages 18 and 70.    aPTT [[id number]]  (Abnormal) Collected:  19    Specimen:  Blood Updated:  19 0021     PTT 23.1 seconds            Results for orders placed during the hospital encounter of 10/06/18   Adult Transthoracic Echo Complete W/ Cont if Necessary Per Protocol    Narrative                           Adult Echocardiogram Report          [facility]  Cardiology Department  [address] [place] [address]  [id number]      Name: DORCAS STANLEY CStudy Date: 10/07/2018 09:37 AM         BP: 100/61 mmHg  MRN: [id number]         Patient Location:   : 1932        Gender: Male                            Height: 70 in  Age: 86 yrs            Account#: [id number]                   Weight: 140 lb  Reason For Study: CHF, DYSPNEA, FALL, WEAKNESS                 BSA: 1.8 m2  Ordering Physician:  EMMIE PORRAS  Referring Physician: NO  PHYSICIAN, NO PHYSICIAN    Performed By: NIKA      M-Mode/2-D Measurements:  LVIDd: 6.0 cm       (3.7-5.7) LVPWd: 0.93 cm       (0.8-1.2)  LVIDs: 5.5 cm       (2.3-3.9)  ACS: 1.6 cm         (1.6-3.7) IVSd: 1.1 cm         (0.7-1.2)  LA dimension: 3.7 cm(1.9-4.0) RVDd: 2.2 cm         (0.7-2.4)  FS: 8.5 %           (21-40%)  Ao root diam: 3.5 cm (2.0-3.7)    Comments  Comments: Technically satisfactory exam for interpretation    Findings: The aortic valve is trileaflet with adequate opening coaptation  Mitral valve shows modest fibrocalcific thickening with decreased leaflet  excursion.  Tricuspid valve structure normal; pulmonic valve grossly normal.  RV chamber size and global contractility satisfactory.  Marked left ventricular enlargement with normal wall thickness. There is  severe generalized hypokinesis; LVEF 20-25%.  Left atrial enlargement is present. Normal  aortic root dimension.  There is a very small pericardial effusion that is not impair right heart  filling. No intracardiac thrombus vegetation or masses are seen.  Very large pleural effusion measuring 7.0 cm is noted.    Supplementary Doppler exam showed normal aortic mitral valve for velocity with  mild AI severe mitral regurgitation and mild TR jet with RVSP of 29 mm Hg.    Interpretation: Highly abnormal echocardiogram shows dilated ischemic  cardiomyopathy with LVEF in the 20-25% range.  Severe mitral regurgitation and mild AI and mild TR jets by Doppler with  normal RV systolic pressure 29 mm Hg.  A large pleural effusion measuring 7.0 cm noted.  Clinical correlation warranted.      Interpretation    MMode/2D Measurements & Calculations  ESV(Teich): 147.9 ml  EF(Teich): 18.5 %                    Ao root area: 9.5 cm2  LVOT diam: 2.0 cm                    EDV(MOD-sp4): 164.5 ml                                       ESV(MOD-sp4): 122.5 ml                                       EF(MOD-sp4): 25.6 %      Doppler Measurements & Calculations  MV max P.0 mmHg                    Ao V2 max: 102.3 cm/sec  MV mean P.8 mmHg                   Ao max P.2 mmHg                                         Ao V2 mean: 73.8 cm/sec                                         Ao mean P.4 mmHg                                         Ao V2 VTI: 20.0 cm                                         FELICIA(I,D): 1.5 cm2                                         FELICIA(V,D): 1.7 cm2  LV V1 max P.1 mmHg                 MR max kyree: 460.1 cm/sec  LV V1 mean P.51 mmHg               MR max P.7 mmHg  LV V1 max: 53.2 cm/sec                 MR VTI: 146.0 cm  LV V1 mean: 32.7 cm/sec  LV V1 VTI: 9.3 cm    MR PISA radius: 0.86 cm                PA max P.3 mmHg  TR max kyree: 215.7 cm/sec  TR max P.6 mmHg  RVSP(TR): 28.6 mmHg    _______________________________________________________________________________      Electronically signed  by: Malik Mejia MD  on 10/09/2018 08:33 AM         Imaging Results (all)     Procedure Component Value Units Date/Time    XR Chest 1 View [[id number]] Collected:  09/07/19 0823     Updated:  09/07/19 0826    Narrative:       Examination: XR CHEST 1 VW-     Date of Exam: 9/7/2019 12:07 AM     Indication: Chest pain.     Comparison: 08/03/2019.     Technique: Single radiographic view of the chest was obtained.     Findings:  There is a stable small to moderate right pleural effusion. There is  significantly improved aeration of the left lung base with mild residual  scarring. There is mild stable biapical pleural thickening. The heart  appears enlarged. Pulmonary vasculature is within normal limits. No  acute osseous abnormalities. No pneumothorax       Impression:       .  1. Stable small to moderate right pleural effusion.  2. Interval resolution of left-sided pneumonia with residual basilar  scarring.  3. Suspected cardiomegaly.     Electronically Signed By-Jett Harden On:9/7/2019 8:24 AM  This report was finalized on [id number] by  Jett Harden, .          ECG/EMG Results (most recent)     Procedure Component Value Units Date/Time    ECG 12 Lead [[id number]] Collected:  09/06/19 2337     Updated:  09/08/19 1532    Narrative:       HEART RATE= 77  bpm  RR Interval= 781  ms  SC Interval=   ms  P Horizontal Axis=   deg  P Front Axis=   deg  QRSD Interval= 117  ms  QT Interval= 371  ms  QRS Axis= -32  deg  T Wave Axis= 118  deg  - ABNORMAL ECG -  Atrial fibrillation  Incomplete left bundle branch block  ST depr, consider ischemia, anterolateral lds  Electronically Signed By: Julio César Manuel (Shamar) 07-Sep-2019 06:18:29  Date and Time of Study: 2019-09-06 23:37:48    ECG 12 Lead [[id number]] Collected:  09/07/19 0932     Updated:  09/09/19 1044    Narrative:       HEART RATE= 78  bpm  RR Interval= 773  ms  SC Interval=   ms  P Horizontal Axis=   deg  P Front Axis=   deg  QRSD Interval= 121  ms  QT Interval= 390   ms  QRS Axis= -40  deg  T Wave Axis= 147  deg  - ABNORMAL ECG -  Atrial fibrillation  Ventricular premature complex  Left bundle branch block  No significant changes compared to prior EKGs that was done on September 6, 2019  Electronically Signed By: Shanika Byers (RAUL) 09-Sep-2019 10:44:12  Date and Time of Study: 2019-09-07 09:32:55          Condition on Discharge:  stable    Vital Signs  Temp:  [97.5 °F (36.4 °C)-97.8 °F (36.6 °C)] 97.5 °F (36.4 °C)  Heart Rate:  [62-75] 62  Resp:  [16-18] 16  BP: ()/(52-76) 128/76    Physical Exam:     General Appearance:    Alert, cooperative, in no acute distress   Head:    Normocephalic, without obvious abnormality, atraumatic   Eyes:            Lids and lashes normal, conjunctivae and sclerae normal, no   icterus, no pallor, corneas clear, PERRLA   Neck:   No adenopathy, supple, trachea midline, no thyromegaly, no   carotid bruit, no JVD   Back:     No kyphosis present, no scoliosis present, no skin lesions,      erythema or scars, no tenderness to percussion or                   palpation,   range of motion normal   Lungs:     Clear to auscultation,respirations regular, even and                  unlabored    Heart:    Regular rhythm and normal rate, normal S1 and S2, no            murmur, no gallop, no rub, no click   Chest Wall:    No abnormalities observed   Abdomen:     Normal bowel sounds, no masses, no organomegaly, soft        non-tender, non-distended, no guarding, no rebound                tenderness   Extremities:   Moves all extremities well, no edema, no cyanosis, no             redness   Skin:   No bleeding, bruising or rash   Neurologic:   Cranial nerves 2 - 12 grossly intact, sensation intact, DTR       present and equal bilaterally       Discharge Disposition  Skilled Nursing Facility (DC - External)    Discharge Medications     Discharge Medications      New Medications      Instructions Start Date   amoxicillin-clavulanate 875-125 MG per  tablet  Commonly known as:  AUGMENTIN   1 tablet, Oral, Every 12 Hours Scheduled      magic butt paste   1 each, Topical, 2 Times Daily         Changes to Medications      Instructions Start Date   HYDROcodone-acetaminophen 5-325 MG per tablet  Commonly known as:  NORCO  What changed:    · when to take this  · reasons to take this   1 tablet, Oral, Every 8 Hours PRN         Continue These Medications      Instructions Start Date   carvedilol 3.125 MG tablet  Commonly known as:  COREG   3.125 mg, Oral, 2 Times Daily With Meals      clopidogrel 75 MG tablet  Commonly known as:  PLAVIX   75 mg, Oral, Daily      dexamethasone 2 MG tablet  Commonly known as:  DECADRON   2 mg, Oral, Daily      furosemide 40 MG tablet  Commonly known as:  LASIX   40 mg, Oral, 2 Times Daily      gabapentin 300 MG capsule  Commonly known as:  NEURONTIN   300 mg, Oral, 3 Times Daily      isosorbide mononitrate 30 MG 24 hr tablet  Commonly known as:  IMDUR   30 mg, Oral, Daily      loperamide 2 MG capsule  Commonly known as:  IMODIUM   2 mg, Oral, Daily PRN      LORazepam 0.5 MG tablet  Commonly known as:  ATIVAN   0.5 mg, Oral, Every 6 Hours PRN      nitroglycerin 0.4 MG SL tablet  Commonly known as:  NITROSTAT   0.4 mg, Sublingual, Every 5 Minutes PRN, Take no more than 3 tabs      polyethylene glycol packet  Commonly known as:  MIRALAX   17 g, Oral, Daily      raNITIdine 150 MG tablet  Commonly known as:  ZANTAC   150 mg, Oral, Daily      senna 8.6 MG tablet tablet  Commonly known as:  SENOKOT   2 tablets, Oral, 2 Times Daily      traZODone 100 MG tablet  Commonly known as:  DESYREL   100 mg, Oral, Nightly             Discharge Diet:   Diet Instructions     Diet: Cardiac      Discharge Diet:  Cardiac          Activity at Discharge:   Activity Instructions     Gradually Increase Activity Until at Pre-Hospitalization Level            Follow-up Appointments  No future appointments.      Test Results Pending at Discharge       Risk for  Readmission (LACE) Score: 14 (9/13/2019  6:00 AM)          Ricardo Avila MD  09/13/19  3:04 PM    Time: Discharge 35 min

## 2019-09-13 NOTE — PLAN OF CARE
Problem: Urinary Tract Infection (Adult)  Goal: Signs and Symptoms of Listed Potential Problems Will be Absent, Minimized or Managed (Urinary Tract Infection)  Outcome: Outcome(s) achieved Date Met: 09/13/19

## 2019-09-13 NOTE — NURSING NOTE
Called report to Jacobo jordan [facility], EMS to be here around [phone]

## 2019-09-16 NOTE — PROGRESS NOTES
Discharge Planning Assessment   Patrice     Patient Name: Julio César Braxton  MRN: [id number]  Today's Date: 9/16/2019    Admit Date: 9/6/2019       Final Discharge Disposition Code  03 - skilled nursing facility (SNF)    Final Note  to rehab at [facility]          Carol naegele rn  Case management  Office number [phone]  Cell phone [phone]

## 2019-09-19 NOTE — TELEPHONE ENCOUNTER
Janette Nurse with [facility]  called and needs to make an appt for Julio César with Dr Medina. Please call and ask for 2 [facility] nurse at [phone].

## 2019-09-20 NOTE — TELEPHONE ENCOUNTER
Spoke with nurse from [facility].  Patient was discharged from the hospital last week and needed to see [facility] for a hospital follow up.  It was too long for [facility] to do a TCM so I scheduled patient with [facility] on 09/27/2019 at 4pm.

## 2019-10-10 NOTE — DISCHARGE INSTRUCTIONS
Return for fever vomiting mental status changes unequal pupils weakness in extremities or any other new or worsening problems or concerns.  Follow-up primary care doctor 1 week for recheck.

## 2019-10-10 NOTE — ED NOTES
"Unstagable pressure injury as of 10/10/19- black eschar to center of pressure wound. iPad unusable at this time. Patient soiled , RN cleansed wound, Mepilex applied, barrier cream applied to erythematous scrotum.    Patient states he \"rolled\" out of bed and awoke lying on right side, pt reports right arm pain and states he had neck pain initially that has now subsided. No other c/o at this time. No confusion noted- pt is found to be on Plavix.     Lisette Mccray, RN  10/10/19 0501       Lisette Mccray, RN  10/10/19 0505    "

## 2019-10-14 PROBLEM — R65.20 SEVERE SEPSIS (HCC): Status: ACTIVE | Noted: 2019-01-01

## 2019-10-14 PROBLEM — A41.9 SEVERE SEPSIS (HCC): Status: ACTIVE | Noted: 2019-01-01

## 2019-10-14 PROBLEM — L89.153 DECUBITUS ULCER OF SACRAL REGION, STAGE 3 (HCC): Status: ACTIVE | Noted: 2019-01-01

## 2019-10-14 NOTE — PROGRESS NOTES
Malnutrition Severity Assessment    Patient Name:  Julio César Braxton  YOB: 1932  MRN: [id number]  Admit Date:  10/14/2019    Patient meets criteria for : Severe Malnutrition    Comments:  Assessed patient to have severe malnutrition. Patient is currently NPO r/t aspiration. Will await ST input.      If unable to start oral diet in 1-2 days, consider addition of tube feeding r/t severe malnutrition.     Malnutrition Severity Assessment  Malnutrition Type: Chronic Disease - Related Malnutrition     Malnutrition Type (last 8 hours)      Malnutrition Severity Assessment     Row Name 10/14/19 1126       Malnutrition Severity Assessment    Malnutrition Type  Chronic Disease - Related Malnutrition    Row Name 10/14/19 1126       Unintentional Weight Loss     Unintentional Weight Loss   Weight loss greater than 5% in one month Current weight of 123# which is down 8.9% from 135# (9/6/19)     Row Name 10/14/19 1126       Muscle Loss    Loss of Muscle Mass Findings  Severe    Zoroastrianism Region  Severe - deep hollowing/scooping, lack of muscle to touch, facial bones well defined    Clavicle Bone Region  Severe - protruding prominent bone    Acromion Bone Region  Severe - squared shoulders, bones, and acromion process protrusion prominent    Scapular Bone Region  Severe - prominent bones, depressions easily visible between ribs, scapula, spine, shoulders    Dorsal Hand Region  -- Unable to obtain r/t positioning    Patellar Region  Severe - prominent bone, square looking, very little muscle definition    Anterior Thigh Region  Severe - line/depression along thigh, obviously thin    Posterior Calf Region  Severe - thin with very little definition/firmness    Row Name 10/14/19 1126       Fat Loss    Subcutaneous Fat Loss Findings  Severe    Orbital Region   Severe - pronounced hollowness/depression, dark circles, loose saggy skin    Upper Arm Region  -- Unable to obtain r/t positioning     Thoracic & Lumbar Region  None     Row Name 10/14/19 1126       Fluid Accumulation (Edema)    Fluid Acumulation Findings  -- None     Row Name 10/14/19 1126       Criteria Met (Must meet criteria for severity in at least 2 of these categories: M Wasting, Fat Loss, Fluid, Secondary Signs, Wt. Status, Intake)    Patient meets criteria for   Severe Malnutrition          Electronically signed by:  Mariah Bella RD  10/14/19 11:38 AM

## 2019-10-14 NOTE — PROGRESS NOTES
Wound Initial Evaluation   KEILA     Patient Name: Julio César Braxton  : 4/3/1932  MRN: [id number]  Today's Date: 10/14/2019 Room Number: 2112/1      Admit Date: 10/14/2019  Attending: Jordan Mittal MD    Consult Requested By: Dr. Mittal    Reason For Consult: Sacral pressure injury and moisture associated dermatitis    Chief Complaint: Patient is not able to respond to questions no family to bedside.  The patient is on an SAT surface.  Patient appears very frail.    Visit Dx:    ICD-10-CM ICD-9-CM   1. Severe sepsis (CMS/HCC) A41.9 038.9    R65.20 995.92   2. Aspiration pneumonia of right lower lobe, unspecified aspiration pneumonia type (CMS/HCC) J69.0 507.0   3. Hyperglycemia R73.9 790.29   4. Acute renal insufficiency N28.9 593.9     Patient Active Problem List   Diagnosis   • Stroke (cerebrum) (CMS/HCC)   • CAD in native artery   • LV dysfunction   • Dyspnea on exertion   • Pacemaker   • Decubitus ulcer of trochanteric region of right hip, stage 2 (CMS/HCC)   • Decubitus ulcer of sacral region, stage 3 (CMS/HCC)   • Atrial fibrillation (CMS/HCC)   • Bradycardia   • Cardiomyopathy (CMS/HCC)   • Diabetes mellitus, type II (CMS/HCC)   • Long term current use of anticoagulant therapy   • PVCs (premature ventricular contractions)   • Second degree heart block   • Essential hypertension   • Hyperlipidemia, mixed   • Acute UTI   • Dermatitis associated with moisture   • Stage 2 skin ulcer of sacral region (CMS/HCC)   • Severe sepsis (CMS/HCC)       History:   Past Medical History:   Diagnosis Date   • Atrial fibrillation (CMS/HCC)    • CHF (congestive heart failure) (CMS/HCC)    • Coronary artery disease    • Myocardial infarction (CMS/HCC)    • Neuropathy    • Stroke (CMS/HCC)      Past Surgical History:   Procedure Laterality Date   • ANGIOPLASTY     • BACK SURGERY     • CHOLECYSTECTOMY     • CORONARY STENT PLACEMENT     • SPINAL FUSION       Social History     Socioeconomic History   • Marital status:       Spouse name: Not on file   • Number of children: Not on file   • Years of education: Not on file   • Highest education level: Not on file   Tobacco Use   • Smoking status: Former Smoker     Types: Cigarettes   • Smokeless tobacco: Never Used   • Tobacco comment: Quit 40 years go   Substance and Sexual Activity   • Alcohol use: No     Frequency: Never   • Drug use: No   Social History Narrative    - spouse has Parkinson's.  He lives at home with spouse and grandson.       Allergies:  No Known Allergies    Medications:    Current Facility-Administered Medications:   •  carvedilol (COREG) tablet 3.125 mg, 3.125 mg, Oral, BID With Meals, Jordan Mittal MD  •  cefepime 2 gm IVPB in 100 ml NS (MBP), 2 g, Intravenous, Q24H, Ilia Samaniego MD  •  clindamycin (CLEOCIN) 600 mg in sodium chloride 0.9% 50 mL IVPB (premix), 600 mg, Intravenous, Q8H, Ilia Samaniego MD  •  clopidogrel (PLAVIX) tablet 75 mg, 75 mg, Oral, Daily, Jordan Mittal MD  •  dextrose (D50W) 25 g/ 50mL Intravenous Solution 25 g, 25 g, Intravenous, Q15 Min PRN, Jordan Mittal MD  •  dextrose (GLUTOSE) oral gel 15 g, 15 g, Oral, Q15 Min PRN, Jordan Mittal MD  •  glucagon (human recombinant) (GLUCAGEN DIAGNOSTIC) injection 1 mg, 1 mg, Subcutaneous, Q15 Min PRN, Jordan Mittal MD  •  hydrocortisone sodium succinate (Solu-CORTEF) injection 50 mg, 50 mg, Intravenous, Q8H, Ilia Samaniego MD, 50 mg at 10/14/19 0924  •  insulin glargine (LANTUS) injection 10 Units, 10 Units, Subcutaneous, Nightly, Jordan Mittal MD, 10 Units at 10/14/19 0953  •  insulin lispro (humaLOG) injection 0-9 Units, 0-9 Units, Subcutaneous, 4x Daily With Meals & Nightly, Jordan Mittal MD, 8 Units at 10/14/19 0951  •  isosorbide mononitrate (IMDUR) 24 hr tablet 30 mg, 30 mg, Oral, Daily, Jordan Mittal MD  •  magic butt paste, , Topical, TID, Isidra Flores APRN  •  nitroglycerin (NITROSTAT) SL tablet 0.4 mg, 0.4 mg, Sublingual, Q5 Min PRN,  Jordan Mittal MD  •  pantoprazole (PROTONIX) EC tablet 40 mg, 40 mg, Oral, Q AM, Jordan Mittal MD  •  Pharmacy to dose vancomycin, , Does not apply, Continuous FADI, Ilia Samaniego MD  •  sodium chloride 0.9 % flush 10 mL, 10 mL, Intravenous, PRN, Carlos Eugene MD  •  sodium chloride 0.9 % flush 10 mL, 10 mL, Intravenous, PRN, Carlos Eugene MD, 10 mL at 10/14/19 0925  •  sodium chloride 0.9 % infusion, 125 mL/hr, Intravenous, Continuous, Jordan Mittal MD, Last Rate: 125 mL/hr at 10/14/19 0700, 125 mL/hr at 10/14/19 0700    Results Review:  Lab Results (last 48 hours)     Procedure Component Value Units Date/Time    POC Glucose Once [[id number]]  (Abnormal) Collected:  10/14/19 0935    Specimen:  Blood Updated:  10/14/19 0936     Glucose 413 mg/dL      Comment: Serial Number: [id number]:  [id number]       Hemoglobin A1c [[id number]]  (Abnormal) Collected:  10/14/19 0121    Specimen:  Blood Updated:  10/14/19 0908     Hemoglobin A1C 8.3 %     Narrative:       Hemoglobin A1C Reference Range:    <5.7 %        Normal  5.7-6.4 %     Increased risk for diabetes  > 6.4 %        Diabetes       These guidelines have been recommended by the American Diabetic Association for Hgb A1c.      The following 2010 guidelines have been recommended by the American Diabetes Association for Hemoglobin A1c.    HBA1c 5.7-6.4% Increased risk for future diabetes (pre-diabetes)  HBA1c     >6.4% Diabetes    Reticulocytes [[id number]]  (Abnormal) Collected:  10/14/19 0121    Specimen:  Blood Updated:  10/14/19 0804     Reticulocyte % 2.33 %      Reticulocyte Absolute 0.0934 10*6/mm3     Lactic Acid, Reflex [[id number]]  (Abnormal) Collected:  10/14/19 0435    Specimen:  Blood Updated:  10/14/19 0521     Lactate 7.0 mmol/L     Glucose, Random [[id number]]  (Abnormal) Collected:  10/14/19 0435    Specimen:  Blood Updated:  10/14/19 0515     Glucose 491 mg/dL     Calcium, Ionized [[id number]]  (Normal) Collected:   10/14/19 0435    Specimen:  Blood from Arm, Right Updated:  10/14/19 0507     Ionized Calcium 1.24 mmol/L     Lactic Acid, Reflex Timer (This will reflex a repeat order 3-3:15 hours after ordered.) [[id number]] Collected:  10/14/19 0125    Specimen:  Blood Updated:  10/14/19 0430     Extra Tube Hold for add-ons.     Comment: Auto resulted.       POC Glucose Once [[id number]]  (Abnormal) Collected:  10/14/19 0323    Specimen:  Blood Updated:  10/14/19 0325     Glucose >500 mg/dL      Comment: Serial Number: [id number]:  [id number]       Protime-INR [[id number]]  (Normal) Collected:  10/14/19 0121    Specimen:  Blood Updated:  10/14/19 0242     Protime 10.0 Seconds      INR 0.90    aPTT [[id number]]  (Abnormal) Collected:  10/14/19 0121    Specimen:  Blood Updated:  10/14/19 0242     PTT 22.4 seconds     [place] Draw [[id number]] Collected:  10/14/19 0121    Specimen:  Blood Updated:  10/14/19 0230    Narrative:       The following orders were created for panel order [place] Draw.  Procedure                               Abnormality         Status                     ---------                               -----------         ------                     Light Blue Top[[id number]]                                   Final result               Green Top (Gel)[[id number]]                                  Final result               Lavender Top[[id number]]                                     Final result               Gold Top - SST[[id number]]                                   Final result                 Please view results for these tests on the individual orders.    Light Blue Top [[id number]] Collected:  10/14/19 0121    Specimen:  Blood Updated:  10/14/19 0230     Extra Tube hold for add-on     Comment: Auto resulted       Green Top (Gel) [[id number]] Collected:  10/14/19 0121    Specimen:  Blood Updated:  10/14/19 0230     Extra Tube Hold for add-ons.     Comment: Auto resulted.       Lavender Top [[id number]] Collected:   10/14/19 0121    Specimen:  Blood Updated:  10/14/19 0230     Extra Tube hold for add-on     Comment: Auto resulted       Gold Top - SST [[id number]] Collected:  10/14/19 0121    Specimen:  Blood Updated:  10/14/19 0230     Extra Tube Hold for add-ons.     Comment: Auto resulted.       Magnesium [[id number]]  (Normal) Collected:  10/14/19 0121    Specimen:  Blood Updated:  10/14/19 0228     Magnesium 2.4 mg/dL     Phosphorus [[id number]]  (Normal) Collected:  10/14/19 0121    Specimen:  Blood Updated:  10/14/19 0228     Phosphorus 4.0 mg/dL     C-reactive Protein [[id number]]  (Abnormal) Collected:  10/14/19 0121    Specimen:  Blood Updated:  10/14/19 0228     C-Reactive Protein 22.97 mg/dL     Urinalysis With Culture If Indicated - Urine, Catheter [[id number]]  (Abnormal) Collected:  10/14/19 0208    Specimen:  Urine, Catheter Updated:  10/14/19 0224     Color, UA Yellow     Appearance, UA Clear     pH, UA 5.5     Specific Gravity, UA 1.025     Glucose, UA >=1000 mg/dL (3+)     Ketones, UA Negative     Bilirubin, UA Negative     Blood, UA Negative     Protein, UA Negative     Leuk Esterase, UA Negative     Nitrite, UA Negative     Urobilinogen, UA 0.2 E.U./dL    Narrative:       Urine microscopic not indicated.    Manual Differential [[id number]]  (Abnormal) Collected:  10/14/19 0121    Specimen:  Blood Updated:  10/14/19 0215     Neutrophil % 69.0 %      Lymphocyte % 14.0 %      Monocyte % 5.0 %      Eosinophil % 1.0 %      Bands %  11.0 %      Neutrophils Absolute 11.36 10*3/mm3      Lymphocytes Absolute 1.99 10*3/mm3      Monocytes Absolute 0.71 10*3/mm3      Eosinophils Absolute 0.14 10*3/mm3      Anisocytosis Slight/1+     Polychromasia Slight/1+     Toxic Granulation Slight/1+     Platelet Morphology Normal    CBC & Differential [[id number]] Collected:  10/14/19 0121    Specimen:  Blood Updated:  10/14/19 0215    Narrative:       The following orders were created for panel order CBC & Differential.  Procedure                                Abnormality         Status                     ---------                               -----------         ------                     CBC Auto Differential[[id number]]        Abnormal            Final result                 Please view results for these tests on the individual orders.    CBC Auto Differential [[id number]]  (Abnormal) Collected:  10/14/19 0121    Specimen:  Blood Updated:  10/14/19 0215     WBC 14.20 10*3/mm3      RBC 3.94 10*6/mm3      Hemoglobin 13.1 g/dL      Hematocrit 39.0 %      MCV 98.8 fL      MCH 33.2 pg      MCHC 33.6 g/dL      RDW 14.9 %      RDW-SD 52.1 fl      MPV 8.5 fL      Platelets 234 10*3/mm3     Scan Slide [[id number]] Collected:  10/14/19 0121    Specimen:  Blood Updated:  10/14/19 0215     Scan Slide --     Comment: See Manual Differential Results       Comprehensive Metabolic Panel [[id number]]  (Abnormal) Collected:  10/14/19 0121    Specimen:  Blood Updated:  10/14/19 0212     Glucose 843 mg/dL      BUN 46 mg/dL      Creatinine 1.40 mg/dL      Sodium 138 mmol/L      Potassium 3.3 mmol/L      Chloride 91 mmol/L      CO2 32.0 mmol/L      Calcium 9.4 mg/dL      Total Protein 6.4 g/dL      Albumin 2.90 g/dL      ALT (SGPT) 21 U/L      AST (SGOT) 41 U/L      Alkaline Phosphatase 109 U/L      Total Bilirubin 1.3 mg/dL      eGFR Non African Amer 48 mL/min/1.73      Globulin 3.5 gm/dL      A/G Ratio 0.8 g/dL      BUN/Creatinine Ratio 32.9     Anion Gap 18.3 mmol/L     Narrative:       The MDRD GFR formula is only valid for adults with stable renal function between ages 18 and 70.    Blood Gas, Arterial [[id number]]  (Abnormal) Collected:  10/14/19 0204    Specimen:  Arterial Blood Updated:  10/14/19 0207     Site Left Radial     Max's Test Positive     pH, Arterial 7.482 pH units      pCO2, Arterial 45.8 mm Hg      pO2, Arterial 55.3 mm Hg      HCO3, Arterial 34.2 mmol/L      Base Excess, Arterial 9.4 mmol/L      Comment: Serial Number: [id number]:  [id number]         O2 Saturation, Arterial 90.1 %      CO2 Content 35.7 mmol/L      Barometric Pressure for Blood Gas --     Comment: N/A        Modality Cannula     FIO2 <21 %      Hemodilution No    Blood Culture - Blood, Arm, Left [[id number]] Collected:  10/14/19 0121    Specimen:  Blood from Arm, Left Updated:  10/14/19 0128    Blood Culture - Blood, Arm, Right [[id number]] Collected:  10/14/19 0121    Specimen:  Blood from Arm, Right Updated:  10/14/19 0127    POC Lactate [[id number]]  (Abnormal) Collected:  10/14/19 0125    Specimen:  Blood Updated:  10/14/19 0126     Lactate 3.2 mmol/L      Comment: Serial Number: [id number]:  [id number]       POC Glucose Once [[id number]]  (Abnormal) Collected:  10/14/19 0059    Specimen:  Blood Updated:  10/14/19 0101     Glucose >500 mg/dL      Comment: Serial Number: [id number]:  [id number]           Imaging Results (last 72 hours)     Procedure Component Value Units Date/Time    XR Chest 1 View [[id number]] Collected:  10/14/19 0832     Updated:  10/14/19 0837    Narrative:       XR CHEST 1 VW-     Date of Exam: 10/14/2019 8:30 AM     Indication: pneumothorax; A41.9-Sepsis, unspecified organism;  R65.20-Severe sepsis without septic shock; J69.0-Pneumonitis due to  inhalation of food and vomit; R73.9-Hyperglycemia, unspecified;  N28.9-Disorder of kidney and ureter, unspecified.     Comparison Exams: Chest radiograph from earlier today     Technique: Single AP chest radiograph     FINDINGS:  A small right apical pneumothorax has slightly improved now measuring  1.8 cm in diameter, previously 2.2 cm. Hazy bilateral airspace opacities  worse in the right lower lung appear slightly improved. There are  moderate right and small left pleural effusions. The heart and  mediastinal contours appear stable. The pulmonary vasculature appears  normal. The osseous structures appear intact.       Impression:       1.Small right apical pneumothorax has slightly improved.  2.Hazy bilateral  airspace opacities worse in the right lower lung have  slightly improved, suggesting multifocal pneumonia.  3.Moderate right and small left pleural effusions.     Electronically Signed By-DR. Darnell Montilla MD On:10/14/2019 8:35 AM  This report was finalized on [id number] by DR. Darnell Montilla MD.    XR Chest 1 View [[id number]] Collected:  10/14/19 0734     Updated:  10/14/19 0742    Narrative:       XR CHEST 1 VW-     Date of Exam: 10/14/2019 1:39 AM     Indication: Severe Sepsis Protocol; A41.9-Sepsis, unspecified organism;  R65.20-Severe sepsis without septic shock; J69.0-Pneumonitis due to  inhalation of food and vomit; R73.9-Hyperglycemia, unspecified.     Comparison Exams: September 7, 2019     Technique: Single AP chest radiograph     FINDINGS:  There is a small-to-moderate right apical pneumothorax measuring up to  2.2 cm in diameter. There are hazy bilateral airspace opacities worse in  the right lower lung. There are moderate right and small left pleural  effusions. The heart and mediastinal contours appear stable. The  pulmonary vasculature appears normal. The osseous structures appear  intact.       Impression:       1.Small-to-moderate right apical pneumothorax.  2.Hazy bilateral airspace opacities worse in the right lower lung,  suggesting multifocal pneumonia.  3.Moderate right and small left pleural effusions.     Results were relayed to Dr. Mittal at time of dictation.     Electronically Signed By-DR. Darnell Montilla MD On:10/14/2019 7:39 AM  This report was finalized on [id number] by DR. Darnell Montilla MD.          Review of Systems:  Review of Systems   Unable to perform ROS: Mental status change       Physical Assessment:                                                                     Patient presents with multiple abrasions and areas of ecchymosis to the upper extremities.    Patient has a sacral pressure injury stage III: The injury presents with a pink base but there is some  ecchymosis and another area of ecchymosis lateral to the primary wound.  Area is approximately 1.8 x 1.8 cm there is a small amount of serosanguineous exudate noted.  There is no odor no erythema surrounding.    Patient has significant moisture associated dermatitis to the perirectal area as well as groin and scrotal area: Several superficial denuded areas noted to the perirectal area patient presents with a red papular rash consistent with yeast.    Recommendation and Plan  Recommend treating the pressure injury to the sacrum with a silicone foam border dressing.  Recommend placing a low-air-loss surface and utilizing pressure injury prevention strategies such as dry flow pads, waffle heel boots, positioning and turning patient side to side as tolerated.    The moisture associated dermatitis will be treated with Dayana's Magic Butt cream, the low-air-loss surface will also help control moisture.    ANDIE Keita   10/14/2019   10:31 AM

## 2019-10-14 NOTE — ED NOTES
"Pt from [facility]. EMS reports they were called out for BG 1,035. According to paperwork from facility, blood was drawn around 2000 on 10/13/2019. EMS states facility reported that the pt \"is not acting like himself\". Pt does not have a hx of diabetes, no insulin was given to the pt PTA. EMS reports pt had two runs of v-tach while in the ambulance. Pt has no complaints at this time.      Eliza Hernandez, GENEVIEVEN  10/14/19 0059    "

## 2019-10-14 NOTE — CONSULTS
Pulmonary/ Critical Care/ sleep medicine Consult Note        Patient Name:  Julio César Braxton    :  4/3/1932    Medical Record:  [id number]    Requesting Physician    No ref. provider found    Primary Care Physician     Jordan Mittal MD    Reason for consultation    Julio César Braxton is a 87 y.o. male who was referred for consultation for pneumonia, sepsis and pneumothorax.  Patient is quite obtunded at time of my evaluation and is not able to give me any history.  History was obtained from the chart and talking to patient's nurse at bedside.  I tried to call patient's sister and daughter the 2 numbers listed in the chart but I was unable to get hold off either of the family members.  He is apparently a resident of nursing home and was sent for aspiration pneumonia.  Patient apparently has history of dysphagia but family has refused dietary recommendations.  Patient was noted to have acute hypoxemia and some mental status changes at the nursing home.  He subsequently was transferred to [facility] for further evaluation and management  Patient has received fluid resuscitation in the ER and has been started on broad-spectrum antibiotic therapy.  Review of Systems    Unable to obtain    Medical History    Past Medical History:   Diagnosis Date   • Atrial fibrillation (CMS/HCC)    • CHF (congestive heart failure) (CMS/HCC)    • Coronary artery disease    • Myocardial infarction (CMS/HCC)    • Neuropathy    • Stroke (CMS/HCC)        Surgical History    Past Surgical History:   Procedure Laterality Date   • ANGIOPLASTY     • BACK SURGERY     • CHOLECYSTECTOMY     • CORONARY STENT PLACEMENT     • SPINAL FUSION         Family History    Family History   Problem Relation Age of Onset   • Coronary artery disease Brother        Social History    Social History     Tobacco Use   • Smoking status: Former Smoker     Types: Cigarettes   • Smokeless tobacco: Never Used   • Tobacco comment: Quit 40 years go    Substance Use Topics   • Alcohol use: No     Frequency: Never       Allergies    No Known Allergies    Medications    Scheduled Meds:  carvedilol 3.125 mg Oral BID With Meals   clopidogrel 75 mg Oral Daily   dexamethasone 2 mg Oral Daily   insulin glargine 10 Units Subcutaneous Nightly   insulin lispro 0-9 Units Subcutaneous 4x Daily With Meals & Nightly   isosorbide mononitrate 30 mg Oral Daily   magic butt paste 1 each Topical BID   pantoprazole 40 mg Oral Q AM   piperacillin-tazobactam 3.375 g Intravenous Q8H     Continuous Infusions:  Pharmacy to Dose Zosyn    sodium chloride 125 mL/hr     PRN Meds:.dextrose  •  dextrose  •  glucagon (human recombinant)  •  nitroglycerin  •  Pharmacy to Dose Zosyn  •  sodium chloride  •  sodium chloride      Physical Exam    tMax 24 hrs:  Temp (24hrs), Av.7 °F (36.5 °C), Min:97.5 °F (36.4 °C), Max:97.8 °F (36.6 °C)    Vitals Ranges:  Temp:  [97.5 °F (36.4 °C)-97.8 °F (36.6 °C)] 97.7 °F (36.5 °C)  Heart Rate:  [] 90  Resp:  [16-22] 20  BP: (101-142)/(50-88) 101/50  Intake and Output Last 3 Shifts:  I/O last 3 completed shifts:  In: 300 [I.V.:300]  Out: -     Constitutional: Chronically ill-appearing, no acute distress, non-toxic appearance   Eyes:  conjunctiva normal   HENT:  Atraumatic, external ears normal, no nasal discharge   Neck-no JVD, no tenderness, supple   Respiratory:  No respiratory distress, good air entry bilaterally, faint bibasilar crackles  Cardiovascular:  Normal rate, normal rhythm, no murmurs, no gallops, no rubs   GI:  Soft, nondistended, normal bowel sounds, nontender, no organomegaly, no mass, no rebound, no guarding   :  No costovertebral angle tenderness   Musculoskeletal:  No edema, no tenderness, no deformities. Back- no tenderness  Integument:  Well hydrated, no rash   Lymphatic:  No lymphadenopathy noted   Neurologic: Lethargic.  Does not follow any commands  Psychiatric: As above    labs    Lab Results (last 24 hours)     Procedure  Component Value Units Date/Time    Reticulocytes [[id number]]  (Abnormal) Collected:  10/14/19 0121    Specimen:  Blood Updated:  10/14/19 0804     Reticulocyte % 2.33 %      Reticulocyte Absolute 0.0934 10*6/mm3     Hemoglobin A1c [[id number]] Collected:  10/14/19 0121    Specimen:  Blood Updated:  10/14/19 0736    Lactic Acid, Reflex [[id number]]  (Abnormal) Collected:  10/14/19 0435    Specimen:  Blood Updated:  10/14/19 0521     Lactate 7.0 mmol/L     Glucose, Random [[id number]]  (Abnormal) Collected:  10/14/19 0435    Specimen:  Blood Updated:  10/14/19 0515     Glucose 491 mg/dL     Calcium, Ionized [[id number]]  (Normal) Collected:  10/14/19 0435    Specimen:  Blood from Arm, Right Updated:  10/14/19 0507     Ionized Calcium 1.24 mmol/L     Lactic Acid, Reflex Timer (This will reflex a repeat order 3-3:15 hours after ordered.) [[id number]] Collected:  10/14/19 0125    Specimen:  Blood Updated:  10/14/19 0430     Extra Tube Hold for add-ons.     Comment: Auto resulted.       POC Glucose Once [[id number]]  (Abnormal) Collected:  10/14/19 0323    Specimen:  Blood Updated:  10/14/19 0325     Glucose >500 mg/dL      Comment: Serial Number: [id number]:  [id number]       Protime-INR [[id number]]  (Normal) Collected:  10/14/19 0121    Specimen:  Blood Updated:  10/14/19 0242     Protime 10.0 Seconds      INR 0.90    aPTT [[id number]]  (Abnormal) Collected:  10/14/19 0121    Specimen:  Blood Updated:  10/14/19 0242     PTT 22.4 seconds     [place] Draw [[id number]] Collected:  10/14/19 0121    Specimen:  Blood Updated:  10/14/19 0230    Narrative:       The following orders were created for panel order [place] Draw.  Procedure                               Abnormality         Status                     ---------                               -----------         ------                     Light Blue Top[[id number]]                                   Final result               Green Top (Gel)[[id number]]                                   Final result               Lavender Top[[id number]]                                     Final result               Gold Top - SST[[id number]]                                   Final result                 Please view results for these tests on the individual orders.    Light Blue Top [[id number]] Collected:  10/14/19 0121    Specimen:  Blood Updated:  10/14/19 0230     Extra Tube hold for add-on     Comment: Auto resulted       Green Top (Gel) [[id number]] Collected:  10/14/19 0121    Specimen:  Blood Updated:  10/14/19 0230     Extra Tube Hold for add-ons.     Comment: Auto resulted.       Lavender Top [[id number]] Collected:  10/14/19 0121    Specimen:  Blood Updated:  10/14/19 0230     Extra Tube hold for add-on     Comment: Auto resulted       Gold Top - SST [[id number]] Collected:  10/14/19 0121    Specimen:  Blood Updated:  10/14/19 0230     Extra Tube Hold for add-ons.     Comment: Auto resulted.       Magnesium [[id number]]  (Normal) Collected:  10/14/19 0121    Specimen:  Blood Updated:  10/14/19 0228     Magnesium 2.4 mg/dL     Phosphorus [[id number]]  (Normal) Collected:  10/14/19 0121    Specimen:  Blood Updated:  10/14/19 0228     Phosphorus 4.0 mg/dL     C-reactive Protein [[id number]]  (Abnormal) Collected:  10/14/19 0121    Specimen:  Blood Updated:  10/14/19 0228     C-Reactive Protein 22.97 mg/dL     Urinalysis With Culture If Indicated - Urine, Catheter [[id number]]  (Abnormal) Collected:  10/14/19 0208    Specimen:  Urine, Catheter Updated:  10/14/19 0224     Color, UA Yellow     Appearance, UA Clear     pH, UA 5.5     Specific Gravity, UA 1.025     Glucose, UA >=1000 mg/dL (3+)     Ketones, UA Negative     Bilirubin, UA Negative     Blood, UA Negative     Protein, UA Negative     Leuk Esterase, UA Negative     Nitrite, UA Negative     Urobilinogen, UA 0.2 E.U./dL    Narrative:       Urine microscopic not indicated.    Manual Differential [[id number]]  (Abnormal) Collected:  10/14/19 0121     Specimen:  Blood Updated:  10/14/19 0215     Neutrophil % 69.0 %      Lymphocyte % 14.0 %      Monocyte % 5.0 %      Eosinophil % 1.0 %      Bands %  11.0 %      Neutrophils Absolute 11.36 10*3/mm3      Lymphocytes Absolute 1.99 10*3/mm3      Monocytes Absolute 0.71 10*3/mm3      Eosinophils Absolute 0.14 10*3/mm3      Anisocytosis Slight/1+     Polychromasia Slight/1+     Toxic Granulation Slight/1+     Platelet Morphology Normal    CBC & Differential [[id number]] Collected:  10/14/19 0121    Specimen:  Blood Updated:  10/14/19 0215    Narrative:       The following orders were created for panel order CBC & Differential.  Procedure                               Abnormality         Status                     ---------                               -----------         ------                     CBC Auto Differential[[id number]]        Abnormal            Final result                 Please view results for these tests on the individual orders.    CBC Auto Differential [[id number]]  (Abnormal) Collected:  10/14/19 0121    Specimen:  Blood Updated:  10/14/19 0215     WBC 14.20 10*3/mm3      RBC 3.94 10*6/mm3      Hemoglobin 13.1 g/dL      Hematocrit 39.0 %      MCV 98.8 fL      MCH 33.2 pg      MCHC 33.6 g/dL      RDW 14.9 %      RDW-SD 52.1 fl      MPV 8.5 fL      Platelets 234 10*3/mm3     Scan Slide [[id number]] Collected:  10/14/19 0121    Specimen:  Blood Updated:  10/14/19 0215     Scan Slide --     Comment: See Manual Differential Results       Comprehensive Metabolic Panel [[id number]]  (Abnormal) Collected:  10/14/19 0121    Specimen:  Blood Updated:  10/14/19 0212     Glucose 843 mg/dL      BUN 46 mg/dL      Creatinine 1.40 mg/dL      Sodium 138 mmol/L      Potassium 3.3 mmol/L      Chloride 91 mmol/L      CO2 32.0 mmol/L      Calcium 9.4 mg/dL      Total Protein 6.4 g/dL      Albumin 2.90 g/dL      ALT (SGPT) 21 U/L      AST (SGOT) 41 U/L      Alkaline Phosphatase 109 U/L      Total Bilirubin 1.3 mg/dL      eGFR  Non  Amer 48 mL/min/1.73      Globulin 3.5 gm/dL      A/G Ratio 0.8 g/dL      BUN/Creatinine Ratio 32.9     Anion Gap 18.3 mmol/L     Narrative:       The MDRD GFR formula is only valid for adults with stable renal function between ages 18 and 70.    Blood Gas, Arterial [[id number]]  (Abnormal) Collected:  10/14/19 0204    Specimen:  Arterial Blood Updated:  10/14/19 0207     Site Left Radial     Max's Test Positive     pH, Arterial 7.482 pH units      pCO2, Arterial 45.8 mm Hg      pO2, Arterial 55.3 mm Hg      HCO3, Arterial 34.2 mmol/L      Base Excess, Arterial 9.4 mmol/L      Comment: Serial Number: [id number]:  [id number]        O2 Saturation, Arterial 90.1 %      CO2 Content 35.7 mmol/L      Barometric Pressure for Blood Gas --     Comment: N/A        Modality Cannula     FIO2 <21 %      Hemodilution No    Blood Culture - Blood, Arm, Left [[id number]] Collected:  10/14/19 0121    Specimen:  Blood from Arm, Left Updated:  10/14/19 0128    Blood Culture - Blood, Arm, Right [[id number]] Collected:  10/14/19 0121    Specimen:  Blood from Arm, Right Updated:  10/14/19 0127    POC Lactate [[id number]]  (Abnormal) Collected:  10/14/19 0125    Specimen:  Blood Updated:  10/14/19 0126     Lactate 3.2 mmol/L      Comment: Serial Number: [id number]:  [id number]       POC Glucose Once [[id number]]  (Abnormal) Collected:  10/14/19 0059    Specimen:  Blood Updated:  10/14/19 0101     Glucose >500 mg/dL      Comment: Serial Number: [id number]:  [id number]             Imaging & Other Studies    Imaging Results (last 72 hours)     Procedure Component Value Units Date/Time    XR Chest 1 View [[id number]] Collected:  10/14/19 0734     Updated:  10/14/19 0742    Narrative:       XR CHEST 1 VW-     Date of Exam: 10/14/2019 1:39 AM     Indication: Severe Sepsis Protocol; A41.9-Sepsis, unspecified organism;  R65.20-Severe sepsis without septic shock; J69.0-Pneumonitis due to  inhalation of food and vomit;  R73.9-Hyperglycemia, unspecified.     Comparison Exams: September 7, 2019     Technique: Single AP chest radiograph     FINDINGS:  There is a small-to-moderate right apical pneumothorax measuring up to  2.2 cm in diameter. There are hazy bilateral airspace opacities worse in  the right lower lung. There are moderate right and small left pleural  effusions. The heart and mediastinal contours appear stable. The  pulmonary vasculature appears normal. The osseous structures appear  intact.       Impression:       1.Small-to-moderate right apical pneumothorax.  2.Hazy bilateral airspace opacities worse in the right lower lung,  suggesting multifocal pneumonia.  3.Moderate right and small left pleural effusions.     Results were relayed to Dr. Mittal at time of dictation.     Electronically Signed By-DR. Darnell Montilla MD On:10/14/2019 7:39 AM  This report was finalized on [id number] by DR. Darnell Montlila MD.          Assessment      Severe sepsis (CMS/HCC)  Acute hypoxemic respiratory failure  Aspiration pneumonia  Right apical pneumothorax. -Etiology unclear.  Likely spontaneous.  Acute encephalopathy.  Likely toxic metabolic encephalopathy.  History of chronic dysphagia  Chronic diastolic congestive heart failure.  EF 20 to 25%  ESTEBAN  Diabetes mellitus with hyperglycemia.  History of hypertension  History of CVA  History of generalized anxiety disorder  Plan    Chest x-ray films and ABG reviewed.  Patient has right lung infiltrate concerning for aspiration pneumonia along with a small right apical pneumothorax.  Will repeat chest x-ray today.  Currently he is on 3 L nasal cannula.  Does not appear to be any acute respiratory distress.  If pneumothorax is worsening we will place a chest tube.  Remains on broad-spectrum antibiotic therapy with vancomycin and Zosyn.  Will follow cultures and de-escalate antibiotic therapy as appropriate.  We will keep him n.p.o. for now.  We will switch his dexamethasone to stress  dose steroids.  He is on Lantus and sliding scale insulin.  Blood sugar significantly elevated.  May need to adjust insulin including possible insulin infusion.  We will closely monitor  Remains on IV hydration.  Will follow urine output and renal function.  We will try to address goals of care with patient's family.  I have left a message with patient's sister to call us.  Unable to leave a message at patient's daughter's answering machine.  Overall prognosis is guarded  Thanks for consultation.  We will follow the patient along with you.

## 2019-10-14 NOTE — PLAN OF CARE
Problem: Patient Care Overview  Goal: Plan of Care Review  Outcome: Ongoing (interventions implemented as appropriate)   10/14/19 1044   OTHER   Outcome Summary Swallow eval. orders received, however Nsg reported pt is not alert and is currently unable to participate in a swallow eval. at this time.

## 2019-10-14 NOTE — PROGRESS NOTES
"Pharmacy Dosing Service  Antibiotic  Vancomycin    87 y.o.male admitted with aspiration + HAP. Pt recently admitted at [facility] 9/6-9/13 for UTI and received IV antibiotics during that admission (ceftriaxone); pt discharged on Augmentin. [facility] is following. Pharmacy to dose vancomycin.      Assessment/Plan  1. Vancomycin day #1: pulse dosing for ESTEBAN. Pt received 1250 mg (22 mg/kg ABW) IV x1 this morning. Will obtain a random level tomorrow with am labs. Plan to re-dose when level is less than 20 mcg/mL.    2. Cefepime day #1: 2g IV q24h, appropriate for borderline CrCl ~29 mL/min.    3. Clindamycin day #1: 600 mg IV q8h.    Piperacillin/tazobactam discontinued after 2 doses due to ESTEBAN and concomitant use with vancomycin. Pharmacy will continue to monitor pt renal Fx, C&S drug levels, and clinical status.       Relevant clinical data and objective history reviewed:  167.6 cm (65.98\")   56.2 kg (123 lb 14.4 oz)   Ideal body weight: 63.8 kg (140 lb 9.2 oz)    Creatinine   Date Value Ref Range Status   10/14/2019 1.40 (H) 0.70 - 1.20 mg/dL Final   10/13/2019 1.21 0.76 - 1.27 mg/dL Final   09/23/2019 1.00 0.70 - 1.20 mg/dL Final     Estimated Creatinine Clearance: 29.5 mL/min (A) (by C-G formula based on SCr of 1.4 mg/dL (H)).  I/O last 3 completed shifts:  In: 300 [I.V.:300]  Out: -     Lab Results   Component Value Date    WBC 14.20 (H) 10/14/2019     Temp Readings from Last 3 Encounters:   10/14/19 97.3 °F (36.3 °C)   10/10/19 97.6 °F (36.4 °C)   09/13/19 97.5 °F (36.4 °C) (Oral)     Baseline culture/source/susceptibility:  Microbiology Results (last 10 days)       ** No results found for the last 240 hours. **             Anti-Infectives (From admission, onward)      Ordered     Dose/Rate Route Frequency Start Stop    10/14/19 0940  clindamycin (CLEOCIN) 600 mg in sodium chloride 0.9% 50 mL IVPB (premix)     Ordering Provider:  Ilia Samaniego MD    600 mg Intravenous Every 8 Hours 10/14/19 2100 10/21/19 2059    " 10/14/19 0939  cefepime 2 gm IVPB in 100 ml NS (MBP)     Ordering Provider:  Ilia Samaniego MD    2 g  over 4 Hours Intravenous Every 24 Hours 10/14/19 1700 10/21/19 1659    10/14/19 0930  Pharmacy to dose vancomycin     Ordering Provider:  Ilia Samaniego MD     Does not apply Continuous PRN 10/14/19 0929 10/21/19 0928    10/14/19 0134  piperacillin-tazobactam (ZOSYN) IVPB 3.375 g in 100 mL NS (CD)     Ordering Provider:  Carlos Eugene MD    3.375 g  over 30 Minutes Intravenous Once 10/14/19 0136 10/14/19 0244    10/14/19 0134  vancomycin 1250 mg/250 mL 0.9% NS IVPB ([facility])     Ordering Provider:  Carlos Eugene MD    20 mg/kg × 61.2 kg Intravenous Once 10/14/19 0136 10/14/19 0215           Caryl Waldron, PharmD, BCPS

## 2019-10-14 NOTE — H&P
Patient Care Team:  Jordan Mittal MD as PCP - General (Family Medicine)  NANCY Mejia MD as PCP - [facility] as Community Care Coordinator (Population Health)    Chief Conplaint  Subjective     The patient is a 87 y.o. male presents with acute MS changes    HPI  The patient was in his usual state of health until the she was noticed by staff to decompensate.  The patient has a long-standing history of aspiration pneumonitis and aspiration pneumonia but family requests continuation of regular diet.  As expected, he aspirated and decompensated with acute hypoxic respiratory failure and was brought with forward mental status changes and acute sepsis to the [facility] emergency room for evaluation where he was admitted.  Other information cannot be obtained secondary to the patient's mental status.    Review of Systems  Review of Systems   Unable to perform ROS: Mental status change       History  Past Medical History:   Diagnosis Date   • Atrial fibrillation (CMS/HCC)    • CHF (congestive heart failure) (CMS/HCC)    • Coronary artery disease    • Myocardial infarction (CMS/HCC)    • Neuropathy    • Stroke (CMS/HCC)      Past Surgical History:   Procedure Laterality Date   • ANGIOPLASTY     • BACK SURGERY     • CHOLECYSTECTOMY     • CORONARY STENT PLACEMENT     • SPINAL FUSION       Family History   Problem Relation Age of Onset   • Coronary artery disease Brother      Social History     Tobacco Use   • Smoking status: Former Smoker     Types: Cigarettes   • Smokeless tobacco: Never Used   • Tobacco comment: Quit 40 years go   Substance Use Topics   • Alcohol use: No     Frequency: Never   • Drug use: No     Allergies:  Patient has no known allergies.    Objective     Vital Signs  Temp:  [97.5 °F (36.4 °C)-97.8 °F (36.6 °C)] 97.5 °F (36.4 °C)  Heart Rate:  [] 98  Resp:  [16-22] 16  BP: (102-142)/(51-88) 139/69      Physical Exam:   Physical Exam   Constitutional: He  appears well-developed and well-nourished.   HENT:   Head: Normocephalic and atraumatic.   Eyes: EOM are normal. Pupils are equal, round, and reactive to light.   Neck: Normal range of motion.   Cardiovascular: Normal rate and regular rhythm.   Pulmonary/Chest: He is in respiratory distress. He has rales.   Abdominal: Soft. Bowel sounds are normal.   Musculoskeletal: Normal range of motion.   Skin: Skin is warm.   Psychiatric: He has a normal mood and affect.   Nursing note and vitals reviewed.           Results Review:   CBC    Results from last 7 days   Lab Units 10/14/19  0121 10/13/19  1930   WBC 10*3/mm3 14.20* 11.30*   HEMOGLOBIN g/dL 13.1 12.6*   PLATELETS 10*3/mm3 234 231     BMP   Results from last 7 days   Lab Units 10/14/19  0435 10/14/19  0121 10/13/19  1930   SODIUM mmol/L  --  138 134*   POTASSIUM mmol/L  --  3.3* 3.9   CHLORIDE mmol/L  --  91* 85*   CO2 mmol/L  --  32.0 34.3*   BUN mg/dL  --  46* 47*   CREATININE mg/dL  --  1.40* 1.21   GLUCOSE mg/dL 491* 843* 1,035*   MAGNESIUM mg/dL  --  2.4  --    PHOSPHORUS mg/dL  --  4.0  --      Cr Clearance Estimated Creatinine Clearance: 29.5 mL/min (A) (by C-G formula based on SCr of 1.4 mg/dL (H)).  Coag   Results from last 7 days   Lab Units 10/14/19  0121   INR  0.90   APTT seconds 22.4*     HbA1C   Lab Results   Component Value Date    HGBA1C 6.3 (H) 08/03/2019    HGBA1C 5.90 (H) 10/24/2016     Blood Glucose   Glucose   Date/Time Value Ref Range Status   10/14/2019 0323 >500 (C) 70 - 105 mg/dL Final     Comment:     Serial Number: [id number]:  [id number]   10/14/2019 0059 >500 (C) 70 - 105 mg/dL Final     Comment:     Serial Number: [id number]:  [id number]     Infection     CMP   Results from last 7 days   Lab Units 10/14/19  0435 10/14/19  0121 10/13/19  1930   SODIUM mmol/L  --  138 134*   POTASSIUM mmol/L  --  3.3* 3.9   CHLORIDE mmol/L  --  91* 85*   CO2 mmol/L  --  32.0 34.3*   BUN mg/dL  --  46* 47*   CREATININE mg/dL  --  1.40* 1.21    GLUCOSE mg/dL 491* 843* 1,035*   ALBUMIN g/dL  --  2.90*  --    BILIRUBIN mg/dL  --  1.3*  --    ALK PHOS U/L  --  109*  --    AST (SGOT) U/L  --  41  --    ALT (SGPT) U/L  --  21  --      UA    Results from last 7 days   Lab Units 10/14/19  0208   NITRITE UA  Negative     Radiology(recent) No radiology results for the last day     Assessment:    Severe sepsis (CMS/HCC)    Acute sepsis secondary to aspiration pneumonia with aspiration pneumonitis  Acute respiratory failure  Right lower lobe pneumonia healthcare acquired  Toxic metabolic encephalopathy secondary to the above  Acute dehydration secondary to an increase in obligate respiratory losses  Acute renal failure secondary to the above  Atherosclerotic heart disease of native coronary arteries with angina pectoris  History of acute myocardial infarction  Acute hyperosmolar state  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  Peripheral polyneuropathy  Chronic systolic congestive heart failure(CHFrEF)  Degenerative disc disease lumbosacral spine  Adjustment disorder with endogenous depression, recurrent mild  Generalized anxiety disorder  Gastroesophageal reflux disease with esophagitis  Essential hypertension  Cerebrovascular disease status post CVA  Macrocytic anemia  Diabetic dyslipidemia    Plan:  Aggressive antimicrobial regimen//glycemic control//renal support//fluid resuscitation//supportive care  Plans to follow  Expected Length of Stay 8 days    I discussed the patients findings and my recommendations with patient and nursing staff.     Jordan Mittal MD  10/14/19  6:51 AM

## 2019-10-14 NOTE — ED PROVIDER NOTES
Subjective   Chief complaint decreased status at nursing home.  This is an 87-year-old who was sent in from the nursing home due to decreased status and increased shortness of breath and cough.  The patient is a very poor historian any history.  He denies any pain at this time.  He is a full code.  Outpatient x-ray from 1013 showed evidence of right basilar pneumonia worse since the previous film.        History provided by:  Patient      Review of Systems   Unable to perform ROS: Acuity of condition       Past Medical History:   Diagnosis Date   • Atrial fibrillation (CMS/[facility])    • CHF (congestive heart failure) (CMS/[facility])    • Coronary artery disease    • Myocardial infarction (CMS/[facility])    • Neuropathy    • Stroke (CMS/[facility])        No Known Allergies    Past Surgical History:   Procedure Laterality Date   • ANGIOPLASTY     • BACK SURGERY     • CHOLECYSTECTOMY     • CORONARY STENT PLACEMENT     • SPINAL FUSION         Family History   Problem Relation Age of Onset   • Coronary artery disease Brother        Social History     Socioeconomic History   • Marital status:      Spouse name: Not on file   • Number of children: Not on file   • Years of education: Not on file   • Highest education level: Not on file   Tobacco Use   • Smoking status: Former Smoker     Types: Cigarettes   • Smokeless tobacco: Never Used   • Tobacco comment: Quit 40 years go   Substance and Sexual Activity   • Alcohol use: No     Frequency: Never   • Drug use: No   Social History Narrative    - spouse has Parkinson's.  He lives at home with spouse and grandson.           Objective   Physical Exam   Constitutional: He is oriented to person, place, and time. He appears well-developed and well-nourished. No distress.   HENT:   Head: Normocephalic and atraumatic.   Right Ear: External ear normal.   Left Ear: External ear normal.   Nose: Nose normal.   Mouth/Throat: Oropharynx is clear and moist.   Eyes: Conjunctivae and EOM are normal.  Pupils are equal, round, and reactive to light.   Neck: Normal range of motion. Neck supple. No JVD present. No thyromegaly present.   Cardiovascular: Regular rhythm, normal heart sounds and intact distal pulses.   Pulmonary/Chest: No stridor. Tachypnea noted. He is in respiratory distress. He has wheezes. He has rhonchi. He has rales.   Abdominal: Soft. Bowel sounds are normal. There is no tenderness.   Musculoskeletal: Normal range of motion.   Lymphadenopathy:     He has no cervical adenopathy.   Neurological: He is alert and oriented to person, place, and time.   Skin: Skin is warm and dry. Capillary refill takes less than 2 seconds. No rash noted.   Psychiatric: He has a normal mood and affect.   Nursing note and vitals reviewed.      Procedures           ED Course  ED Course as of Oct 14 0311   Mon Oct 14, 2019   0211 EKG personally reviewed and interpreted by me shows:    Atrial fibrillation with incomplete left bundle branch block appears unchanged compared to previous EKG dated 9/7/2019   Further information as documented on EKG.    [WP]      ED Course User Index  [WP] Carlos Eugene MD      XR Chest 1 View   ED Interpretation   Extensive right lower lobe pneumonia and effusion        Lab Results (last 72 hours)     Procedure Component Value Units Date/Time    CBC & Differential [[id number]] Collected:  10/13/19 1930    Specimen:  Blood Updated:  10/13/19 2038    Narrative:       The following orders were created for panel order CBC & Differential.  Procedure                               Abnormality         Status                     ---------                               -----------         ------                     CBC Auto Differential[[id number]]        Abnormal            Final result                 Please view results for these tests on the individual orders.    Basic Metabolic Panel [[id number]]  (Abnormal) Collected:  10/13/19 1930    Specimen:  Blood Updated:  10/13/19 2146     Glucose  1,035 mg/dL      BUN 47 mg/dL      Creatinine 1.21 mg/dL      Sodium 134 mmol/L      Potassium 3.9 mmol/L      Chloride 85 mmol/L      CO2 34.3 mmol/L      Calcium 9.2 mg/dL      eGFR Non African Amer 57 mL/min/1.73      BUN/Creatinine Ratio 38.8     Anion Gap 14.7 mmol/L     Narrative:       GFR Normal >60  Chronic Kidney Disease <60  Kidney Failure <15    Urinalysis With Microscopic If Indicated (No Culture) - Urine, Clean Catch [[id number]]  (Abnormal) Collected:  10/13/19 1930    Specimen:  Urine, Clean Catch Updated:  10/13/19 2052     Color, UA Yellow     Appearance, UA Clear     pH, UA <=5.0     Specific Gravity, UA >=1.030     Glucose, UA >=1000 mg/dL (3+)     Ketones, UA Negative     Bilirubin, UA Negative     Blood, UA Negative     Protein, UA Negative     Leuk Esterase, UA Negative     Nitrite, UA Negative     Urobilinogen, UA 0.2 E.U./dL    Narrative:       Urine microscopic not indicated.    CBC Auto Differential [[id number]]  (Abnormal) Collected:  10/13/19 1930    Specimen:  Blood Updated:  10/13/19 2038     WBC 11.30 10*3/mm3      RBC 3.97 10*6/mm3      Hemoglobin 12.6 g/dL      Hematocrit 41.0 %      .3 fL      MCH 31.7 pg      MCHC 30.7 g/dL      RDW 13.1 %      RDW-SD 51.1 fl      MPV 10.7 fL      Platelets 231 10*3/mm3      Neutrophil % 86.0 %      Lymphocyte % 7.4 %      Monocyte % 3.6 %      Eosinophil % 0.0 %      Basophil % 0.3 %      Immature Grans % 2.7 %      Neutrophils, Absolute 9.72 10*3/mm3      Lymphocytes, Absolute 0.84 10*3/mm3      Monocytes, Absolute 0.41 10*3/mm3      Eosinophils, Absolute 0.00 10*3/mm3      Basophils, Absolute 0.03 10*3/mm3      Immature Grans, Absolute 0.30 10*3/mm3      nRBC 0.1 /100 WBC     POC Glucose Once [[id number]]  (Abnormal) Collected:  10/14/19 0059    Specimen:  Blood Updated:  10/14/19 0101     Glucose >500 mg/dL      Comment: Serial Number: [id number]:  [id number]       CBC & Differential [[id number]] Collected:  10/14/19 0121    Specimen:   Blood Updated:  10/14/19 0215    Narrative:       The following orders were created for panel order CBC & Differential.  Procedure                               Abnormality         Status                     ---------                               -----------         ------                     CBC Auto Differential[[id number]]        Abnormal            Final result                 Please view results for these tests on the individual orders.    Comprehensive Metabolic Panel [[id number]]  (Abnormal) Collected:  10/14/19 0121    Specimen:  Blood Updated:  10/14/19 0212     Glucose 843 mg/dL      BUN 46 mg/dL      Creatinine 1.40 mg/dL      Sodium 138 mmol/L      Potassium 3.3 mmol/L      Chloride 91 mmol/L      CO2 32.0 mmol/L      Calcium 9.4 mg/dL      Total Protein 6.4 g/dL      Albumin 2.90 g/dL      ALT (SGPT) 21 U/L      AST (SGOT) 41 U/L      Alkaline Phosphatase 109 U/L      Total Bilirubin 1.3 mg/dL      eGFR Non African Amer 48 mL/min/1.73      Globulin 3.5 gm/dL      A/G Ratio 0.8 g/dL      BUN/Creatinine Ratio 32.9     Anion Gap 18.3 mmol/L     Narrative:       The MDRD GFR formula is only valid for adults with stable renal function between ages 18 and 70.    Blood Culture - Blood, Arm, Left [[id number]] Collected:  10/14/19 0121    Specimen:  Blood from Arm, Left Updated:  10/14/19 0128    Blood Culture - Blood, Arm, Right [[id number]] Collected:  10/14/19 0121    Specimen:  Blood from Arm, Right Updated:  10/14/19 0127    CBC Auto Differential [[id number]]  (Abnormal) Collected:  10/14/19 0121    Specimen:  Blood Updated:  10/14/19 0215     WBC 14.20 10*3/mm3      RBC 3.94 10*6/mm3      Hemoglobin 13.1 g/dL      Hematocrit 39.0 %      MCV 98.8 fL      MCH 33.2 pg      MCHC 33.6 g/dL      RDW 14.9 %      RDW-SD 52.1 fl      MPV 8.5 fL      Platelets 234 10*3/mm3     Protime-INR [[id number]]  (Normal) Collected:  10/14/19 0121    Specimen:  Blood Updated:  10/14/19 0242     Protime 10.0 Seconds      INR 0.90     aPTT [[id number]]  (Abnormal) Collected:  10/14/19 0121    Specimen:  Blood Updated:  10/14/19 0242     PTT 22.4 seconds     Magnesium [[id number]]  (Normal) Collected:  10/14/19 0121    Specimen:  Blood Updated:  10/14/19 0228     Magnesium 2.4 mg/dL     Phosphorus [[id number]]  (Normal) Collected:  10/14/19 0121    Specimen:  Blood Updated:  10/14/19 0228     Phosphorus 4.0 mg/dL     C-reactive Protein [[id number]]  (Abnormal) Collected:  10/14/19 0121    Specimen:  Blood Updated:  10/14/19 0228     C-Reactive Protein 22.97 mg/dL     Scan Slide [[id number]] Collected:  10/14/19 0121    Specimen:  Blood Updated:  10/14/19 0215     Scan Slide --     Comment: See Manual Differential Results       Manual Differential [[id number]]  (Abnormal) Collected:  10/14/19 0121    Specimen:  Blood Updated:  10/14/19 0215     Neutrophil % 69.0 %      Lymphocyte % 14.0 %      Monocyte % 5.0 %      Eosinophil % 1.0 %      Bands %  11.0 %      Neutrophils Absolute 11.36 10*3/mm3      Lymphocytes Absolute 1.99 10*3/mm3      Monocytes Absolute 0.71 10*3/mm3      Eosinophils Absolute 0.14 10*3/mm3      Anisocytosis Slight/1+     Polychromasia Slight/1+     Toxic Granulation Slight/1+     Platelet Morphology Normal    POC Lactate [[id number]]  (Abnormal) Collected:  10/14/19 0125    Specimen:  Blood Updated:  10/14/19 0126     Lactate 3.2 mmol/L      Comment: Serial Number: [id number]:  [id number]       Lactic Acid, Reflex Timer (This will reflex a repeat order 3-3:15 hours after ordered.) [[id number]] Collected:  10/14/19 0125    Specimen:  Blood Updated:  10/14/19 0126    Blood Gas, Arterial [[id number]]  (Abnormal) Collected:  10/14/19 0204    Specimen:  Arterial Blood Updated:  10/14/19 0207     Site Left Radial     Max's Test Positive     pH, Arterial 7.482 pH units      pCO2, Arterial 45.8 mm Hg      pO2, Arterial 55.3 mm Hg      HCO3, Arterial 34.2 mmol/L      Base Excess, Arterial 9.4 mmol/L      Comment: Serial Number:  [id number]:  [id number]        O2 Saturation, Arterial 90.1 %      CO2 Content 35.7 mmol/L      Barometric Pressure for Blood Gas --     Comment: N/A        Modality Cannula     FIO2 <21 %      Hemodilution No    Urinalysis With Culture If Indicated - Urine, Catheter [[id number]]  (Abnormal) Collected:  10/14/19 0208    Specimen:  Urine, Catheter Updated:  10/14/19 0224     Color, UA Yellow     Appearance, UA Clear     pH, UA 5.5     Specific Gravity, UA 1.025     Glucose, UA >=1000 mg/dL (3+)     Ketones, UA Negative     Bilirubin, UA Negative     Blood, UA Negative     Protein, UA Negative     Leuk Esterase, UA Negative     Nitrite, UA Negative     Urobilinogen, UA 0.2 E.U./dL    Narrative:       Urine microscopic not indicated.        Medications   sodium chloride 0.9 % flush 10 mL (not administered)   sodium chloride 0.9 % flush 10 mL (not administered)   sodium chloride 0.9 % bolus 1,836 mL (1,836 mL Intravenous New Bag 10/14/19 0213)   piperacillin-tazobactam (ZOSYN) IVPB 3.375 g in 100 mL NS (CD) (3.375 g Intravenous New Bag 10/14/19 0214)   vancomycin 1250 mg/250 mL 0.9% NS IVPB ([facility]) (1,250 mg Intravenous New Bag 10/14/19 0215)   albuterol (PROVENTIL) nebulizer solution 0.083% 2.5 mg/3mL (10 mg Nebulization Given 10/14/19 0216)   insulin lispro (humaLOG) injection 12 Units (12 Units Subcutaneous Given 10/14/19 0211)     /85   Pulse 105   Temp 97.8 °F (36.6 °C) (Oral)   Resp 18   Ht 182.9 cm (72\")   Wt 61.2 kg (135 lb)   SpO2 (!) 80%   BMI 18.31 kg/m²     Sepsis orders were initiated including IV fluids and IV antibiotics due to meeting sepsis criteria.  Given the patient has worsening pneumonia.  Albuterol nebulizer was given tenuous for 1 hour.  Old records were reviewed.  Prior creatinine was 1.21-7 hours ago and 1.0-2 weeks ago.          MDM  Differential diagnosis; this does not constitute the entirety of considered causes:      DKA, intra-abdominal infection, dissection, pneumoperitoneum, " peritonitis, peptic ulcer disease, pancreatitis, hepatitis, ischemic bowel, bowel obstruction, appendicitis, cholelithiasis, cholecystitis    Acute coronary syndrome, pneumothorax, pneumonia, dissection, electrolyte abnormality, anemia, DVT, pulmonary embolus, sepsis      Critical care time 40 minutes exclusive of any separately billable procedure time  Final diagnoses:   Severe sepsis (CMS/HCC)   Aspiration pneumonia of right lower lobe, unspecified aspiration pneumonia type (CMS/HCC)   Hyperglycemia   Acute renal insufficiency              Carlos Eugene MD  10/14/19 6459

## 2019-10-14 NOTE — PLAN OF CARE
Problem: Fall Risk (Adult)  Goal: Identify Related Risk Factors and Signs and Symptoms  Outcome: Ongoing (interventions implemented as appropriate)   10/14/19 0502   Fall Risk (Adult)   Related Risk Factors (Fall Risk) age-related changes;fatigue/slow reaction;gait/mobility problems   Signs and Symptoms (Fall Risk) presence of risk factors     Goal: Absence of Fall  Outcome: Ongoing (interventions implemented as appropriate)   10/14/19 0502   Fall Risk (Adult)   Absence of Fall making progress toward outcome       Problem: Patient Care Overview  Goal: Plan of Care Review  Outcome: Ongoing (interventions implemented as appropriate)   10/14/19 0502   Coping/Psychosocial   Plan of Care Reviewed With other (see comments)  (pt confused no family present)   Plan of Care Review   Progress no change   OTHER   Outcome Summary pt new admission from [facility]. pt confused. lungs coarse and rhonchi. pt had thickened liquids ordered at facility family refused them. pt with aspiration pneumonia. cont to monitor       Problem: Skin Injury Risk (Adult)  Goal: Identify Related Risk Factors and Signs and Symptoms  Outcome: Ongoing (interventions implemented as appropriate)   10/14/19 0502   Skin Injury Risk (Adult)   Related Risk Factors (Skin Injury Risk) advanced age;cognitive impairment;infection;mobility impaired;moisture     Goal: Skin Health and Integrity  Outcome: Ongoing (interventions implemented as appropriate)   10/14/19 0502   Skin Injury Risk (Adult)   Skin Health and Integrity making progress toward outcome       Problem: Pneumonia (Adult)  Goal: Signs and Symptoms of Listed Potential Problems Will be Absent, Minimized or Managed (Pneumonia)  Outcome: Ongoing (interventions implemented as appropriate)   10/14/19 0502   Goal/Outcome Evaluation   Problems Assessed (Pneumonia) infection progression;respiratory compromise   Problems Present (Pneumonia) infection progression;respiratory compromise

## 2019-10-14 NOTE — PROGRESS NOTES
Repeat chest x-ray films reviewed.  Stable to slightly improved right apical pneumothorax.  We will continue to monitor for now.  We will plan to repeat chest x-ray later on today.  We will follow

## 2019-10-14 NOTE — DISCHARGE PLACEMENT REQUEST
"Dorcas Stanley (87 y.o. Male)     Date of Birth Social Security Number Address Home Phone MRN    04/03/1932  [address]  [place] [address] [phone] [id number]    Alevism Marital Status          Unknown        Admission Date Admission Type Admitting Provider Attending Provider Department, Room/Bed    10/14/19 Emergency Jordan Mittal MD Heimer, Brian T, MD [facility], 2112/1    Discharge Date Discharge Disposition Discharge Destination                       Attending Provider:  Jordan Mittal MD    Allergies:  No Known Allergies    Isolation:  None   Infection:  None   Code Status:  CPR    Ht:  167.6 cm (65.98\")   Wt:  56.2 kg (123 lb 14.4 oz)    Admission Cmt:  None   Principal Problem:  None                Active Insurance as of 10/14/2019     Primary Coverage     Payor Plan Insurance Group Employer/Plan Group    MEDICARE MEDICARE A & B      Payor Plan Address Payor Plan Phone Number Payor Plan Fax Number Effective Dates    [address] [phone]  4/1/1997 - None Entered    [place] [address]       Subscriber Name Subscriber Birth Date Member ID       DORCAS STANLEY 4/3/1932 [id number]                 Emergency Contacts      (Rel.) Home Phone Work Phone Mobile Phone    Kemar Velasquezon (Grandchild) [phone] -- --    IraisGarcia (Daughter) [phone] -- --    Tonya Rodrigues (Sister) [phone] -- --              "

## 2019-10-14 NOTE — PROGRESS NOTES
"Continued Stay Note  [facility]     Patient Name: Julio César Braxton  MRN: [id number]  Today's Date: 10/14/2019    Admit Date: 10/14/2019    Discharge Plan     Row Name 10/14/19 1212       Plan    Plan  DC Plan: Return to [facility] pending bed availability (not a bed hold). No PASRR (return to facility) or precert needed.     Patient/Family in Agreement with Plan  yes    Plan Comments  SW went to meet with pt at bedside, but pt was asleep. Spoke with sister-in-law (Tonya Rodrigues) at bedside who reported pt has been at [facility] for a little over a month. Tonya states pt will be agreeable to return as he lives alone & is unable to care for himself at this time. Additionally, Tonya reports pt was current with [facility] until going to rehab facility, but reports they signed off however have been folllowing his case closely in case he wants return to their services. Tonya reports [facility]  (Sakina) & RN (DONA) check in weekly on pt. Tonya requested SW notify [facility] pt is here so they can update them on his status (sent message to [facility] liaison). LINO texted facility liaison (Yecenia) about pt return to facility. Liaison stated \"family called them saying he may not make it back, that the room has been cleaned out & pt is not a bedhold so return depends on if campus has opening once d/c ready.\"     MATEO Salgado    Phone: [phone]  Cell: [phone]  Fax: [phone]  [email or web address]     "

## 2019-10-14 NOTE — PROGRESS NOTES
Discharge Planning Assessment   [facility]     Patient Name: Julio César Braxton  MRN: [id number]  Today's Date: 10/14/2019    Admit Date: 10/14/2019    Discharge Needs Assessment     Row Name 10/14/19 0958       Living Environment    Lives With  facility resident    Unique Family Situation  Pt lives alone when at home . Pt is currently fro [facility] .       Discharge Needs Assessment    Discharge Coordination/Progress  From [facility] .         Discharge Plan     Row Name 10/14/19 1000       Plan    Plan  Pt is from [facility]     Plan Comments  Pt is in with  confusion , a glucose of 1035 and aspirate PNA.         Destination      No service coordination in this encounter.      Durable Medical Equipment      No service coordination in this encounter.      Dialysis/Infusion      No service coordination in this encounter.      Home Medical Care      No service coordination in this encounter.      Therapy      No service coordination in this encounter.      Community Resources      No service coordination in this encounter.          Demographic Summary    No documentation.       Functional Status    No documentation.       Psychosocial    No documentation.       Abuse/Neglect    No documentation.       Legal    No documentation.       Substance Abuse    No documentation.       Patient Forms    No documentation.           Clary Simons RN

## 2019-10-14 NOTE — CONSULTS
"Adult Nutrition  Assessment/PES    Patient Name:  Julio César Braxton  YOB: 1932  MRN: [id number]  Admit Date:  10/14/2019    Assessment Date:  10/14/2019    Comments: Assessed patient to have severe malnutrition. Patient is currently NPO r/t aspiration. Will await ST input.     If unable to start oral diet in 1-2 days, consider addition of tube feeding r/t severe malnutrition.     Reason for Assessment     Row Name 10/14/19 1114          Reason for Assessment      Reason For Assessment MST score 3          Diagnosis     H&P: Aspiration PNA, hx of family insisting patient be on regular diet, DM II, CHF, GERD, HTN, CVA, DLD, heart disease     Current: Pneumothorax (right side), aspiration PNA, acute resp failure, HCAP, TME, dehydration, acute renal failure          Nutrition/Diet History     Row Name 10/14/19 1118          Nutrition/Diet History    Typical Food/Fluid Intake  Visited pt who was resting at time of visit. Unable to participate in interview. Able to speak with pt's jesus. She states at [facility] patient was on a mechanical soft with thickened liquids (unsure what type). She brought up & seemed upset there were \"rumors\" family has been insisting pt be on a regular diet when in fact pt was on a textured modified diet at the [facility]. Niece was unsure of appetite or weight hx.     Niece states pt has been drinking strawberrry ONS at [facility].        Food Allergies  NKFA        Factors Affecting Nutritional Intake   difficulty/impaired swallowing;impaired cognitive status/motor control         Anthropometrics     Row Name 10/14/19 1120       Anthropometrics    Height  167.6 cm (65.98\")    Weight  56.2 kg (123 lb 14.4 oz)       Admit Weight    Admit Weight  62.1 kg (137 lb 0 oz)       Ideal Body Weight (IBW)    Ideal Body Weight (IBW) (kg)  65.26    % Ideal Body Weight  86.12       Usual Body Weight (UBW)    Usual Body Weight  59 kg (130 lb)     Pt's UBW per 's#.      % Usual Body Weight  95.31      Weight " "Hx  135# (9/6/19)   133# (8/4/19)     Noted significant wt loss of 8.9% x 1 month        Body Mass Index (BMI)    BMI (kg/m2)  20.05     Labs/Tests/Procedures/Meds     Row Name 10/14/19 1121          Labs/Procedures/Meds    Lab Results Comments Gluc 843 H/491 H, Na 138, K+ 3.3 L, Crt 1.4 H, BUN 46 H, Alb 2.9 L, HgbA1C 8.3 H, Lactate 7.0 H, Mg 2.4, Hgb 13.1, Hct 39.0         Medications    Pertinent Medications Comments Solu-cortef, lantus, humalog, protonix, zosyn          Physical Findings     Row Name 10/14/19 1122          Physical Findings    Overall Physical Appearance NFPE completed 10/14/19 indicating severe malnutrition, see MSA. Noted during exam, pt had hands clenched on bed sheet under jaw, unable to assess upper arm or dorsal hand region.        Gastrointestinal  + BM 10/14. Niece reported recently pt having diarrhea but unsure of duration.        Oral/Mouth Cavity Noted hx of aspiration PNA. Will await further workup.        Skin Stg III pressure ulcer to coccyx.          Estimated/Assessed Needs     Row Name 10/14/19 1124       Weight Used For Calculations  56.2 kg (123 lb 14.4 oz)    Height  167.6 cm (65.98\")       KCAL/KG  35 Kcal/Kg (kcal)    35 Kcal/Kg (kcal)  1967       Weight Used For Protein Calculations  56.2 kg (123 lb 14.4 oz)    Est Protein Requirement Amount (gms/kg)  1.5 gm protein    Estimated Protein Requirements (gms/day)  84.3       Estimated Fluid Requirements (mL/day)  7707-7318 ml r/t CHF      Nutrition Prescription Ordered     Row Name 10/14/19 1125          Nutrition Prescription PO    Current PO Diet  NPO     Fluid Consistency  -- -     Supplement  -- -     Supplement Frequency  -- -        Nutrition Prescription EN    Enteral Route  -- -     Product  -- -     Modulars  -- -     Continuous TF Goal Rate (mL/hr)  -- -     Continuous TF Current Rate (mL/hr)  -- -     Water flush (mL)   -- -     Water Flush Frequency  -- -        Nutrition Prescription PN    PN Route  -- -     PN " Goal Volume (mL)  -- -     PN Current Volume (mL)  -- -     Dextrose (Kcal)  -- -     Amino Acid (gm)  -- -     Lipid Concentration (%)  -- -     Lipid Volume (mL)  -- -     Lipid Frequency  -- -         Evaluation of Received Nutrient/Fluid Intake     Row Name 10/14/19 1126          PO Evaluation    % PO Intake No meals recorded r/t NPO status        EN Evaluation    TF Changes  -- -     TF Residual  -- -     TF Tolerance  -- -        PN Evaluation    Number of Days PN Evaluated  -- -           Malnutrition Severity Assessment     Row Name 10/14/19 1126          Malnutrition Severity Assessment    Malnutrition Type Chronic Disease - Related Malnutrition        Unintentional Weight Loss     Unintentional Weight Loss  Weight loss greater than 5% in one month     Current weight of 123# which is down 8.9% from 135# (9/6/19)         Muscle Loss    Loss of Muscle Mass Findings  Severe     Burchard Region  Severe - deep hollowing/scooping, lack of muscle to touch, facial bones well defined     Clavicle Bone Region  Severe - protruding prominent bone     Acromion Bone Region  Severe - squared shoulders, bones, and acromion process protrusion prominent     Scapular Bone Region  Severe - prominent bones, depressions easily visible between ribs, scapula, spine, shoulders     Dorsal Hand Region Unable to obtain r/t positioning     Patellar Region  Severe - prominent bone, square looking, very little muscle definition     Anterior Thigh Region  Severe - line/depression along thigh, obviously thin     Posterior Calf Region  Severe - thin with very little definition/firmness        Fat Loss    Subcutaneous Fat Loss Findings  Severe     Orbital Region   Severe - pronounced hollowness/depression, dark circles, loose saggy skin     Upper Arm Region Unable to obtain r/t positioning      Thoracic & Lumbar Region  None        Fluid Accumulation (Edema)    Fluid Acumulation Findings None         Criteria Met (Must meet criteria for  severity in at least 2 of these categories: M Wasting, Fat Loss, Fluid, Secondary Signs, Wt. Status, Intake)    Patient meets criteria for   Severe Malnutrition           Problem/Interventions:  Problem 1     Row Name 10/14/19 1128          Nutrition Diagnoses Problem 1    Problem 1 Severe chronic disease malnutrition      Etiology (related to) DM II, CHF, hx of aspiration      Signs/Symptoms (evidenced by) severe wt loss of 8.9% x 1 month and severe muscle/fat wasting as noted on physical exam.          Problem 2     Row Name 10/14/19 1129          Nutrition Diagnoses Problem 2    Problem 2  -- -     Etiology (related to)  -- -     Signs/Symptoms (evidenced by)  -- -               Intervention Goal     Row Name 10/14/19 1129          Intervention Goal    General Diet to advance          Nutrition Intervention     Row Name 10/14/19 1130          Nutrition Intervention    RD/Tech Action Await diet advancement and/or tx decisions & add appropriate interventions based on clinical picture          Nutrition Prescription     Row Name 10/14/19 1130          Nutrition Prescription PO    Begin/Change Diet to  NPO     Fluid Consistency  -- -     Supplement  -- -     Supplement Frequency  -- -        Nutrition Prescription EN    Enteral Prescription  -- -        Nutrition Prescription PN    Parenteral Prescription  -- -         Education/Evaluation     Row Name 10/14/19 1131          Monitor/Evaluation    Monitor  I&O;PO intake;Pertinent labs;Weight;Skin status           Electronically signed by:  Mariah Bella RD  10/14/19 11:31 AM

## 2019-10-14 NOTE — PROGRESS NOTES
The patient was identified as having an acute pneumothorax on the right-hand side.  Case was discussed with radiology.  Will discuss with pulmonology

## 2019-10-15 NOTE — NURSING NOTE
Accucheck done with Blood glucose of 29. D50 given to patient per order. Patient asymptomatic. Blood glucose rechecked at 17:04 and was stable at 138. MD paged and new orders were received.

## 2019-10-15 NOTE — NURSING NOTE
PICC Team Consult:  Successful placement of ultrasound guided 18g powerglide midline. CHRISTOPHER Arellano notified that pgml is okay to use at this time. Patient tolerated procedure well.

## 2019-10-15 NOTE — PROGRESS NOTES
LOS: 1 day   Patient Care Team:  Jordan Mittal MD as PCP - General (Family Medicine)  NANCY Mejia MD as PCP - Claims Attributed  Dina Coronado, RN as Community Care Coordinator (Population Health)    Subjective:    Events noted, better overall  Objective:     Afebrile    Review of Systems:   Review of Systems   Unable to perform ROS: Mental status change           Vital Signs  Temp:  [97.3 °F (36.3 °C)-98.7 °F (37.1 °C)] 98.4 °F (36.9 °C)  Heart Rate:  [] 104  Resp:  [14-20] 14  BP: (108-119)/(47-73) 118/73    Physical Exam:  Physical Exam   Constitutional: He appears well-developed.   HENT:   Head: Normocephalic and atraumatic.   Eyes: EOM are normal. Pupils are equal, round, and reactive to light.   Neck: Normal range of motion.   Cardiovascular: Regular rhythm.   Pulmonary/Chest: He is in respiratory distress. He has wheezes. He has rales.   Abdominal: Soft.   Skin: Skin is warm.   Nursing note and vitals reviewed.       Radiology:  Ct Head Without Contrast    Result Date: 10/10/2019   CT Head : 1.  No acute intracranial process detected. 2.  Volume loss and moderate microvascular ischemic changes. 3.  Lacunar infarct in the right basal ganglia.. CT Cervical Spine: 1.  No evidence of acute fracture or traumatic malalignment. 2.  Degenerative changes, with moderate to severe spinal stenosis from osteophytes at C5-6, and varying degrees of neural foraminal narrowing as described above. 3.  Moderate to large right and small left pleural effusions. Consider correlation with chest radiograph spurring Pedro Rodney MD Neuroradiologist Thank you for this referral.  This exam was interpreted by a fellowship trained neuroradiologist.   SLOT:  68  Electronically signed by:  Pedro Rodney  10/10/2019 3:49 AM    Ct Cervical Spine Without Contrast    Result Date: 10/10/2019   CT Head : 1.  No acute intracranial process detected. 2.  Volume loss and moderate microvascular ischemic changes. 3.   Lacunar infarct in the right basal ganglia.. CT Cervical Spine: 1.  No evidence of acute fracture or traumatic malalignment. 2.  Degenerative changes, with moderate to severe spinal stenosis from osteophytes at C5-6, and varying degrees of neural foraminal narrowing as described above. 3.  Moderate to large right and small left pleural effusions. Consider correlation with chest radiograph spurring Pedro Rodney MD Neuroradiologist Thank you for this referral.  This exam was interpreted by a fellowship trained neuroradiologist.   SLOT:  68  Electronically signed by:  Pedro Rodney  10/10/2019 3:49 AM    Xr Chest 1 View    Result Date: 10/15/2019  1.Small right apical pneumothorax is unchanged. 2.Hazy bilateral airspace opacities worse in the right lower lung are unchanged, suggesting multifocal pneumonia. 3.Moderate right and small left pleural effusions.  Electronically Signed By-DR. Darnell Montilla MD On:10/15/2019 7:14 AM This report was finalized on [id number] by DR. Darnell Montilla MD.    Xr Chest 1 View    Result Date: 10/14/2019   1. Stable small right apical pneumothorax compared to earlier today. 2. Multifocal patchy infiltrates in both lungs with more confluent bibasilar consolidations the appearance of pneumonia, stable. 3. Small to moderate right, small left pleural effusions, unchanged.  Electronically Signed By-Dr. Mary Hope MD On:10/14/2019 2:22 PM This report was finalized on [id number] by Dr. Mary Hope MD.    Xr Chest 1 View    Result Date: 10/14/2019  1.Small right apical pneumothorax has slightly improved. 2.Hazy bilateral airspace opacities worse in the right lower lung have slightly improved, suggesting multifocal pneumonia. 3.Moderate right and small left pleural effusions.  Electronically Signed By-DR. Darnell Montilla MD On:10/14/2019 8:35 AM This report was finalized on [id number] by DR. Darnell Montilla MD.    Xr Chest 1 View    Result Date:  10/14/2019  1.Small-to-moderate right apical pneumothorax. 2.Hazy bilateral airspace opacities worse in the right lower lung, suggesting multifocal pneumonia. 3.Moderate right and small left pleural effusions.  Results were relayed to Dr. Mittal at time of dictation.  Electronically Signed By-DR. Darnell Montilla MD On:10/14/2019 7:39 AM This report was finalized on [id number] by DR. Darnell Montilla MD.         Results Review:     I reviewed the patient's new clinical results.  I reviewed the patient's new imaging results and agree with the interpretation.    Medication Review:   Scheduled Meds:  carvedilol 3.125 mg Oral BID With Meals   cefepime 2 g Intravenous Q24H   clindamycin 600 mg Intravenous Q8H   enoxaparin 40 mg Subcutaneous Q24H   hydrocortisone sodium succinate 50 mg Intravenous Q8H   insulin lispro 0-7 Units Subcutaneous 4x Daily With Meals & Nightly   isosorbide mononitrate 30 mg Oral Daily   magic butt paste  Topical TID   pantoprazole 40 mg Oral Q AM     Continuous Infusions:  Pharmacy to dose vancomycin     sodium chloride 125 mL/hr Last Rate: 125 mL/hr (10/15/19 0503)     PRN Meds:.dextrose  •  dextrose  •  glucagon (human recombinant)  •  nitroglycerin  •  Pharmacy to dose vancomycin  •  potassium chloride **OR** potassium chloride **OR** potassium chloride  •  sodium chloride  •  sodium chloride  •  vancomycin    Labs:    CBC    Results from last 7 days   Lab Units 10/15/19  0307 10/14/19  0121 10/13/19  1930   WBC 10*3/mm3 10.90* 14.20* 11.30*   HEMOGLOBIN g/dL 11.7* 13.1 12.6*   PLATELETS 10*3/mm3 209 234 231     BMP Results from last 7 days   Lab Units 10/15/19  0307 10/14/19  0435 10/14/19  0121 10/13/19  1930   SODIUM mmol/L 148*  --  138 134*   POTASSIUM mmol/L 3.1*  --  3.3* 3.9   CHLORIDE mmol/L 107  --  91* 85*   CO2 mmol/L 29.0  --  32.0 34.3*   BUN mg/dL 37*  --  46* 47*   CREATININE mg/dL 1.00  --  1.40* 1.21   GLUCOSE mg/dL 228* 491* 843* 1,035*   MAGNESIUM mg/dL  --   --  2.4   --    PHOSPHORUS mg/dL  --   --  4.0  --      Cr Clearance Estimated Creatinine Clearance: 41.4 mL/min (by C-G formula based on SCr of 1 mg/dL).  Coag   Results from last 7 days   Lab Units 10/14/19  0121   INR  0.90   APTT seconds 22.4*     HbA1C   Lab Results   Component Value Date    HGBA1C 8.3 (H) 10/14/2019    HGBA1C 6.3 (H) 08/03/2019    HGBA1C 5.90 (H) 10/24/2016     Blood Glucose   Glucose   Date/Time Value Ref Range Status   10/15/2019 0649 209 (H) 70 - 105 mg/dL Final     Comment:     Serial Number: [id number]:  [id number]   10/14/2019 2358 193 (H) 70 - 105 mg/dL Final     Comment:     Serial Number: [id number]:  [id number]   10/14/2019 1941 126 (H) 70 - 105 mg/dL Final     Comment:     Serial Number: [id number]:  [id number]   10/14/2019 1847 81 70 - 105 mg/dL Final     Comment:     Serial Number: [id number]:  [id number]   10/14/2019 1813 75 70 - 105 mg/dL Final     Comment:     Serial Number: [id number]:  [id number]   10/14/2019 1704 138 (H) 70 - 105 mg/dL Final     Comment:     Serial Number: [id number]:  [id number]   10/14/2019 1632 29 (C) 70 - 105 mg/dL Final     Comment:     Serial Number: [id number]:  [id number]   10/14/2019 1032 410 (C) 70 - 105 mg/dL Final     Comment:     Serial Number: [id number]:  [id number]     Infection Results from last 7 days   Lab Units 10/14/19  0121   BLOODCX  No growth at 24 hours  No growth at 24 hours     CMP Results from last 7 days   Lab Units 10/15/19  0307 10/14/19  0435 10/14/19  0121 10/13/19  1930   SODIUM mmol/L 148*  --  138 134*   POTASSIUM mmol/L 3.1*  --  3.3* 3.9   CHLORIDE mmol/L 107  --  91* 85*   CO2 mmol/L 29.0  --  32.0 34.3*   BUN mg/dL 37*  --  46* 47*   CREATININE mg/dL 1.00  --  1.40* 1.21   GLUCOSE mg/dL 228* 491* 843* 1,035*   ALBUMIN g/dL  --   --  2.90*  --    BILIRUBIN mg/dL  --   --  1.3*  --    ALK PHOS U/L  --   --  109*  --    AST (SGOT) U/L  --   --  41  --    ALT (SGPT) U/L  --   --  21   --      UA  Results from last 7 days   Lab Units 10/14/19  0208   NITRITE UA  Negative     Radiology(recent) Xr Chest 1 View    Result Date: 10/15/2019  1.Small right apical pneumothorax is unchanged. 2.Hazy bilateral airspace opacities worse in the right lower lung are unchanged, suggesting multifocal pneumonia. 3.Moderate right and small left pleural effusions.  Electronically Signed By-DR. Darnell Montilla MD On:10/15/2019 7:14 AM This report was finalized on [id number] by DR. Darnell Montilla MD.    Xr Chest 1 View    Result Date: 10/14/2019   1. Stable small right apical pneumothorax compared to earlier today. 2. Multifocal patchy infiltrates in both lungs with more confluent bibasilar consolidations the appearance of pneumonia, stable. 3. Small to moderate right, small left pleural effusions, unchanged.  Electronically Signed By-Dr. Mary Hope MD On:10/14/2019 2:22 PM This report was finalized on [id number] by Dr. Mary Hope MD.    Xr Chest 1 View    Result Date: 10/14/2019  1.Small right apical pneumothorax has slightly improved. 2.Hazy bilateral airspace opacities worse in the right lower lung have slightly improved, suggesting multifocal pneumonia. 3.Moderate right and small left pleural effusions.  Electronically Signed By-DR. Darnell Montilla MD On:10/14/2019 8:35 AM This report was finalized on [id number] by DR. Darnell Montilla MD.    Xr Chest 1 View    Result Date: 10/14/2019  1.Small-to-moderate right apical pneumothorax. 2.Hazy bilateral airspace opacities worse in the right lower lung, suggesting multifocal pneumonia. 3.Moderate right and small left pleural effusions.  Results were relayed to Dr. Mittal at time of dictation.  Electronically Signed By-DR. Darnell Montilla MD On:10/14/2019 7:39 AM This report was finalized on [id number] by DR. Darnell Montilla MD.     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

## 2019-10-15 NOTE — PLAN OF CARE
Problem: Fall Risk (Adult)  Goal: Identify Related Risk Factors and Signs and Symptoms  Outcome: Ongoing (interventions implemented as appropriate)   10/15/19 0456   Fall Risk (Adult)   Related Risk Factors (Fall Risk) age-related changes;environment unfamiliar   Signs and Symptoms (Fall Risk) presence of risk factors     Goal: Absence of Fall  Outcome: Ongoing (interventions implemented as appropriate)      Problem: Skin Injury Risk (Adult)  Goal: Identify Related Risk Factors and Signs and Symptoms  Outcome: Ongoing (interventions implemented as appropriate)    Goal: Skin Health and Integrity  Outcome: Ongoing (interventions implemented as appropriate)      Problem: Pneumonia (Adult)  Goal: Signs and Symptoms of Listed Potential Problems Will be Absent, Minimized or Managed (Pneumonia)  Outcome: Ongoing (interventions implemented as appropriate)      Problem: Nutrition, Imbalanced: Inadequate Oral Intake (Adult)  Goal: Identify Related Risk Factors and Signs and Symptoms  Outcome: Ongoing (interventions implemented as appropriate)    Goal: Improved Oral Intake  Outcome: Ongoing (interventions implemented as appropriate)    Goal: Prevent Further Weight Loss  Outcome: Ongoing (interventions implemented as appropriate)

## 2019-10-15 NOTE — PROGRESS NOTES
"KPA/PULM/CC PROGRESS NOTE       PATIENT IDENTIFICATION    Name: Julio César Braxton Age: 87 y.o. Sex: male :  4/3/1932 MRN: [id number]  87 y.o. male who was referred for consultation for pneumonia, sepsis and pneumothorax.  Patient is quite obtunded at time of my evaluation and is not able to give me any history.  History was obtained from the chart and talking to patient's nurse at bedside.  I tried to call patient's sister and daughter the 2 numbers listed in the chart but I was unable to get hold off either of the family members.  He is apparently a resident of nursing home and was sent for aspiration pneumonia.  Patient apparently has history of dysphagia but family has refused dietary recommendations.  Patient was noted to have acute hypoxemia and some mental status changes at the nursing home.  He subsequently was transferred to [facility] for further evaluation and management  Patient has received fluid resuscitation in the ER and has been started on broad-spectrum antibiotic therapy  SUBJECTIVE    10/15: More awake and appropriate today.  Currently on 3 L nasal cannula.  Remains on IV fluids.  Not able to provide much history.  OBJECTIVE    /71   Pulse 95   Temp 98.6 °F (37 °C)   Resp 14   Ht 167.6 cm (65.98\")   Wt 55.7 kg (122 lb 12.7 oz)   SpO2 98%   BMI 19.83 kg/m²   Intake/Output last 3 shifts:  I/O last 3 completed shifts:  In: 8 [I.V.:2588]  Out: -   Intake/Output this shift:  No intake/output data recorded.    Constitutional: Chronically ill-appearing, no acute distress, non-toxic appearance   Eyes:  conjunctiva normal   HENT:  Atraumatic, external ears normal, no nasal discharge   Neck-no JVD, no tenderness, supple   Respiratory:  No respiratory distress, good air entry bilaterally, bibasilar crackles  Cardiovascular:  Normal rate, normal rhythm, no murmurs, no gallops, no rubs   GI:  Soft, nondistended, normal bowel sounds, nontender, no organomegaly, no mass, no " rebound, no guarding   :  No costovertebral angle tenderness   Musculoskeletal:  No edema, no tenderness, no deformities. Back- no tenderness  Integument:  Well hydrated, no rash   Lymphatic:  No lymphadenopathy noted   Neurologic: awake  Psychiatric: As above    Scheduled Meds:  [START ON 10/17/2019] !Vancomycin Level Draw Needed  Does not apply Once   carvedilol 3.125 mg Oral BID With Meals   cefepime 2 g Intravenous Q12H   clindamycin 600 mg Intravenous Q8H   enoxaparin 40 mg Subcutaneous Q24H   hydrocortisone sodium succinate 50 mg Intravenous Q8H   insulin lispro 0-7 Units Subcutaneous 4x Daily With Meals & Nightly   isosorbide mononitrate 30 mg Oral Daily   magic butt paste  Topical TID   pantoprazole 40 mg Oral Q AM   [START ON 10/16/2019] vancomycin 1,000 mg Intravenous Q24H       Continuous Infusions:  Pharmacy to dose vancomycin     sodium chloride 125 mL/hr Last Rate: 125 mL/hr (10/15/19 0503)       PRN Meds:dextrose  •  dextrose  •  glucagon (human recombinant)  •  nitroglycerin  •  Pharmacy to dose vancomycin  •  potassium chloride **OR** potassium chloride **OR** potassium chloride  •  sodium chloride  •  sodium chloride  •  vancomycin     Data Review:  Lab Results (last 24 hours)     Procedure Component Value Units Date/Time    POC Glucose Once [[id number]]  (Abnormal) Collected:  10/15/19 1103    Specimen:  Blood Updated:  10/15/19 1104     Glucose 203 mg/dL      Comment: Serial Number: [id number]:  [id number]       Lactic Acid, Plasma [[id number]]  (Normal) Collected:  10/15/19 0900    Specimen:  Blood Updated:  10/15/19 0930     Lactate 2.0 mmol/L     Magnesium [[id number]] Collected:  10/15/19 0307    Specimen:  Blood Updated:  10/15/19 0739    POC Glucose Once [[id number]]  (Abnormal) Collected:  10/15/19 0649    Specimen:  Blood Updated:  10/15/19 0651     Glucose 209 mg/dL      Comment: Serial Number: [id number]:  [id number]       Basic Metabolic Panel [[id number]]  (Abnormal)  Collected:  10/15/19 0307    Specimen:  Blood Updated:  10/15/19 0552     Glucose 228 mg/dL      BUN 37 mg/dL      Creatinine 1.00 mg/dL      Sodium 148 mmol/L      Potassium 3.1 mmol/L      Chloride 107 mmol/L      CO2 29.0 mmol/L      Calcium 8.1 mg/dL      eGFR Non African Amer 71 mL/min/1.73      BUN/Creatinine Ratio 37.0     Anion Gap 15.1 mmol/L     Narrative:       The MDRD GFR formula is only valid for adults with stable renal function between ages 18 and 70.    CBC & Differential [[id number]] Collected:  10/15/19 0307    Specimen:  Blood Updated:  10/15/19 0551    Narrative:       The following orders were created for panel order CBC & Differential.  Procedure                               Abnormality         Status                     ---------                               -----------         ------                     CBC Auto Differential[[id number]]        Abnormal            Final result                 Please view results for these tests on the individual orders.    CBC Auto Differential [[id number]]  (Abnormal) Collected:  10/15/19 0307    Specimen:  Blood Updated:  10/15/19 0551     WBC 10.90 10*3/mm3      RBC 3.54 10*6/mm3      Hemoglobin 11.7 g/dL      Hematocrit 34.5 %      MCV 97.4 fL      MCH 32.9 pg      MCHC 33.8 g/dL      RDW 15.0 %      RDW-SD 51.2 fl      MPV 8.6 fL      Platelets 209 10*3/mm3     Scan Slide [[id number]] Collected:  10/15/19 0307    Specimen:  Blood Updated:  10/15/19 0551     Scan Slide --     Comment: See Manual Differential Results       Manual Differential [[id number]]  (Abnormal) Collected:  10/15/19 0307    Specimen:  Blood Updated:  10/15/19 0551     Neutrophil % 74.0 %      Lymphocyte % 12.0 %      Monocyte % 1.0 %      Bands %  13.0 %      Neutrophils Absolute 9.48 10*3/mm3      Lymphocytes Absolute 1.31 10*3/mm3      Monocytes Absolute 0.11 10*3/mm3      nRBC 1.0 /100 WBC      RBC Morphology Normal     Toxic Granulation Slight/1+     Platelet Morphology Normal     Folate [[id number]]  (Normal) Collected:  10/15/19 0307    Specimen:  Blood Updated:  10/15/19 0526     Folate 17.00 ng/mL     Narrative:       Results may be falsely increased if patient taking Biotin.    Vitamin B12 [[id number]]  (Normal) Collected:  10/15/19 0307    Specimen:  Blood Updated:  10/15/19 0526     Vitamin B-12 502 pg/mL      Comment: Results may be falsely increased if patient taking Biotin.       Vancomycin, Random [[id number]]  (Normal) Collected:  10/15/19 0307    Specimen:  Blood Updated:  10/15/19 0446     Vancomycin Random 8.30 mcg/mL     Blood Culture - Blood, Arm, Left [[id number]] Collected:  10/14/19 0121    Specimen:  Blood from Arm, Left Updated:  10/15/19 0130     Blood Culture No growth at 24 hours    Blood Culture - Blood, Arm, Right [[id number]] Collected:  10/14/19 0121    Specimen:  Blood from Arm, Right Updated:  10/15/19 0130     Blood Culture No growth at 24 hours    POC Glucose Once [[id number]]  (Abnormal) Collected:  10/14/19 2358    Specimen:  Blood Updated:  10/15/19 0000     Glucose 193 mg/dL      Comment: Serial Number: [id number]:  [id number]       POC Glucose Once [[id number]]  (Abnormal) Collected:  10/14/19 1941    Specimen:  Blood Updated:  10/14/19 1944     Glucose 126 mg/dL      Comment: Serial Number: [id number]:  [id number]       POC Glucose Once [[id number]]  (Normal) Collected:  10/14/19 1847    Specimen:  Blood Updated:  10/14/19 1848     Glucose 81 mg/dL      Comment: Serial Number: [id number]:  [id number]       POC Glucose Once [[id number]]  (Normal) Collected:  10/14/19 1813    Specimen:  Blood Updated:  10/14/19 1815     Glucose 75 mg/dL      Comment: Serial Number: [id number]:  [id number]       POC Glucose Once [[id number]]  (Abnormal) Collected:  10/14/19 1704    Specimen:  Blood Updated:  10/14/19 1706     Glucose 138 mg/dL      Comment: Serial Number: [id number]:  [id number]       POC Glucose Once [[id number]]  (Abnormal) Collected:   10/14/19 1632    Specimen:  Blood Updated:  10/14/19 1636     Glucose 29 mg/dL      Comment: Serial Number: [id number]:  [id number]                Imaging:  Imaging Results (last 72 hours)     Procedure Component Value Units Date/Time    FL Video Swallow With Speech [[id number]] Collected:  10/15/19 1226     Updated:  10/15/19 1233    Narrative:       DATE OF EXAM:  10/15/2019 12:00 PM     PROCEDURE:  FL VIDEO SWALLOW W SPEECH-     INDICATIONS:  dysphagia; A41.9-Sepsis, unspecified organism; R65.20-Severe sepsis  without septic shock; J69.0-Pneumonitis due to inhalation of food and  vomit; R73.9-Hyperglycemia, unspecified; N28.9-Disorder of kidney and  ureter, unspecified     COMPARISON:  No Comparisons Available     TECHNIQUE:   This examination was performed in conjunction with speech pathology.  Lateral video fluoroscopic evaluation of the swallowing mechanism was  performed while correlate administering to the patient various  consistency food items mixed with barium.     Fluoroscopic Time:   1.3 minutes     FINDINGS:  There was penetration with aspiration with thin barium. There were  residuals with nectar and applesauce consistencies.        Impression:       1.Aspiration with thin barium.  2.Please refer to speech pathology report for further details.     Electronically Signed By-DR. Darnell Montilla MD On:10/15/2019 12:30 PM  This report was finalized on [id number] by DR. Darnell Montilla MD.    XR Chest 1 View [[id number]] Collected:  10/15/19 0712     Updated:  10/15/19 0716    Narrative:       XR CHEST 1 VW-     Date of Exam: 10/15/2019 6:28 AM     Indication: REsp failure; A41.9-Sepsis, unspecified organism;  R65.20-Severe sepsis without septic shock; J69.0-Pneumonitis due to  inhalation of food and vomit; R73.9-Hyperglycemia, unspecified;  N28.9-Disorder of kidney and ureter, unspecified.     Comparison Exams: October 14, 2019     Technique: Single AP chest radiograph     FINDINGS:  A small  right apical pneumothorax is unchanged. Hazy bilateral airspace  opacities worse in the right lower lung are unchanged, suggesting  multifocal pneumonia. There are moderate right and small left pleural  effusions. The heart and mediastinal contours appear stable. The osseous  structures appear intact.       Impression:       1.Small right apical pneumothorax is unchanged.  2.Hazy bilateral airspace opacities worse in the right lower lung are  unchanged, suggesting multifocal pneumonia.  3.Moderate right and small left pleural effusions.     Electronically Signed By-DR. Darnell Montilla MD On:10/15/2019 7:14 AM  This report was finalized on [id number] by DR. Darnell Montilla MD.    XR Chest 1 View [[id number]] Collected:  10/14/19 1421     Updated:  10/14/19 1424    Narrative:       DATE OF EXAM:  10/14/2019 1:38 PM     PROCEDURE:  XR CHEST 1 VW-     INDICATIONS:  Pneumothorax; A41.9-Sepsis, unspecified organism; R65.20-Severe sepsis  without septic shock; J69.0-Pneumonitis due to inhalation of food and  vomit; R73.9-Hyperglycemia, unspecified; N28.9-Disorder of kidney and  ureter, unspecified       COMPARISON:  AP portable chest 10/14/2019.     TECHNIQUE:   Single radiographic view of the chest was obtained.     FINDINGS:  Dense consolidative changes in the bilateral lower lobes, right greater  than left, with small to moderate right and small left pleural  effusions, without significant change. More ill-defined patchy  infiltrates in the right upper lobe and left midlung likewise stable.  Small right apical pneumothorax is unchanged. Stable cardiac  enlargement.       Impression:          1. Stable small right apical pneumothorax compared to earlier today.  2. Multifocal patchy infiltrates in both lungs with more confluent  bibasilar consolidations the appearance of pneumonia, stable.  3. Small to moderate right, small left pleural effusions, unchanged.     Electronically Signed By-Dr. Mary Hope MD  On:10/14/2019 2:22 PM  This report was finalized on [id number] by Dr. Mary Hope MD.    XR Chest 1 View [[id number]] Collected:  10/14/19 0832     Updated:  10/14/19 0837    Narrative:       XR CHEST 1 VW-     Date of Exam: 10/14/2019 8:30 AM     Indication: pneumothorax; A41.9-Sepsis, unspecified organism;  R65.20-Severe sepsis without septic shock; J69.0-Pneumonitis due to  inhalation of food and vomit; R73.9-Hyperglycemia, unspecified;  N28.9-Disorder of kidney and ureter, unspecified.     Comparison Exams: Chest radiograph from earlier today     Technique: Single AP chest radiograph     FINDINGS:  A small right apical pneumothorax has slightly improved now measuring  1.8 cm in diameter, previously 2.2 cm. Hazy bilateral airspace opacities  worse in the right lower lung appear slightly improved. There are  moderate right and small left pleural effusions. The heart and  mediastinal contours appear stable. The pulmonary vasculature appears  normal. The osseous structures appear intact.       Impression:       1.Small right apical pneumothorax has slightly improved.  2.Hazy bilateral airspace opacities worse in the right lower lung have  slightly improved, suggesting multifocal pneumonia.  3.Moderate right and small left pleural effusions.     Electronically Signed By-DR. Darnell Montilla MD On:10/14/2019 8:35 AM  This report was finalized on [id number] by DR. Darnell Montilla MD.    XR Chest 1 View [[id number]] Collected:  10/14/19 0734     Updated:  10/14/19 0742    Narrative:       XR CHEST 1 VW-     Date of Exam: 10/14/2019 1:39 AM     Indication: Severe Sepsis Protocol; A41.9-Sepsis, unspecified organism;  R65.20-Severe sepsis without septic shock; J69.0-Pneumonitis due to  inhalation of food and vomit; R73.9-Hyperglycemia, unspecified.     Comparison Exams: September 7, 2019     Technique: Single AP chest radiograph     FINDINGS:  There is a small-to-moderate right apical pneumothorax measuring up  to  2.2 cm in diameter. There are hazy bilateral airspace opacities worse in  the right lower lung. There are moderate right and small left pleural  effusions. The heart and mediastinal contours appear stable. The  pulmonary vasculature appears normal. The osseous structures appear  intact.       Impression:       1.Small-to-moderate right apical pneumothorax.  2.Hazy bilateral airspace opacities worse in the right lower lung,  suggesting multifocal pneumonia.  3.Moderate right and small left pleural effusions.     Results were relayed to Dr. Mittal at time of dictation.     Electronically Signed By-DR. Darnell Montilla MD On:10/14/2019 7:39 AM  This report was finalized on [id number] by DR. Darnell Montilla MD.            ASSESSMENT/PLAN:     Decubitus ulcer of sacral region, stage 3 (CMS/HCC)    Dermatitis associated with moisture    Severe sepsis (CMS/HCC)  Acute hypoxemic respiratory failure  Aspiration pneumonia  Right apical pneumothorax. -Etiology unclear.  Likely spontaneous.  Acute encephalopathy.  Likely toxic metabolic encephalopathy.  History of chronic dysphagia  Chronic diastolic congestive heart failure.  EF 20 to 25%  ESTEBAN  Diabetes mellitus with hyperglycemia.  History of hypertension  History of CVA  History of generalized anxiety disorder  PLAN    Chest x-ray films and ABG reviewed.  Patient has right lung infiltrate concerning for aspiration pneumonia along with a small right apical pneumothorax.  Pneumothorax has been stable on serial x-ray monitoring.  We will continue to monitor conservatively for now.  Currently he is on 3 L nasal cannula.  Does not appear to be any acute respiratory distress.  If pneumothorax is worsening we will place a chest tube.  Remains on broad-spectrum antibiotic therapy with vancomycin, clindamycin and cefepime.  Will follow cultures and de-escalate antibiotic therapy as appropriate.  Blood cultures are negative so far.  video swallow today with persistent  aspiration.  We will follow speech therapy recommendations.  Switched his dexamethasone to stress dose steroids.  He is on Lantus and sliding scale insulin. We will closely monitor  Remains on IV fluids.  Some crackles on exam today.  Lactic acid level significantly improved.  Will DC IV fluids for now.  Discussed goals of care with patient's nephew (who is his power of  but not healthcare proxy) yesterday.  Unable to get hold of daughter who lives out of town.  Continue aggressive treatment.  Overall prognosis is guarded  Discussed with patient's nursing staff

## 2019-10-15 NOTE — CONSULTS
Nutrition Services    Patient Name:  Julio César Braxton  YOB: 1932  MRN: [id number]  Admit Date:  10/14/2019    Progress note: Consulted for diet education r/t increased HgbA1C. R/t pt's AMS, age, and current medical condition, diet education not appropriate at this time.     Electronically signed by:  Mariah Bella RD  10/15/19 1:33 PM

## 2019-10-15 NOTE — PLAN OF CARE
Problem: Fall Risk (Adult)  Goal: Identify Related Risk Factors and Signs and Symptoms  Outcome: Ongoing (interventions implemented as appropriate)   10/15/19 6210   Fall Risk (Adult)   Related Risk Factors (Fall Risk) age-related changes;gait/mobility problems;environment unfamiliar   Signs and Symptoms (Fall Risk) presence of risk factors     Goal: Absence of Fall  Outcome: Ongoing (interventions implemented as appropriate)      Problem: Patient Care Overview  Goal: Plan of Care Review  Outcome: Ongoing (interventions implemented as appropriate)    Goal: Individualization and Mutuality  Outcome: Ongoing (interventions implemented as appropriate)    Goal: Discharge Needs Assessment  Outcome: Ongoing (interventions implemented as appropriate)    Goal: Interprofessional Rounds/Family Conf  Outcome: Ongoing (interventions implemented as appropriate)      Problem: Skin Injury Risk (Adult)  Goal: Identify Related Risk Factors and Signs and Symptoms  Outcome: Ongoing (interventions implemented as appropriate)    Goal: Skin Health and Integrity  Outcome: Ongoing (interventions implemented as appropriate)      Problem: Pneumonia (Adult)  Goal: Signs and Symptoms of Listed Potential Problems Will be Absent, Minimized or Managed (Pneumonia)  Outcome: Ongoing (interventions implemented as appropriate)      Problem: Nutrition, Imbalanced: Inadequate Oral Intake (Adult)  Goal: Identify Related Risk Factors and Signs and Symptoms  Outcome: Ongoing (interventions implemented as appropriate)    Goal: Improved Oral Intake  Outcome: Ongoing (interventions implemented as appropriate)    Goal: Prevent Further Weight Loss  Outcome: Ongoing (interventions implemented as appropriate)

## 2019-10-15 NOTE — MBS/VFSS/FEES
Acute Care - Speech Language Pathology   Swallow Initial Evaluation  Patrice     Patient Name: Julio César Braxton  : 4/3/1932  MRN: [id number]  Today's Date: 10/15/2019               Admit Date: 10/14/2019    Visit Dx:     ICD-10-CM ICD-9-CM   1. Severe sepsis (CMS/HCC) A41.9 038.9    R65.20 995.92   2. Aspiration pneumonia of right lower lobe, unspecified aspiration pneumonia type (CMS/HCC) J69.0 507.0   3. Hyperglycemia R73.9 790.29   4. Acute renal insufficiency N28.9 593.9     Patient Active Problem List   Diagnosis   • Stroke (cerebrum) (CMS/HCC)   • CAD in native artery   • LV dysfunction   • Dyspnea on exertion   • Pacemaker   • Decubitus ulcer of trochanteric region of right hip, stage 2 (CMS/HCC)   • Decubitus ulcer of sacral region, stage 3 (CMS/HCC)   • Atrial fibrillation (CMS/HCC)   • Bradycardia   • Cardiomyopathy (CMS/HCC)   • Diabetes mellitus, type II (CMS/HCC)   • Long term current use of anticoagulant therapy   • PVCs (premature ventricular contractions)   • Second degree heart block   • Essential hypertension   • Hyperlipidemia, mixed   • Acute UTI   • Dermatitis associated with moisture   • Stage 2 skin ulcer of sacral region (CMS/HCC)   • Severe sepsis (CMS/HCC)     Past Medical History:   Diagnosis Date   • Atrial fibrillation (CMS/HCC)    • CHF (congestive heart failure) (CMS/HCC)    • Coronary artery disease    • Myocardial infarction (CMS/HCC)    • Neuropathy    • Stroke (CMS/HCC)      Past Surgical History:   Procedure Laterality Date   • ANGIOPLASTY     • BACK SURGERY     • CHOLECYSTECTOMY     • CORONARY STENT PLACEMENT     • SPINAL FUSION          SWALLOW EVALUATION (last 72 hours)      SLP Adult Swallow Evaluation     Row Name 10/15/19 1500          Document Type  evaluation  -EC    Patient Observations  alert;cooperative;lethargic  -EC    Patient Effort  adequate  -EC          Patient Profile Reviewed  yes  -EC    Current Method of Nutrition  NPO  -EC          Oral Motor General  Assessment  --    Oral Motor, Comment  --          Oral Prep Phase  --    Pharyngeal Phase  --    Clinical Swallow Evaluation Summary  --          Utensils Used  spoon;straw  -EC    Consistencies Trialed  pureed;thin liquids;nectar/syrup-thick liquids  -EC          Oral Prep Phase  impaired oral phase of swallowing  -EC    Oral Transit Phase  impaired  -EC    Oral Residue  impaired  -EC    VFSS Summary  Across all trials pt is noted w/decreased anterior hyoid movement, a C-shaped epiglottis and no epiglottic movement.  THIN: Pt noted w/silent aspiration during the swallow and during subsequent swallows when atempting to clear pharyngeal residue.  Moderate vallecular residue remains after two independent dry swallows as well as mild/trace pyriform residue. NTL: By spoon pt demonstrates no penetration or aspiration initially though does have severe vallecular residue which contributes to penetration/aspiration of subsequent trials.  By straw, pt again w/severe vallecular residue w/penetration and silent aspiration on subsequent dry swallows to clear residue. penetration is not cleared.  PUREE: Pt has prolonged oral transit w/tongue pumping noted.  Pt penetrates residue from previous trials during the oral transit of puree.  Pt demonstrates aspiration of puree during multiple swallows in attempt to clear severe pharyngeal residue.  Pt is at high risk for aspiration of all consistencies due to absent epiglottic deflection pt's ability to maintain nutrition is also questionable due to severe residue/inability to clear bolus trials.  -EC          Oral Preparatory Phase  prolonged manipulation  -EC    Prolonged Manipulation  pudding/puree  -EC          Impaired Oral Transit Phase  tongue pumping;increased A-P transit time  -EC    Increased A-P Transit Time  pudding/puree  -EC    Tongue Pumping  pudding/puree  -EC          Initiation of Pharyngeal Swallow  WFL  -EC    Pharyngeal Phase  impaired pharyngeal phase of  swallowing  -EC    Penetration During the Swallow  nectar-thick liquids;pudding/puree  -EC    Aspiration During the Swallow  thin liquids;nectar-thick liquids;pudding/puree  -EC    Response to Aspiration  no response, silent aspiration  -EC    Pharyngeal Residue  all consistencies tested 2/2 no epiglottic deflection  -EC    Attempted Compensatory Maneuvers  -- Unable to folow cues  -EC          SLP Swallowing Diagnosis  oral dysfunction;profound;pharyngeal dysfunction  -EC    Functional Impact  risk of aspiration/pneumonia;risk of malnutrition;risk of dehydration  -EC    Rehab Potential/Prognosis, Swallowing  re-evaluate goals as necessary  -EC    Swallow Criteria for Skilled Therapeutic Interventions Met  demonstrates skilled criteria  -EC          Therapy Frequency (Swallow)  PRN  -EC    Predicted Duration Therapy Intervention (Days)  until discharge  -EC    SLP Diet Recommendation  NPO  -EC    Recommended Diagnostics  --    SLP Rec. for Method of Medication Administration  meds via alternate route  -EC          Oral Nutrition/Hydration Goal Selection (SLP)  oral nutrition/hydration, SLP goal 1;oral nutrition/hydration, SLP goal 2  -EC          Oral Nutrition/Hydration Goal 1, SLP  LTG: Establish PO diet  -EC          Oral Nutrition/Hydration Goal 2, SLP  Pt will complete VFSS to objectively evaluate identified aspiration risk  -EC    Barriers (Oral Nutrition/Hydration Goal 2, SLP)  VFSS completed, pt at high risk for aspiration of all consistencies assessed. Recommend NPO.  -EC    Progress/Outcomes (Oral Nutrition/Hydration Goal 2, SLP)  goal met  -EC      User Key  (r) = Recorded By, (t) = Taken By, (c) = Cosigned By    Initials Name Effective Dates    EC Caryl Webster 03/01/19 -     Nati Holley SLP 03/01/19 -           EDUCATION  The patient has been educated in the following areas:   NPO rationale.    SLP Recommendation and Plan  SLP Swallowing Diagnosis: oral dysfunction, profound, pharyngeal  dysfunction  SLP Diet Recommendation: NPO     SLP Rec. for Method of Medication Administration: meds via alternate route           Swallow Criteria for Skilled Therapeutic Interventions Met: demonstrates skilled criteria     Rehab Potential/Prognosis, Swallowing: re-evaluate goals as necessary  Therapy Frequency (Swallow): PRN  Predicted Duration Therapy Intervention (Days): until discharge            SLP GOALS     Row Name 10/15/19 1500 10/15/19 1100          Oral Nutrition/Hydration Goal 1 (SLP)    Oral Nutrition/Hydration Goal 1, SLP  LTG: Establish PO diet  -EC  LTG: Establish PO diet  -MC        Oral Nutrition/Hydration Goal 2 (SLP)    Oral Nutrition/Hydration Goal 2, SLP  Pt will complete VFSS to objectively evaluate identified aspiration risk  -EC  Pt will complete VFSS to objectively evaluate identified aspiration risk  -MC     Barriers (Oral Nutrition/Hydration Goal 2, SLP)  VFSS completed, pt at high risk for aspiration of all consistencies assessed. Recommend NPO.  -EC  --     Progress/Outcomes (Oral Nutrition/Hydration Goal 2, SLP)  goal met  -EC  --       User Key  (r) = Recorded By, (t) = Taken By, (c) = Cosigned By    Initials Name Provider Type    Caryl Woodward Speech and Language Pathologist    Nati Holley, SLP Speech and Language Pathologist             Time Calculation:                Caryl Webster  10/15/2019

## 2019-10-15 NOTE — PLAN OF CARE
Problem: Fall Risk (Adult)  Goal: Identify Related Risk Factors and Signs and Symptoms  Outcome: Ongoing (interventions implemented as appropriate)   10/14/19 2020   Fall Risk (Adult)   Related Risk Factors (Fall Risk) age-related changes;environment unfamiliar   Signs and Symptoms (Fall Risk) presence of risk factors     Goal: Absence of Fall  Outcome: Ongoing (interventions implemented as appropriate)      Problem: Patient Care Overview  Goal: Plan of Care Review  Outcome: Ongoing (interventions implemented as appropriate)    Goal: Individualization and Mutuality  Outcome: Ongoing (interventions implemented as appropriate)    Goal: Discharge Needs Assessment  Outcome: Ongoing (interventions implemented as appropriate)    Goal: Interprofessional Rounds/Family Conf  Outcome: Ongoing (interventions implemented as appropriate)      Problem: Skin Injury Risk (Adult)  Goal: Identify Related Risk Factors and Signs and Symptoms  Outcome: Ongoing (interventions implemented as appropriate)    Goal: Skin Health and Integrity  Outcome: Ongoing (interventions implemented as appropriate)      Problem: Pneumonia (Adult)  Goal: Signs and Symptoms of Listed Potential Problems Will be Absent, Minimized or Managed (Pneumonia)  Outcome: Ongoing (interventions implemented as appropriate)      Problem: Nutrition, Imbalanced: Inadequate Oral Intake (Adult)  Goal: Identify Related Risk Factors and Signs and Symptoms  Outcome: Ongoing (interventions implemented as appropriate)    Goal: Improved Oral Intake  Outcome: Ongoing (interventions implemented as appropriate)    Goal: Prevent Further Weight Loss  Outcome: Ongoing (interventions implemented as appropriate)

## 2019-10-15 NOTE — PLAN OF CARE
Problem: Patient Care Overview  Goal: Plan of Care Review   10/15/19 1544   OTHER   Outcome Summary VFSS completed on this date. Across all trials pt is noted w/decreased anterior hyoid movement, a C-shaped epiglottis and no epiglottic movement resulting in severe pharyngeal residue unable to be cleared and silent aspiration of thin liquid, NTL and pudding thick/puree. Pt is at high risk for aspiration of all consistencies due to absent epiglottic deflection pt's ability to maintain nutrition is also questionable due to severe residue/inability to clear bolus trials. Recommend NPO w/alternative means of nutrition.

## 2019-10-15 NOTE — THERAPY EVALUATION
Acute Care - Speech Language Pathology   Swallow Initial Evaluation  Patrice     Patient Name: Julio César Braxton  : 4/3/1932  MRN: [id number]  Today's Date: 10/15/2019               Admit Date: 10/14/2019    Visit Dx:     ICD-10-CM ICD-9-CM   1. Severe sepsis (CMS/HCC) A41.9 038.9    R65.20 995.92   2. Aspiration pneumonia of right lower lobe, unspecified aspiration pneumonia type (CMS/HCC) J69.0 507.0   3. Hyperglycemia R73.9 790.29   4. Acute renal insufficiency N28.9 593.9     Patient Active Problem List   Diagnosis   • Stroke (cerebrum) (CMS/HCC)   • CAD in native artery   • LV dysfunction   • Dyspnea on exertion   • Pacemaker   • Decubitus ulcer of trochanteric region of right hip, stage 2 (CMS/HCC)   • Decubitus ulcer of sacral region, stage 3 (CMS/HCC)   • Atrial fibrillation (CMS/HCC)   • Bradycardia   • Cardiomyopathy (CMS/HCC)   • Diabetes mellitus, type II (CMS/HCC)   • Long term current use of anticoagulant therapy   • PVCs (premature ventricular contractions)   • Second degree heart block   • Essential hypertension   • Hyperlipidemia, mixed   • Acute UTI   • Dermatitis associated with moisture   • Stage 2 skin ulcer of sacral region (CMS/HCC)   • Severe sepsis (CMS/HCC)     Past Medical History:   Diagnosis Date   • Atrial fibrillation (CMS/HCC)    • CHF (congestive heart failure) (CMS/HCC)    • Coronary artery disease    • Myocardial infarction (CMS/HCC)    • Neuropathy    • Stroke (CMS/HCC)      Past Surgical History:   Procedure Laterality Date   • ANGIOPLASTY     • BACK SURGERY     • CHOLECYSTECTOMY     • CORONARY STENT PLACEMENT     • SPINAL FUSION          SWALLOW EVALUATION (last 72 hours)      SLP Adult Swallow Evaluation     Row Name 10/15/19 1100          Oral Motor General Assessment  lingual impairment  -MC    Oral Motor, Comment  Pt with natural mandibular dentition seen anteriorly only. Pt with maxillary plate in room, SLP placed denture cleansing tablet in denture cup to clean. Pt  follow 1-step commands for OME. Of note, pt unable to terminate lingual laterlization task. Pt continued with repetitive lateralization of tongue to labial corners throughout exam. Suspect due to confusion, does not appear consistent with an acute apraxia. This was however disruptive to accepting PO trials inconsistently.  -          Oral Prep Phase  impaired  -    Pharyngeal Phase  suspected pharyngeal impairment  -    Clinical Swallow Evaluation Summary  Clinical swallow evaluation completed with ice chip, water by tsp, & water by straw.  Pt demonstrates oral deficits 2/2 repetetive lingual movements as well as anterior loss x1.  Consistent cough following 100% trials water. Recommend NPO until VFSS can be completed in the setting of overt s/s aspiration at bedside correlating with known dysphagia history.  Order obtained, RN aware & plan is to complete VFSS as able with recs to follow. RN & CNA aware that dentures are currently soaking & need to be placed prior to coming down for study.   -          SLP Swallowing Diagnosis  oral dysfunction;pharyngeal dysfunction  -          SLP Diet Recommendation  NPO pending VFSS  -    Recommended Diagnostics  reassess via VFSS (MBS)  -          Oral Nutrition/Hydration Goal Selection (SLP)  oral nutrition/hydration, SLP goal 1;oral nutrition/hydration, SLP goal 2  -          Oral Nutrition/Hydration Goal 1, SLP  LTG: Establish PO diet  -          Oral Nutrition/Hydration Goal 2, SLP  Pt will complete VFSS to objectively evaluate identified aspiration risk  -      User Key  (r) = Recorded By, (t) = Taken By, (c) = Cosigned By    Initials Name Effective Dates     Nati Álvarez, SLP 03/01/19 -         EDUCATION  The patient & RN have been educated in the following areas:   Dysphagia (Swallowing Impairment) NPO rationale.    SLP Recommendation and Plan  SLP Swallowing Diagnosis: oral dysfunction, pharyngeal dysfunction  SLP Diet Recommendation: NPO(pending  VFSS)     Recommended Diagnostics: reassess via VFSS ([facility])    SLP GOALS     Row Name 10/15/19 1100          Oral Nutrition/Hydration Goal 1, SLP  LTG: Establish PO diet  -          Oral Nutrition/Hydration Goal 2, SLP  Pt will complete VFSS to objectively evaluate identified aspiration risk  -      User Key  (r) = Recorded By, (t) = Taken By, (c) = Cosigned By    Initials Name Provider Type     Nati Álvarez, SLP Speech and Language Pathologist            JOSEFINA Stewart  10/15/2019

## 2019-10-15 NOTE — PROGRESS NOTES
"Pharmacy Dosing Service  Antibiotic  Vancomycin    87 y.o.male admitted with aspiration + HAP. Pt recently admitted at [facility] 9/6-9/13 for UTI and received IV antibiotics during that admission (ceftriaxone); pt discharged on Augmentin. [facility] is following. Pharmacy to dose vancomycin.      Assessment/Plan  1. Vancomycin day #2: pulse dosing for ESTEBAN. Pt received 1250 mg (22 mg/kg ABW) IV x1 on 10/14 at 0215. Random level 10/15=8.3 at 03:07.   Plan:since Vancomycin is being cleared and Scr=1.0 (baseline), will give Vanc 1000mg today and q24h. Trough on 10/17 at 0900 prior  to 4th dose.  Goal trough 15 to 20.    2. Cefepime day #2: 2g IV q12h, appropriate for estCrCl 41ml/min    3. Clindamycin day #2: 600 mg IV q8h.    Piperacillin/tazobactam discontinued after 2 doses due to ESTEBAN and concomitant use with vancomycin. Pharmacy will continue to monitor pt renal Fx, C&S drug levels, and clinical status.       Relevant clinical data and objective history reviewed:  167.6 cm (65.98\")   55.7 kg (122 lb 12.7 oz)   Ideal body weight: 63.8 kg (140 lb 9.2 oz)    Creatinine   Date Value Ref Range Status   10/15/2019 1.00 0.70 - 1.20 mg/dL Final   10/14/2019 1.40 (H) 0.70 - 1.20 mg/dL Final   10/13/2019 1.21 0.76 - 1.27 mg/dL Final     Estimated Creatinine Clearance: 41 mL/min (by C-G formula based on SCr of 1 mg/dL).  I/O last 3 completed shifts:  In: 2588 [I.V.:2588]  Out: -     Lab Results   Component Value Date    WBC 10.90 (H) 10/15/2019     Temp Readings from Last 3 Encounters:   10/15/19 98.6 °F (37 °C)   10/10/19 97.6 °F (36.4 °C)   09/13/19 97.5 °F (36.4 °C) (Oral)     Baseline culture/source/susceptibility:  Microbiology Results (last 10 days)       Procedure Component Value - Date/Time    Blood Culture - Blood, Arm, Left [[id number]] Collected:  10/14/19 0121    Lab Status:  Preliminary result Specimen:  Blood from Arm, Left Updated:  10/15/19 0130     Blood Culture No growth at 24 hours    Blood Culture - Blood, Arm, Right " [[id number]] Collected:  10/14/19 0121    Lab Status:  Preliminary result Specimen:  Blood from Arm, Right Updated:  10/15/19 0130     Blood Culture No growth at 24 hours             Anti-Infectives (From admission, onward)      Ordered     Dose/Rate Route Frequency Start Stop    10/15/19 0740  vancomycin 1000 mg/250 mL 0.9% NS IVPB ([facility])     Ordering Provider:  Jordan Mittal MD    1,000 mg Intravenous Once 10/15/19 0900 10/15/19 1032    10/14/19 0940  clindamycin (CLEOCIN) 600 mg in sodium chloride 0.9% 50 mL IVPB (premix)     Ordering Provider:  Ilia Samaniego MD    600 mg Intravenous Every 8 Hours 10/14/19 2100 10/21/19 2059    10/14/19 0939  cefepime 2 gm IVPB in 100 ml NS ([facility])     Ordering Provider:  Ilia Samaniego MD    2 g  over 4 Hours Intravenous Every 24 Hours 10/14/19 1700 10/21/19 1659    10/14/19 1236  vancomycin 1000 mg/250 mL 0.9% NS IVPB ([facility])     Ordering Provider:  Ilia Samaniego MD    1,000 mg Intravenous As Needed 10/14/19 1236 10/21/19 1235    10/14/19 0930  Pharmacy to dose vancomycin     Ordering Provider:  Ilia Samaniego MD     Does not apply Continuous PRN 10/14/19 0929 10/21/19 0928    10/14/19 0134  piperacillin-tazobactam (ZOSYN) IVPB 3.375 g in 100 mL NS (CD)     Ordering Provider:  Carlos Eugene MD    3.375 g  over 30 Minutes Intravenous Once 10/14/19 0136 10/14/19 0244    10/14/19 0134  vancomycin 1250 mg/250 mL 0.9% NS IVPB ([facility])     Ordering Provider:  Carlos Eugene MD    20 mg/kg × 61.2 kg Intravenous Once 10/14/19 0136 10/14/19 0215          Bandar Garza, PharmD  10/15/19 14:10

## 2019-10-16 PROBLEM — J93.9 PNEUMOTHORAX: Status: ACTIVE | Noted: 2019-01-01

## 2019-10-16 PROBLEM — J69.0 ASPIRATION PNEUMONIA (HCC): Status: ACTIVE | Noted: 2019-01-01

## 2019-10-16 NOTE — CONSULTS
Continued Stay Note   Patrice     Patient Name: Julio César Braxton  MRN: [id number]  Today's Date: 10/16/2019    Admit Date: 10/14/2019    Discharge Plan     Row Name 10/16/19 1547       Plan    Plan  DC Plan: Pending clinical course, pt/family decision on feeding tube. Has LTC needs. From [facility] (can return pending bed availability if pt/family wishes). No PASRR (if return to facility). No precert needed. [facility] following d/t being with pt at previous admission.     Patient/Family in Agreement with Plan  yes    Plan Comments  LINO met with POA (Clau Mejia, [phone]) at bedside. Pt was not A&O during conversation. POA reports she is pt's niece. She is dual-POA with her brother (pt's nephew), Neearj Braxton ([phone]). POA reports that they are currently discussing if they would like to move forward with a feeding tube at this time, decision pending. POA expressed that she is aware pt needs to be LTC under Medicaid at a facility, but has expressed being uncertain about best plan for patient's quality of life. POA requested SW check with [facility] about a Medicaid accomodation. [facility] declined having accomodation at this time. POA gave permission for LINO to talk with Anam  (Sakina,[phone]). POA reports standing history of [facility] being involved with pt & pt's spouse (who has since passed). LINO talked to Sakina about what hospice at home with 24/7 care would look like as an option and updated on pt's current medical needs/need to find LTC facility/etc. At this time, d/c planning is pending familly decision about feeding tube.      MATEO Salgado    Phone: [phone]  Cell: [phone]  Fax: [phone]  [email or web address]

## 2019-10-16 NOTE — PROGRESS NOTES
"   LOS: 2 days   Patient Care Team:  Jordan Mittal MD as PCP - General (Family Medicine)  NANCY Mejia MD as PCP - Claims Attributed  Dina Coronado RN as Community Care Coordinator ([facility])    Chief Complaint:     Subjective     Patient resting but awakens to his name.  He does not respond to any questions.  He failed his swallow study and is n.p.o.  There are no family members at bedside.  Review of chart shows power of  to be shared by Neeraj Braxton and Clau Gupta equally.  He is currently a full code.        Subjective:  Diet:  NPO.        History taken from: chart RN Patient unable to give history due to patient confusion.    Objective     Vital Signs  Temp:  [98.1 °F (36.7 °C)-99 °F (37.2 °C)] 99 °F (37.2 °C)  Heart Rate:  [] 105  Resp:  [14-20] 20  BP: (108-140)/(59-77) 108/62    Objective:  General Appearance:  Ill-appearing, comfortable, in no acute distress and not in pain.    Vital signs: (most recent): Blood pressure 108/62, pulse 105, temperature 99 °F (37.2 °C), temperature source Oral, resp. rate 20, height 167.6 cm (65.98\"), weight 55 kg (121 lb 4.1 oz), SpO2 96 %.  (Tachycardic 100s).    Lungs:  Tachypnea.  There are decreased breath sounds.    Heart: Tachycardia.  Irregular rhythm.    Abdomen: Abdomen is soft.  Bowel sounds are normal.   There is no abdominal tenderness.     Extremities: There is no dependent edema.    Pulses: Distal pulses are intact.    Neurological: Patient is alert.    Skin:  Warm and dry.              Results Review:      Lab Results (last 24 hours)     Procedure Component Value Units Date/Time    POC Glucose Once [[id number]]  (Abnormal) Collected:  10/16/19 0725    Specimen:  Blood Updated:  10/16/19 0726     Glucose 210 mg/dL      Comment: Serial Number: [id number]:  [id number]       CBC & Differential [[id number]] Collected:  10/16/19 0319    Specimen:  Blood Updated:  10/16/19 0632    Narrative:       The following orders were " created for panel order CBC & Differential.  Procedure                               Abnormality         Status                     ---------                               -----------         ------                     CBC Auto Differential[[id number]]        Abnormal            Final result                 Please view results for these tests on the individual orders.    CBC Auto Differential [[id number]]  (Abnormal) Collected:  10/16/19 0319    Specimen:  Blood Updated:  10/16/19 0632     WBC 15.40 10*3/mm3      RBC 3.65 10*6/mm3      Hemoglobin 12.0 g/dL      Hematocrit 35.9 %      MCV 98.4 fL      MCH 32.9 pg      MCHC 33.4 g/dL      RDW 15.4 %      RDW-SD 54.7 fl      MPV 8.8 fL      Platelets 251 10*3/mm3     Scan Slide [[id number]] Collected:  10/16/19 0319    Specimen:  Blood Updated:  10/16/19 0632     Scan Slide --     Comment: See Manual Differential Results       Manual Differential [[id number]]  (Abnormal) Collected:  10/16/19 0319    Specimen:  Blood Updated:  10/16/19 0632     Neutrophil % 75.0 %      Lymphocyte % 10.0 %      Monocyte % 4.0 %      Bands %  10.0 %      Metamyelocyte % 1.0 %      Neutrophils Absolute 13.09 10*3/mm3      Lymphocytes Absolute 1.54 10*3/mm3      Monocytes Absolute 0.62 10*3/mm3      RBC Morphology Normal     Toxic Granulation Slight/1+     Platelet Morphology Normal    Vancomycin, Random [[id number]]  (Normal) Collected:  10/16/19 0319    Specimen:  Blood Updated:  10/16/19 0501     Vancomycin Random 12.30 mcg/mL     Basic Metabolic Panel [[id number]]  (Abnormal) Collected:  10/16/19 0319    Specimen:  Blood Updated:  10/16/19 0458     Glucose 208 mg/dL      BUN 50 mg/dL      Creatinine 1.01 mg/dL      Sodium 152 mmol/L      Potassium 3.9 mmol/L      Chloride 115 mmol/L      CO2 20.0 mmol/L      Calcium 8.7 mg/dL      eGFR Non African Amer 70 mL/min/1.73      BUN/Creatinine Ratio 49.5     Anion Gap 20.9 mmol/L     Narrative:       GFR Normal >60  Chronic Kidney Disease  <60  Kidney Failure <15    Blood Culture - Blood, Arm, Left [[id number]] Collected:  10/14/19 0121    Specimen:  Blood from Arm, Left Updated:  10/16/19 0130     Blood Culture No growth at 2 days    Blood Culture - Blood, Arm, Right [[id number]] Collected:  10/14/19 0121    Specimen:  Blood from Arm, Right Updated:  10/16/19 0130     Blood Culture No growth at 2 days    POC Glucose Once [[id number]]  (Abnormal) Collected:  10/15/19 1947    Specimen:  Blood Updated:  10/15/19 1949     Glucose 207 mg/dL      Comment: Serial Number: [id number]:  [id number]       POC Glucose Once [[id number]]  (Abnormal) Collected:  10/15/19 1654    Specimen:  Blood Updated:  10/15/19 1701     Glucose 195 mg/dL      Comment: Serial Number: [id number]:  [id number]       POC Glucose Once [[id number]]  (Abnormal) Collected:  10/15/19 1103    Specimen:  Blood Updated:  10/15/19 1104     Glucose 203 mg/dL      Comment: Serial Number: [id number]:  [id number]       Lactic Acid, Plasma [[id number]]  (Normal) Collected:  10/15/19 0900    Specimen:  Blood Updated:  10/15/19 0930     Lactate 2.0 mmol/L          Imaging Results (last 24 hours)     Procedure Component Value Units Date/Time    FL Video Swallow With Speech [[id number]] Collected:  10/15/19 1226     Updated:  10/15/19 1233    Narrative:       DATE OF EXAM:  10/15/2019 12:00 PM     PROCEDURE:  FL VIDEO SWALLOW W SPEECH-     INDICATIONS:  dysphagia; A41.9-Sepsis, unspecified organism; R65.20-Severe sepsis  without septic shock; J69.0-Pneumonitis due to inhalation of food and  vomit; R73.9-Hyperglycemia, unspecified; N28.9-Disorder of kidney and  ureter, unspecified     COMPARISON:  No Comparisons Available     TECHNIQUE:   This examination was performed in conjunction with speech pathology.  Lateral video fluoroscopic evaluation of the swallowing mechanism was  performed while correlate administering to the patient various  consistency food items mixed with barium.      Fluoroscopic Time:   1.3 minutes     FINDINGS:  There was penetration with aspiration with thin barium. There were  residuals with nectar and applesauce consistencies.        Impression:       1.Aspiration with thin barium.  2.Please refer to speech pathology report for further details.     Electronically Signed By-DR. Darnell Montilla MD On:10/15/2019 12:30 PM  This report was finalized on [id number] by DR. Darnell Montilla MD.           I reviewed the patient's new clinical results.    Medication Review:    Hospital Medications (active)       Dose Frequency Start End    !Vancomycin Level Draw Needed  Once 10/17/2019     Sig - Route: 1 (One) Time. - Does not apply    barium sulfate (E-Z-PAQUE) 96 % oral suspension reconstituted suspension 183 mL 183 mL Once in Imaging 10/15/2019 10/15/2019    Sig - Route: Take 183 mL by mouth Once. - Oral    barium sulfate oral suspension 50 mL 50 mL Once in Imaging 10/15/2019 10/15/2019    Sig - Route: Take 50 mL by mouth Once. - Oral    carvedilol (COREG) tablet 3.125 mg 3.125 mg 2 Times Daily With Meals 10/14/2019     Sig - Route: Take 1 tablet by mouth 2 (Two) Times a Day With Meals. - Oral    cefepime 2 gm IVPB in 100 ml NS (MBP) 2 g Every 12 Hours 10/15/2019 10/22/2019    Sig - Route: Infuse 2 g into a venous catheter Every 12 (Twelve) Hours. - Intravenous    clindamycin (CLEOCIN) 600 mg in sodium chloride 0.9% 50 mL IVPB (premix) 600 mg Every 8 Hours 10/14/2019 10/21/2019    Sig - Route: Infuse 50 mL into a venous catheter Every 8 (Eight) Hours. - Intravenous    dextrose (D50W) 25 g/ 50mL Intravenous Solution 25 g 25 g Every 15 Minutes PRN 10/14/2019     Sig - Route: Infuse 50 mL into a venous catheter Every 15 (Fifteen) Minutes As Needed for Low Blood Sugar (Blood Sugar Less Than 70). - Intravenous    dextrose (GLUTOSE) oral gel 15 g 15 g Every 15 Minutes PRN 10/14/2019     Sig - Route: Take 15 application by mouth Every 15 (Fifteen) Minutes As Needed for Low Blood  Sugar (Blood sugar less than 70). - Oral    enoxaparin (LOVENOX) syringe 40 mg 40 mg Every 24 Hours 10/15/2019     Sig - Route: Inject 0.4 mL under the skin into the appropriate area as directed Daily. - Subcutaneous    glucagon (human recombinant) (GLUCAGEN DIAGNOSTIC) injection 1 mg 1 mg Every 15 Minutes PRN 10/14/2019     Sig - Route: Inject 1 mg under the skin into the appropriate area as directed Every 15 (Fifteen) Minutes As Needed for Low Blood Sugar (Blood Glucose Less Than 70). - Subcutaneous    hydrocortisone sodium succinate (Solu-CORTEF) injection 50 mg 50 mg Every 8 Hours 10/14/2019     Sig - Route: Infuse 50 mg into a venous catheter Every 8 (Eight) Hours. - Intravenous    insulin lispro (humaLOG) injection 0-7 Units 0-7 Units 4 Times Daily With Meals & Nightly 10/14/2019     Sig - Route: Inject 0-7 Units under the skin into the appropriate area as directed 4 (Four) Times a Day With Meals & at Bedtime. - Subcutaneous    Cosign for Ordering: Accepted by Mikaela Owusu DO on 10/15/2019  6:19 AM    isosorbide mononitrate (IMDUR) 24 hr tablet 30 mg 30 mg Daily 10/14/2019     Sig - Route: Take 1 tablet by mouth Daily. - Oral    magic butt paste  3 Times Daily 10/14/2019     Sig - Route: Apply  topically to the appropriate area as directed 3 (Three) Times a Day. - Topical    nitroglycerin (NITROSTAT) SL tablet 0.4 mg 0.4 mg Every 5 Minutes PRN 10/14/2019     Sig - Route: Place 1 tablet under the tongue Every 5 (Five) Minutes As Needed for Chest Pain (Only if SBP Greater Than 100). - Sublingual    pantoprazole (PROTONIX) EC tablet 40 mg 40 mg Every Early Morning 10/14/2019     Sig - Route: Take 1 tablet by mouth Every Morning. - Oral    Pharmacy to dose vancomycin  Continuous PRN 10/14/2019 10/21/2019    Sig - Route: Continuous As Needed for Consult. - Does not apply    potassium chloride (K-DUR,KLOR-CON) CR tablet 40 mEq 40 mEq As Needed 10/15/2019     Sig - Route: Take 2 tablets by mouth As Needed  "(Potassium Replacement.  See Admin Instructions). - Oral    Linked Group 1:  \"Or\" Linked Group Details        potassium chloride (KLOR-CON) packet 40 mEq 40 mEq As Needed 10/15/2019     Sig - Route: Take 40 mEq by mouth As Needed (potassium replacement, see admin instructions). - Oral    Linked Group 1:  \"Or\" Linked Group Details        potassium chloride 10 mEq in 100 mL IVPB 10 mEq Every 1 Hour PRN 10/15/2019     Sig - Route: Infuse 100 mL into a venous catheter Every 1 (One) Hour As Needed (Potassium Replacement - See Admin Instructions). - Intravenous    Linked Group 1:  \"Or\" Linked Group Details        sodium chloride 0.9 % flush 10 mL 10 mL As Needed 10/14/2019     Sig - Route: Infuse 10 mL into a venous catheter As Needed for Line Care. - Intravenous    Cosign for Ordering: Accepted by Carlos Eugene MD on 10/14/2019  3:09 AM    sodium chloride 0.9 % flush 10 mL 10 mL As Needed 10/14/2019     Sig - Route: Infuse 10 mL into a venous catheter As Needed for Line Care. - Intravenous    sodium chloride 0.9 % flush 10 mL 10 mL Every 12 Hours Scheduled 10/15/2019     Sig - Route: Infuse 10 mL into a venous catheter Every 12 (Twelve) Hours. - Intravenous    sodium chloride 0.9 % flush 10 mL 10 mL As Needed 10/15/2019     Sig - Route: Infuse 10 mL into a venous catheter As Needed for Line Care (After Medication Administration). - Intravenous    vancomycin 1000 mg/250 mL 0.9% NS IVPB ([facility]) 1,000 mg As Needed 10/14/2019 10/21/2019    Sig - Route: Infuse 250 mL into a venous catheter As Needed (Do not administer doses off of this order- pulse dosing per pharmacy). - Intravenous    vancomycin 1000 mg/250 mL 0.9% NS IVPB ([facility]) 1,000 mg Once 10/15/2019 10/15/2019    Sig - Route: Infuse 250 mL into a venous catheter 1 (One) Time. - Intravenous    vancomycin 1000 mg/250 mL 0.9% NS IVPB ([facility]) 1,000 mg Every 24 Hours 10/16/2019 10/23/2019    Sig - Route: Infuse 250 mL into a venous catheter Daily. - Intravenous    " cefepime 2 gm IVPB in 100 ml NS (MBP) (Discontinued) 2 g Every 24 Hours 10/14/2019 10/15/2019    Sig - Route: Infuse 2 g into a venous catheter Daily. - Intravenous    sodium chloride 0.9 % infusion (Discontinued) 125 mL/hr Continuous 10/14/2019 10/16/2019    Sig - Route: Infuse 125 mL/hr into a venous catheter Continuous. - Intravenous    vancomycin 1000 mg/250 mL 0.9% NS IVPB ([facility]) (Discontinued) 1,000 mg Once 10/15/2019 10/15/2019    Sig - Route: Infuse 250 mL into a venous catheter 1 (One) Time. - Intravenous    Reason for Discontinue: Duplicate order           Assessment/Plan       Decubitus ulcer of sacral region, stage 3 (CMS/HCC)    Diabetes mellitus, type II (CMS/HCC)    Dermatitis associated with moisture    Severe sepsis (CMS/HCC)    Aspiration pneumonia (CMS/[facility])    Pneumothorax      Assessment:  (Acute sepsis secondary to aspiration pneumonia with aspiration pneumonitis  Acute respiratory failure  Right apical pneumothorax  Right lower lobe pneumonia healthcare acquired  Toxic metabolic encephalopathy secondary to the above  Acute dehydration secondary to an increase in obligate respiratory losses  Acute renal failure secondary to the above  Atherosclerotic heart disease of native coronary arteries with angina pectoris  History of acute myocardial infarction  Acute hyperosmolar state  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     Plan:   (I will call his POA today to discuss his failed swallow study and see if they are agreeable to feeding tube placement.  If they are not agreeable to  this, then we will discuss palliative care.  Continue aggressive treatment for now.    Update 1:43 PM 10/16/2019:  I have spoke with Lizette Braxton and Clau Mejia about the failed swallow study and his current condition/prognosis. They are going to discuss options tonight and let either me or the nurse know something tomorrow. ).       Shasha Anthony, ANDIE  10/16/19  7:51 AM

## 2019-10-16 NOTE — PLAN OF CARE
Problem: Fall Risk (Adult)  Goal: Identify Related Risk Factors and Signs and Symptoms  Outcome: Ongoing (interventions implemented as appropriate)   10/16/19 0505   Fall Risk (Adult)   Related Risk Factors (Fall Risk) age-related changes   Signs and Symptoms (Fall Risk) presence of risk factors     Goal: Absence of Fall  Outcome: Ongoing (interventions implemented as appropriate)      Problem: Skin Injury Risk (Adult)  Goal: Identify Related Risk Factors and Signs and Symptoms  Outcome: Ongoing (interventions implemented as appropriate)    Goal: Skin Health and Integrity  Outcome: Ongoing (interventions implemented as appropriate)      Problem: Pneumonia (Adult)  Goal: Signs and Symptoms of Listed Potential Problems Will be Absent, Minimized or Managed (Pneumonia)  Outcome: Ongoing (interventions implemented as appropriate)      Problem: Nutrition, Imbalanced: Inadequate Oral Intake (Adult)  Goal: Identify Related Risk Factors and Signs and Symptoms  Outcome: Ongoing (interventions implemented as appropriate)    Goal: Improved Oral Intake  Outcome: Ongoing (interventions implemented as appropriate)    Goal: Prevent Further Weight Loss  Outcome: Ongoing (interventions implemented as appropriate)

## 2019-10-16 NOTE — PROGRESS NOTES
"Adult Nutrition  Assessment/PES    Patient Name:  Julio César Braxton  YOB: 1932  MRN: [id number]  Admit Date:  10/14/2019    Assessment Date:  10/16/2019    Comments: Assessed patient to have severe malnutrition. Failed swallow study.   If TF started recommend the following:    Starting goal: Isosource HN @ 40 mL/hr with 10 mL/hr water flush-d/t increased risk of RFS.     Ending goal: Isosource HN @ 75 mL/hr with 10 ml/hr water flush. Provides 1980 kcal (100%), 89 gm Pro (105%), and 1573 ml total water (1353 mL free water + 220 mL water flush)+ IVF + Rx flush      Reason for Assessment     Row Name           Reason for Assessment      Reason For Assessment Follow up per protocol     MST score 3          Diagnosis     H&P: Aspiration PNA, hx of family insisting patient be on regular diet, DM II, CHF, GERD, HTN, CVA, DLD, heart disease     Current: Pneumothorax (right side), aspiration PNA, acute resp failure, HCAP, TME, dehydration, acute renal failure, Acute Sepsis, Failed VFSS 10/15/19          Nutrition/Diet History     Row Name           Nutrition/Diet History    Typical Food/Fluid Intake  Visited pt who was resting at time of visit. Unable to participate in interview. Able to speak with pt's jesus. She states at [facility] patient was on a mechanical soft with thickened liquids (unsure what type). She brought up & seemed upset there were \"rumors\" family has been insisting pt be on a regular diet when in fact pt was on a textured modified diet at the [facility]. Niece was unsure of appetite or weight hx.     Niece states pt has been drinking strawberrry ONS at [facility].        Food Allergies  NKFA        Factors Affecting Nutritional Intake   difficulty/impaired swallowing;impaired cognitive status/motor control         Anthropometrics     Row Name        Anthropometrics    Height  167.6 cm (65.98\")    Weight  55 kg (121 lb 4.1 oz)       Admit Weight    Admit Weight  62.1 kg (137 lb 0 oz)       Ideal Body Weight (IBW) " "   Ideal Body Weight (IBW) (kg)  65.26    % Ideal Body Weight  86.12       Usual Body Weight (UBW)    Usual Body Weight  59 kg (130 lb)     Pt's UBW per 's#.      % Usual Body Weight  95.31      Weight Hx  135# (9/6/19)   133# (8/4/19)     Noted significant wt loss of 8.9% x 1 month        Body Mass Index (BMI)    BMI (kg/m2) 19.58     Labs/Tests/Procedures/Meds     Row Name           Labs/Procedures/Meds    Lab Results Comments Glucose 210 (H), Na 152 (H), BUN 50 (H), H/H 12/35.9 (L)        Medications    Pertinent Medications Comments Cefepime, Cleocin, Solu-Cortef, Humalog, Protonix         Physical Findings     Row Name           Physical Findings    Overall Physical Appearance NFPE completed 10/14/19 indicating severe malnutrition, see MSA. Noted during exam, pt had hands clenched on bed sheet under jaw, unable to assess upper arm or dorsal hand region.        Gastrointestinal  No BM doc x 3 days         Oral/Mouth Cavity Noted hx of aspiration PNA. Will await further workup.        Skin Stg III pressure ulcer to coccyx.          Estimated/Assessed Needs     Row Name        Weight Used For Calculations  56.2 kg (123 lb 14.4 oz)    Height  167.6 cm (65.98\")       KCAL/KG  35 Kcal/Kg (kcal)    35 Kcal/Kg (kcal)  1967       Weight Used For Protein Calculations  56.2 kg (123 lb 14.4 oz)    Est Protein Requirement Amount (gms/kg)  1.5 gm protein    Estimated Protein Requirements (gms/day)  84.3       Estimated Fluid Requirements (mL/day)  2909-4588 ml r/t CHF      Nutrition Prescription Ordered     Row Name           Nutrition Prescription PO    Current PO Diet  NPO     Fluid Consistency  -- -     Supplement  -- -     Supplement Frequency  -- -        Nutrition Prescription EN    Enteral Route  -- -     Product  -- -     Modulars  -- -     Continuous TF Goal Rate (mL/hr)  -- -     Continuous TF Current Rate (mL/hr)  -- -     Water flush (mL)   -- -     Water Flush Frequency  -- -        Nutrition Prescription " PN    PN Route  -- -     PN Goal Volume (mL)  -- -     PN Current Volume (mL)  -- -     Dextrose (Kcal)  -- -     Amino Acid (gm)  -- -     Lipid Concentration (%)  -- -     Lipid Volume (mL)  -- -     Lipid Frequency  -- -         Evaluation of Received Nutrient/Fluid Intake     Row Name           PO Evaluation    % PO Intake No meals recorded r/t NPO status        EN Evaluation    TF Changes  -- -     TF Residual  -- -     TF Tolerance  -- -        PN Evaluation    Number of Days PN Evaluated  -- -           Malnutrition Severity Assessment     Row Name           Malnutrition Severity Assessment    Malnutrition Type Chronic Disease - Related Malnutrition        Unintentional Weight Loss     Unintentional Weight Loss  Weight loss greater than 5% in one month     Current weight of 123# which is down 8.9% from 135# (9/6/19)         Muscle Loss    Loss of Muscle Mass Findings  Severe     [place]  Severe - deep hollowing/scooping, lack of muscle to touch, facial bones well defined     Clavicle Bone Region  Severe - protruding prominent bone     Acromion Bone Region  Severe - squared shoulders, bones, and acromion process protrusion prominent     Scapular Bone Region  Severe - prominent bones, depressions easily visible between ribs, scapula, spine, shoulders     Dorsal Hand Region Unable to obtain r/t positioning     Patellar Region  Severe - prominent bone, square looking, very little muscle definition     Anterior Thigh Region  Severe - line/depression along thigh, obviously thin     Posterior Calf Region  Severe - thin with very little definition/firmness        Fat Loss    Subcutaneous Fat Loss Findings  Severe     Orbital Region   Severe - pronounced hollowness/depression, dark circles, loose saggy skin     Upper Arm Region Unable to obtain r/t positioning      Thoracic & Lumbar Region  None        Fluid Accumulation (Edema)    Fluid Acumulation Findings None         Criteria Met (Must meet criteria for  severity in at least 2 of these categories: M Wasting, Fat Loss, Fluid, Secondary Signs, Wt. Status, Intake)    Patient meets criteria for   Severe Malnutrition           Problem/Interventions:  Problem 1     Row Name           Nutrition Diagnoses Problem 1    Problem 1 Severe chronic disease malnutrition      Etiology (related to) DM II, CHF, hx of aspiration      Signs/Symptoms (evidenced by) severe wt loss of 8.9% x 1 month and severe muscle/fat wasting as noted on physical exam.          Problem 2     Row Name           Nutrition Diagnoses Problem 2    Problem 2  -- -     Etiology (related to)  -- -     Signs/Symptoms (evidenced by)  -- -               Intervention Goal     Row Name           Intervention Goal    General 10/16: Diet to advance?, MD to call family for tx decision d/t failed VFSS-TF?          Nutrition Intervention     Row Name           Nutrition Intervention    RD/Tech Action Await diet advancement and/or tx decisions & add appropriate interventions based on clinical picture          Nutrition Prescription     Row Name           Nutrition Prescription PO    Begin/Change Diet to  NPO     Fluid Consistency  -- -     Supplement  -- -     Supplement Frequency  -- -        Nutrition Prescription EN    Enteral Prescription Starting goal: Isosource HN @ 40 mL/hr with 10 mL/hr water flush-d/t increased risk of RFS.     Ending goal: Isosource HN @ 75 mL/hr with 10 ml/hr water flush. Provides 1980 kcal (100%), 89 gm Pro (105%), and 1573 ml total water (1353 mL free water + 220 mL water flush)+ IVF + Rx flush.         Nutrition Prescription PN    Parenteral Prescription  -- -         Education/Evaluation     Row Name           Monitor/Evaluation    Monitor  I&O;PO intake;Pertinent labs;Weight;Skin status           Electronically signed by:  Viviane Mtz RD  10/16/19 7:53 AM

## 2019-10-16 NOTE — PLAN OF CARE
Problem: Patient Care Overview  Goal: Individualization and Mutuality  Outcome: Ongoing (interventions implemented as appropriate)    Goal: Discharge Needs Assessment  Outcome: Ongoing (interventions implemented as appropriate)    Goal: Interprofessional Rounds/Family Conf  Outcome: Ongoing (interventions implemented as appropriate)      Problem: Nutrition, Imbalanced: Inadequate Oral Intake (Adult)  Goal: Identify Related Risk Factors and Signs and Symptoms  Outcome: Ongoing (interventions implemented as appropriate)    Goal: Improved Oral Intake  Outcome: Ongoing (interventions implemented as appropriate)    Goal: Prevent Further Weight Loss  Outcome: Ongoing (interventions implemented as appropriate)

## 2019-10-16 NOTE — PROGRESS NOTES
"KPA/PULM/CC PROGRESS NOTE       PATIENT IDENTIFICATION    Name: Julio César Braxton Age: 87 y.o. Sex: male :  4/3/1932 MRN: [id number]  87 y.o. male who was referred for consultation for pneumonia, sepsis and pneumothorax.  Patient is quite obtunded at time of my evaluation and is not able to give me any history.  History was obtained from the chart and talking to patient's nurse at bedside.  I tried to call patient's sister and daughter the 2 numbers listed in the chart but I was unable to get hold off either of the family members.  He is apparently a resident of nursing home and was sent for aspiration pneumonia.  Patient apparently has history of dysphagia but family has refused dietary recommendations.  Patient was noted to have acute hypoxemia and some mental status changes at the nursing home.  He subsequently was transferred to [facility] for further evaluation and management  Patient has received fluid resuscitation in the ER and has been started on broad-spectrum antibiotic therapy  SUBJECTIVE    10/15: More awake and appropriate today.  Currently on 3 L nasal cannula.  Remains on IV fluids.  Not able to provide much history.  10/16:  No new issues overnight.  Remains NPO.  Cont supplemental oxygen, 3.5L.  Resting quietly in bed.  Feels ok    OBJECTIVE    /62   Pulse 105   Temp 99 °F (37.2 °C) (Oral)   Resp 20   Ht 167.6 cm (65.98\")   Wt 55 kg (121 lb 4.1 oz)   SpO2 96%   BMI 19.58 kg/m²   Intake/Output last 3 shifts:  I/O last 3 completed shifts:  In: 3473 [I.V.:3473]  Out: -   Intake/Output this shift:  No intake/output data recorded.    Constitutional: Chronically ill-appearing, no acute distress, non-toxic appearance   Eyes:  conjunctiva normal   HENT:  Atraumatic, external ears normal, no nasal discharge   Neck-no JVD, no tenderness, supple   Respiratory:  No respiratory distress, good air entry bilaterally, bibasilar crackles  Cardiovascular:  Normal rate, normal rhythm, no " murmurs, no gallops, no rubs   GI:  Soft, nondistended, normal bowel sounds, nontender, no organomegaly, no mass, no rebound, no guarding   :  No costovertebral angle tenderness   Musculoskeletal:  No edema, no tenderness, no deformities. Back- no tenderness  Integument:  Well hydrated, no rash   Lymphatic:  No lymphadenopathy noted   Neurologic: awakens to voice   Psychiatric: As above    Scheduled Meds:    [START ON 10/17/2019] !Vancomycin Level Draw Needed  Does not apply Once   carvedilol 3.125 mg Oral BID With Meals   cefepime 2 g Intravenous Q12H   clindamycin 600 mg Intravenous Q8H   enoxaparin 40 mg Subcutaneous Q24H   hydrocortisone sodium succinate 50 mg Intravenous Q8H   insulin lispro 0-7 Units Subcutaneous 4x Daily With Meals & Nightly   isosorbide mononitrate 30 mg Oral Daily   magic butt paste  Topical TID   pantoprazole 40 mg Oral Q AM   sodium chloride 10 mL Intravenous Q12H   vancomycin 1,000 mg Intravenous Q24H       Continuous Infusions:    Pharmacy to dose vancomycin        PRN Meds:dextrose  •  dextrose  •  glucagon (human recombinant)  •  nitroglycerin  •  Pharmacy to dose vancomycin  •  potassium chloride **OR** potassium chloride **OR** potassium chloride  •  sodium chloride  •  sodium chloride  •  sodium chloride  •  vancomycin     Data Review:  Lab Results (last 24 hours)     Procedure Component Value Units Date/Time    POC Glucose Once [[id number]]  (Abnormal) Collected:  10/16/19 0725    Specimen:  Blood Updated:  10/16/19 0726     Glucose 210 mg/dL      Comment: Serial Number: [id number]:  [id number]       CBC & Differential [[id number]] Collected:  10/16/19 0319    Specimen:  Blood Updated:  10/16/19 0632    Narrative:       The following orders were created for panel order CBC & Differential.  Procedure                               Abnormality         Status                     ---------                               -----------         ------                     CBC Auto  Differential[[id number]]        Abnormal            Final result                 Please view results for these tests on the individual orders.    CBC Auto Differential [[id number]]  (Abnormal) Collected:  10/16/19 0319    Specimen:  Blood Updated:  10/16/19 0632     WBC 15.40 10*3/mm3      RBC 3.65 10*6/mm3      Hemoglobin 12.0 g/dL      Hematocrit 35.9 %      MCV 98.4 fL      MCH 32.9 pg      MCHC 33.4 g/dL      RDW 15.4 %      RDW-SD 54.7 fl      MPV 8.8 fL      Platelets 251 10*3/mm3     Scan Slide [[id number]] Collected:  10/16/19 0319    Specimen:  Blood Updated:  10/16/19 0632     Scan Slide --     Comment: See Manual Differential Results       Manual Differential [[id number]]  (Abnormal) Collected:  10/16/19 0319    Specimen:  Blood Updated:  10/16/19 0632     Neutrophil % 75.0 %      Lymphocyte % 10.0 %      Monocyte % 4.0 %      Bands %  10.0 %      Metamyelocyte % 1.0 %      Neutrophils Absolute 13.09 10*3/mm3      Lymphocytes Absolute 1.54 10*3/mm3      Monocytes Absolute 0.62 10*3/mm3      RBC Morphology Normal     Toxic Granulation Slight/1+     Platelet Morphology Normal    Vancomycin, Random [[id number]]  (Normal) Collected:  10/16/19 0319    Specimen:  Blood Updated:  10/16/19 0501     Vancomycin Random 12.30 mcg/mL     Basic Metabolic Panel [[id number]]  (Abnormal) Collected:  10/16/19 0319    Specimen:  Blood Updated:  10/16/19 0458     Glucose 208 mg/dL      BUN 50 mg/dL      Creatinine 1.01 mg/dL      Sodium 152 mmol/L      Potassium 3.9 mmol/L      Chloride 115 mmol/L      CO2 20.0 mmol/L      Calcium 8.7 mg/dL      eGFR Non African Amer 70 mL/min/1.73      BUN/Creatinine Ratio 49.5     Anion Gap 20.9 mmol/L     Narrative:       GFR Normal >60  Chronic Kidney Disease <60  Kidney Failure <15    Blood Culture - Blood, Arm, Left [[id number]] Collected:  10/14/19 0121    Specimen:  Blood from Arm, Left Updated:  10/16/19 0130     Blood Culture No growth at 2 days    Blood Culture - Blood, Arm, Right  [[id number]] Collected:  10/14/19 0121    Specimen:  Blood from Arm, Right Updated:  10/16/19 0130     Blood Culture No growth at 2 days    POC Glucose Once [[id number]]  (Abnormal) Collected:  10/15/19 1947    Specimen:  Blood Updated:  10/15/19 1949     Glucose 207 mg/dL      Comment: Serial Number: [id number]:  [id number]       POC Glucose Once [[id number]]  (Abnormal) Collected:  10/15/19 1654    Specimen:  Blood Updated:  10/15/19 1701     Glucose 195 mg/dL      Comment: Serial Number: [id number]:  [id number]       POC Glucose Once [[id number]]  (Abnormal) Collected:  10/15/19 1103    Specimen:  Blood Updated:  10/15/19 1104     Glucose 203 mg/dL      Comment: Serial Number: [id number]:  [id number]       Lactic Acid, Plasma [[id number]]  (Normal) Collected:  10/15/19 0900    Specimen:  Blood Updated:  10/15/19 0930     Lactate 2.0 mmol/L              Imaging:  Imaging Results (last 72 hours)     Procedure Component Value Units Date/Time    FL Video Swallow With Speech [[id number]] Collected:  10/15/19 1226     Updated:  10/15/19 1233    Narrative:       DATE OF EXAM:  10/15/2019 12:00 PM     PROCEDURE:  FL VIDEO SWALLOW W SPEECH-     INDICATIONS:  dysphagia; A41.9-Sepsis, unspecified organism; R65.20-Severe sepsis  without septic shock; J69.0-Pneumonitis due to inhalation of food and  vomit; R73.9-Hyperglycemia, unspecified; N28.9-Disorder of kidney and  ureter, unspecified     COMPARISON:  No Comparisons Available     TECHNIQUE:   This examination was performed in conjunction with speech pathology.  Lateral video fluoroscopic evaluation of the swallowing mechanism was  performed while correlate administering to the patient various  consistency food items mixed with barium.     Fluoroscopic Time:   1.3 minutes     FINDINGS:  There was penetration with aspiration with thin barium. There were  residuals with nectar and applesauce consistencies.        Impression:       1.Aspiration with thin  barium.  2.Please refer to speech pathology report for further details.     Electronically Signed By-DR. Darnell Montilla MD On:10/15/2019 12:30 PM  This report was finalized on [id number] by DR. Darnell Montilla MD.    XR Chest 1 View [[id number]] Collected:  10/15/19 0712     Updated:  10/15/19 0716    Narrative:       XR CHEST 1 VW-     Date of Exam: 10/15/2019 6:28 AM     Indication: REsp failure; A41.9-Sepsis, unspecified organism;  R65.20-Severe sepsis without septic shock; J69.0-Pneumonitis due to  inhalation of food and vomit; R73.9-Hyperglycemia, unspecified;  N28.9-Disorder of kidney and ureter, unspecified.     Comparison Exams: October 14, 2019     Technique: Single AP chest radiograph     FINDINGS:  A small right apical pneumothorax is unchanged. Hazy bilateral airspace  opacities worse in the right lower lung are unchanged, suggesting  multifocal pneumonia. There are moderate right and small left pleural  effusions. The heart and mediastinal contours appear stable. The osseous  structures appear intact.       Impression:       1.Small right apical pneumothorax is unchanged.  2.Hazy bilateral airspace opacities worse in the right lower lung are  unchanged, suggesting multifocal pneumonia.  3.Moderate right and small left pleural effusions.     Electronically Signed By-DR. Darnell Montilla MD On:10/15/2019 7:14 AM  This report was finalized on [id number] by DR. Darnell Montilla MD.    XR Chest 1 View [[id number]] Collected:  10/14/19 1421     Updated:  10/14/19 1424    Narrative:       DATE OF EXAM:  10/14/2019 1:38 PM     PROCEDURE:  XR CHEST 1 VW-     INDICATIONS:  Pneumothorax; A41.9-Sepsis, unspecified organism; R65.20-Severe sepsis  without septic shock; J69.0-Pneumonitis due to inhalation of food and  vomit; R73.9-Hyperglycemia, unspecified; N28.9-Disorder of kidney and  ureter, unspecified       COMPARISON:  AP portable chest 10/14/2019.     TECHNIQUE:   Single radiographic view of the chest  was obtained.     FINDINGS:  Dense consolidative changes in the bilateral lower lobes, right greater  than left, with small to moderate right and small left pleural  effusions, without significant change. More ill-defined patchy  infiltrates in the right upper lobe and left midlung likewise stable.  Small right apical pneumothorax is unchanged. Stable cardiac  enlargement.       Impression:          1. Stable small right apical pneumothorax compared to earlier today.  2. Multifocal patchy infiltrates in both lungs with more confluent  bibasilar consolidations the appearance of pneumonia, stable.  3. Small to moderate right, small left pleural effusions, unchanged.     Electronically Signed By-Dr. Mary Hope MD On:10/14/2019 2:22 PM  This report was finalized on [id number] by Dr. Mary Hope MD.    XR Chest 1 View [[id number]] Collected:  10/14/19 0832     Updated:  10/14/19 0837    Narrative:       XR CHEST 1 VW-     Date of Exam: 10/14/2019 8:30 AM     Indication: pneumothorax; A41.9-Sepsis, unspecified organism;  R65.20-Severe sepsis without septic shock; J69.0-Pneumonitis due to  inhalation of food and vomit; R73.9-Hyperglycemia, unspecified;  N28.9-Disorder of kidney and ureter, unspecified.     Comparison Exams: Chest radiograph from earlier today     Technique: Single AP chest radiograph     FINDINGS:  A small right apical pneumothorax has slightly improved now measuring  1.8 cm in diameter, previously 2.2 cm. Hazy bilateral airspace opacities  worse in the right lower lung appear slightly improved. There are  moderate right and small left pleural effusions. The heart and  mediastinal contours appear stable. The pulmonary vasculature appears  normal. The osseous structures appear intact.       Impression:       1.Small right apical pneumothorax has slightly improved.  2.Hazy bilateral airspace opacities worse in the right lower lung have  slightly improved, suggesting multifocal  pneumonia.  3.Moderate right and small left pleural effusions.     Electronically Signed By-DR. Darnell Montilla MD On:10/14/2019 8:35 AM  This report was finalized on [id number] by DR. Darnell Montilla MD.    XR Chest 1 View [[id number]] Collected:  10/14/19 0734     Updated:  10/14/19 0742    Narrative:       XR CHEST 1 VW-     Date of Exam: 10/14/2019 1:39 AM     Indication: Severe Sepsis Protocol; A41.9-Sepsis, unspecified organism;  R65.20-Severe sepsis without septic shock; J69.0-Pneumonitis due to  inhalation of food and vomit; R73.9-Hyperglycemia, unspecified.     Comparison Exams: September 7, 2019     Technique: Single AP chest radiograph     FINDINGS:  There is a small-to-moderate right apical pneumothorax measuring up to  2.2 cm in diameter. There are hazy bilateral airspace opacities worse in  the right lower lung. There are moderate right and small left pleural  effusions. The heart and mediastinal contours appear stable. The  pulmonary vasculature appears normal. The osseous structures appear  intact.       Impression:       1.Small-to-moderate right apical pneumothorax.  2.Hazy bilateral airspace opacities worse in the right lower lung,  suggesting multifocal pneumonia.  3.Moderate right and small left pleural effusions.     Results were relayed to Dr. Mittal at time of dictation.     Electronically Signed By-DR. Darnell Montilla MD On:10/14/2019 7:39 AM  This report was finalized on [id number] by DR. Darnell Montilla MD.            ASSESSMENT/PLAN:     Decubitus ulcer of sacral region, stage 3 (CMS/HCC)    Diabetes mellitus, type II (CMS/HCC)    Dermatitis associated with moisture    Severe sepsis (CMS/HCC)  Acute hypoxemic respiratory failure  Aspiration pneumonia  Right apical pneumothorax. -Etiology unclear.  Likely spontaneous.  Acute encephalopathy.  Likely toxic metabolic encephalopathy.  History of chronic dysphagia  Chronic diastolic congestive heart failure.  EF 20 to  25%  ESTEBAN  Diabetes mellitus with hyperglycemia.  History of hypertension  History of CVA  History of generalized anxiety disorder    PLAN    Chest x-ray films and ABG reviewed.  Patient has right lung infiltrate concerning for aspiration pneumonia along with a small right apical pneumothorax.  Pneumothorax has been stable on serial x-ray monitoring.  Repeat chest x-ray for today pending. We will continue to monitor conservatively for now.  Currently he is on 3 L nasal cannula.  Does not appear to be any acute respiratory distress.  If pneumothorax is worsening we will place a chest tube.  Remains on broad-spectrum antibiotic therapy with vancomycin, clindamycin and cefepime.  Blood cultures are negative so far.  Will discontinue vancomycin today.  Anticipate switching to Augmentin to finish 7 days of antibiotic therapy soon.  video swallow with persistent aspiration.  Speech therapy recommends n.p.o. with alternative means of nutrition.  Switched his dexamethasone to stress dose steroids.  It can be switched back to p.o. steroids once patient is able to take p.o.  He is on Lantus and sliding scale insulin. We will closely monitor  Discussed goals of care with patient's nephew (who is his power of  but not healthcare proxy) on admission.  Unable to get hold of daughter who lives out of town.  Continue aggressive treatment.  Overall prognosis is guarded  Discussed with patient's nursing staff    Awaiting family decision on goals of care and for feeding tube.    Patient was previously under Hosp[facility] per nursing

## 2019-10-17 NOTE — CONSULTS
NEPHROLOGY CONSULTATION-----[facility]    [facility]  [phone]  Stephy Conway MD    Patient Care Team:  Jordan Mittal MD as PCP - General (Family Medicine)  NANCY Mejia MD as PCP - Claims Attributed  Dina Coronado, RN as Community Care Coordinator (Population Health)    CC/REASON FOR CONSULTATION: RENAL FAILURE/ELEVATED SERUM CREATININE/HYPERNATREMIA    PHYSICIAN REQUESTING CONSULTATION:     History of Present Illness     HPI    Patient is a 87 y.o. WM  whom I was asked to see in consultation for evaluation and management of renal failure/elevated serum creatinine and hypernatremia. Patient was admitted with suspected sepsis.   Patient is nonverbal and unable to answer my questions. No documented h/o hepatitis, TB, rheumatic fever, jaundice, SLE, bleeding/bruising disorders.   No edema or fluid retention.  Was on diuretics in the form of  Lsix prior to admission.   No herbal med use.      ROS: UNABLE TO OBTAIN B/C OF MENTAL STATUS AND NONVERBAL       Past Medical History:   Diagnosis Date   • Atrial fibrillation (CMS/HCC)    • CHF (congestive heart failure) (CMS/HCC)    • Coronary artery disease    • Myocardial infarction (CMS/HCC)    • Neuropathy    • Stroke (CMS/HCC)        Past Surgical History:   Procedure Laterality Date   • ANGIOPLASTY     • BACK SURGERY     • CHOLECYSTECTOMY     • CORONARY STENT PLACEMENT     • SPINAL FUSION         Family History   Problem Relation Age of Onset   • Coronary artery disease Brother        Social History     Tobacco Use   • Smoking status: Former Smoker     Types: Cigarettes   • Smokeless tobacco: Never Used   • Tobacco comment: Quit 40 years go   Substance Use Topics   • Alcohol use: No     Frequency: Never   • Drug use: No       Home Meds:   Medications Prior to Admission   Medication Sig Dispense Refill Last Dose   • carvedilol (COREG) 3.125 MG tablet Take 3.125 mg by mouth 2 (Two) Times a Day With  Meals.   10/13/2019 at Unknown time   • clopidogrel (PLAVIX) 75 MG tablet Take 1 tablet by mouth Daily. 30 tablet 0 10/13/2019 at Unknown time   • dexamethasone (DECADRON) 2 MG tablet Take 2 mg by mouth Daily.   10/13/2019 at Unknown time   • furosemide (LASIX) 40 MG tablet Take 40 mg by mouth 2 (Two) Times a Day.   10/13/2019 at Unknown time   • gabapentin (NEURONTIN) 300 MG capsule Take 1 capsule by mouth 3 (Three) Times a Day. 30 capsule 0 10/13/2019 at Unknown time   • isosorbide mononitrate (IMDUR) 30 MG 24 hr tablet Take 1 tablet by mouth Daily. 30 tablet 0 10/13/2019 at Unknown time   • loperamide (IMODIUM) 2 MG capsule Take 2 mg by mouth Daily As Needed for Diarrhea.   10/13/2019 at Unknown time   • Nystatin (MAGIC BUTT PASTE) Apply 1 each topically to the appropriate area as directed 2 (Two) Times a Day. 12 g 0 10/13/2019 at Unknown time   • polyethylene glycol (MIRALAX) packet Take 17 g by mouth Daily.   10/13/2019 at Unknown time   • raNITIdine (ZANTAC) 150 MG tablet Take 150 mg by mouth Daily.   10/13/2019 at Unknown time   • senna (SENOKOT) 8.6 MG tablet tablet Take 2 tablets by mouth 2 (Two) Times a Day.   10/13/2019 at Unknown time   • traZODone (DESYREL) 100 MG tablet Take 100 mg by mouth Every Night.   Past Week at Unknown time   • LORazepam (ATIVAN) 0.5 MG tablet Take 1 tablet by mouth Every 6 (Six) Hours As Needed for Anxiety. 30 tablet 0 Unknown at Unknown time   • nitroglycerin (NITROSTAT) 0.4 MG SL tablet Place 1 tablet under the tongue Every 5 (Five) Minutes As Needed for Chest Pain (Only if SBP Greater Than 100). Take no more than 3 tabs 30 tablet 0 Unknown at Unknown time       Scheduled Meds:    albuterol 2.5 mg Nebulization Q4H - RT   carvedilol 3.125 mg Oral BID With Meals   cefepime 2 g Intravenous Q12H   clindamycin 600 mg Intravenous Q8H   enoxaparin 40 mg Subcutaneous Q24H   hydrocortisone sodium succinate 50 mg Intravenous Q12H   insulin lispro 0-7 Units Subcutaneous 4x Daily With  Meals & Nightly   isosorbide mononitrate 30 mg Oral Daily   magic butt paste  Topical TID   pantoprazole 40 mg Oral Q AM   sodium chloride 10 mL Intravenous Q12H       Continuous Infusions:       PRN Meds:  dextrose  •  dextrose  •  glucagon (human recombinant)  •  nitroglycerin  •  potassium chloride **OR** potassium chloride **OR** potassium chloride  •  sodium chloride  •  sodium chloride  •  sodium chloride    Allergies:  Patient has no known allergies.    OBJECTIVE    Vital Signs  Temp:  [97.3 °F (36.3 °C)-98.9 °F (37.2 °C)] 97.6 °F (36.4 °C)  Heart Rate:  [] 113  Resp:  [17-19] 18  BP: (103-131)/(54-72) 117/72    No intake/output data recorded.  I/O last 3 completed shifts:  In: 815 [I.V.:765; IV Piggyback:50]  Out: -     Physical Exam:  General Appearance: alert but NONVERBAL  Head: +OP DRY AND PARCHED  Eyes: conjunctivae and sclerae normal and no icterus  Neck: supple and no JVD  Lungs: +FEW SCATTERED RHONCHI AND +R CRACKLES  Heart: regular rhythm & normal rate and normal S1, S2 +ANTONIO  Chest Wall: no abnormalities observed  Abdomen: normal bowel sounds and soft non-tender  Extremities: moves extremities well, no edema, no cyanosis and no redness +DJD  Skin: no bleeding, bruising or rash  Neurologic: Alert and opening eyes, but nonverbal    Results Review:    I reviewed the patient's new clinical results.    WBC WBC   Date Value Ref Range Status   10/17/2019 19.50 (H) 3.40 - 10.80 10*3/mm3 Final   10/16/2019 15.40 (H) 3.40 - 10.80 10*3/mm3 Final   10/15/2019 10.90 (H) 3.40 - 10.80 10*3/mm3 Final      HGB Hemoglobin   Date Value Ref Range Status   10/17/2019 12.6 (L) 13.0 - 17.7 g/dL Final   10/16/2019 12.0 (L) 13.0 - 17.7 g/dL Final   10/15/2019 11.7 (L) 13.0 - 17.7 g/dL Final      HCT Hematocrit   Date Value Ref Range Status   10/17/2019 38.1 37.5 - 51.0 % Final   10/16/2019 35.9 (L) 37.5 - 51.0 % Final   10/15/2019 34.5 (L) 37.5 - 51.0 % Final      Platlets No results found for: LABPLAT   MCV MCV    Date Value Ref Range Status   10/17/2019 99.5 (H) 79.0 - 97.0 fL Final   10/16/2019 98.4 (H) 79.0 - 97.0 fL Final   10/15/2019 97.4 (H) 79.0 - 97.0 fL Final          Sodium Sodium   Date Value Ref Range Status   10/17/2019 156 (H) 136 - 145 mmol/L Final   10/16/2019 152 (H) 136 - 145 mmol/L Final   10/15/2019 148 (H) 136 - 144 mmol/L Final      Potassium Potassium   Date Value Ref Range Status   10/17/2019 3.6 3.5 - 5.2 mmol/L Final   10/16/2019 3.9 3.5 - 5.2 mmol/L Final   10/15/2019 3.1 (L) 3.6 - 5.1 mmol/L Final      Chloride Chloride   Date Value Ref Range Status   10/17/2019 118 (H) 98 - 107 mmol/L Final   10/16/2019 115 (H) 98 - 107 mmol/L Final   10/15/2019 107 101 - 111 mmol/L Final      CO2 CO2   Date Value Ref Range Status   10/17/2019 20.0 (L) 22.0 - 29.0 mmol/L Final   10/16/2019 20.0 (L) 22.0 - 29.0 mmol/L Final   10/15/2019 29.0 22.0 - 32.0 mmol/L Final      BUN BUN   Date Value Ref Range Status   10/17/2019 60 (H) 8 - 23 mg/dL Final   10/16/2019 50 (H) 8 - 23 mg/dL Final   10/15/2019 37 (H) 8 - 20 mg/dL Final      Creatinine Creatinine   Date Value Ref Range Status   10/17/2019 1.18 0.76 - 1.27 mg/dL Final   10/16/2019 1.01 0.76 - 1.27 mg/dL Final   10/15/2019 1.00 0.70 - 1.20 mg/dL Final      Calcium Calcium   Date Value Ref Range Status   10/17/2019 8.7 8.6 - 10.5 mg/dL Final   10/16/2019 8.7 8.6 - 10.5 mg/dL Final   10/15/2019 8.1 (L) 8.9 - 10.3 mg/dL Final      PO4 No results found for: CAPO4   Albumin Albumin   Date Value Ref Range Status   10/17/2019 2.80 (L) 3.50 - 5.20 g/dL Final      Magnesium Magnesium   Date Value Ref Range Status   10/17/2019 2.2 1.6 - 2.4 mg/dL Final   10/15/2019 1.9 1.6 - 2.4 mg/dL Final      Uric Acid No results found for: URICACID       Imaging Results (last 72 hours)     Procedure Component Value Units Date/Time    XR Chest 1 View [[id number]] Collected:  10/16/19 1206     Updated:  10/16/19 1209    Narrative:       Examination: XR CHEST 1 VW-     Date of Exam:  10/16/2019 10:34 AM     Indication: Pneumothorax; A41.9-Sepsis, unspecified organism;  R65.20-Severe sepsis without septic shock; J69.0-Pneumonitis due to  inhalation of food and vomit; R73.9-Hyperglycemia, unspecified;  N28.9-Disorder of kidney and ureter, unspecified.     Comparison: Radiograph 10/15/2019, 10/14/2019     Technique: 1 view of the chest      Findings:  There is a tiny right-sided pneumothorax with degree of pleural  separation measuring approximately 1 to 2 mm, decreased as compared to  the previous study. Patchy airspace changes are present within the lung  bases bilaterally with a moderate-sized pleural effusion present on the  right. The heart appears mildly enlarged. The pulmonary vasculature  appears indistinct. No acute osseous abnormality identified.       Impression:       Interval decrease in size of right-sided pneumothorax. A tiny  right-sided pneumothorax is still present. Otherwise, stable exam with  reconstruction patchy airspace disease within the lung bases bilaterally  with a moderate-sized right sided pleural effusion.     Electronically Signed By-Arielle Ann On:10/16/2019 12:07 PM  This report was finalized on [id number] by  Arielle Ann, .    FL Video Swallow With Speech [[id number]] Collected:  10/15/19 1226     Updated:  10/15/19 1233    Narrative:       DATE OF EXAM:  10/15/2019 12:00 PM     PROCEDURE:  FL VIDEO SWALLOW W SPEECH-     INDICATIONS:  dysphagia; A41.9-Sepsis, unspecified organism; R65.20-Severe sepsis  without septic shock; J69.0-Pneumonitis due to inhalation of food and  vomit; R73.9-Hyperglycemia, unspecified; N28.9-Disorder of kidney and  ureter, unspecified     COMPARISON:  No Comparisons Available     TECHNIQUE:   This examination was performed in conjunction with speech pathology.  Lateral video fluoroscopic evaluation of the swallowing mechanism was  performed while correlate administering to the patient various  consistency food items mixed with  barium.     Fluoroscopic Time:   1.3 minutes     FINDINGS:  There was penetration with aspiration with thin barium. There were  residuals with nectar and applesauce consistencies.        Impression:       1.Aspiration with thin barium.  2.Please refer to speech pathology report for further details.     Electronically Signed By-DR. Darnell Montilla MD On:10/15/2019 12:30 PM  This report was finalized on [id number] by DR. Darnell Montilla MD.    XR Chest 1 View [[id number]] Collected:  10/15/19 0712     Updated:  10/15/19 0716    Narrative:       XR CHEST 1 VW-     Date of Exam: 10/15/2019 6:28 AM     Indication: REsp failure; A41.9-Sepsis, unspecified organism;  R65.20-Severe sepsis without septic shock; J69.0-Pneumonitis due to  inhalation of food and vomit; R73.9-Hyperglycemia, unspecified;  N28.9-Disorder of kidney and ureter, unspecified.     Comparison Exams: October 14, 2019     Technique: Single AP chest radiograph     FINDINGS:  A small right apical pneumothorax is unchanged. Hazy bilateral airspace  opacities worse in the right lower lung are unchanged, suggesting  multifocal pneumonia. There are moderate right and small left pleural  effusions. The heart and mediastinal contours appear stable. The osseous  structures appear intact.       Impression:       1.Small right apical pneumothorax is unchanged.  2.Hazy bilateral airspace opacities worse in the right lower lung are  unchanged, suggesting multifocal pneumonia.  3.Moderate right and small left pleural effusions.     Electronically Signed By-DR. Darnell Montilla MD On:10/15/2019 7:14 AM  This report was finalized on [id number] by DR. Darnell Montilla MD.    XR Chest 1 View [[id number]] Collected:  10/14/19 1421     Updated:  10/14/19 1424    Narrative:       DATE OF EXAM:  10/14/2019 1:38 PM     PROCEDURE:  XR CHEST 1 VW-     INDICATIONS:  Pneumothorax; A41.9-Sepsis, unspecified organism; R65.20-Severe sepsis  without septic shock;  J69.0-Pneumonitis due to inhalation of food and  vomit; R73.9-Hyperglycemia, unspecified; N28.9-Disorder of kidney and  ureter, unspecified       COMPARISON:  AP portable chest 10/14/2019.     TECHNIQUE:   Single radiographic view of the chest was obtained.     FINDINGS:  Dense consolidative changes in the bilateral lower lobes, right greater  than left, with small to moderate right and small left pleural  effusions, without significant change. More ill-defined patchy  infiltrates in the right upper lobe and left midlung likewise stable.  Small right apical pneumothorax is unchanged. Stable cardiac  enlargement.       Impression:          1. Stable small right apical pneumothorax compared to earlier today.  2. Multifocal patchy infiltrates in both lungs with more confluent  bibasilar consolidations the appearance of pneumonia, stable.  3. Small to moderate right, small left pleural effusions, unchanged.     Electronically Signed By-Dr. Mary Hope MD On:10/14/2019 2:22 PM  This report was finalized on [id number] by Dr. Mary Hope MD.    XR Chest 1 View [[id number]] Collected:  10/14/19 0832     Updated:  10/14/19 0837    Narrative:       XR CHEST 1 VW-     Date of Exam: 10/14/2019 8:30 AM     Indication: pneumothorax; A41.9-Sepsis, unspecified organism;  R65.20-Severe sepsis without septic shock; J69.0-Pneumonitis due to  inhalation of food and vomit; R73.9-Hyperglycemia, unspecified;  N28.9-Disorder of kidney and ureter, unspecified.     Comparison Exams: Chest radiograph from earlier today     Technique: Single AP chest radiograph     FINDINGS:  A small right apical pneumothorax has slightly improved now measuring  1.8 cm in diameter, previously 2.2 cm. Hazy bilateral airspace opacities  worse in the right lower lung appear slightly improved. There are  moderate right and small left pleural effusions. The heart and  mediastinal contours appear stable. The pulmonary vasculature appears  normal. The  osseous structures appear intact.       Impression:       1.Small right apical pneumothorax has slightly improved.  2.Hazy bilateral airspace opacities worse in the right lower lung have  slightly improved, suggesting multifocal pneumonia.  3.Moderate right and small left pleural effusions.     Electronically Signed By-DR. Darnell Montilla MD On:10/14/2019 8:35 AM  This report was finalized on [id number] by DR. Darnell Montilla MD.            Results for orders placed during the hospital encounter of 10/14/19   XR Chest 1 View    Narrative Examination: XR CHEST 1 VW-     Date of Exam: 10/16/2019 10:34 AM     Indication: Pneumothorax; A41.9-Sepsis, unspecified organism;  R65.20-Severe sepsis without septic shock; J69.0-Pneumonitis due to  inhalation of food and vomit; R73.9-Hyperglycemia, unspecified;  N28.9-Disorder of kidney and ureter, unspecified.     Comparison: Radiograph 10/15/2019, 10/14/2019     Technique: 1 view of the chest      Findings:  There is a tiny right-sided pneumothorax with degree of pleural  separation measuring approximately 1 to 2 mm, decreased as compared to  the previous study. Patchy airspace changes are present within the lung  bases bilaterally with a moderate-sized pleural effusion present on the  right. The heart appears mildly enlarged. The pulmonary vasculature  appears indistinct. No acute osseous abnormality identified.       Impression Interval decrease in size of right-sided pneumothorax. A tiny  right-sided pneumothorax is still present. Otherwise, stable exam with  reconstruction patchy airspace disease within the lung bases bilaterally  with a moderate-sized right sided pleural effusion.     Electronically Signed By-Arielle Ann On:10/16/2019 12:07 PM  This report was finalized on [id number] by  Arielle Ann, .   XR Chest 1 View    Narrative XR CHEST 1 VW-     Date of Exam: 10/15/2019 6:28 AM     Indication: REsp failure; A41.9-Sepsis, unspecified  organism;  R65.20-Severe sepsis without septic shock; J69.0-Pneumonitis due to  inhalation of food and vomit; R73.9-Hyperglycemia, unspecified;  N28.9-Disorder of kidney and ureter, unspecified.     Comparison Exams: October 14, 2019     Technique: Single AP chest radiograph     FINDINGS:  A small right apical pneumothorax is unchanged. Hazy bilateral airspace  opacities worse in the right lower lung are unchanged, suggesting  multifocal pneumonia. There are moderate right and small left pleural  effusions. The heart and mediastinal contours appear stable. The osseous  structures appear intact.       Impression 1.Small right apical pneumothorax is unchanged.  2.Hazy bilateral airspace opacities worse in the right lower lung are  unchanged, suggesting multifocal pneumonia.  3.Moderate right and small left pleural effusions.     Electronically Signed By-DR. Darnell Montilla MD On:10/15/2019 7:14 AM  This report was finalized on [id number] by DR. Darnell Montilla MD.   XR Chest 1 View    Narrative DATE OF EXAM:  10/14/2019 1:38 PM     PROCEDURE:  XR CHEST 1 VW-     INDICATIONS:  Pneumothorax; A41.9-Sepsis, unspecified organism; R65.20-Severe sepsis  without septic shock; J69.0-Pneumonitis due to inhalation of food and  vomit; R73.9-Hyperglycemia, unspecified; N28.9-Disorder of kidney and  ureter, unspecified       COMPARISON:  AP portable chest 10/14/2019.     TECHNIQUE:   Single radiographic view of the chest was obtained.     FINDINGS:  Dense consolidative changes in the bilateral lower lobes, right greater  than left, with small to moderate right and small left pleural  effusions, without significant change. More ill-defined patchy  infiltrates in the right upper lobe and left midlung likewise stable.  Small right apical pneumothorax is unchanged. Stable cardiac  enlargement.       Impression    1. Stable small right apical pneumothorax compared to earlier today.  2. Multifocal patchy infiltrates in both lungs  with more confluent  bibasilar consolidations the appearance of pneumonia, stable.  3. Small to moderate right, small left pleural effusions, unchanged.     Electronically Signed By-Dr. Mary Hope MD On:10/14/2019 2:22 PM  This report was finalized on [id number] by Dr. Mary Hope MD.              ASSESSMENT / PLAN      Decubitus ulcer of sacral region, stage 3 (CMS/HCC)    Diabetes mellitus, type II (CMS/HCC)    Dermatitis associated with moisture    Severe sepsis (CMS/HCC)    Aspiration pneumonia (CMS/HCC)    Pneumothorax    1. RENAL FAILURE---------Nonoliguric. Appears to have ARF/ESTEBAN secondary to severe prerenal state and intravascular volume depletion. Hydrate with D5W and d/c Lasix. Check urine and serum studies and renal US. No NSAIDs or IV dye    2. HYPERNATREMIA------Free water deficit of about 4-5 L. Hydrate with D5W. D/C Lasix. Check TSH    3. ACIDOSIS------Metabolic. No elevation in AGAP. Give NaHCO3 and follow. Check lactic    4. ANEMIA-------Check Fe    5. OA/DJD------No NSAIDs. Check uric acid level    6. LEUKOCYTOSIS/?ASPIRATION PNA    7. RIGHT PNEUMOTHORAX (SMALL)/PLEURAL EFFUSION    8. HYPOALBUMINEMIA------Check SPEP. IV Albumin to temporize        I discussed the patients findings and my recommendations with patient, nursing staff and consulting provider    Will follow along closely. Thank you for allowing us to see this patient in renal consultation.    [facility]  [phone]  MD Stephy Prescott MD  10/17/19  7:58 AM

## 2019-10-17 NOTE — PROGRESS NOTES
"KPA/PULM/CC PROGRESS NOTE       PATIENT IDENTIFICATION    Name: Julio César Braxton Age: 87 y.o. Sex: male :  4/3/1932 MRN: [id number]  87 y.o. male who was referred for consultation for pneumonia, sepsis and pneumothorax.  Patient is quite obtunded at time of my evaluation and is not able to give me any history.  History was obtained from the chart and talking to patient's nurse at bedside.  I tried to call patient's sister and daughter the 2 numbers listed in the chart but I was unable to get hold off either of the family members.  He is apparently a resident of nursing home and was sent for aspiration pneumonia.  Patient apparently has history of dysphagia but family has refused dietary recommendations.  Patient was noted to have acute hypoxemia and some mental status changes at the nursing home.  He subsequently was transferred to [facility] for further evaluation and management  Patient has received fluid resuscitation in the ER and has been started on broad-spectrum antibiotic therapy    SUBJECTIVE    10/15: More awake and appropriate today.  Currently on 3 L nasal cannula.  Remains on IV fluids.  Not able to provide much history.  10/16:  No new issues overnight.  Remains NPO.  Cont supplemental oxygen, 3.5L.  Resting quietly in bed.  Feels ok  10/17:  No new issues overnight. More lethargic and confused today    OBJECTIVE    /72   Pulse 108   Temp 97.6 °F (36.4 °C) (Oral)   Resp 18   Ht 167.6 cm (65.98\")   Wt 56.8 kg (125 lb 3.5 oz)   SpO2 97%   BMI 20.22 kg/m²   Intake/Output last 3 shifts:  I/O last 3 completed shifts:  In: 815 [I.V.:765; IV Piggyback:50]  Out: -   Intake/Output this shift:  No intake/output data recorded.    Constitutional: Chronically ill-appearing, no acute distress, non-toxic appearance   Eyes:  conjunctiva normal   HENT:  Atraumatic, external ears normal, no nasal discharge   Neck-no JVD, no tenderness, supple   Respiratory:  No respiratory distress, good " air entry bilaterally, bibasilar crackles  Cardiovascular:  Normal rate, normal rhythm, no murmurs, no gallops, no rubs   GI:  Soft, nondistended, normal bowel sounds, nontender, no organomegaly, no mass, no rebound, no guarding   :  No costovertebral angle tenderness   Musculoskeletal:  No edema, no tenderness, no deformities. Back- no tenderness  Integument:  Well hydrated, no rash   Lymphatic:  No lymphadenopathy noted   Neurologic: awakens to voice   Psychiatric: As above    Scheduled Meds:    albuterol 2.5 mg Nebulization Q4H - RT   carvedilol 3.125 mg Oral BID With Meals   cefepime 2 g Intravenous Q12H   clindamycin 600 mg Intravenous Q8H   enoxaparin 40 mg Subcutaneous Q24H   hydrocortisone sodium succinate 50 mg Intravenous Q12H   insulin lispro 0-7 Units Subcutaneous 4x Daily With Meals & Nightly   isosorbide mononitrate 30 mg Oral Daily   magic butt paste  Topical TID   pantoprazole 40 mg Oral Q AM   sodium chloride 10 mL Intravenous Q12H       Continuous Infusions:    dextrose 100 mL/hr       PRN Meds:dextrose  •  dextrose  •  glucagon (human recombinant)  •  nitroglycerin  •  potassium chloride **OR** potassium chloride **OR** potassium chloride  •  sodium chloride  •  sodium chloride  •  sodium chloride     Data Review:  Lab Results (last 24 hours)     Procedure Component Value Units Date/Time    Uric Acid [[id number]] Collected:  10/17/19 0147    Specimen:  Blood Updated:  10/17/19 0820    CK [[id number]] Collected:  10/17/19 0147    Specimen:  Blood Updated:  10/17/19 0820    TSH [[id number]] Collected:  10/17/19 0147    Specimen:  Blood Updated:  10/17/19 0820    POC Glucose Once [[id number]]  (Abnormal) Collected:  10/17/19 0541    Specimen:  Blood Updated:  10/17/19 0542     Glucose 213 mg/dL      Comment: Serial Number: [id number]:  [id number]       Manual Differential [[id number]]  (Abnormal) Collected:  10/17/19 0147    Specimen:  Blood Updated:  10/17/19 0336     Neutrophil % 83.0 %       Lymphocyte % 6.0 %      Monocyte % 6.0 %      Myelocyte % 5.0 %      Neutrophils Absolute 16.19 10*3/mm3      Lymphocytes Absolute 1.17 10*3/mm3      Monocytes Absolute 1.17 10*3/mm3      nRBC 1.0 /100 WBC      RBC Morphology Normal     Toxic Granulation Slight/1+     Platelet Morphology Normal    CBC & Differential [[id number]] Collected:  10/17/19 0147    Specimen:  Blood Updated:  10/17/19 0336    Narrative:       The following orders were created for panel order CBC & Differential.  Procedure                               Abnormality         Status                     ---------                               -----------         ------                     CBC Auto Differential[[id number]]        Abnormal            Final result                 Please view results for these tests on the individual orders.    CBC Auto Differential [[id number]]  (Abnormal) Collected:  10/17/19 0147    Specimen:  Blood Updated:  10/17/19 0336     WBC 19.50 10*3/mm3      RBC 3.82 10*6/mm3      Hemoglobin 12.6 g/dL      Hematocrit 38.1 %      MCV 99.5 fL      MCH 32.9 pg      MCHC 33.1 g/dL      RDW 15.8 %      RDW-SD 55.6 fl      MPV 8.2 fL      Platelets 256 10*3/mm3     Scan Slide [[id number]] Collected:  10/17/19 0147    Specimen:  Blood Updated:  10/17/19 0336     Scan Slide --     Comment: See Manual Differential Results       Comprehensive Metabolic Panel [[id number]]  (Abnormal) Collected:  10/17/19 0147    Specimen:  Blood Updated:  10/17/19 0244     Glucose 200 mg/dL      BUN 60 mg/dL      Creatinine 1.18 mg/dL      Sodium 156 mmol/L      Potassium 3.6 mmol/L      Chloride 118 mmol/L      CO2 20.0 mmol/L      Calcium 8.7 mg/dL      Total Protein 5.9 g/dL      Albumin 2.80 g/dL      ALT (SGPT) 15 U/L      AST (SGOT) 17 U/L      Alkaline Phosphatase 92 U/L      Total Bilirubin 0.6 mg/dL      eGFR Non African Amer 58 mL/min/1.73      Globulin 3.1 gm/dL      A/G Ratio 0.9 g/dL      BUN/Creatinine Ratio 50.8     Anion Gap 21.6 mmol/L      Narrative:       GFR Normal >60  Chronic Kidney Disease <60  Kidney Failure <15    Magnesium [[id number]]  (Normal) Collected:  10/17/19 0147    Specimen:  Blood Updated:  10/17/19 0239     Magnesium 2.2 mg/dL     Blood Culture - Blood, Arm, Left [[id number]] Collected:  10/14/19 0121    Specimen:  Blood from Arm, Left Updated:  10/17/19 0130     Blood Culture No growth at 3 days    Blood Culture - Blood, Arm, Right [[id number]] Collected:  10/14/19 0121    Specimen:  Blood from Arm, Right Updated:  10/17/19 0130     Blood Culture No growth at 3 days    POC Glucose Once [[id number]]  (Abnormal) Collected:  10/16/19 2350    Specimen:  Blood Updated:  10/16/19 2352     Glucose 145 mg/dL      Comment: Serial Number: [id number]:  [id number]       POC Glucose Once [[id number]]  (Abnormal) Collected:  10/16/19 2024    Specimen:  Blood Updated:  10/16/19 2026     Glucose 213 mg/dL      Comment: Serial Number: [id number]:  [id number]       POC Glucose Once [[id number]]  (Abnormal) Collected:  10/16/19 1636    Specimen:  Blood Updated:  10/16/19 1642     Glucose 231 mg/dL      Comment: Serial Number: [id number]:  [id number]       Magnesium [[id number]]  (Normal) Collected:  10/15/19 0307    Specimen:  Blood Updated:  10/16/19 1128     Magnesium 1.9 mg/dL     POC Glucose Once [[id number]]  (Abnormal) Collected:  10/16/19 1119    Specimen:  Blood Updated:  10/16/19 1127     Glucose 216 mg/dL      Comment: Serial Number: [id number]:  [id number]                Imaging:  Imaging Results (last 72 hours)     Procedure Component Value Units Date/Time    FL Video Swallow With Speech [[id number]] Collected:  10/15/19 1226     Updated:  10/15/19 1233    Narrative:       DATE OF EXAM:  10/15/2019 12:00 PM     PROCEDURE:  FL VIDEO SWALLOW W SPEECH-     INDICATIONS:  dysphagia; A41.9-Sepsis, unspecified organism; R65.20-Severe sepsis  without septic shock; J69.0-Pneumonitis due to inhalation of food and  vomit;  R73.9-Hyperglycemia, unspecified; N28.9-Disorder of kidney and  ureter, unspecified     COMPARISON:  No Comparisons Available     TECHNIQUE:   This examination was performed in conjunction with speech pathology.  Lateral video fluoroscopic evaluation of the swallowing mechanism was  performed while correlate administering to the patient various  consistency food items mixed with barium.     Fluoroscopic Time:   1.3 minutes     FINDINGS:  There was penetration with aspiration with thin barium. There were  residuals with nectar and applesauce consistencies.        Impression:       1.Aspiration with thin barium.  2.Please refer to speech pathology report for further details.     Electronically Signed By-DR. Darnell Montilla MD On:10/15/2019 12:30 PM  This report was finalized on [id number] by DR. Darnlel Montilla MD.    XR Chest 1 View [[id number]] Collected:  10/15/19 0712     Updated:  10/15/19 0716    Narrative:       XR CHEST 1 VW-     Date of Exam: 10/15/2019 6:28 AM     Indication: REsp failure; A41.9-Sepsis, unspecified organism;  R65.20-Severe sepsis without septic shock; J69.0-Pneumonitis due to  inhalation of food and vomit; R73.9-Hyperglycemia, unspecified;  N28.9-Disorder of kidney and ureter, unspecified.     Comparison Exams: October 14, 2019     Technique: Single AP chest radiograph     FINDINGS:  A small right apical pneumothorax is unchanged. Hazy bilateral airspace  opacities worse in the right lower lung are unchanged, suggesting  multifocal pneumonia. There are moderate right and small left pleural  effusions. The heart and mediastinal contours appear stable. The osseous  structures appear intact.       Impression:       1.Small right apical pneumothorax is unchanged.  2.Hazy bilateral airspace opacities worse in the right lower lung are  unchanged, suggesting multifocal pneumonia.  3.Moderate right and small left pleural effusions.     Electronically Signed By-DR. Darnell Montilla MD  On:10/15/2019 7:14 AM  This report was finalized on [id number] by DR. Darnell Montilla MD.    XR Chest 1 View [[id number]] Collected:  10/14/19 1421     Updated:  10/14/19 1424    Narrative:       DATE OF EXAM:  10/14/2019 1:38 PM     PROCEDURE:  XR CHEST 1 VW-     INDICATIONS:  Pneumothorax; A41.9-Sepsis, unspecified organism; R65.20-Severe sepsis  without septic shock; J69.0-Pneumonitis due to inhalation of food and  vomit; R73.9-Hyperglycemia, unspecified; N28.9-Disorder of kidney and  ureter, unspecified       COMPARISON:  AP portable chest 10/14/2019.     TECHNIQUE:   Single radiographic view of the chest was obtained.     FINDINGS:  Dense consolidative changes in the bilateral lower lobes, right greater  than left, with small to moderate right and small left pleural  effusions, without significant change. More ill-defined patchy  infiltrates in the right upper lobe and left midlung likewise stable.  Small right apical pneumothorax is unchanged. Stable cardiac  enlargement.       Impression:          1. Stable small right apical pneumothorax compared to earlier today.  2. Multifocal patchy infiltrates in both lungs with more confluent  bibasilar consolidations the appearance of pneumonia, stable.  3. Small to moderate right, small left pleural effusions, unchanged.     Electronically Signed By-Dr. Mary Hope MD On:10/14/2019 2:22 PM  This report was finalized on [id number] by Dr. Mary Hope MD.    XR Chest 1 View [[id number]] Collected:  10/14/19 0832     Updated:  10/14/19 0837    Narrative:       XR CHEST 1 VW-     Date of Exam: 10/14/2019 8:30 AM     Indication: pneumothorax; A41.9-Sepsis, unspecified organism;  R65.20-Severe sepsis without septic shock; J69.0-Pneumonitis due to  inhalation of food and vomit; R73.9-Hyperglycemia, unspecified;  N28.9-Disorder of kidney and ureter, unspecified.     Comparison Exams: Chest radiograph from earlier today     Technique: Single AP chest radiograph      FINDINGS:  A small right apical pneumothorax has slightly improved now measuring  1.8 cm in diameter, previously 2.2 cm. Hazy bilateral airspace opacities  worse in the right lower lung appear slightly improved. There are  moderate right and small left pleural effusions. The heart and  mediastinal contours appear stable. The pulmonary vasculature appears  normal. The osseous structures appear intact.       Impression:       1.Small right apical pneumothorax has slightly improved.  2.Hazy bilateral airspace opacities worse in the right lower lung have  slightly improved, suggesting multifocal pneumonia.  3.Moderate right and small left pleural effusions.     Electronically Signed By-DR. Darnell Montilla MD On:10/14/2019 8:35 AM  This report was finalized on [id number] by DR. Darnell Montilla MD.    XR Chest 1 View [[id number]] Collected:  10/14/19 0734     Updated:  10/14/19 0742    Narrative:       XR CHEST 1 VW-     Date of Exam: 10/14/2019 1:39 AM     Indication: Severe Sepsis Protocol; A41.9-Sepsis, unspecified organism;  R65.20-Severe sepsis without septic shock; J69.0-Pneumonitis due to  inhalation of food and vomit; R73.9-Hyperglycemia, unspecified.     Comparison Exams: September 7, 2019     Technique: Single AP chest radiograph     FINDINGS:  There is a small-to-moderate right apical pneumothorax measuring up to  2.2 cm in diameter. There are hazy bilateral airspace opacities worse in  the right lower lung. There are moderate right and small left pleural  effusions. The heart and mediastinal contours appear stable. The  pulmonary vasculature appears normal. The osseous structures appear  intact.       Impression:       1.Small-to-moderate right apical pneumothorax.  2.Hazy bilateral airspace opacities worse in the right lower lung,  suggesting multifocal pneumonia.  3.Moderate right and small left pleural effusions.     Results were relayed to Dr. Mittal at time of dictation.     Electronically Signed  By-DR. Darnell Montilla MD On:10/14/2019 7:39 AM  This report was finalized on [id number] by DR. Darnell Montilla MD.            ASSESSMENT/PLAN:     Decubitus ulcer of sacral region, stage 3 (CMS/HCC)    Diabetes mellitus, type II (CMS/HCC)    Dermatitis associated with moisture    Severe sepsis (CMS/HCC)    Aspiration pneumonia (CMS/HCC)    Pneumothorax  Acute hypoxemic respiratory failure  Aspiration pneumonia  Right apical pneumothorax. -Etiology unclear.  Likely spontaneous.  Acute encephalopathy.  Likely toxic metabolic encephalopathy.  History of chronic dysphagia  Chronic diastolic congestive heart failure.  EF 20 to 25%  ESTEBAN  Diabetes mellitus with hyperglycemia.  History of hypertension  History of CVA  History of generalized anxiety disorder    PLAN    Chest x-ray films and ABG reviewed.  Patient has right lung infiltrate concerning for aspiration pneumonia along with a small right apical pneumothorax.  Pneumothorax has been stable on serial x-ray monitoring.  Repeat chest x-ray for today pending. We will continue to monitor conservatively for now.  Currently he is on 3 L nasal cannula.  Does not appear to be any acute respiratory distress.  If pneumothorax is worsening we will place a chest tube.  Recent CXR reviewed and stable.  Remains on broad-spectrum antibiotic therapy with vancomycin, clindamycin and cefepime.  Blood cultures are negative so far.  Will discontinue vancomycin today. On Clindamycin and Cefepime change to Unasyn due to concerns with increased encephalopathy with Cefepime.    video swallow with persistent aspiration.  Speech therapy recommends n.p.o. with alternative means of nutrition.  Switched his dexamethasone to stress dose steroids.  It can be switched back to p.o. steroids once patient is able to take p.o.  He is on Lantus and sliding scale insulin. We will closely monitor  Discussed goals of care with patient's nephew (who is his power of  but not healthcare proxy)  on admission.  Unable to get hold of daughter who lives out of town.  Continue aggressive treatment.  Overall prognosis is guarded  Discussed with patient's nursing staff  IVFs of D5, hypernatremic 156    Awaiting family decision on goals of care and for feeding tube.    Patient was previously under Hospice per nursing    Resp status remains stable.  Cont to follow

## 2019-10-17 NOTE — PLAN OF CARE
Problem: Fall Risk (Adult)  Goal: Identify Related Risk Factors and Signs and Symptoms  Outcome: Ongoing (interventions implemented as appropriate)      Problem: Patient Care Overview  Goal: Plan of Care Review  Outcome: Ongoing (interventions implemented as appropriate)   10/17/19 0209   Coping/Psychosocial   Plan of Care Reviewed With patient   Plan of Care Review   Progress no change       Problem: Skin Injury Risk (Adult)  Goal: Identify Related Risk Factors and Signs and Symptoms  Outcome: Ongoing (interventions implemented as appropriate)      Problem: Pneumonia (Adult)  Goal: Signs and Symptoms of Listed Potential Problems Will be Absent, Minimized or Managed (Pneumonia)  Outcome: Ongoing (interventions implemented as appropriate)

## 2019-10-17 NOTE — PROGRESS NOTES
"   LOS: 3 days   Patient Care Team:  Jordan Mittal MD as PCP - General (Family Medicine)  NANCY Mejia MD as PCP - Claims Attributed  Dina Coronado RN as Community Care Coordinator ([facility])    Chief Complaint:     Subjective     Patient awakens to his name.  He does not respond to any questions.  He failed his swallow study and is n.p.o.  There are no family members at bedside.  Review of chart shows power of  to be shared by Neeraj Braxton and Clau Gupta equally.  I spoke with both yesterday and they are still considering whether to place temporary NG tube for feedings, consult GI for peg tube placement, or consult palliative care. He is now a DNR.         Subjective:  Diet:  NPO.        History taken from: chart RN Patient unable to give history due to patient confusion.    Objective     Vital Signs  Temp:  [97.3 °F (36.3 °C)-98.9 °F (37.2 °C)] 97.6 °F (36.4 °C)  Heart Rate:  [] 113  Resp:  [17-19] 18  BP: (103-131)/(54-72) 117/72    Objective:  General Appearance:  Ill-appearing, comfortable, in no acute distress and not in pain.    Vital signs: (most recent): Blood pressure 117/72, pulse 113, temperature 97.6 °F (36.4 °C), temperature source Oral, resp. rate 18, height 167.6 cm (65.98\"), weight 56.8 kg (125 lb 3.5 oz), SpO2 95 %.  (Tachycardic 100s).    Output: Producing urine.    Lungs:  Tachypnea and increased effort.  Breath sounds clear to auscultation.  No decreased breath sounds.    Heart: Tachycardia.  Irregular rhythm.    Abdomen: Abdomen is soft.  Bowel sounds are normal.   There is no abdominal tenderness.     Extremities: There is no dependent edema.    Pulses: Distal pulses are intact.    Neurological: Patient is alert.    Skin:  Warm and dry.              Results Review:      Lab Results (last 24 hours)     Procedure Component Value Units Date/Time    POC Glucose Once [[id number]]  (Abnormal) Collected:  10/17/19 0541    Specimen:  Blood Updated:  10/17/19 " 0542     Glucose 213 mg/dL      Comment: Serial Number: [id number]:  [id number]       Manual Differential [[id number]]  (Abnormal) Collected:  10/17/19 0147    Specimen:  Blood Updated:  10/17/19 0336     Neutrophil % 83.0 %      Lymphocyte % 6.0 %      Monocyte % 6.0 %      Myelocyte % 5.0 %      Neutrophils Absolute 16.19 10*3/mm3      Lymphocytes Absolute 1.17 10*3/mm3      Monocytes Absolute 1.17 10*3/mm3      nRBC 1.0 /100 WBC      RBC Morphology Normal     Toxic Granulation Slight/1+     Platelet Morphology Normal    CBC & Differential [[id number]] Collected:  10/17/19 0147    Specimen:  Blood Updated:  10/17/19 0336    Narrative:       The following orders were created for panel order CBC & Differential.  Procedure                               Abnormality         Status                     ---------                               -----------         ------                     CBC Auto Differential[[id number]]        Abnormal            Final result                 Please view results for these tests on the individual orders.    CBC Auto Differential [[id number]]  (Abnormal) Collected:  10/17/19 0147    Specimen:  Blood Updated:  10/17/19 0336     WBC 19.50 10*3/mm3      RBC 3.82 10*6/mm3      Hemoglobin 12.6 g/dL      Hematocrit 38.1 %      MCV 99.5 fL      MCH 32.9 pg      MCHC 33.1 g/dL      RDW 15.8 %      RDW-SD 55.6 fl      MPV 8.2 fL      Platelets 256 10*3/mm3     Scan Slide [[id number]] Collected:  10/17/19 0147    Specimen:  Blood Updated:  10/17/19 0336     Scan Slide --     Comment: See Manual Differential Results       Comprehensive Metabolic Panel [[id number]]  (Abnormal) Collected:  10/17/19 0147    Specimen:  Blood Updated:  10/17/19 0244     Glucose 200 mg/dL      BUN 60 mg/dL      Creatinine 1.18 mg/dL      Sodium 156 mmol/L      Potassium 3.6 mmol/L      Chloride 118 mmol/L      CO2 20.0 mmol/L      Calcium 8.7 mg/dL      Total Protein 5.9 g/dL      Albumin 2.80 g/dL      ALT (SGPT) 15 U/L       AST (SGOT) 17 U/L      Alkaline Phosphatase 92 U/L      Total Bilirubin 0.6 mg/dL      eGFR Non African Amer 58 mL/min/1.73      Globulin 3.1 gm/dL      A/G Ratio 0.9 g/dL      BUN/Creatinine Ratio 50.8     Anion Gap 21.6 mmol/L     Narrative:       GFR Normal >60  Chronic Kidney Disease <60  Kidney Failure <15    Magnesium [[id number]]  (Normal) Collected:  10/17/19 0147    Specimen:  Blood Updated:  10/17/19 0239     Magnesium 2.2 mg/dL     Blood Culture - Blood, Arm, Left [[id number]] Collected:  10/14/19 0121    Specimen:  Blood from Arm, Left Updated:  10/17/19 0130     Blood Culture No growth at 3 days    Blood Culture - Blood, Arm, Right [[id number]] Collected:  10/14/19 0121    Specimen:  Blood from Arm, Right Updated:  10/17/19 0130     Blood Culture No growth at 3 days    POC Glucose Once [[id number]]  (Abnormal) Collected:  10/16/19 2350    Specimen:  Blood Updated:  10/16/19 2352     Glucose 145 mg/dL      Comment: Serial Number: [id number]:  [id number]       POC Glucose Once [[id number]]  (Abnormal) Collected:  10/16/19 2024    Specimen:  Blood Updated:  10/16/19 2026     Glucose 213 mg/dL      Comment: Serial Number: [id number]:  [id number]       POC Glucose Once [[id number]]  (Abnormal) Collected:  10/16/19 1636    Specimen:  Blood Updated:  10/16/19 1642     Glucose 231 mg/dL      Comment: Serial Number: [id number]:  [id number]       Magnesium [[id number]]  (Normal) Collected:  10/15/19 0307    Specimen:  Blood Updated:  10/16/19 1128     Magnesium 1.9 mg/dL     POC Glucose Once [[id number]]  (Abnormal) Collected:  10/16/19 1119    Specimen:  Blood Updated:  10/16/19 1127     Glucose 216 mg/dL      Comment: Serial Number: [id number]:  [id number]       POC Glucose Once [[id number]]  (Abnormal) Collected:  10/16/19 0725    Specimen:  Blood Updated:  10/16/19 0726     Glucose 210 mg/dL      Comment: Serial Number: [id number]:  [id number]            Imaging Results (last 24  hours)     Procedure Component Value Units Date/Time    XR Chest 1 View [[id number]] Collected:  10/16/19 1206     Updated:  10/16/19 1209    Narrative:       Examination: XR CHEST 1 VW-     Date of Exam: 10/16/2019 10:34 AM     Indication: Pneumothorax; A41.9-Sepsis, unspecified organism;  R65.20-Severe sepsis without septic shock; J69.0-Pneumonitis due to  inhalation of food and vomit; R73.9-Hyperglycemia, unspecified;  N28.9-Disorder of kidney and ureter, unspecified.     Comparison: Radiograph 10/15/2019, 10/14/2019     Technique: 1 view of the chest      Findings:  There is a tiny right-sided pneumothorax with degree of pleural  separation measuring approximately 1 to 2 mm, decreased as compared to  the previous study. Patchy airspace changes are present within the lung  bases bilaterally with a moderate-sized pleural effusion present on the  right. The heart appears mildly enlarged. The pulmonary vasculature  appears indistinct. No acute osseous abnormality identified.       Impression:       Interval decrease in size of right-sided pneumothorax. A tiny  right-sided pneumothorax is still present. Otherwise, stable exam with  reconstruction patchy airspace disease within the lung bases bilaterally  with a moderate-sized right sided pleural effusion.     Electronically Signed By-Arielle Ann On:10/16/2019 12:07 PM  This report was finalized on [id number] by  Arielle Ann, .           I reviewed the patient's new clinical results.    Medication Review:    Hospital Medications (active)       Dose Frequency Start End    !Vancomycin Level Draw Needed  Once 10/17/2019     Sig - Route: 1 (One) Time. - Does not apply    barium sulfate (E-Z-PAQUE) 96 % oral suspension reconstituted suspension 183 mL 183 mL Once in Imaging 10/15/2019 10/15/2019    Sig - Route: Take 183 mL by mouth Once. - Oral    barium sulfate oral suspension 50 mL 50 mL Once in Imaging 10/15/2019 10/15/2019    Sig - Route: Take 50 mL by mouth Once. -  Oral    carvedilol (COREG) tablet 3.125 mg 3.125 mg 2 Times Daily With Meals 10/14/2019     Sig - Route: Take 1 tablet by mouth 2 (Two) Times a Day With Meals. - Oral    cefepime 2 gm IVPB in 100 ml NS (MBP) 2 g Every 12 Hours 10/15/2019 10/22/2019    Sig - Route: Infuse 2 g into a venous catheter Every 12 (Twelve) Hours. - Intravenous    clindamycin (CLEOCIN) 600 mg in sodium chloride 0.9% 50 mL IVPB (premix) 600 mg Every 8 Hours 10/14/2019 10/21/2019    Sig - Route: Infuse 50 mL into a venous catheter Every 8 (Eight) Hours. - Intravenous    dextrose (D50W) 25 g/ 50mL Intravenous Solution 25 g 25 g Every 15 Minutes PRN 10/14/2019     Sig - Route: Infuse 50 mL into a venous catheter Every 15 (Fifteen) Minutes As Needed for Low Blood Sugar (Blood Sugar Less Than 70). - Intravenous    dextrose (GLUTOSE) oral gel 15 g 15 g Every 15 Minutes PRN 10/14/2019     Sig - Route: Take 15 application by mouth Every 15 (Fifteen) Minutes As Needed for Low Blood Sugar (Blood sugar less than 70). - Oral    enoxaparin (LOVENOX) syringe 40 mg 40 mg Every 24 Hours 10/15/2019     Sig - Route: Inject 0.4 mL under the skin into the appropriate area as directed Daily. - Subcutaneous    glucagon (human recombinant) (GLUCAGEN DIAGNOSTIC) injection 1 mg 1 mg Every 15 Minutes PRN 10/14/2019     Sig - Route: Inject 1 mg under the skin into the appropriate area as directed Every 15 (Fifteen) Minutes As Needed for Low Blood Sugar (Blood Glucose Less Than 70). - Subcutaneous    hydrocortisone sodium succinate (Solu-CORTEF) injection 50 mg 50 mg Every 8 Hours 10/14/2019     Sig - Route: Infuse 50 mg into a venous catheter Every 8 (Eight) Hours. - Intravenous    insulin lispro (humaLOG) injection 0-7 Units 0-7 Units 4 Times Daily With Meals & Nightly 10/14/2019     Sig - Route: Inject 0-7 Units under the skin into the appropriate area as directed 4 (Four) Times a Day With Meals & at Bedtime. - Subcutaneous    Cosign for Ordering: Accepted by Umer  "Mikaela DEGROOT DO on 10/15/2019  6:19 AM    isosorbide mononitrate (IMDUR) 24 hr tablet 30 mg 30 mg Daily 10/14/2019     Sig - Route: Take 1 tablet by mouth Daily. - Oral    magic butt paste  3 Times Daily 10/14/2019     Sig - Route: Apply  topically to the appropriate area as directed 3 (Three) Times a Day. - Topical    nitroglycerin (NITROSTAT) SL tablet 0.4 mg 0.4 mg Every 5 Minutes PRN 10/14/2019     Sig - Route: Place 1 tablet under the tongue Every 5 (Five) Minutes As Needed for Chest Pain (Only if SBP Greater Than 100). - Sublingual    pantoprazole (PROTONIX) EC tablet 40 mg 40 mg Every Early Morning 10/14/2019     Sig - Route: Take 1 tablet by mouth Every Morning. - Oral    Pharmacy to dose vancomycin  Continuous PRN 10/14/2019 10/21/2019    Sig - Route: Continuous As Needed for Consult. - Does not apply    potassium chloride (K-DUR,KLOR-CON) CR tablet 40 mEq 40 mEq As Needed 10/15/2019     Sig - Route: Take 2 tablets by mouth As Needed (Potassium Replacement.  See Admin Instructions). - Oral    Linked Group 1:  \"Or\" Linked Group Details        potassium chloride (KLOR-CON) packet 40 mEq 40 mEq As Needed 10/15/2019     Sig - Route: Take 40 mEq by mouth As Needed (potassium replacement, see admin instructions). - Oral    Linked Group 1:  \"Or\" Linked Group Details        potassium chloride 10 mEq in 100 mL IVPB 10 mEq Every 1 Hour PRN 10/15/2019     Sig - Route: Infuse 100 mL into a venous catheter Every 1 (One) Hour As Needed (Potassium Replacement - See Admin Instructions). - Intravenous    Linked Group 1:  \"Or\" Linked Group Details        sodium chloride 0.9 % flush 10 mL 10 mL As Needed 10/14/2019     Sig - Route: Infuse 10 mL into a venous catheter As Needed for Line Care. - Intravenous    Cosign for Ordering: Accepted by Carlos Eugene MD on 10/14/2019  3:09 AM    sodium chloride 0.9 % flush 10 mL 10 mL As Needed 10/14/2019     Sig - Route: Infuse 10 mL into a venous catheter As Needed for Line Care. " - Intravenous    sodium chloride 0.9 % flush 10 mL 10 mL Every 12 Hours Scheduled 10/15/2019     Sig - Route: Infuse 10 mL into a venous catheter Every 12 (Twelve) Hours. - Intravenous    sodium chloride 0.9 % flush 10 mL 10 mL As Needed 10/15/2019     Sig - Route: Infuse 10 mL into a venous catheter As Needed for Line Care (After Medication Administration). - Intravenous    vancomycin 1000 mg/250 mL 0.9% NS IVPB ([facility]) 1,000 mg As Needed 10/14/2019 10/21/2019    Sig - Route: Infuse 250 mL into a venous catheter As Needed (Do not administer doses off of this order- pulse dosing per pharmacy). - Intravenous    vancomycin 1000 mg/250 mL 0.9% NS IVPB ([facility]) 1,000 mg Once 10/15/2019 10/15/2019    Sig - Route: Infuse 250 mL into a venous catheter 1 (One) Time. - Intravenous    vancomycin 1000 mg/250 mL 0.9% NS IVPB ([facility]) 1,000 mg Every 24 Hours 10/16/2019 10/23/2019    Sig - Route: Infuse 250 mL into a venous catheter Daily. - Intravenous    cefepime 2 gm IVPB in 100 ml NS (MBP) (Discontinued) 2 g Every 24 Hours 10/14/2019 10/15/2019    Sig - Route: Infuse 2 g into a venous catheter Daily. - Intravenous    sodium chloride 0.9 % infusion (Discontinued) 125 mL/hr Continuous 10/14/2019 10/16/2019    Sig - Route: Infuse 125 mL/hr into a venous catheter Continuous. - Intravenous    vancomycin 1000 mg/250 mL 0.9% NS IVPB ([facility]) (Discontinued) 1,000 mg Once 10/15/2019 10/15/2019    Sig - Route: Infuse 250 mL into a venous catheter 1 (One) Time. - Intravenous    Reason for Discontinue: Duplicate order           Assessment/Plan       Decubitus ulcer of sacral region, stage 3 (CMS/[facility])    Diabetes mellitus, type II (CMS/[facility])    Dermatitis associated with moisture    Severe sepsis (CMS/[facility])    Aspiration pneumonia (CMS/[facility])    Pneumothorax      Assessment:    Condition: In critical condition.  Worsening.   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     Plan:   (Await decision from POAs on feeding tube placement vs palliative care. Consult nephrology for hypernatremia. ).       Shasha Anthony, ANDIE  10/17/19  7:13 AM

## 2019-10-18 NOTE — PROGRESS NOTES
"KPA/PULM/CC PROGRESS NOTE       PATIENT IDENTIFICATION    Name: Julio César Braxton Age: 87 y.o. Sex: male :  4/3/1932 MRN: [id number]  87 y.o. male who was referred for consultation for pneumonia, sepsis and pneumothorax.  Patient is quite obtunded at time of my evaluation and is not able to give me any history.  History was obtained from the chart and talking to patient's nurse at bedside.  I tried to call patient's sister and daughter the 2 numbers listed in the chart but I was unable to get hold off either of the family members.  He is apparently a resident of nursing home and was sent for aspiration pneumonia.  Patient apparently has history of dysphagia but family has refused dietary recommendations.  Patient was noted to have acute hypoxemia and some mental status changes at the nursing home.  He subsequently was transferred to [facility] for further evaluation and management  Patient has received fluid resuscitation in the ER and has been started on broad-spectrum antibiotic therapy    SUBJECTIVE    10/15: More awake and appropriate today.  Currently on 3 L nasal cannula.  Remains on IV fluids.  Not able to provide much history.  10/16:  No new issues overnight.  Remains NPO.  Cont supplemental oxygen, 3.5L.  Resting quietly in bed.  Feels ok  10/17:  No new issues overnight. More lethargic and confused today  10/18: failed swallow study again. Nursing staff unable to place DHT or NGT.  Continues to be lethargic    OBJECTIVE    /46   Pulse 90   Temp 97.9 °F (36.6 °C) (Axillary)   Resp 22   Ht 167.6 cm (65.98\")   Wt 57.2 kg (126 lb 1.7 oz)   SpO2 96%   BMI 20.36 kg/m²   Intake/Output last 3 shifts:  I/O last 3 completed shifts:  In: 250 [IV Piggyback:250]  Out: -   Intake/Output this shift:  No intake/output data recorded.    Constitutional: Chronically ill-appearing, no acute distress, non-toxic appearance   HENT:  Atraumatic, external ears normal, no nasal discharge   Neck-no " JVD, no tenderness, supple   Respiratory:  No respiratory distress, good air entry bilaterally, bibasilar crackles  Cardiovascular:  Normal rate, normal rhythm, no murmurs, no gallops, no rubs   GI:  Soft, nondistended, normal bowel sounds, nontender  :  No costovertebral angle tenderness   Musculoskeletal:  No edema, no tenderness, no deformities. Back- no tenderness  Integument:  Well hydrated, no rash   Neurologic: awakens to voice   Psychiatric: As above    Scheduled Meds:    albuterol 2.5 mg Nebulization Q4H - RT   carvedilol 3.125 mg Oral BID With Meals   digoxin 125 mcg Intravenous Daily   enoxaparin 30 mg Subcutaneous Q24H   hydrocortisone sodium succinate 50 mg Intravenous Q12H   insulin lispro 0-7 Units Subcutaneous 4x Daily With Meals & Nightly   isosorbide mononitrate 30 mg Oral Daily   magic butt paste  Topical TID   pantoprazole 40 mg Oral Q AM   piperacillin-tazobactam 3.375 g Intravenous Q8H   sodium chloride 10 mL Intravenous Q12H       Continuous Infusions:    dextrose 150 mL/hr Last Rate: 100 mL/hr (10/18/19 0950)       PRN Meds:dextrose  •  dextrose  •  glucagon (human recombinant)  •  nitroglycerin  •  potassium chloride **OR** potassium chloride **OR** potassium chloride  •  sodium chloride  •  sodium chloride  •  sodium chloride     Data Review:  Lab Results (last 24 hours)     Procedure Component Value Units Date/Time    POC Glucose Once [[id number]]  (Abnormal) Collected:  10/18/19 1122    Specimen:  Blood Updated:  10/18/19 1128     Glucose 327 mg/dL      Comment: Serial Number: [id number]:  [id number]       Blood Culture - Blood, Arm, Left [[id number]] Collected:  10/18/19 0921    Specimen:  Blood from Arm, Left Updated:  10/18/19 0934    CBC & Differential [[id number]] Collected:  10/18/19 0521    Specimen:  Blood Updated:  10/18/19 0730    Narrative:       The following orders were created for panel order CBC & Differential.  Procedure                               Abnormality          Status                     ---------                               -----------         ------                     CBC Auto Differential[[id number]]        Abnormal            Final result                 Please view results for these tests on the individual orders.    CBC Auto Differential [[id number]]  (Abnormal) Collected:  10/18/19 0521    Specimen:  Blood Updated:  10/18/19 0730     WBC 12.60 10*3/mm3      RBC 3.13 10*6/mm3      Hemoglobin 10.2 g/dL      Comment: Result checked         Hematocrit 31.5 %      .6 fL      MCH 32.6 pg      MCHC 32.4 g/dL      RDW 15.7 %      RDW-SD 56.9 fl      MPV 8.9 fL      Platelets 182 10*3/mm3     Scan Slide [[id number]] Collected:  10/18/19 0521    Specimen:  Blood Updated:  10/18/19 0730     Scan Slide --     Comment: See Manual Differential Results       Manual Differential [[id number]]  (Abnormal) Collected:  10/18/19 0521    Specimen:  Blood Updated:  10/18/19 0730     Neutrophil % 80.0 %      Lymphocyte % 6.0 %      Monocyte % 1.0 %      Bands %  12.0 %      Metamyelocyte % 1.0 %      Neutrophils Absolute 11.59 10*3/mm3      Lymphocytes Absolute 0.76 10*3/mm3      Monocytes Absolute 0.13 10*3/mm3      Anisocytosis Slight/1+     Macrocytes Slight/1+     Poikilocytes Slight/1+     WBC Morphology Normal     Platelet Morphology Normal    POC Glucose Once [[id number]]  (Abnormal) Collected:  10/18/19 0632    Specimen:  Blood Updated:  10/18/19 0634     Glucose 256 mg/dL      Comment: Serial Number: [id number]:  [id number]       Comprehensive Metabolic Panel [[id number]]  (Abnormal) Collected:  10/18/19 0359    Specimen:  Blood Updated:  10/18/19 0532     Glucose 253 mg/dL      BUN 67 mg/dL      Creatinine 1.51 mg/dL      Sodium 162 mmol/L      Potassium 3.1 mmol/L      Chloride 122 mmol/L      CO2 24.0 mmol/L      Calcium 9.2 mg/dL      Total Protein 6.2 g/dL      Albumin 3.90 g/dL      ALT (SGPT) 10 U/L      AST (SGOT) 13 U/L      Alkaline Phosphatase 81 U/L       Total Bilirubin 0.7 mg/dL      eGFR Non African Amer 44 mL/min/1.73      Globulin 2.3 gm/dL      A/G Ratio 1.7 g/dL      BUN/Creatinine Ratio 44.4     Anion Gap 16.0 mmol/L     Narrative:       GFR Normal >60  Chronic Kidney Disease <60  Kidney Failure <15    Magnesium [[id number]]  (Normal) Collected:  10/18/19 0359    Specimen:  Blood Updated:  10/18/19 0517     Magnesium 2.4 mg/dL     Phosphorus [[id number]]  (Normal) Collected:  10/18/19 0359    Specimen:  Blood Updated:  10/18/19 0517     Phosphorus 3.9 mg/dL     Iron [[id number]]  (Abnormal) Collected:  10/18/19 0359    Specimen:  Blood Updated:  10/18/19 0517     Iron 52 mcg/dL     CK [[id number]]  (Normal) Collected:  10/18/19 0359    Specimen:  Blood Updated:  10/18/19 0517     Creatine Kinase 20 U/L     Calcium, Ionized [[id number]]  (Normal) Collected:  10/18/19 0359    Specimen:  Blood Updated:  10/18/19 0457     Ionized Calcium 1.23 mmol/L     Blood Culture - Blood, Arm, Left [[id number]] Collected:  10/14/19 0121    Specimen:  Blood from Arm, Left Updated:  10/18/19 0130     Blood Culture No growth at 4 days    Blood Culture - Blood, Arm, Right [[id number]] Collected:  10/14/19 0121    Specimen:  Blood from Arm, Right Updated:  10/18/19 0130     Blood Culture No growth at 4 days    POC Glucose Once [[id number]]  (Abnormal) Collected:  10/17/19 2355    Specimen:  Blood Updated:  10/17/19 2356     Glucose 192 mg/dL      Comment: Serial Number: [id number]:  [id number]       POC Glucose Once [[id number]]  (Abnormal) Collected:  10/17/19 1712    Specimen:  Blood Updated:  10/17/19 1713     Glucose 235 mg/dL      Comment: Serial Number: [id number]:  [id number]       TSH [[id number]]  (Normal) Collected:  10/17/19 0147    Specimen:  Blood Updated:  10/17/19 1628     TSH 0.441 uIU/mL      Comment: Results may be falsely decreased if patient taking Biotin.       CK [[id number]]  (Normal) Collected:  10/17/19 0147    Specimen:  Blood Updated:  10/17/19 162      Creatine Kinase 36 U/L     Uric Acid [[id number]]  (Abnormal) Collected:  10/17/19 0147    Specimen:  Blood Updated:  10/17/19 1621     Uric Acid 9.3 mg/dL              Imaging:  Imaging Results (last 72 hours)     Procedure Component Value Units Date/Time    FL Video Swallow With Speech [[id number]] Collected:  10/15/19 1226     Updated:  10/15/19 1233    Narrative:       DATE OF EXAM:  10/15/2019 12:00 PM     PROCEDURE:  FL VIDEO SWALLOW W SPEECH-     INDICATIONS:  dysphagia; A41.9-Sepsis, unspecified organism; R65.20-Severe sepsis  without septic shock; J69.0-Pneumonitis due to inhalation of food and  vomit; R73.9-Hyperglycemia, unspecified; N28.9-Disorder of kidney and  ureter, unspecified     COMPARISON:  No Comparisons Available     TECHNIQUE:   This examination was performed in conjunction with speech pathology.  Lateral video fluoroscopic evaluation of the swallowing mechanism was  performed while correlate administering to the patient various  consistency food items mixed with barium.     Fluoroscopic Time:   1.3 minutes     FINDINGS:  There was penetration with aspiration with thin barium. There were  residuals with nectar and applesauce consistencies.        Impression:       1.Aspiration with thin barium.  2.Please refer to speech pathology report for further details.     Electronically Signed By-DR. Darnell Montilla MD On:10/15/2019 12:30 PM  This report was finalized on [id number] by DR. Darnell Montilla MD.    XR Chest 1 View [[id number]] Collected:  10/15/19 0712     Updated:  10/15/19 0716    Narrative:       XR CHEST 1 VW-     Date of Exam: 10/15/2019 6:28 AM     Indication: REsp failure; A41.9-Sepsis, unspecified organism;  R65.20-Severe sepsis without septic shock; J69.0-Pneumonitis due to  inhalation of food and vomit; R73.9-Hyperglycemia, unspecified;  N28.9-Disorder of kidney and ureter, unspecified.     Comparison Exams: October 14, 2019     Technique: Single AP chest radiograph      FINDINGS:  A small right apical pneumothorax is unchanged. Hazy bilateral airspace  opacities worse in the right lower lung are unchanged, suggesting  multifocal pneumonia. There are moderate right and small left pleural  effusions. The heart and mediastinal contours appear stable. The osseous  structures appear intact.       Impression:       1.Small right apical pneumothorax is unchanged.  2.Hazy bilateral airspace opacities worse in the right lower lung are  unchanged, suggesting multifocal pneumonia.  3.Moderate right and small left pleural effusions.     Electronically Signed By-DR. Darnell Montilla MD On:10/15/2019 7:14 AM  This report was finalized on [id number] by DR. Darnell Montilla MD.    XR Chest 1 View [[id number]] Collected:  10/14/19 1421     Updated:  10/14/19 1424    Narrative:       DATE OF EXAM:  10/14/2019 1:38 PM     PROCEDURE:  XR CHEST 1 VW-     INDICATIONS:  Pneumothorax; A41.9-Sepsis, unspecified organism; R65.20-Severe sepsis  without septic shock; J69.0-Pneumonitis due to inhalation of food and  vomit; R73.9-Hyperglycemia, unspecified; N28.9-Disorder of kidney and  ureter, unspecified       COMPARISON:  AP portable chest 10/14/2019.     TECHNIQUE:   Single radiographic view of the chest was obtained.     FINDINGS:  Dense consolidative changes in the bilateral lower lobes, right greater  than left, with small to moderate right and small left pleural  effusions, without significant change. More ill-defined patchy  infiltrates in the right upper lobe and left midlung likewise stable.  Small right apical pneumothorax is unchanged. Stable cardiac  enlargement.       Impression:          1. Stable small right apical pneumothorax compared to earlier today.  2. Multifocal patchy infiltrates in both lungs with more confluent  bibasilar consolidations the appearance of pneumonia, stable.  3. Small to moderate right, small left pleural effusions, unchanged.     Electronically Signed By-Dr. Hernandez  MD Jayy On:10/14/2019 2:22 PM  This report was finalized on [id number] by Dr. Mary Hope MD.    XR Chest 1 View [[id number]] Collected:  10/14/19 0832     Updated:  10/14/19 0837    Narrative:       XR CHEST 1 VW-     Date of Exam: 10/14/2019 8:30 AM     Indication: pneumothorax; A41.9-Sepsis, unspecified organism;  R65.20-Severe sepsis without septic shock; J69.0-Pneumonitis due to  inhalation of food and vomit; R73.9-Hyperglycemia, unspecified;  N28.9-Disorder of kidney and ureter, unspecified.     Comparison Exams: Chest radiograph from earlier today     Technique: Single AP chest radiograph     FINDINGS:  A small right apical pneumothorax has slightly improved now measuring  1.8 cm in diameter, previously 2.2 cm. Hazy bilateral airspace opacities  worse in the right lower lung appear slightly improved. There are  moderate right and small left pleural effusions. The heart and  mediastinal contours appear stable. The pulmonary vasculature appears  normal. The osseous structures appear intact.       Impression:       1.Small right apical pneumothorax has slightly improved.  2.Hazy bilateral airspace opacities worse in the right lower lung have  slightly improved, suggesting multifocal pneumonia.  3.Moderate right and small left pleural effusions.     Electronically Signed By-DR. Darnell Montilla MD On:10/14/2019 8:35 AM  This report was finalized on [id number] by DR. Darnell Montilla MD.    XR Chest 1 View [[id number]] Collected:  10/14/19 0734     Updated:  10/14/19 0742    Narrative:       XR CHEST 1 VW-     Date of Exam: 10/14/2019 1:39 AM     Indication: Severe Sepsis Protocol; A41.9-Sepsis, unspecified organism;  R65.20-Severe sepsis without septic shock; J69.0-Pneumonitis due to  inhalation of food and vomit; R73.9-Hyperglycemia, unspecified.     Comparison Exams: September 7, 2019     Technique: Single AP chest radiograph     FINDINGS:  There is a small-to-moderate right apical pneumothorax  measuring up to  2.2 cm in diameter. There are hazy bilateral airspace opacities worse in  the right lower lung. There are moderate right and small left pleural  effusions. The heart and mediastinal contours appear stable. The  pulmonary vasculature appears normal. The osseous structures appear  intact.       Impression:       1.Small-to-moderate right apical pneumothorax.  2.Hazy bilateral airspace opacities worse in the right lower lung,  suggesting multifocal pneumonia.  3.Moderate right and small left pleural effusions.     Results were relayed to Dr. Mittal at time of dictation.     Electronically Signed By-DR. Darnell Montilla MD On:10/14/2019 7:39 AM  This report was finalized on [id number] by DR. Darnell Montilla MD.            ASSESSMENT/PLAN:     Decubitus ulcer of sacral region, stage 3 (CMS/HCC)    Diabetes mellitus, type II (CMS/HCC)    Dermatitis associated with moisture    Severe sepsis (CMS/HCC)    Aspiration pneumonia (CMS/HCC)    Pneumothorax  Acute hypoxemic respiratory failure  Aspiration pneumonia  Right apical pneumothorax. -Etiology unclear.  Likely spontaneous.  Acute encephalopathy.  Likely toxic metabolic encephalopathy.  History of chronic dysphagia  Chronic diastolic congestive heart failure.  EF 20 to 25%  ESTEBAN  Diabetes mellitus with hyperglycemia.  History of hypertension  History of CVA  History of generalized anxiety disorder    PLAN    Chest x-ray films and ABG reviewed.  Patient has right lung infiltrate concerning for aspiration pneumonia along with a small right apical pneumothorax.  Pneumothorax has been stable on serial x-ray monitoring.   We will continue to monitor conservatively for now.  Currently he is on 3 L nasal cannula.  Does not appear to be any acute respiratory distress.    Pneumothorax is stable.  Recent CXR reviewed and stable.  Initially on broad-spectrum antibiotic therapy with vancomycin, clindamycin and cefepime.   Blood cultures are negative so far.   De-escalated to Zosyn  Video swallow with persistent aspiration.  Speech therapy recommends n.p.o. with alternative means of nutrition.  Switched his dexamethasone to stress dose steroids.  It can be switched back to p.o. steroids once patient is able to take p.o.  He is on Lantus and sliding scale insulin. We will closely monitor  Discussed goals of care with patient's nephew (who is his power of  but not healthcare proxy) on admission.  Unable to get hold of daughter who lives out of town.  Continue aggressive treatment.  Overall prognosis is guarded  Discussed with patient's nursing staff  IVFs of D5, hypernatremic 156    Nursing staff unable to place feeding tube.  Pt failed swallow study and unable to receive nutrition.  Will consult IR for NGT insertion  Patient was previously under Hospice per nursing    Resp status remains stable.  Cont to follow

## 2019-10-18 NOTE — PROGRESS NOTES
"   LOS: 4 days   Patient Care Team:  Jordan Mittal MD as PCP - General (Family Medicine)  NANCY Mejia MD as PCP - Claims Attributed  Dina Coronado RN as Community Care Coordinator ([facility])    Chief Complaint:     Subjective     Patient awakens to his name.  He did say \"Thirsty\" to me today.  He failed his swallow study and is n.p.o.  There are no family members at bedside.  Review of chart shows power of  to be shared by Neeraj Braxton and Clau Gupta equally. Per RN, family decided last night on placing NG tube for feeds. Tube hasn't been placed yet.         Subjective:  Diet:  NPO.        History taken from: chart RN Patient unable to give history due to patient confusion.    Objective     Vital Signs  Temp:  [97.5 °F (36.4 °C)-98.6 °F (37 °C)] 98 °F (36.7 °C)  Heart Rate:  [] 95  Resp:  [18-24] 24  BP: (100-124)/(45-64) 104/50    Objective:  General Appearance:  Ill-appearing, in no acute distress, not in pain and uncomfortable.    Vital signs: (most recent): Blood pressure 104/50, pulse 95, temperature 98 °F (36.7 °C), temperature source Axillary, resp. rate 24, height 167.6 cm (65.98\"), weight 57.2 kg (126 lb 1.7 oz), SpO2 99 %.  (Tachycardic 100s).    Output: Producing urine.    Lungs:  Tachypnea and increased effort.  Breath sounds clear to auscultation.  No decreased breath sounds.    Heart: Tachycardia.  Regular rhythm.    Abdomen: Abdomen is soft.  Bowel sounds are normal.   There is no abdominal tenderness.     Extremities: There is no dependent edema.    Pulses: Distal pulses are intact.    Neurological: Patient is alert.    Skin:  Warm and dry.              Results Review:      Lab Results (last 24 hours)     Procedure Component Value Units Date/Time    POC Glucose Once [[id number]]  (Abnormal) Collected:  10/18/19 0632    Specimen:  Blood Updated:  10/18/19 0634     Glucose 256 mg/dL      Comment: Serial Number: [id number]:  [id number]       CBC Auto " Differential [[id number]]  (Abnormal) Collected:  10/18/19 0521    Specimen:  Blood Updated:  10/18/19 0621     WBC 12.60 10*3/mm3      RBC 3.13 10*6/mm3      Hemoglobin 10.2 g/dL      Comment: Result checked         Hematocrit 31.5 %      .6 fL      MCH 32.6 pg      MCHC 32.4 g/dL      RDW 15.7 %      RDW-SD 56.9 fl      MPV 8.9 fL      Platelets 182 10*3/mm3     Scan Slide [[id number]] Collected:  10/18/19 0521    Specimen:  Blood Updated:  10/18/19 0612    CBC & Differential [[id number]] Collected:  10/18/19 0521    Specimen:  Blood Updated:  10/18/19 0549    Narrative:       The following orders were created for panel order CBC & Differential.  Procedure                               Abnormality         Status                     ---------                               -----------         ------                     CBC Auto Differential[[id number]]        Abnormal            Preliminary result           Please view results for these tests on the individual orders.    Comprehensive Metabolic Panel [[id number]]  (Abnormal) Collected:  10/18/19 0359    Specimen:  Blood Updated:  10/18/19 0532     Glucose 253 mg/dL      BUN 67 mg/dL      Creatinine 1.51 mg/dL      Sodium 162 mmol/L      Potassium 3.1 mmol/L      Chloride 122 mmol/L      CO2 24.0 mmol/L      Calcium 9.2 mg/dL      Total Protein 6.2 g/dL      Albumin 3.90 g/dL      ALT (SGPT) 10 U/L      AST (SGOT) 13 U/L      Alkaline Phosphatase 81 U/L      Total Bilirubin 0.7 mg/dL      eGFR Non African Amer 44 mL/min/1.73      Globulin 2.3 gm/dL      A/G Ratio 1.7 g/dL      BUN/Creatinine Ratio 44.4     Anion Gap 16.0 mmol/L     Narrative:       GFR Normal >60  Chronic Kidney Disease <60  Kidney Failure <15    Magnesium [[id number]]  (Normal) Collected:  10/18/19 0359    Specimen:  Blood Updated:  10/18/19 0517     Magnesium 2.4 mg/dL     Phosphorus [[id number]]  (Normal) Collected:  10/18/19 0359    Specimen:  Blood Updated:  10/18/19 0517     Phosphorus 3.9  mg/dL     Iron [[id number]]  (Abnormal) Collected:  10/18/19 0359    Specimen:  Blood Updated:  10/18/19 0517     Iron 52 mcg/dL     CK [[id number]]  (Normal) Collected:  10/18/19 0359    Specimen:  Blood Updated:  10/18/19 0517     Creatine Kinase 20 U/L     Calcium, Ionized [[id number]]  (Normal) Collected:  10/18/19 0359    Specimen:  Blood Updated:  10/18/19 0457     Ionized Calcium 1.23 mmol/L     Blood Culture - Blood, Arm, Left [[id number]] Collected:  10/14/19 0121    Specimen:  Blood from Arm, Left Updated:  10/18/19 0130     Blood Culture No growth at 4 days    Blood Culture - Blood, Arm, Right [[id number]] Collected:  10/14/19 0121    Specimen:  Blood from Arm, Right Updated:  10/18/19 0130     Blood Culture No growth at 4 days    POC Glucose Once [[id number]]  (Abnormal) Collected:  10/17/19 2355    Specimen:  Blood Updated:  10/17/19 2356     Glucose 192 mg/dL      Comment: Serial Number: [id number]:  [id number]       POC Glucose Once [[id number]]  (Abnormal) Collected:  10/17/19 1712    Specimen:  Blood Updated:  10/17/19 1713     Glucose 235 mg/dL      Comment: Serial Number: [id number]:  [id number]       TSH [[id number]]  (Normal) Collected:  10/17/19 0147    Specimen:  Blood Updated:  10/17/19 1628     TSH 0.441 uIU/mL      Comment: Results may be falsely decreased if patient taking Biotin.       CK [[id number]]  (Normal) Collected:  10/17/19 0147    Specimen:  Blood Updated:  10/17/19 1621     Creatine Kinase 36 U/L     Uric Acid [[id number]]  (Abnormal) Collected:  10/17/19 0147    Specimen:  Blood Updated:  10/17/19 1621     Uric Acid 9.3 mg/dL     POC Glucose Once [[id number]]  (Abnormal) Collected:  10/17/19 1136    Specimen:  Blood Updated:  10/17/19 1142     Glucose 242 mg/dL      Comment: Serial Number: [id number]:  [id number]            Imaging Results (last 24 hours)     Procedure Component Value Units Date/Time    US Renal Bilateral [[id number]] Collected:  10/17/19 0908      Updated:  10/17/19 0930    Narrative:       Examination: US RENAL BILATERAL-     Date of Exam: 10/17/2019 8:45 AM     Indication: ARF/CRF; A41.9-Sepsis, unspecified organism; R65.20-Severe  sepsis without septic shock; J69.0-Pneumonitis due to inhalation of food  and vomit; R73.9-Hyperglycemia, unspecified; N28.9-Disorder of kidney  and ureter, unspecified.     Comparison: None available.     Technique: Grayscale and color Doppler ultrasound evaluation of the  kidneys and urinary bladder was performed     Findings:  Multiple simple appearing cysts are seen in both kidneys, measuring up  to 2.9 cm on the right and 2.6 cm on the left. Both kidneys are  otherwise unremarkable in ultrasound appearance. No solid renal mass,  shadowing stone, or hydronephrosis. The right kidney measures 8.5 cm in  length. The left kidney measures 9.1 cm in length. Nonspecific diffuse  urinary bladder wall thickening.       Impression:          1. Simple appearing bilateral renal cysts.  2. Otherwise, unremarkable ultrasound appearance of both kidneys.  3. Diffuse urinary bladder wall thickening, which could reflect chronic  bladder outlet obstruction versus cystitis.     Electronically Signed By-Rk Colon On:10/17/2019 9:15 AM  This report was finalized on [id number] by  Rk Colon, .           I reviewed the patient's new clinical results.    Medication Review:    Hospital Medications (active)       Dose Frequency Start End    !Vancomycin Level Draw Needed  Once 10/17/2019     Sig - Route: 1 (One) Time. - Does not apply    barium sulfate (E-Z-PAQUE) 96 % oral suspension reconstituted suspension 183 mL 183 mL Once in Imaging 10/15/2019 10/15/2019    Sig - Route: Take 183 mL by mouth Once. - Oral    barium sulfate oral suspension 50 mL 50 mL Once in Imaging 10/15/2019 10/15/2019    Sig - Route: Take 50 mL by mouth Once. - Oral    carvedilol (COREG) tablet 3.125 mg 3.125 mg 2 Times Daily With Meals 10/14/2019     Sig - Route: Take 1  tablet by mouth 2 (Two) Times a Day With Meals. - Oral    cefepime 2 gm IVPB in 100 ml NS (MBP) 2 g Every 12 Hours 10/15/2019 10/22/2019    Sig - Route: Infuse 2 g into a venous catheter Every 12 (Twelve) Hours. - Intravenous    clindamycin (CLEOCIN) 600 mg in sodium chloride 0.9% 50 mL IVPB (premix) 600 mg Every 8 Hours 10/14/2019 10/21/2019    Sig - Route: Infuse 50 mL into a venous catheter Every 8 (Eight) Hours. - Intravenous    dextrose (D50W) 25 g/ 50mL Intravenous Solution 25 g 25 g Every 15 Minutes PRN 10/14/2019     Sig - Route: Infuse 50 mL into a venous catheter Every 15 (Fifteen) Minutes As Needed for Low Blood Sugar (Blood Sugar Less Than 70). - Intravenous    dextrose (GLUTOSE) oral gel 15 g 15 g Every 15 Minutes PRN 10/14/2019     Sig - Route: Take 15 application by mouth Every 15 (Fifteen) Minutes As Needed for Low Blood Sugar (Blood sugar less than 70). - Oral    enoxaparin (LOVENOX) syringe 40 mg 40 mg Every 24 Hours 10/15/2019     Sig - Route: Inject 0.4 mL under the skin into the appropriate area as directed Daily. - Subcutaneous    glucagon (human recombinant) (GLUCAGEN DIAGNOSTIC) injection 1 mg 1 mg Every 15 Minutes PRN 10/14/2019     Sig - Route: Inject 1 mg under the skin into the appropriate area as directed Every 15 (Fifteen) Minutes As Needed for Low Blood Sugar (Blood Glucose Less Than 70). - Subcutaneous    hydrocortisone sodium succinate (Solu-CORTEF) injection 50 mg 50 mg Every 8 Hours 10/14/2019     Sig - Route: Infuse 50 mg into a venous catheter Every 8 (Eight) Hours. - Intravenous    insulin lispro (humaLOG) injection 0-7 Units 0-7 Units 4 Times Daily With Meals & Nightly 10/14/2019     Sig - Route: Inject 0-7 Units under the skin into the appropriate area as directed 4 (Four) Times a Day With Meals & at Bedtime. - Subcutaneous    Cosign for Ordering: Accepted by Mikaela Owusu DO on 10/15/2019  6:19 AM    isosorbide mononitrate (IMDUR) 24 hr tablet 30 mg 30 mg Daily 10/14/2019   "   Sig - Route: Take 1 tablet by mouth Daily. - Oral    magic butt paste  3 Times Daily 10/14/2019     Sig - Route: Apply  topically to the appropriate area as directed 3 (Three) Times a Day. - Topical    nitroglycerin (NITROSTAT) SL tablet 0.4 mg 0.4 mg Every 5 Minutes PRN 10/14/2019     Sig - Route: Place 1 tablet under the tongue Every 5 (Five) Minutes As Needed for Chest Pain (Only if SBP Greater Than 100). - Sublingual    pantoprazole (PROTONIX) EC tablet 40 mg 40 mg Every Early Morning 10/14/2019     Sig - Route: Take 1 tablet by mouth Every Morning. - Oral    Pharmacy to dose vancomycin  Continuous PRN 10/14/2019 10/21/2019    Sig - Route: Continuous As Needed for Consult. - Does not apply    potassium chloride (K-DUR,KLOR-CON) CR tablet 40 mEq 40 mEq As Needed 10/15/2019     Sig - Route: Take 2 tablets by mouth As Needed (Potassium Replacement.  See Admin Instructions). - Oral    Linked Group 1:  \"Or\" Linked Group Details        potassium chloride (KLOR-CON) packet 40 mEq 40 mEq As Needed 10/15/2019     Sig - Route: Take 40 mEq by mouth As Needed (potassium replacement, see admin instructions). - Oral    Linked Group 1:  \"Or\" Linked Group Details        potassium chloride 10 mEq in 100 mL IVPB 10 mEq Every 1 Hour PRN 10/15/2019     Sig - Route: Infuse 100 mL into a venous catheter Every 1 (One) Hour As Needed (Potassium Replacement - See Admin Instructions). - Intravenous    Linked Group 1:  \"Or\" Linked Group Details        sodium chloride 0.9 % flush 10 mL 10 mL As Needed 10/14/2019     Sig - Route: Infuse 10 mL into a venous catheter As Needed for Line Care. - Intravenous    Cosign for Ordering: Accepted by Carlos Eugene MD on 10/14/2019  3:09 AM    sodium chloride 0.9 % flush 10 mL 10 mL As Needed 10/14/2019     Sig - Route: Infuse 10 mL into a venous catheter As Needed for Line Care. - Intravenous    sodium chloride 0.9 % flush 10 mL 10 mL Every 12 Hours Scheduled 10/15/2019     Sig - Route: " Infuse 10 mL into a venous catheter Every 12 (Twelve) Hours. - Intravenous    sodium chloride 0.9 % flush 10 mL 10 mL As Needed 10/15/2019     Sig - Route: Infuse 10 mL into a venous catheter As Needed for Line Care (After Medication Administration). - Intravenous    vancomycin 1000 mg/250 mL 0.9% NS IVPB ([facility]) 1,000 mg As Needed 10/14/2019 10/21/2019    Sig - Route: Infuse 250 mL into a venous catheter As Needed (Do not administer doses off of this order- pulse dosing per pharmacy). - Intravenous    vancomycin 1000 mg/250 mL 0.9% NS IVPB ([facility]) 1,000 mg Once 10/15/2019 10/15/2019    Sig - Route: Infuse 250 mL into a venous catheter 1 (One) Time. - Intravenous    vancomycin 1000 mg/250 mL 0.9% NS IVPB ([facility]) 1,000 mg Every 24 Hours 10/16/2019 10/23/2019    Sig - Route: Infuse 250 mL into a venous catheter Daily. - Intravenous    cefepime 2 gm IVPB in 100 ml NS (MBP) (Discontinued) 2 g Every 24 Hours 10/14/2019 10/15/2019    Sig - Route: Infuse 2 g into a venous catheter Daily. - Intravenous    sodium chloride 0.9 % infusion (Discontinued) 125 mL/hr Continuous 10/14/2019 10/16/2019    Sig - Route: Infuse 125 mL/hr into a venous catheter Continuous. - Intravenous    vancomycin 1000 mg/250 mL 0.9% NS IVPB ([facility]) (Discontinued) 1,000 mg Once 10/15/2019 10/15/2019    Sig - Route: Infuse 250 mL into a venous catheter 1 (One) Time. - Intravenous    Reason for Discontinue: Duplicate order           Assessment/Plan       Decubitus ulcer of sacral region, stage 3 (CMS/HCC)    Diabetes mellitus, type II (CMS/HCC)    Dermatitis associated with moisture    Severe sepsis (CMS/HCC)    Aspiration pneumonia (CMS/[facility])    Pneumothorax      Assessment:    Condition: In critical condition.  Worsening.   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     Plan:   (Appreciate nephrology and cardiology input. Nursing to place NG tube today and dietary to make recommendations for feeds. ).       Shasha Anthony, ANDIE  10/18/19  7:15 AM

## 2019-10-18 NOTE — CONSULTS
Infectious Diseases Consult Note    Referring Provider: Jordan Mittal MD    Reason for Consultation:     Worsening of condition    Patient Care Team:  Jordan Mittal MD as PCP - General (Family Medicine)  NANCY Mejia MD as PCP - Claims Attributed  Dina Coronado, RN as Community Care Coordinator ([facility])    Chief complaint   The patient appears to be confused not able to complain    Subjective     History of present illness:      This is 87-year-old white gentleman who was hospitalized to [facility] with decreased of clinical condition according to admitting note.  Apparently patient was short of breath.  Was found to have infiltrate on the chest x-ray.  The patient was diagnosed with aspiration pneumonia.  He failed swallow study and supposed to have NG tube placed for feeding.  The patient is not able to complaint to me but appears to be hemodynamically stable currently on no vasopressors.  He seems relatively comfortable and he is on 2 L of oxygen via nasal cannula.  The history was obtained from the chart and medical staff.      Review of Systems   Review of Systems   Unable to perform ROS: Dementia   Respiratory: Positive for shortness of breath.        Medications  Medications Prior to Admission   Medication Sig Dispense Refill Last Dose   • carvedilol (COREG) 3.125 MG tablet Take 3.125 mg by mouth 2 (Two) Times a Day With Meals.   10/13/2019 at Unknown time   • clopidogrel (PLAVIX) 75 MG tablet Take 1 tablet by mouth Daily. 30 tablet 0 10/13/2019 at Unknown time   • dexamethasone (DECADRON) 2 MG tablet Take 2 mg by mouth Daily.   10/13/2019 at Unknown time   • furosemide (LASIX) 40 MG tablet Take 40 mg by mouth 2 (Two) Times a Day.   10/13/2019 at Unknown time   • gabapentin (NEURONTIN) 300 MG capsule Take 1 capsule by mouth 3 (Three) Times a Day. 30 capsule 0 10/13/2019 at Unknown time   • isosorbide mononitrate (IMDUR) 30 MG 24 hr tablet Take 1 tablet by mouth Daily. 30  tablet 0 10/13/2019 at Unknown time   • loperamide (IMODIUM) 2 MG capsule Take 2 mg by mouth Daily As Needed for Diarrhea.   10/13/2019 at Unknown time   • Nystatin (MAGIC BUTT PASTE) Apply 1 each topically to the appropriate area as directed 2 (Two) Times a Day. 12 g 0 10/13/2019 at Unknown time   • polyethylene glycol (MIRALAX) packet Take 17 g by mouth Daily.   10/13/2019 at Unknown time   • raNITIdine (ZANTAC) 150 MG tablet Take 150 mg by mouth Daily.   10/13/2019 at Unknown time   • senna (SENOKOT) 8.6 MG tablet tablet Take 2 tablets by mouth 2 (Two) Times a Day.   10/13/2019 at Unknown time   • traZODone (DESYREL) 100 MG tablet Take 100 mg by mouth Every Night.   Past Week at Unknown time   • LORazepam (ATIVAN) 0.5 MG tablet Take 1 tablet by mouth Every 6 (Six) Hours As Needed for Anxiety. 30 tablet 0 Unknown at Unknown time   • nitroglycerin (NITROSTAT) 0.4 MG SL tablet Place 1 tablet under the tongue Every 5 (Five) Minutes As Needed for Chest Pain (Only if SBP Greater Than 100). Take no more than 3 tabs 30 tablet 0 Unknown at Unknown time       History  Past Medical History:   Diagnosis Date   • Atrial fibrillation (CMS/HCC)    • CHF (congestive heart failure) (CMS/HCC)    • Coronary artery disease    • Myocardial infarction (CMS/HCC)    • Neuropathy    • Stroke (CMS/HCC)      Past Surgical History:   Procedure Laterality Date   • ANGIOPLASTY     • BACK SURGERY     • CHOLECYSTECTOMY     • CORONARY STENT PLACEMENT     • SPINAL FUSION         Family History  Family History   Problem Relation Age of Onset   • Coronary artery disease Brother        Social History   reports that he has quit smoking. His smoking use included cigarettes. He has never used smokeless tobacco. He reports that he does not drink alcohol or use drugs.    Allergies  Patient has no known allergies.    Objective     Vital Signs   Vital Signs (last 24 hours)       10/17 0700  -  10/18 0659 10/18 0700  -  10/18 1440   Most Recent    Temp (°F)  97.5 -  98.6      97.9     97.9 (36.6)    Heart Rate 85 -  117    89 -  105     90    Resp 18 -  24    22 -  24     22    /53 -  124/46      107/46     107/46    SpO2 (%) (!)85 -  99    95 -  96     96          Physical Exam:  Physical Exam   Constitutional: He appears well-developed.   HENT:   Head: Normocephalic and atraumatic.   Eyes: Pupils are equal, round, and reactive to light.   Neck: Neck supple.   Cardiovascular: Normal rate.   Pulmonary/Chest: He has rales.   Poor respiratory efforts  No obvious distress  Crackles mostly at bases   Abdominal: Soft. Bowel sounds are normal.   Neurological:   Awake with no obvious distress appears nonverbal   Skin: Skin is dry.   Stage III sacral decubitus ulceration       Microbiology  Microbiology Results (last 10 days)     Procedure Component Value - Date/Time    Blood Culture - Blood, Arm, Left [[id number]] Collected:  10/14/19 0121    Lab Status:  Preliminary result Specimen:  Blood from Arm, Left Updated:  10/18/19 0130     Blood Culture No growth at 4 days    Blood Culture - Blood, Arm, Right [[id number]] Collected:  10/14/19 0121    Lab Status:  Preliminary result Specimen:  Blood from Arm, Right Updated:  10/18/19 0130     Blood Culture No growth at 4 days          Laboratory  Results from last 7 days   Lab Units 10/18/19  0521   WBC 10*3/mm3 12.60*   HEMOGLOBIN g/dL 10.2*   HEMATOCRIT % 31.5*   PLATELETS 10*3/mm3 182     Results from last 7 days   Lab Units 10/18/19  0359   SODIUM mmol/L 162*   POTASSIUM mmol/L 3.1*   CHLORIDE mmol/L 122*   CO2 mmol/L 24.0   BUN mg/dL 67*   CREATININE mg/dL 1.51*   GLUCOSE mg/dL 253*   CALCIUM mg/dL 9.2     Results from last 7 days   Lab Units 10/18/19  0359   SODIUM mmol/L 162*   POTASSIUM mmol/L 3.1*   CHLORIDE mmol/L 122*   CO2 mmol/L 24.0   BUN mg/dL 67*   CREATININE mg/dL 1.51*   GLUCOSE mg/dL 253*   CALCIUM mg/dL 9.2     Results from last 7 days   Lab Units 10/18/19  0359   CK TOTAL U/L 20                Radiology  Imaging Results (last 72 hours)     Procedure Component Value Units Date/Time    XR Abdomen KUB [[id number]] Collected:  10/18/19 1319     Updated:  10/18/19 1323    Narrative:       DATE OF EXAM:  10/18/2019 12:57 PM     PROCEDURE:  XR ABDOMEN KUB-     INDICATIONS:  NG PLACEMENT; A41.9-Sepsis, unspecified organism; R65.20-Severe sepsis  without septic shock; J69.0-Pneumonitis due to inhalation of food and  vomit; R73.9-Hyperglycemia, unspecified; N28.9-Disorder of kidney and  ureter, unspecified     COMPARISON:  KUB 10/18/2019.     TECHNIQUE:   Single radiographic view of the abdomen was obtained.        FINDINGS:  The history states the study is for injury to or Dobbhoff placement.  However, no enteric tube is seen within the imaged chest or upper  abdomen.     Dense opacity in the right base may present a combination of  consolidated lung and pleural fluid. Ill-defined interstitial  infiltrates are thought to be present bilaterally.     Multilevel prominent osteophytes in the thoracolumbar spine with mild  lumbar curvature toward the left.     Nonspecific but nonobstructive bowel gas pattern in the included upper  abdomen.       Impression:          1. No enteric tube is seen within the included field of view of the  chest or upper abdomen. Correlate to the patient's known history.  2. If this study was truly for enteric tube placement, it is conceivable  it could be coiled in the upper throat or mouth. If this is the case,  consider retracting, repositioning and repeat imaging confirmed  placement.     Electronically Signed By-Dr. Mary Hope MD On:10/18/2019 1:21 PM  This report was finalized on [id number] by Dr. Mary Hope MD.    XR Chest 1 View [[id number]] Collected:  10/18/19 0828     Updated:  10/18/19 0841    Narrative:       Examination: XR CHEST 1 VW-     Date of Exam: 10/18/2019 7:49 AM     Indication: Deterioration of clinical status.  Unclear etiology;  A41.9-Sepsis,  unspecified organism; R65.20-Severe sepsis without septic  shock; J69.0-Pneumonitis due to inhalation of food and vomit;  R73.9-Hyperglycemia, unspecified; N28.9-Disorder of kidney and ureter,  unspecified.     Comparison: Radiograph 10/16/2019, 10/15/2019, 10/14/2019     Technique: 1 view of the chest      Findings:  The heart appears borderline enlarged. An enteric tube is present within  the stomach, incompletely visualized. Patchy airspace disease is seen  within the bilateral lower lobes with a small to moderate-sized  right-sided pleural effusion present. There is mild indistinctness of  the pulmonary vasculature. No acute osseous abnormality identified.       Impression:       No significant changes compared to the previous study. There is  redemonstration of bibasilar airspace disease with a small to  moderate-sized right-sided pleural effusion, similar as compared to the  previous study, likely related to pneumonia. A component of this may be  related to underlying pulmonary edema.     Electronically Signed By-Arielle Ann On:10/18/2019 8:30 AM  This report was finalized on [id number] by  Arielle nAn, .    XR Abdomen KUB [[id number]] Collected:  10/18/19 0830     Updated:  10/18/19 0841    Narrative:       DATE OF EXAM:  10/18/2019 8:00 AM     PROCEDURE:  XR ABDOMEN KUB-     INDICATIONS:  Enteric tube placement placement; A41.9-Sepsis, unspecified organism;  R65.20-Severe sepsis without septic shock; J69.0-Pneumonitis due to  inhalation of food and vomit; R73.9-Hyperglycemia, unspecified;  N28.9-Disorder of kidney and ureter, unspecified     COMPARISON:  No Comparisons Available     TECHNIQUE:   Single radiographic view of the abdomen was obtained.     FINDINGS:  An enteric tube is present within the stomach. The distal portion of the  tubing is looped within the region of the antrum of the stomach. There  is no evidence of pneumatosis or free air. Evaluation is limited due to  supine technique. No  dilated loops of bowel identified. No suspicious  calcifications identified. No significant stool burden identified. No  acute osseous abnormality.        Impression:       Enteric tube within the stomach.     Electronically Signed By-Arielle Ann On:10/18/2019 8:31 AM  This report was finalized on [id number] by  Arielle Ann, .    US Renal Bilateral [[id number]] Collected:  10/17/19 0908     Updated:  10/17/19 0930    Narrative:       Examination: US RENAL BILATERAL-     Date of Exam: 10/17/2019 8:45 AM     Indication: ARF/CRF; A41.9-Sepsis, unspecified organism; R65.20-Severe  sepsis without septic shock; J69.0-Pneumonitis due to inhalation of food  and vomit; R73.9-Hyperglycemia, unspecified; N28.9-Disorder of kidney  and ureter, unspecified.     Comparison: None available.     Technique: Grayscale and color Doppler ultrasound evaluation of the  kidneys and urinary bladder was performed     Findings:  Multiple simple appearing cysts are seen in both kidneys, measuring up  to 2.9 cm on the right and 2.6 cm on the left. Both kidneys are  otherwise unremarkable in ultrasound appearance. No solid renal mass,  shadowing stone, or hydronephrosis. The right kidney measures 8.5 cm in  length. The left kidney measures 9.1 cm in length. Nonspecific diffuse  urinary bladder wall thickening.       Impression:          1. Simple appearing bilateral renal cysts.  2. Otherwise, unremarkable ultrasound appearance of both kidneys.  3. Diffuse urinary bladder wall thickening, which could reflect chronic  bladder outlet obstruction versus cystitis.     Electronically Signed By-Rk Colon On:10/17/2019 9:15 AM  This report was finalized on [id number] by  Rk Colon, .    XR Chest 1 View [[id number]] Collected:  10/16/19 1206     Updated:  10/16/19 1209    Narrative:       Examination: XR CHEST 1 VW-     Date of Exam: 10/16/2019 10:34 AM     Indication: Pneumothorax; A41.9-Sepsis, unspecified organism;  R65.20-Severe sepsis  without septic shock; J69.0-Pneumonitis due to  inhalation of food and vomit; R73.9-Hyperglycemia, unspecified;  N28.9-Disorder of kidney and ureter, unspecified.     Comparison: Radiograph 10/15/2019, 10/14/2019     Technique: 1 view of the chest      Findings:  There is a tiny right-sided pneumothorax with degree of pleural  separation measuring approximately 1 to 2 mm, decreased as compared to  the previous study. Patchy airspace changes are present within the lung  bases bilaterally with a moderate-sized pleural effusion present on the  right. The heart appears mildly enlarged. The pulmonary vasculature  appears indistinct. No acute osseous abnormality identified.       Impression:       Interval decrease in size of right-sided pneumothorax. A tiny  right-sided pneumothorax is still present. Otherwise, stable exam with  reconstruction patchy airspace disease within the lung bases bilaterally  with a moderate-sized right sided pleural effusion.     Electronically Signed By-Arielle Ann On:10/16/2019 12:07 PM  This report was finalized on [id number] by  Arielle Ann, .          Cardiology      Results Review:  I have reviewed all clinical data, test, lab, and imaging results.       Schedule Meds    albuterol 2.5 mg Nebulization Q4H - RT   carvedilol 3.125 mg Oral BID With Meals   digoxin 125 mcg Intravenous Daily   enoxaparin 30 mg Subcutaneous Q24H   hydrocortisone sodium succinate 50 mg Intravenous Q12H   insulin lispro 0-7 Units Subcutaneous 4x Daily With Meals & Nightly   isosorbide mononitrate 30 mg Oral Daily   magic butt paste  Topical TID   pantoprazole 40 mg Oral Q AM   piperacillin-tazobactam 3.375 g Intravenous Q8H   sodium chloride 10 mL Intravenous Q12H       Infusion Meds    dextrose 150 mL/hr Last Rate: 200 mL/hr (10/18/19 1400)       PRN Meds  dextrose  •  dextrose  •  glucagon (human recombinant)  •  nitroglycerin  •  potassium chloride **OR** potassium chloride **OR** potassium chloride  •   sodium chloride  •  sodium chloride  •  sodium chloride      Assessment/Plan       Assessment    Aspiration pneumonia.  The patient appears to be hemodynamically stable and currently on 2 L of oxygen via nasal cannula.  No signs of acute distress noted    Probable underlying dementia.  Patient failed swallow study.    Stage III sacral decubitus ulceration    Diabetes mellitus type 2    Plan    Continue IV Zosyn 3.375 g every 8 hours for a total of 7 days  Continue supportive care  Consider wound care nurse consultation  Labs in a.m.    Gloria Skelton MD  10/18/19  2:40 PM    Note is dictated utilizing voice recognition software/Dragon

## 2019-10-18 NOTE — THERAPY TREATMENT NOTE
Acute Care - Speech Language Pathology   Swallow Progress Note  Patrice     Patient Name: Julio César Braxton  : 4/3/1932  MRN: [id number]  Today's Date: 10/18/2019               Admit Date: 10/14/2019    Visit Dx:      ICD-10-CM ICD-9-CM   1. Severe sepsis (CMS/HCC) A41.9 038.9    R65.20 995.92   2. Aspiration pneumonia of right lower lobe, unspecified aspiration pneumonia type (CMS/HCC) J69.0 507.0   3. Hyperglycemia R73.9 790.29   4. Acute renal insufficiency N28.9 593.9     Patient Active Problem List   Diagnosis   • Stroke (cerebrum) (CMS/HCC)   • CAD in native artery   • LV dysfunction   • Dyspnea on exertion   • Pacemaker   • Decubitus ulcer of trochanteric region of right hip, stage 2 (CMS/HCC)   • Decubitus ulcer of sacral region, stage 3 (CMS/HCC)   • Atrial fibrillation (CMS/HCC)   • Bradycardia   • Cardiomyopathy (CMS/HCC)   • Diabetes mellitus, type II (CMS/HCC)   • Long term current use of anticoagulant therapy   • PVCs (premature ventricular contractions)   • Second degree heart block   • Essential hypertension   • Hyperlipidemia, mixed   • Acute UTI   • Dermatitis associated with moisture   • Stage 2 skin ulcer of sacral region (CMS/HCC)   • Severe sepsis (CMS/HCC)   • Aspiration pneumonia (CMS/HCC)   • Pneumothorax       Therapy Treatment      Outcome Summary   The patient was seen bedside today for follow up from VFSS and assess stimulability for swallow therapy. Chart reviewed. Chest x-ray films and ABG reviewed.  Patient has right lung infiltrate concerning for aspiration pneumonia along with a small right apical pneumothorax.     Pt seen bedside. Was awake but minimally responsive. Oral cavity very dry w/dried secretions. Oral care completed w/dried secretions removed. Pt did open mouth and readily accepted oral care, however pt presented with much difficulty closing lips on toothette upon request/max cues. No observable swallow made today.       SLP GOALS     Row Name 10/18/19 1600       Oral  Nutrition/Hydration Goal 1 (SLP)    Oral Nutrition/Hydration Goal 1, SLP  LTG: Establish PO diet  -SM    Time Frame (Oral Nutrition/Hydration Goal 1, SLP)  by discharge  -SM    Barriers (Oral Nutrition/Hydration Goal 1, SLP)  Pt not safe for po diet at this time. Goal will be d/c'd and updated to allow pt to have PO trials only in therapy for therapeutic feedings.   -SM    Progress/Outcomes (Oral Nutrition/Hydration Goal 1, SLP)  goal revised this date  -SM       Oral Nutrition/Hydration Goal 2 (SLP)    Oral Nutrition/Hydration Goal 2, SLP  Pt will complete VFSS to objectively evaluate identified aspiration risk  -SM    Barriers (Oral Nutrition/Hydration Goal 2, SLP)  VFSS completed previously which demonstrated the pt is at high risk for aspiration of all consistencies assessed. Recommend NPO. Pt remains NPO. Nursing staff attempted to place DHT and NG several times today however have been unsuccessful. Lengthy discussion held w/nursing staff regarding results/rec of VFSS. Pt not safe for po and dysphagia does appear to be profound. With NGT or DHT in place, this will certainly inhibit the already limited epiglottic deflection and will have a negative impact upon swallow function. If pt/family agreeable, pt would benefit from longer term tube feedings.   -SM    Progress/Outcomes (Oral Nutrition/Hydration Goal 2, SLP)  goal met  -SM      User Key  (r) = Recorded By, (t) = Taken By, (c) = Cosigned By    Initials Name Provider Type    Zoë Navarrete, SLP Speech and Language Pathologist          EDUCATION  The patient has been educated in the following areas:   Dysphagia (Swallowing Impairment) Oral Care/Hydration NPO rationale.    SLP Recommendation and Plan      Recommend pt continue to remain NPO   Recommend pt/family consider longer term tube feedings base on pt's minimal responses today and results of most recent VFSS.   No family present at this time  Discussed w/nursing staff.   Rec frequent oral  care  Further goals/intervention as indicated.         Time Calculation:                  Zoë Adkins, SLP  10/18/2019

## 2019-10-18 NOTE — SIGNIFICANT NOTE
Unable to check placement with 30ml air. Dobhoff appears to be kinked somehow. 3 RN's total tried.

## 2019-10-18 NOTE — PROGRESS NOTES
"NEPHROLOGY PROGRESS NOTE------[facility]    [facility]  [phone]  Eldon Bolton MD      Patient Care Team:  Jordan Mittal MD as PCP - General (Family Medicine)  NANCY Mejia MD as PCP - Claims Attributed  Dina Coronado, RN as Community Care Coordinator (Population Health)      Provider:  Eldon Bolton MD  Patient Name: Julio César Braxton  :  4/3/1932    SUBJECTIVE:  F/u ESTEBAN/hypernatremia  Awake. No distress    Medication:    albuterol 2.5 mg Nebulization Q4H - RT   carvedilol 3.125 mg Oral BID With Meals   digoxin 125 mcg Intravenous Daily   enoxaparin 30 mg Subcutaneous Q24H   hydrocortisone sodium succinate 50 mg Intravenous Q12H   insulin lispro 0-7 Units Subcutaneous 4x Daily With Meals & Nightly   isosorbide mononitrate 30 mg Oral Daily   magic butt paste  Topical TID   pantoprazole 40 mg Oral Q AM   piperacillin-tazobactam 3.375 g Intravenous Q8H   sodium chloride 10 mL Intravenous Q12H       dextrose 100 mL/hr Last Rate: 100 mL/hr (10/18/19 0950)       OBJECTIVE    Vital Sign Min/Max for last 24 hours  Temp  Min: 97.5 °F (36.4 °C)  Max: 98.6 °F (37 °C)   BP  Min: 100/53  Max: 124/46   Pulse  Min: 85  Max: 112   Resp  Min: 18  Max: 24   SpO2  Min: 90 %  Max: 99 %   No Data Recorded   Weight  Min: 57.2 kg (126 lb 1.7 oz)  Max: 57.2 kg (126 lb 1.7 oz)     Flowsheet Rows      First Filed Value   Admission Height  182.9 cm (72\") Documented at 10/14/2019 0047   Admission Weight  61.2 kg (135 lb) Documented at 10/14/2019 0047          No intake/output data recorded.  I/O last 3 completed shifts:  In: 250 [IV Piggyback:250]  Out: -     Physical Exam:  General Appearance: alert, appears stated age  Head: normocephalic, without obvious abnormality and atraumatic  Eyes: conjunctivae and sclerae normal and no icterus  Neck: supple and no JVD  Lungs: clear to auscultation and respirations regular  Heart: regular rhythm & normal rate and normal S1, S2  Chest: Wall " no abnormalities observed  Abdomen: normal bowel sounds and soft non-tender  Extremities: moves extremities well, no edema, no cyanosis and no redness  Skin: no bleeding, bruising or rash, turgor normal, color normal and no leasions noted  Neurologic: No focal deficits    Labs:    WBC WBC   Date Value Ref Range Status   10/18/2019 12.60 (H) 3.40 - 10.80 10*3/mm3 Final   10/17/2019 19.50 (H) 3.40 - 10.80 10*3/mm3 Final   10/16/2019 15.40 (H) 3.40 - 10.80 10*3/mm3 Final      HGB Hemoglobin   Date Value Ref Range Status   10/18/2019 10.2 (L) 13.0 - 17.7 g/dL Final     Comment:     Result checked    10/17/2019 12.6 (L) 13.0 - 17.7 g/dL Final   10/16/2019 12.0 (L) 13.0 - 17.7 g/dL Final      HCT Hematocrit   Date Value Ref Range Status   10/18/2019 31.5 (L) 37.5 - 51.0 % Final   10/17/2019 38.1 37.5 - 51.0 % Final   10/16/2019 35.9 (L) 37.5 - 51.0 % Final      Platlets No results found for: LABPLAT   MCV MCV   Date Value Ref Range Status   10/18/2019 100.6 (H) 79.0 - 97.0 fL Final   10/17/2019 99.5 (H) 79.0 - 97.0 fL Final   10/16/2019 98.4 (H) 79.0 - 97.0 fL Final          Sodium Sodium   Date Value Ref Range Status   10/18/2019 162 (C) 136 - 145 mmol/L Final   10/17/2019 156 (H) 136 - 145 mmol/L Final   10/16/2019 152 (H) 136 - 145 mmol/L Final      Potassium Potassium   Date Value Ref Range Status   10/18/2019 3.1 (L) 3.5 - 5.2 mmol/L Final   10/17/2019 3.6 3.5 - 5.2 mmol/L Final   10/16/2019 3.9 3.5 - 5.2 mmol/L Final      Chloride Chloride   Date Value Ref Range Status   10/18/2019 122 (H) 98 - 107 mmol/L Final   10/17/2019 118 (H) 98 - 107 mmol/L Final   10/16/2019 115 (H) 98 - 107 mmol/L Final      CO2 CO2   Date Value Ref Range Status   10/18/2019 24.0 22.0 - 29.0 mmol/L Final   10/17/2019 20.0 (L) 22.0 - 29.0 mmol/L Final   10/16/2019 20.0 (L) 22.0 - 29.0 mmol/L Final      BUN BUN   Date Value Ref Range Status   10/18/2019 67 (H) 8 - 23 mg/dL Final   10/17/2019 60 (H) 8 - 23 mg/dL Final   10/16/2019 50 (H) 8 -  23 mg/dL Final      Creatinine Creatinine   Date Value Ref Range Status   10/18/2019 1.51 (H) 0.76 - 1.27 mg/dL Final   10/17/2019 1.18 0.76 - 1.27 mg/dL Final   10/16/2019 1.01 0.76 - 1.27 mg/dL Final      Calcium Calcium   Date Value Ref Range Status   10/18/2019 9.2 8.6 - 10.5 mg/dL Final   10/17/2019 8.7 8.6 - 10.5 mg/dL Final   10/16/2019 8.7 8.6 - 10.5 mg/dL Final      PO4 No components found for: PO4   Albumin Albumin   Date Value Ref Range Status   10/18/2019 3.90 3.50 - 5.20 g/dL Final   10/17/2019 2.80 (L) 3.50 - 5.20 g/dL Final      Magnesium Magnesium   Date Value Ref Range Status   10/18/2019 2.4 1.6 - 2.4 mg/dL Final   10/17/2019 2.2 1.6 - 2.4 mg/dL Final      Uric Acid No components found for: URIC ACID     Imaging Results (last 72 hours)     Procedure Component Value Units Date/Time    XR Chest 1 View [[id number]] Collected:  10/18/19 0828     Updated:  10/18/19 0841    Narrative:       Examination: XR CHEST 1 VW-     Date of Exam: 10/18/2019 7:49 AM     Indication: Deterioration of clinical status.  Unclear etiology;  A41.9-Sepsis, unspecified organism; R65.20-Severe sepsis without septic  shock; J69.0-Pneumonitis due to inhalation of food and vomit;  R73.9-Hyperglycemia, unspecified; N28.9-Disorder of kidney and ureter,  unspecified.     Comparison: Radiograph 10/16/2019, 10/15/2019, 10/14/2019     Technique: 1 view of the chest      Findings:  The heart appears borderline enlarged. An enteric tube is present within  the stomach, incompletely visualized. Patchy airspace disease is seen  within the bilateral lower lobes with a small to moderate-sized  right-sided pleural effusion present. There is mild indistinctness of  the pulmonary vasculature. No acute osseous abnormality identified.       Impression:       No significant changes compared to the previous study. There is  redemonstration of bibasilar airspace disease with a small to  moderate-sized right-sided pleural effusion, similar as compared  to the  previous study, likely related to pneumonia. A component of this may be  related to underlying pulmonary edema.     Electronically Signed ByGuilherme Ann On:10/18/2019 8:30 AM  This report was finalized on [id number] by  Arielle Ann, .    XR Abdomen KUB [[id number]] Collected:  10/18/19 0830     Updated:  10/18/19 0841    Narrative:       DATE OF EXAM:  10/18/2019 8:00 AM     PROCEDURE:  XR ABDOMEN KUB-     INDICATIONS:  Enteric tube placement placement; A41.9-Sepsis, unspecified organism;  R65.20-Severe sepsis without septic shock; J69.0-Pneumonitis due to  inhalation of food and vomit; R73.9-Hyperglycemia, unspecified;  N28.9-Disorder of kidney and ureter, unspecified     COMPARISON:  No Comparisons Available     TECHNIQUE:   Single radiographic view of the abdomen was obtained.     FINDINGS:  An enteric tube is present within the stomach. The distal portion of the  tubing is looped within the region of the antrum of the stomach. There  is no evidence of pneumatosis or free air. Evaluation is limited due to  supine technique. No dilated loops of bowel identified. No suspicious  calcifications identified. No significant stool burden identified. No  acute osseous abnormality.        Impression:       Enteric tube within the stomach.     Electronically Signed ByGuilherme Ann On:10/18/2019 8:31 AM  This report was finalized on [id number] by  Arielle Ann, .    US Renal Bilateral [[id number]] Collected:  10/17/19 0908     Updated:  10/17/19 0930    Narrative:       Examination: US RENAL BILATERAL-     Date of Exam: 10/17/2019 8:45 AM     Indication: ARF/CRF; A41.9-Sepsis, unspecified organism; R65.20-Severe  sepsis without septic shock; J69.0-Pneumonitis due to inhalation of food  and vomit; R73.9-Hyperglycemia, unspecified; N28.9-Disorder of kidney  and ureter, unspecified.     Comparison: None available.     Technique: Grayscale and color Doppler ultrasound evaluation of the  kidneys and urinary bladder  was performed     Findings:  Multiple simple appearing cysts are seen in both kidneys, measuring up  to 2.9 cm on the right and 2.6 cm on the left. Both kidneys are  otherwise unremarkable in ultrasound appearance. No solid renal mass,  shadowing stone, or hydronephrosis. The right kidney measures 8.5 cm in  length. The left kidney measures 9.1 cm in length. Nonspecific diffuse  urinary bladder wall thickening.       Impression:          1. Simple appearing bilateral renal cysts.  2. Otherwise, unremarkable ultrasound appearance of both kidneys.  3. Diffuse urinary bladder wall thickening, which could reflect chronic  bladder outlet obstruction versus cystitis.     Electronically Signed By-Rk Colon On:10/17/2019 9:15 AM  This report was finalized on [id number] by  Rk Colon, .    XR Chest 1 View [[id number]] Collected:  10/16/19 1206     Updated:  10/16/19 1209    Narrative:       Examination: XR CHEST 1 VW-     Date of Exam: 10/16/2019 10:34 AM     Indication: Pneumothorax; A41.9-Sepsis, unspecified organism;  R65.20-Severe sepsis without septic shock; J69.0-Pneumonitis due to  inhalation of food and vomit; R73.9-Hyperglycemia, unspecified;  N28.9-Disorder of kidney and ureter, unspecified.     Comparison: Radiograph 10/15/2019, 10/14/2019     Technique: 1 view of the chest      Findings:  There is a tiny right-sided pneumothorax with degree of pleural  separation measuring approximately 1 to 2 mm, decreased as compared to  the previous study. Patchy airspace changes are present within the lung  bases bilaterally with a moderate-sized pleural effusion present on the  right. The heart appears mildly enlarged. The pulmonary vasculature  appears indistinct. No acute osseous abnormality identified.       Impression:       Interval decrease in size of right-sided pneumothorax. A tiny  right-sided pneumothorax is still present. Otherwise, stable exam with  reconstruction patchy airspace disease within the lung  bases bilaterally  with a moderate-sized right sided pleural effusion.     Electronically Signed By-Arielle Ann On:10/16/2019 12:07 PM  This report was finalized on [id number] by  Arielle Ann, .          Results for orders placed during the hospital encounter of 10/14/19   XR Abdomen KUB    Narrative DATE OF EXAM:  10/18/2019 8:00 AM     PROCEDURE:  XR ABDOMEN KUB-     INDICATIONS:  Enteric tube placement placement; A41.9-Sepsis, unspecified organism;  R65.20-Severe sepsis without septic shock; J69.0-Pneumonitis due to  inhalation of food and vomit; R73.9-Hyperglycemia, unspecified;  N28.9-Disorder of kidney and ureter, unspecified     COMPARISON:  No Comparisons Available     TECHNIQUE:   Single radiographic view of the abdomen was obtained.     FINDINGS:  An enteric tube is present within the stomach. The distal portion of the  tubing is looped within the region of the antrum of the stomach. There  is no evidence of pneumatosis or free air. Evaluation is limited due to  supine technique. No dilated loops of bowel identified. No suspicious  calcifications identified. No significant stool burden identified. No  acute osseous abnormality.        Impression Enteric tube within the stomach.     Electronically Signed ByGuilherme Ann On:10/18/2019 8:31 AM  This report was finalized on [id number] by  Arielle Ann, .   XR Chest 1 View    Narrative Examination: XR CHEST 1 VW-     Date of Exam: 10/18/2019 7:49 AM     Indication: Deterioration of clinical status.  Unclear etiology;  A41.9-Sepsis, unspecified organism; R65.20-Severe sepsis without septic  shock; J69.0-Pneumonitis due to inhalation of food and vomit;  R73.9-Hyperglycemia, unspecified; N28.9-Disorder of kidney and ureter,  unspecified.     Comparison: Radiograph 10/16/2019, 10/15/2019, 10/14/2019     Technique: 1 view of the chest      Findings:  The heart appears borderline enlarged. An enteric tube is present within  the stomach, incompletely visualized.  Patchy airspace disease is seen  within the bilateral lower lobes with a small to moderate-sized  right-sided pleural effusion present. There is mild indistinctness of  the pulmonary vasculature. No acute osseous abnormality identified.       Impression No significant changes compared to the previous study. There is  redemonstration of bibasilar airspace disease with a small to  moderate-sized right-sided pleural effusion, similar as compared to the  previous study, likely related to pneumonia. A component of this may be  related to underlying pulmonary edema.     Electronically Signed ByGuilherme Ann On:10/18/2019 8:30 AM  This report was finalized on [id number] by  Arielle Ann, .   XR Chest 1 View    Narrative Examination: XR CHEST 1 VW-     Date of Exam: 10/16/2019 10:34 AM     Indication: Pneumothorax; A41.9-Sepsis, unspecified organism;  R65.20-Severe sepsis without septic shock; J69.0-Pneumonitis due to  inhalation of food and vomit; R73.9-Hyperglycemia, unspecified;  N28.9-Disorder of kidney and ureter, unspecified.     Comparison: Radiograph 10/15/2019, 10/14/2019     Technique: 1 view of the chest      Findings:  There is a tiny right-sided pneumothorax with degree of pleural  separation measuring approximately 1 to 2 mm, decreased as compared to  the previous study. Patchy airspace changes are present within the lung  bases bilaterally with a moderate-sized pleural effusion present on the  right. The heart appears mildly enlarged. The pulmonary vasculature  appears indistinct. No acute osseous abnormality identified.       Impression Interval decrease in size of right-sided pneumothorax. A tiny  right-sided pneumothorax is still present. Otherwise, stable exam with  reconstruction patchy airspace disease within the lung bases bilaterally  with a moderate-sized right sided pleural effusion.     Electronically Signed ByGuilherme Ann On:10/16/2019 12:07 PM  This report was finalized on [id number] by   Arielle Ann, .              ASSESSMENT / PLAN      Decubitus ulcer of sacral region, stage 3 (CMS/HCC)    Diabetes mellitus, type II (CMS/HCC)    Dermatitis associated with moisture    Severe sepsis (CMS/HCC)    Aspiration pneumonia (CMS/HCC)    Pneumothorax    1. RENAL FAILURE---------Nonoliguric. Appears to have ARF/ESTEBAN secondary to severe prerenal state and intravascular volume depletion. Hydrate with D5W and d/c Lasix. Check urine and serum studies and renal US. No NSAIDs or IV dye     2. HYPERNATREMIA------Free water deficit of about 4-5 L. Hydrate with D5W. D/C Lasix. Check TSH     3. ACIDOSIS------Metabolic. No elevation in AGAP. Give NaHCO3 and follow. Check lactic     4. ANEMIA-------Check Fe     5. OA/DJD------No NSAIDs. Check uric acid level     6. LEUKOCYTOSIS/?ASPIRATION PNA     7. RIGHT PNEUMOTHORAX (SMALL)/PLEURAL EFFUSION     8. HYPOALBUMINEMIA------Check SPEP. IV Albumin to temporize    Sodium worse, cr up  Increase fluids  Feeds are off, plans noted for DHT    Eldon Bolton MD  [facility]  [phone]  10/18/19  12:57 PM

## 2019-10-18 NOTE — PLAN OF CARE
Problem: Fall Risk (Adult)  Goal: Identify Related Risk Factors and Signs and Symptoms  Outcome: Ongoing (interventions implemented as appropriate)   10/16/19 0505 10/17/19 0209   Fall Risk (Adult)   Related Risk Factors (Fall Risk) --  age-related changes;gait/mobility problems;confusion/agitation;fatigue/slow reaction   Signs and Symptoms (Fall Risk) presence of risk factors --      Goal: Absence of Fall  Outcome: Ongoing (interventions implemented as appropriate)   10/18/19 0423   Fall Risk (Adult)   Absence of Fall achieves outcome       Problem: Patient Care Overview  Goal: Plan of Care Review  Outcome: Ongoing (interventions implemented as appropriate)   10/18/19 0423   Coping/Psychosocial   Plan of Care Reviewed With patient   Plan of Care Review   Progress declining   OTHER   Outcome Summary Pt remains on a strict NPO r/t aspiration. Pt has order for dobhoff/NG-Tube with nutrition consult. Pt will receive NG-Tube this AM once nutrition is available to add recommended feeding plan.       Problem: Skin Injury Risk (Adult)  Goal: Identify Related Risk Factors and Signs and Symptoms  Outcome: Ongoing (interventions implemented as appropriate)   10/18/19 0423   Skin Injury Risk (Adult)   Related Risk Factors (Skin Injury Risk) advanced age;cognitive impairment;hospitalization prolonged;infection     Goal: Skin Health and Integrity  Outcome: Ongoing (interventions implemented as appropriate)   10/18/19 0423   Skin Injury Risk (Adult)   Skin Health and Integrity making progress toward outcome       Problem: Pneumonia (Adult)  Goal: Signs and Symptoms of Listed Potential Problems Will be Absent, Minimized or Managed (Pneumonia)  Outcome: Ongoing (interventions implemented as appropriate)   10/18/19 0423   Goal/Outcome Evaluation   Problems Assessed (Pneumonia) all   Problems Present (Pneumonia) fluid/electrolyte imbalance;respiratory compromise       Problem: Nutrition, Imbalanced: Inadequate Oral Intake  (Adult)  Goal: Identify Related Risk Factors and Signs and Symptoms  Outcome: Ongoing (interventions implemented as appropriate)   10/18/19 0423   Nutrition, Imbalanced: Inadequate Oral Intake (Adult)   Related Risk Factors (Nutrition Imbalance, Inadequate Oral Intake) NPO status prolonged   Signs and Symptoms (Nutrition Imbalance, Inadequate Oral Intake: Signs and Symptoms) weakness/lethargy;functional impairment;diarrhea/malabsorption;irritability/confusion;muscle mass/strength decreased;wound healing poor     Goal: Improved Oral Intake  Outcome: Ongoing (interventions implemented as appropriate)   10/18/19 0423   Nutrition, Imbalanced: Inadequate Oral Intake (Adult)   Improved Oral Intake unable to achieve outcome  ((Pt is strictly NPO))     Goal: Prevent Further Weight Loss  Outcome: Ongoing (interventions implemented as appropriate)   10/18/19 0423   Nutrition, Imbalanced: Inadequate Oral Intake (Adult)   Prevent Further Weight Loss making progress toward outcome

## 2019-10-18 NOTE — PROGRESS NOTES
"Adult Nutrition  Assessment/PES    Patient Name:  Julio César Braxton  YOB: 1932  MRN: [id number]  Admit Date:  10/14/2019    Assessment Date:  10/18/2019    Comments: Assessed patient to have severe malnutrition. Failed swallow study.   If TF started recommend the following:    Starting goal: Isosource HN @ 40 mL/hr with 1000 mL/hr water flush-d/t increased risk of RFS. Total fluid provided= 2200 ml flush + 722 ml free water = 2922 ml total water + 100 ml/hr D5    Ending goal: Isosource HN @ 75 mL/hr with 10 ml/hr water flush. Provides 1980 kcal (100%), 89 gm Pro (105%), and 1573 ml total water (1353 mL free water +220 mL water flush)+ IVF + Rx flush      Reason for Assessment     Row Name           Reason for Assessment      Reason For Assessment Follow up per protocol     MST score 3          Diagnosis     H&P: Aspiration PNA, hx of family insisting patient be on regular diet, DM II, CHF, GERD, HTN, CVA, DLD, heart disease     Current: Pneumothorax (right side), aspiration PNA, acute resp failure, HCAP, TME, dehydration, acute renal failure, Acute Sepsis, Failed VFSS 10/15/19, Decubitus ulcer, Renal Failure, Hypernatremia 4-5 Liter deficit.         Nutrition/Diet History     Row Name           Nutrition/Diet History    Typical Food/Fluid Intake  Visited pt who was resting at time of visit. Unable to participate in interview. Able to speak with pt's jesus. She states at [facility] patient was on a mechanical soft with thickened liquids (unsure what type). She brought up & seemed upset there were \"rumors\" family has been insisting pt be on a regular diet when in fact pt was on a textured modified diet at the [facility]. Niece was unsure of appetite or weight hx.     Niece states pt has been drinking strawberrry ONS at [facility].        Food Allergies  NKFA        Factors Affecting Nutritional Intake   difficulty/impaired swallowing;impaired cognitive status/motor control         Anthropometrics     Row Name        " "Anthropometrics    Height  167.6 cm (65.98\")    Weight  57.2 kg (126 lb 1.7 oz)  10/18/19       Admit Weight    Admit Weight  62.1 kg (137 lb 0 oz)       Ideal Body Weight (IBW)    Ideal Body Weight (IBW) (kg)  65.26    % Ideal Body Weight  86.12       Usual Body Weight (UBW)    Usual Body Weight  59 kg (130 lb)     Pt's UBW per 's#.      % Usual Body Weight  95.31      Weight Hx  135# (9/6/19)   133# (8/4/19)     Noted significant wt loss of 8.9% x 1 month        Body Mass Index (BMI)    BMI (kg/m2) 20.36     Labs/Tests/Procedures/Meds     Row Name           Labs/Procedures/Meds    Lab Results Comments Glucose 256 (H), Na 162 (H), K 3.1 (L), BUN 67 (H), Crt 1.51 (H), H/H 10.2/31.5 (H)        Medications    Pertinent Medications Comments Solu-Cortef, Humalog, Protonix, Zosyn         Physical Findings     Row Name           Physical Findings    Overall Physical Appearance NFPE completed 10/14/19 indicating severe malnutrition, see MSA. Noted during exam, pt had hands clenched on bed sheet under jaw, unable to assess upper arm or dorsal hand region.        Gastrointestinal  No BM doc x 5 days         Oral/Mouth Cavity Noted hx of aspiration PNA. Will await further workup.        Skin Stg III pressure ulcer to coccyx & Anterior hip stage II-healed         Estimated/Assessed Needs     Row Name        Weight Used For Calculations  56.2 kg (123 lb 14.4 oz)    Height  167.6 cm (65.98\")       KCAL/KG  35 Kcal/Kg (kcal)    35 Kcal/Kg (kcal)  1967       Weight Used For Protein Calculations  56.2 kg (123 lb 14.4 oz)    Est Protein Requirement Amount (gms/kg)  1.5 gm protein    Estimated Protein Requirements (gms/day)  84.3       Estimated Fluid Requirements (mL/day)  0089-0445 ml r/t CHF      Nutrition Prescription Ordered     Row Name           Nutrition Prescription PO    Current PO Diet  NPO     Fluid Consistency  -- -     Supplement  -- -     Supplement Frequency  -- -        Nutrition Prescription EN    Enteral " Route  -- -     Product  -- -     Modulars  -- -     Continuous TF Goal Rate (mL/hr)  -- -     Continuous TF Current Rate (mL/hr)  -- -     Water flush (mL)   -- -     Water Flush Frequency  -- -        Nutrition Prescription PN    PN Route  -- -     PN Goal Volume (mL)  -- -     PN Current Volume (mL)  -- -     Dextrose (Kcal)  -- -     Amino Acid (gm)  -- -     Lipid Concentration (%)  -- -     Lipid Volume (mL)  -- -     Lipid Frequency  -- -         Evaluation of Received Nutrient/Fluid Intake     Row Name           PO Evaluation    % PO Intake No meals recorded r/t NPO status        EN Evaluation    TF Changes  -- -     TF Residual  -- -     TF Tolerance  -- -        PN Evaluation    Number of Days PN Evaluated  -- -           Malnutrition Severity Assessment     Row Name           Malnutrition Severity Assessment    Malnutrition Type Chronic Disease - Related Malnutrition        Unintentional Weight Loss     Unintentional Weight Loss  Weight loss greater than 5% in one month     Current weight of 123# which is down 8.9% from 135# (9/6/19)         Muscle Loss    Loss of Muscle Mass Findings  Severe     [place] Region  Severe - deep hollowing/scooping, lack of muscle to touch, facial bones well defined     Clavicle Bone Region  Severe - protruding prominent bone     Acromion Bone Region  Severe - squared shoulders, bones, and acromion process protrusion prominent     Scapular Bone Region  Severe - prominent bones, depressions easily visible between ribs, scapula, spine, shoulders     Dorsal Hand Region Unable to obtain r/t positioning     Patellar Region  Severe - prominent bone, square looking, very little muscle definition     Anterior Thigh Region  Severe - line/depression along thigh, obviously thin     Posterior Calf Region  Severe - thin with very little definition/firmness        Fat Loss    Subcutaneous Fat Loss Findings  Severe     Orbital Region   Severe - pronounced hollowness/depression, dark circles,  loose saggy skin     Upper Arm Region Unable to obtain r/t positioning      Thoracic & Lumbar Region  None        Fluid Accumulation (Edema)    Fluid Acumulation Findings None         Criteria Met (Must meet criteria for severity in at least 2 of these categories: M Wasting, Fat Loss, Fluid, Secondary Signs, Wt. Status, Intake)    Patient meets criteria for   Severe Malnutrition           Problem/Interventions:  Problem 1     Row Name           Nutrition Diagnoses Problem 1    Problem 1 Severe chronic disease malnutrition      Etiology (related to) DM II, CHF, hx of aspiration      Signs/Symptoms (evidenced by) severe wt loss of 8.9% x 1 month and severe muscle/fat wasting as noted on physical exam.          Problem 2     Row Name           Nutrition Diagnoses Problem 2    Problem 2  -- -     Etiology (related to)  -- -     Signs/Symptoms (evidenced by)  -- -               Intervention Goal     Row Name           Intervention Goal    General 10/18: Tolerate TF, Improved labs.     10/16: Diet to advance?, MD to call family for tx decision d/t failed VFSS-TF?          Nutrition Intervention     Row Name           Nutrition Intervention    RD/Tech Action TF consult          Nutrition Prescription     Row Name           Nutrition Prescription PO    Begin/Change Diet to  NPO     Fluid Consistency  -- -     Supplement  -- -     Supplement Frequency  -- -        Nutrition Prescription EN    Enteral Prescription Starting goal: Isosource HN @ 40 mL/hr with 100 mL/hr water flush-d/t increased risk of RFS.Total fluid provided= 2200 ml flush + 722 ml free water = 2922 ml total water + 100 ml/hr D5       Ending goal: Isosource HN @ 75 mL/hr with 10 ml/hr water flush. Provides 1980 kcal (100%), 89 gm Pro (105%), and 1573 ml total water (1353 mL free water + 220 mL water flush)+ IVF + Rx flush.         Nutrition Prescription PN    Parenteral Prescription  -- -         Education/Evaluation     Row Name           Monitor/Evaluation     Monitor  TF tom I&O;PO intake;Pertinent labs-RFS & NA ;Weight;Skin status           Electronically signed by:  Viviane Mtz RD  10/18/19 8:58 AM

## 2019-10-18 NOTE — PROGRESS NOTES
Continued Stay Note  [facility]     Patient Name: Julio César Braxton  MRN: [id number]  Today's Date: 10/18/2019    Admit Date: 10/14/2019    Discharge Plan   Spoke with family at bedside, who are open to returning to [facility].  Updated .  Discharge Barrier: NGT ,plan to start feeds     Sari Chisholm RN   [phone]

## 2019-10-18 NOTE — PROGRESS NOTES
"Referring Provider: Hospitalist    Reason for follow-up: Coronary artery disease     Patient Care Team:  Jordan Mittal MD as PCP - General (Family Medicine)  NANCY Mejia MD as PCP - Claims Attributed  Dina Coronado, RN as Community Care Coordinator (Population Health)    Subjective .  Patient does not respond very well    Objective  Lying in bed with no distress     Review of Systems   Unable to perform ROS: mental status change       Patient has no known allergies.    Scheduled Meds:    albuterol 2.5 mg Nebulization Q4H - RT   carvedilol 3.125 mg Oral BID With Meals   digoxin 125 mcg Intravenous Daily   enoxaparin 30 mg Subcutaneous Q24H   hydrocortisone sodium succinate 50 mg Intravenous Q12H   insulin lispro 0-7 Units Subcutaneous 4x Daily With Meals & Nightly   isosorbide mononitrate 30 mg Oral Daily   magic butt paste  Topical TID   pantoprazole 40 mg Oral Q AM   piperacillin-tazobactam 3.375 g Intravenous Q8H   sodium chloride 10 mL Intravenous Q12H     Continuous Infusions:    dextrose 150 mL/hr Last Rate: 200 mL/hr (10/18/19 1400)     PRN Meds:.dextrose  •  dextrose  •  glucagon (human recombinant)  •  nitroglycerin  •  potassium chloride **OR** potassium chloride **OR** potassium chloride  •  sodium chloride  •  sodium chloride  •  sodium chloride        VITAL SIGNS  Vitals:    10/18/19 1512 10/18/19 1515 10/18/19 1520 10/18/19 1530   BP:       Pulse: 94 90 82 76   Resp: 24      Temp:       TempSrc:       SpO2: 100% 94% 100% 100%   Weight:       Height:           Flowsheet Rows      First Filed Value   Admission Height  182.9 cm (72\") Documented at 10/14/2019 0047   Admission Weight  61.2 kg (135 lb) Documented at 10/14/2019 0047            TELEMETRY: Sinus rhythm    Physical Exam:  Physical Exam   Constitutional: He appears well-developed and well-nourished.   HENT:   Head: Normocephalic and atraumatic.   Eyes: Conjunctivae are normal. No scleral icterus.   Neck: Normal range of motion. Neck " supple. No JVD present. Carotid bruit is not present.   Cardiovascular: Normal rate, regular rhythm, S1 normal, S2 normal and intact distal pulses. PMI is not displaced.   Murmur heard.  Pulmonary/Chest: Effort normal and breath sounds normal. He has no wheezes. He has no rales.   Abdominal: Soft. Bowel sounds are normal.   Neurological: He has normal strength.   Skin: Skin is warm and dry. No rash noted.        Results Review:   I reviewed the patient's new clinical results.  Lab Results (last 24 hours)     Procedure Component Value Units Date/Time    POC Glucose Once [[id number]]  (Abnormal) Collected:  10/18/19 1122    Specimen:  Blood Updated:  10/18/19 1128     Glucose 327 mg/dL      Comment: Serial Number: [id number]:  [id number]       Blood Culture - Blood, Arm, Left [[id number]] Collected:  10/18/19 0921    Specimen:  Blood from Arm, Left Updated:  10/18/19 0934    CBC & Differential [[id number]] Collected:  10/18/19 0521    Specimen:  Blood Updated:  10/18/19 0730    Narrative:       The following orders were created for panel order CBC & Differential.  Procedure                               Abnormality         Status                     ---------                               -----------         ------                     CBC Auto Differential[[id number]]        Abnormal            Final result                 Please view results for these tests on the individual orders.    CBC Auto Differential [[id number]]  (Abnormal) Collected:  10/18/19 0521    Specimen:  Blood Updated:  10/18/19 0730     WBC 12.60 10*3/mm3      RBC 3.13 10*6/mm3      Hemoglobin 10.2 g/dL      Comment: Result checked         Hematocrit 31.5 %      .6 fL      MCH 32.6 pg      MCHC 32.4 g/dL      RDW 15.7 %      RDW-SD 56.9 fl      MPV 8.9 fL      Platelets 182 10*3/mm3     Scan Slide [[id number]] Collected:  10/18/19 0521    Specimen:  Blood Updated:  10/18/19 0730     Scan Slide --     Comment: See Manual Differential Results        Manual Differential [[id number]]  (Abnormal) Collected:  10/18/19 0521    Specimen:  Blood Updated:  10/18/19 0730     Neutrophil % 80.0 %      Lymphocyte % 6.0 %      Monocyte % 1.0 %      Bands %  12.0 %      Metamyelocyte % 1.0 %      Neutrophils Absolute 11.59 10*3/mm3      Lymphocytes Absolute 0.76 10*3/mm3      Monocytes Absolute 0.13 10*3/mm3      Anisocytosis Slight/1+     Macrocytes Slight/1+     Poikilocytes Slight/1+     WBC Morphology Normal     Platelet Morphology Normal    POC Glucose Once [[id number]]  (Abnormal) Collected:  10/18/19 0632    Specimen:  Blood Updated:  10/18/19 0634     Glucose 256 mg/dL      Comment: Serial Number: [id number]:  [id number]       Comprehensive Metabolic Panel [[id number]]  (Abnormal) Collected:  10/18/19 0359    Specimen:  Blood Updated:  10/18/19 0532     Glucose 253 mg/dL      BUN 67 mg/dL      Creatinine 1.51 mg/dL      Sodium 162 mmol/L      Potassium 3.1 mmol/L      Chloride 122 mmol/L      CO2 24.0 mmol/L      Calcium 9.2 mg/dL      Total Protein 6.2 g/dL      Albumin 3.90 g/dL      ALT (SGPT) 10 U/L      AST (SGOT) 13 U/L      Alkaline Phosphatase 81 U/L      Total Bilirubin 0.7 mg/dL      eGFR Non African Amer 44 mL/min/1.73      Globulin 2.3 gm/dL      A/G Ratio 1.7 g/dL      BUN/Creatinine Ratio 44.4     Anion Gap 16.0 mmol/L     Narrative:       GFR Normal >60  Chronic Kidney Disease <60  Kidney Failure <15    Magnesium [[id number]]  (Normal) Collected:  10/18/19 0359    Specimen:  Blood Updated:  10/18/19 0517     Magnesium 2.4 mg/dL     Phosphorus [[id number]]  (Normal) Collected:  10/18/19 0359    Specimen:  Blood Updated:  10/18/19 0517     Phosphorus 3.9 mg/dL     Iron [[id number]]  (Abnormal) Collected:  10/18/19 0359    Specimen:  Blood Updated:  10/18/19 0517     Iron 52 mcg/dL     CK [[id number]]  (Normal) Collected:  10/18/19 0359    Specimen:  Blood Updated:  10/18/19 0517     Creatine Kinase 20 U/L     Calcium, Ionized [[id number]]  (Normal)  Collected:  10/18/19 0359    Specimen:  Blood Updated:  10/18/19 0457     Ionized Calcium 1.23 mmol/L     Blood Culture - Blood, Arm, Left [[id number]] Collected:  10/14/19 0121    Specimen:  Blood from Arm, Left Updated:  10/18/19 0130     Blood Culture No growth at 4 days    Blood Culture - Blood, Arm, Right [[id number]] Collected:  10/14/19 0121    Specimen:  Blood from Arm, Right Updated:  10/18/19 0130     Blood Culture No growth at 4 days    POC Glucose Once [[id number]]  (Abnormal) Collected:  10/17/19 2355    Specimen:  Blood Updated:  10/17/19 2356     Glucose 192 mg/dL      Comment: Serial Number: [id number]:  [id number]       POC Glucose Once [[id number]]  (Abnormal) Collected:  10/17/19 1712    Specimen:  Blood Updated:  10/17/19 1713     Glucose 235 mg/dL      Comment: Serial Number: [id number]:  [id number]             Imaging Results (last 24 hours)     Procedure Component Value Units Date/Time    XR Abdomen KUB [[id number]] Collected:  10/18/19 1319     Updated:  10/18/19 1323    Narrative:       DATE OF EXAM:  10/18/2019 12:57 PM     PROCEDURE:  XR ABDOMEN KUB-     INDICATIONS:  NG PLACEMENT; A41.9-Sepsis, unspecified organism; R65.20-Severe sepsis  without septic shock; J69.0-Pneumonitis due to inhalation of food and  vomit; R73.9-Hyperglycemia, unspecified; N28.9-Disorder of kidney and  ureter, unspecified     COMPARISON:  KUB 10/18/2019.     TECHNIQUE:   Single radiographic view of the abdomen was obtained.        FINDINGS:  The history states the study is for injury to or Dobbhoff placement.  However, no enteric tube is seen within the imaged chest or upper  abdomen.     Dense opacity in the right base may present a combination of  consolidated lung and pleural fluid. Ill-defined interstitial  infiltrates are thought to be present bilaterally.     Multilevel prominent osteophytes in the thoracolumbar spine with mild  lumbar curvature toward the left.     Nonspecific but nonobstructive  bowel gas pattern in the included upper  abdomen.       Impression:          1. No enteric tube is seen within the included field of view of the  chest or upper abdomen. Correlate to the patient's known history.  2. If this study was truly for enteric tube placement, it is conceivable  it could be coiled in the upper throat or mouth. If this is the case,  consider retracting, repositioning and repeat imaging confirmed  placement.     Electronically Signed By-Dr. Mary Hope MD On:10/18/2019 1:21 PM  This report was finalized on [id number] by Dr. Mary Hope MD.    XR Chest 1 View [[id number]] Collected:  10/18/19 0828     Updated:  10/18/19 0841    Narrative:       Examination: XR CHEST 1 VW-     Date of Exam: 10/18/2019 7:49 AM     Indication: Deterioration of clinical status.  Unclear etiology;  A41.9-Sepsis, unspecified organism; R65.20-Severe sepsis without septic  shock; J69.0-Pneumonitis due to inhalation of food and vomit;  R73.9-Hyperglycemia, unspecified; N28.9-Disorder of kidney and ureter,  unspecified.     Comparison: Radiograph 10/16/2019, 10/15/2019, 10/14/2019     Technique: 1 view of the chest      Findings:  The heart appears borderline enlarged. An enteric tube is present within  the stomach, incompletely visualized. Patchy airspace disease is seen  within the bilateral lower lobes with a small to moderate-sized  right-sided pleural effusion present. There is mild indistinctness of  the pulmonary vasculature. No acute osseous abnormality identified.       Impression:       No significant changes compared to the previous study. There is  redemonstration of bibasilar airspace disease with a small to  moderate-sized right-sided pleural effusion, similar as compared to the  previous study, likely related to pneumonia. A component of this may be  related to underlying pulmonary edema.     Electronically Signed By-Arielle Ann On:10/18/2019 8:30 AM  This report was finalized on [id number] by   Arielle Ann, .    XR Abdomen KUB [[id number]] Collected:  10/18/19 0830     Updated:  10/18/19 0841    Narrative:       DATE OF EXAM:  10/18/2019 8:00 AM     PROCEDURE:  XR ABDOMEN KUB-     INDICATIONS:  Enteric tube placement placement; A41.9-Sepsis, unspecified organism;  R65.20-Severe sepsis without septic shock; J69.0-Pneumonitis due to  inhalation of food and vomit; R73.9-Hyperglycemia, unspecified;  N28.9-Disorder of kidney and ureter, unspecified     COMPARISON:  No Comparisons Available     TECHNIQUE:   Single radiographic view of the abdomen was obtained.     FINDINGS:  An enteric tube is present within the stomach. The distal portion of the  tubing is looped within the region of the antrum of the stomach. There  is no evidence of pneumatosis or free air. Evaluation is limited due to  supine technique. No dilated loops of bowel identified. No suspicious  calcifications identified. No significant stool burden identified. No  acute osseous abnormality.        Impression:       Enteric tube within the stomach.     Electronically Signed By-Arielle Ann On:10/18/2019 8:31 AM  This report was finalized on [id number] by  Arielle Ann, .          EKG      I personally viewed and interpreted the patient's EKG/Telemetry data:    ECHOCARDIOGRAM:    STRESS MYOVIEW:    CARDIAC CATHETERIZATION:    OTHER:         Assessment/Plan     #1 decubitus ulcers  2.  Mental status changes  3.  Severe sepsis  4.  Aspiration pneumonia  5.  Pneumothorax  6.  History of coronary disease  7.  Diabetes  8.  Hypertension  9.  Acute renal failure  10.  History of dyslipidemia  11.  CVA  12.  Hypernatremia    Patient presented with mental status changes and has sepsis with possible pneumonia and also infected decubitus ulcers  Patient is on IV antibiotics  Patient still has significant medical changes  Patient is ruled out for MI by EKG and enzymes  Patient has acute renal failure with hyponatremia and NSAID nephrologist is seen the  patient  Blood pressure and heart are stable  No further cardiac work-up at this time  I discussed the patients findings and my recommendations with the nurse    Raoul Bender MD  10/18/19  5:01 PM

## 2019-10-18 NOTE — CONSULTS
Referring Provider: Hospitalist  Reason for Consultation: Sepsis    Patient Care Team:  Jordan Mittal MD as PCP - General (Family Medicine)  NANCY Mejia MD as PCP - Claims Attributed  Dina Coronado, RN as Community Care Coordinator (Population Health)    Chief complaint mental status changes    Subjective .     History of present illness:  Julio César Braxton is a 87 y.o. male with history of diabetes to the hospital with dizziness and was noted to have sepsis with aspiration pneumonia patient does not respond to any questions.  Patient has history of diabetes and has significant decubitus ulcers and dermatitis.  Patient lives in a chronic care home and is getting all his medicines regularly  Review of Systems   Unable to perform ROS: mental status change       History  Past Medical History:   Diagnosis Date   • Atrial fibrillation (CMS/[facility])    • CHF (congestive heart failure) (CMS/[facility])    • Coronary artery disease    • Myocardial infarction (CMS/[facility])    • Neuropathy    • Stroke (CMS/[facility])        Past Surgical History:   Procedure Laterality Date   • ANGIOPLASTY     • BACK SURGERY     • CHOLECYSTECTOMY     • CORONARY STENT PLACEMENT     • SPINAL FUSION         Family History   Problem Relation Age of Onset   • Coronary artery disease Brother        Social History     Tobacco Use   • Smoking status: Former Smoker     Types: Cigarettes   • Smokeless tobacco: Never Used   • Tobacco comment: Quit 40 years go   Substance Use Topics   • Alcohol use: No     Frequency: Never   • Drug use: No        Medications Prior to Admission   Medication Sig Dispense Refill Last Dose   • carvedilol (COREG) 3.125 MG tablet Take 3.125 mg by mouth 2 (Two) Times a Day With Meals.   10/13/2019 at Unknown time   • clopidogrel (PLAVIX) 75 MG tablet Take 1 tablet by mouth Daily. 30 tablet 0 10/13/2019 at Unknown time   • dexamethasone (DECADRON) 2 MG tablet Take 2 mg by mouth Daily.   10/13/2019 at Unknown time   • furosemide (LASIX)  40 MG tablet Take 40 mg by mouth 2 (Two) Times a Day.   10/13/2019 at Unknown time   • gabapentin (NEURONTIN) 300 MG capsule Take 1 capsule by mouth 3 (Three) Times a Day. 30 capsule 0 10/13/2019 at Unknown time   • isosorbide mononitrate (IMDUR) 30 MG 24 hr tablet Take 1 tablet by mouth Daily. 30 tablet 0 10/13/2019 at Unknown time   • loperamide (IMODIUM) 2 MG capsule Take 2 mg by mouth Daily As Needed for Diarrhea.   10/13/2019 at Unknown time   • Nystatin (MAGIC BUTT PASTE) Apply 1 each topically to the appropriate area as directed 2 (Two) Times a Day. 12 g 0 10/13/2019 at Unknown time   • polyethylene glycol (MIRALAX) packet Take 17 g by mouth Daily.   10/13/2019 at Unknown time   • raNITIdine (ZANTAC) 150 MG tablet Take 150 mg by mouth Daily.   10/13/2019 at Unknown time   • senna (SENOKOT) 8.6 MG tablet tablet Take 2 tablets by mouth 2 (Two) Times a Day.   10/13/2019 at Unknown time   • traZODone (DESYREL) 100 MG tablet Take 100 mg by mouth Every Night.   Past Week at Unknown time   • LORazepam (ATIVAN) 0.5 MG tablet Take 1 tablet by mouth Every 6 (Six) Hours As Needed for Anxiety. 30 tablet 0 Unknown at Unknown time   • nitroglycerin (NITROSTAT) 0.4 MG SL tablet Place 1 tablet under the tongue Every 5 (Five) Minutes As Needed for Chest Pain (Only if SBP Greater Than 100). Take no more than 3 tabs 30 tablet 0 Unknown at Unknown time         Patient has no known allergies.    Scheduled Meds:    albumin human 25 g Intravenous Q8H   albuterol 2.5 mg Nebulization Q4H - RT   carvedilol 3.125 mg Oral BID With Meals   digoxin 125 mcg Intravenous Daily   enoxaparin 30 mg Subcutaneous Q24H   hydrocortisone sodium succinate 50 mg Intravenous Q12H   insulin lispro 0-7 Units Subcutaneous 4x Daily With Meals & Nightly   isosorbide mononitrate 30 mg Oral Daily   magic butt paste  Topical TID   pantoprazole 40 mg Oral Q AM   [START ON 10/18/2019] piperacillin-tazobactam 3.375 g Intravenous Q8H   sodium bicarbonate 650 mg  "Oral TID   sodium chloride 10 mL Intravenous Q12H     Continuous Infusions:    dextrose 100 mL/hr Last Rate: 100 mL/hr (10/17/19 1249)     PRN Meds:.dextrose  •  dextrose  •  glucagon (human recombinant)  •  nitroglycerin  •  potassium chloride **OR** potassium chloride **OR** potassium chloride  •  sodium chloride  •  sodium chloride  •  sodium chloride    Objective     VITAL SIGNS  Vitals:    10/17/19 1503 10/17/19 1810 10/17/19 1937 10/17/19 1941   BP:  100/53     Pulse: 97 104 109    Resp:  23 22 23   Temp:  97.8 °F (36.6 °C)     TempSrc:       SpO2: 98% 99% 95%    Weight:       Height:           Flowsheet Rows      First Filed Value   Admission Height  182.9 cm (72\") Documented at 10/14/2019 0047   Admission Weight  61.2 kg (135 lb) Documented at 10/14/2019 0047           TELEMETRY: Sinus rhythm    Physical Exam:  Physical Exam   Constitutional: He appears well-developed and well-nourished.   HENT:   Head: Normocephalic and atraumatic.   Eyes: Conjunctivae are normal. No scleral icterus.   Neck: Normal range of motion. Neck supple. No JVD present. Carotid bruit is not present.   Cardiovascular: Normal rate, regular rhythm, S1 normal, S2 normal, normal heart sounds and intact distal pulses. PMI is not displaced.   Pulmonary/Chest: Effort normal and breath sounds normal. He has no wheezes. He has no rales.   Abdominal: Soft. Bowel sounds are normal.   Neurological: He has normal strength.   Skin: Skin is warm and dry. No rash noted.        Results Review:   I reviewed the patient's new clinical results.  Lab Results (last 24 hours)     Procedure Component Value Units Date/Time    POC Glucose Once [[id number]]  (Abnormal) Collected:  10/17/19 1712    Specimen:  Blood Updated:  10/17/19 1713     Glucose 235 mg/dL      Comment: Serial Number: [id number]:  [id number]       TSH [[id number]]  (Normal) Collected:  10/17/19 0147    Specimen:  Blood Updated:  10/17/19 1628     TSH 0.441 uIU/mL      Comment: Results " may be falsely decreased if patient taking Biotin.       CK [[id number]]  (Normal) Collected:  10/17/19 0147    Specimen:  Blood Updated:  10/17/19 1621     Creatine Kinase 36 U/L     Uric Acid [[id number]]  (Abnormal) Collected:  10/17/19 0147    Specimen:  Blood Updated:  10/17/19 1621     Uric Acid 9.3 mg/dL     POC Glucose Once [[id number]]  (Abnormal) Collected:  10/17/19 1136    Specimen:  Blood Updated:  10/17/19 1142     Glucose 242 mg/dL      Comment: Serial Number: [id number]:  [id number]       POC Glucose Once [[id number]]  (Abnormal) Collected:  10/17/19 0541    Specimen:  Blood Updated:  10/17/19 0542     Glucose 213 mg/dL      Comment: Serial Number: [id number]:  [id number]       Manual Differential [[id number]]  (Abnormal) Collected:  10/17/19 0147    Specimen:  Blood Updated:  10/17/19 0336     Neutrophil % 83.0 %      Lymphocyte % 6.0 %      Monocyte % 6.0 %      Myelocyte % 5.0 %      Neutrophils Absolute 16.19 10*3/mm3      Lymphocytes Absolute 1.17 10*3/mm3      Monocytes Absolute 1.17 10*3/mm3      nRBC 1.0 /100 WBC      RBC Morphology Normal     Toxic Granulation Slight/1+     Platelet Morphology Normal    CBC & Differential [[id number]] Collected:  10/17/19 0147    Specimen:  Blood Updated:  10/17/19 0336    Narrative:       The following orders were created for panel order CBC & Differential.  Procedure                               Abnormality         Status                     ---------                               -----------         ------                     CBC Auto Differential[[id number]]        Abnormal            Final result                 Please view results for these tests on the individual orders.    CBC Auto Differential [[id number]]  (Abnormal) Collected:  10/17/19 0147    Specimen:  Blood Updated:  10/17/19 0336     WBC 19.50 10*3/mm3      RBC 3.82 10*6/mm3      Hemoglobin 12.6 g/dL      Hematocrit 38.1 %      MCV 99.5 fL      MCH 32.9 pg      MCHC 33.1 g/dL      RDW  15.8 %      RDW-SD 55.6 fl      MPV 8.2 fL      Platelets 256 10*3/mm3     Scan Slide [[id number]] Collected:  10/17/19 0147    Specimen:  Blood Updated:  10/17/19 0336     Scan Slide --     Comment: See Manual Differential Results       Comprehensive Metabolic Panel [[id number]]  (Abnormal) Collected:  10/17/19 0147    Specimen:  Blood Updated:  10/17/19 0244     Glucose 200 mg/dL      BUN 60 mg/dL      Creatinine 1.18 mg/dL      Sodium 156 mmol/L      Potassium 3.6 mmol/L      Chloride 118 mmol/L      CO2 20.0 mmol/L      Calcium 8.7 mg/dL      Total Protein 5.9 g/dL      Albumin 2.80 g/dL      ALT (SGPT) 15 U/L      AST (SGOT) 17 U/L      Alkaline Phosphatase 92 U/L      Total Bilirubin 0.6 mg/dL      eGFR Non African Amer 58 mL/min/1.73      Globulin 3.1 gm/dL      A/G Ratio 0.9 g/dL      BUN/Creatinine Ratio 50.8     Anion Gap 21.6 mmol/L     Narrative:       GFR Normal >60  Chronic Kidney Disease <60  Kidney Failure <15    Magnesium [[id number]]  (Normal) Collected:  10/17/19 0147    Specimen:  Blood Updated:  10/17/19 0239     Magnesium 2.2 mg/dL     Blood Culture - Blood, Arm, Left [[id number]] Collected:  10/14/19 0121    Specimen:  Blood from Arm, Left Updated:  10/17/19 0130     Blood Culture No growth at 3 days    Blood Culture - Blood, Arm, Right [[id number]] Collected:  10/14/19 0121    Specimen:  Blood from Arm, Right Updated:  10/17/19 0130     Blood Culture No growth at 3 days    POC Glucose Once [[id number]]  (Abnormal) Collected:  10/16/19 2350    Specimen:  Blood Updated:  10/16/19 2352     Glucose 145 mg/dL      Comment: Serial Number: [id number]:  [id number]             Imaging Results (last 24 hours)     Procedure Component Value Units Date/Time    US Renal Bilateral [[id number]] Collected:  10/17/19 0908     Updated:  10/17/19 0930    Narrative:       Examination: US RENAL BILATERAL-     Date of Exam: 10/17/2019 8:45 AM     Indication: ARF/CRF; A41.9-Sepsis, unspecified organism;  R65.20-Severe  sepsis without septic shock; J69.0-Pneumonitis due to inhalation of food  and vomit; R73.9-Hyperglycemia, unspecified; N28.9-Disorder of kidney  and ureter, unspecified.     Comparison: None available.     Technique: Grayscale and color Doppler ultrasound evaluation of the  kidneys and urinary bladder was performed     Findings:  Multiple simple appearing cysts are seen in both kidneys, measuring up  to 2.9 cm on the right and 2.6 cm on the left. Both kidneys are  otherwise unremarkable in ultrasound appearance. No solid renal mass,  shadowing stone, or hydronephrosis. The right kidney measures 8.5 cm in  length. The left kidney measures 9.1 cm in length. Nonspecific diffuse  urinary bladder wall thickening.       Impression:          1. Simple appearing bilateral renal cysts.  2. Otherwise, unremarkable ultrasound appearance of both kidneys.  3. Diffuse urinary bladder wall thickening, which could reflect chronic  bladder outlet obstruction versus cystitis.     Electronically Signed By-Rk Colon On:10/17/2019 9:15 AM  This report was finalized on [id number] by  Rk Colon, .          EKG      I personally viewed and interpreted the patient's EKG/Telemetry data:    ECHOCARDIOGRAM:      STRESS MYOVIEW:    CARDIAC CATHETERIZATION:    OTHER:         Assessment/Plan     #1 decubitus ulcers  2.  Mental status changes  3.  Severe sepsis  4.  Aspiration pneumonia  5.  Pneumothorax  6.  Diabetes  7.  History of coronary disease   #8 acute renal failure #9 history of hypertension  10.  History of CVA  11.  History of dyslipidemia    Present with mental status changes and has aspiration pneumonia and severe sepsis  Patient also has significant decubitus ulcers  Patient is currently on IV antibiotics  Patient has significant mental status changes and is not responding very well to verbal commands  Being ruled out for MI by EKG and enzymes  Patient will have an echocardiogram for LV function valve  abnormalities      Raoul Bender MD  10/17/19  8:45 PM

## 2019-10-19 NOTE — PROGRESS NOTES
LOS: 5 days   Patient Care Team:  Jordan Mittal MD as PCP - General (Family Medicine)  NANCY Mejia MD as PCP - Claims [facility]  Dina Coronado, RN as Community Care Coordinator (Population Health)    Subjective:  No observable distress    Objective:   Afebrile      Review of Systems:   Review of Systems   Unable to perform ROS: Dementia   Constitutional: Positive for activity change and appetite change.   HENT: Positive for trouble swallowing.    Respiratory: Positive for shortness of breath.    Cardiovascular: Negative.    Gastrointestinal: Negative.            Vital Signs  Temp:  [97 °F (36.1 °C)-98.2 °F (36.8 °C)] 97 °F (36.1 °C)  Heart Rate:  [] 95  Resp:  [18-24] 22  BP: (107-120)/(34-64) 113/64    Physical Exam:  Physical Exam   Constitutional: He appears well-developed.   HENT:   Head: Normocephalic.   Eyes: EOM are normal. Pupils are equal, round, and reactive to light.   Neck: Normal range of motion.   Cardiovascular: Normal heart sounds.   Pulmonary/Chest: Effort normal. Stridor present. He has wheezes.   Abdominal: Soft.   Neurological: He is alert.   Skin: Skin is warm.   Nursing note and vitals reviewed.       Radiology:  Fl Video Swallow With Speech    Result Date: 10/15/2019  1.Aspiration with thin barium. 2.Please refer to speech pathology report for further details.  Electronically Signed By-DR. Darnell Montilla MD On:10/15/2019 12:30 PM This report was finalized on [id number] by DR. Darnell Montilla MD.    Xr Chest 1 View    Result Date: 10/18/2019  No significant changes compared to the previous study. There is redemonstration of bibasilar airspace disease with a small to moderate-sized right-sided pleural effusion, similar as compared to the previous study, likely related to pneumonia. A component of this may be related to underlying pulmonary edema.  Electronically Signed By-Arielle Ann On:10/18/2019 8:30 AM This report was finalized on [id number] by  Arielle Ann,  .    Xr Chest 1 View    Result Date: 10/16/2019  Interval decrease in size of right-sided pneumothorax. A tiny right-sided pneumothorax is still present. Otherwise, stable exam with reconstruction patchy airspace disease within the lung bases bilaterally with a moderate-sized right sided pleural effusion.  Electronically Signed By-Arielle Ann On:10/16/2019 12:07 PM This report was finalized on [id number] by  Arielle Ann, .    Xr Chest 1 View    Result Date: 10/15/2019  1.Small right apical pneumothorax is unchanged. 2.Hazy bilateral airspace opacities worse in the right lower lung are unchanged, suggesting multifocal pneumonia. 3.Moderate right and small left pleural effusions.  Electronically Signed By-DR. Darnell Montilla MD On:10/15/2019 7:14 AM This report was finalized on [id number] by DR. Darnell Montilla MD.    Xr Chest 1 View    Result Date: 10/14/2019   1. Stable small right apical pneumothorax compared to earlier today. 2. Multifocal patchy infiltrates in both lungs with more confluent bibasilar consolidations the appearance of pneumonia, stable. 3. Small to moderate right, small left pleural effusions, unchanged.  Electronically Signed By-Dr. Mary Hope MD On:10/14/2019 2:22 PM This report was finalized on [id number] by Dr. Mary Hope MD.    Xr Chest 1 View    Result Date: 10/14/2019  1.Small right apical pneumothorax has slightly improved. 2.Hazy bilateral airspace opacities worse in the right lower lung have slightly improved, suggesting multifocal pneumonia. 3.Moderate right and small left pleural effusions.  Electronically Signed By-DR. Darnell Montilla MD On:10/14/2019 8:35 AM This report was finalized on [id number] by DR. Darnell Montilla MD.    Xr Chest 1 View    Result Date: 10/14/2019  1.Small-to-moderate right apical pneumothorax. 2.Hazy bilateral airspace opacities worse in the right lower lung, suggesting multifocal pneumonia. 3.Moderate right and small left pleural  effusions.  Results were relayed to Dr. Mittal at time of dictation.  Electronically Signed By-DR. Darnell Montilla MD On:10/14/2019 7:39 AM This report was finalized on [id number] by DR. Darnell Montilla MD.    Us Renal Bilateral    Result Date: 10/17/2019   1. Simple appearing bilateral renal cysts. 2. Otherwise, unremarkable ultrasound appearance of both kidneys. 3. Diffuse urinary bladder wall thickening, which could reflect chronic bladder outlet obstruction versus cystitis.  Electronically Signed By-Rk Colon On:10/17/2019 9:15 AM This report was finalized on [id number] by  Rk Colon, .    Xr Abdomen Kub    Result Date: 10/18/2019   1. No enteric tube is seen within the included field of view of the chest or upper abdomen. Correlate to the patient's known history. 2. If this study was truly for enteric tube placement, it is conceivable it could be coiled in the upper throat or mouth. If this is the case, consider retracting, repositioning and repeat imaging confirmed placement.  Electronically Signed By-Dr. Mary Hope MD On:10/18/2019 1:21 PM This report was finalized on [id number] by Dr. Mary Hope MD.    Xr Abdomen Kub    Result Date: 10/18/2019  Enteric tube within the stomach.  Electronically Signed By-Arielle Ann On:10/18/2019 8:31 AM This report was finalized on [id number] by  Arielle Ann, .         Results Review:     I reviewed the patient's new clinical results.  I reviewed the patient's new imaging results and agree with the interpretation.    Medication Review:   Scheduled Meds:  albuterol 2.5 mg Nebulization Q4H - RT   carvedilol 3.125 mg Oral BID With Meals   digoxin 125 mcg Intravenous Daily   enoxaparin 30 mg Subcutaneous Q24H   hydrocortisone sodium succinate 50 mg Intravenous Q12H   insulin lispro 0-7 Units Subcutaneous 4x Daily With Meals & Nightly   isosorbide mononitrate 30 mg Oral Daily   magic butt paste  Topical TID   pantoprazole 40 mg Oral Q AM    piperacillin-tazobactam 3.375 g Intravenous Q8H   sodium chloride 10 mL Intravenous Q12H     Continuous Infusions:  dextrose 150 mL/hr Last Rate: 150 mL/hr (10/19/19 0310)     PRN Meds:.dextrose  •  dextrose  •  glucagon (human recombinant)  •  nitroglycerin  •  potassium chloride **OR** potassium chloride **OR** potassium chloride  •  sodium chloride  •  sodium chloride  •  sodium chloride    Labs:    CBC    Results from last 7 days   Lab Units 10/19/19  0424 10/18/19  0521 10/17/19  0147 10/16/19  0319 10/15/19  0307 10/14/19  0121 10/13/19  1930   WBC 10*3/mm3 10.60 12.60* 19.50* 15.40* 10.90* 14.20* 11.30*   HEMOGLOBIN g/dL 9.6* 10.2* 12.6* 12.0* 11.7* 13.1 12.6*   PLATELETS 10*3/mm3 148 182 256 251 209 234 231     BMP Results from last 7 days   Lab Units 10/19/19  0616 10/19/19  0424 10/18/19  0359 10/17/19  0147 10/16/19  0319 10/15/19  0307 10/14/19  0435 10/14/19  0121 10/13/19  1930   SODIUM mmol/L 151*  --  162* 156* 152* 148*  --  138 134*   POTASSIUM mmol/L 2.6*  --  3.1* 3.6 3.9 3.1*  --  3.3* 3.9   CHLORIDE mmol/L 113*  --  122* 118* 115* 107  --  91* 85*   CO2 mmol/L 23.0  --  24.0 20.0* 20.0* 29.0  --  32.0 34.3*   BUN mg/dL 58*  --  67* 60* 50* 37*  --  46* 47*   CREATININE mg/dL 1.60*  --  1.51* 1.18 1.01 1.00  --  1.40* 1.21   GLUCOSE mg/dL 575*  --  253* 200* 208* 228* 491* 843* 1,035*   MAGNESIUM mg/dL  --  2.2 2.4 2.2  --  1.9  --  2.4  --    PHOSPHORUS mg/dL 3.0  --  3.9  --   --   --   --  4.0  --      Cr Clearance Estimated Creatinine Clearance: 27.2 mL/min (A) (by C-G formula based on SCr of 1.6 mg/dL (H)).  Coag   Results from last 7 days   Lab Units 10/14/19  0121   INR  0.90   APTT seconds 22.4*     HbA1C   Lab Results   Component Value Date    HGBA1C 8.3 (H) 10/14/2019    HGBA1C 6.3 (H) 08/03/2019    HGBA1C 5.90 (H) 10/24/2016     Blood Glucose   Glucose   Date/Time Value Ref Range Status   10/18/2019 1659 258 (H) 70 - 105 mg/dL Final     Comment:     Serial Number:  [id number]:  [id number]   10/18/2019 1122 327 (H) 70 - 105 mg/dL Final     Comment:     Serial Number: [id number]:  [id number]   10/18/2019 0632 256 (H) 70 - 105 mg/dL Final     Comment:     Serial Number: [id number]:  [id number]   10/17/2019 2355 192 (H) 70 - 105 mg/dL Final     Comment:     Serial Number: [id number]:  [id number]   10/17/2019 1712 235 (H) 70 - 105 mg/dL Final     Comment:     Serial Number: [id number]:  [id number]   10/17/2019 1136 242 (H) 70 - 105 mg/dL Final     Comment:     Serial Number: [id number]:  [id number]   10/17/2019 0541 213 (H) 70 - 105 mg/dL Final     Comment:     Serial Number: [id number]:  [id number]   10/16/2019 2350 145 (H) 70 - 105 mg/dL Final     Comment:     Serial Number: [id number]:  [id number]     Infection Results from last 7 days   Lab Units 10/14/19  0121   BLOODCX  No growth at 5 days  No growth at 5 days     CMP Results from last 7 days   Lab Units 10/19/19  0616 10/18/19  0359 10/17/19  0147 10/16/19  0319 10/15/19  0307 10/14/19  0435 10/14/19  0121 10/13/19  1930   SODIUM mmol/L 151* 162* 156* 152* 148*  --  138 134*   POTASSIUM mmol/L 2.6* 3.1* 3.6 3.9 3.1*  --  3.3* 3.9   CHLORIDE mmol/L 113* 122* 118* 115* 107  --  91* 85*   CO2 mmol/L 23.0 24.0 20.0* 20.0* 29.0  --  32.0 34.3*   BUN mg/dL 58* 67* 60* 50* 37*  --  46* 47*   CREATININE mg/dL 1.60* 1.51* 1.18 1.01 1.00  --  1.40* 1.21   GLUCOSE mg/dL 575* 253* 200* 208* 228* 491* 843* 1,035*   ALBUMIN g/dL 3.40* 3.90 2.80*  --   --   --  2.90*  --    BILIRUBIN mg/dL 0.8 0.7 0.6  --   --   --  1.3*  --    ALK PHOS U/L 74 81 92  --   --   --  109*  --    AST (SGOT) U/L 14 13 17  --   --   --  41  --    ALT (SGPT) U/L 10 10 15  --   --   --  21  --      UA  Results from last 7 days   Lab Units 10/14/19  0208   NITRITE UA  Negative     Radiology(recent) Xr Chest 1 View    Result Date: 10/18/2019  No significant changes compared to the previous study. There is  redemonstration of bibasilar airspace disease with a small to moderate-sized right-sided pleural effusion, similar as compared to the previous study, likely related to pneumonia. A component of this may be related to underlying pulmonary edema.  Electronically Signed By-Arielle Ann On:10/18/2019 8:30 AM This report was finalized on [id number] by  Arielle Ann, .    Us Renal Bilateral    Result Date: 10/17/2019   1. Simple appearing bilateral renal cysts. 2. Otherwise, unremarkable ultrasound appearance of both kidneys. 3. Diffuse urinary bladder wall thickening, which could reflect chronic bladder outlet obstruction versus cystitis.  Electronically Signed By-Rk Colon On:10/17/2019 9:15 AM This report was finalized on [id number] by  Rk Colon, .    Xr Abdomen Kub    Result Date: 10/18/2019   1. No enteric tube is seen within the included field of view of the chest or upper abdomen. Correlate to the patient's known history. 2. If this study was truly for enteric tube placement, it is conceivable it could be coiled in the upper throat or mouth. If this is the case, consider retracting, repositioning and repeat imaging confirmed placement.  Electronically Signed By-Dr. Mary Hope MD On:10/18/2019 1:21 PM This report was finalized on [id number] by Dr. Mary Hope MD.    Xr Abdomen Kub    Result Date: 10/18/2019  Enteric tube within the stomach.  Electronically Signed ByGuilherme Ann On:10/18/2019 8:31 AM This report was finalized on [id number] by  Arielle Ann, .     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

## 2019-10-19 NOTE — PROGRESS NOTES
"NEPHROLOGY PROGRESS NOTE------[facility]    [facility]  [phone]  Eldon Bolton MD      Patient Care Team:  Jordan Mittal MD as PCP - General (Family Medicine)  NANCY Mejia MD as PCP - Claims Attributed  Dina Coronado, RN as Community Care Coordinator (Population Health)      Provider:  Eldon Bolton MD  Patient Name: Julio César Braxton  :  4/3/1932    SUBJECTIVE:  F/u ESTEBAN/hypernatremia  Awake. No distress    Medication:    albuterol 2.5 mg Nebulization Q4H - RT   carvedilol 3.125 mg Oral BID With Meals   digoxin 125 mcg Intravenous Daily   enoxaparin 30 mg Subcutaneous Q24H   hydrocortisone sodium succinate 50 mg Intravenous Q12H   insulin lispro 0-7 Units Subcutaneous 4x Daily With Meals & Nightly   isosorbide mononitrate 30 mg Oral Daily   magic butt paste  Topical TID   pantoprazole 40 mg Oral Q AM   piperacillin-tazobactam 3.375 g Intravenous Q8H   sodium chloride 10 mL Intravenous Q12H       sodium chloride 100 mL/hr       OBJECTIVE    Vital Sign Min/Max for last 24 hours  Temp  Min: 97 °F (36.1 °C)  Max: 98.2 °F (36.8 °C)   BP  Min: 108/59  Max: 120/59   Pulse  Min: 72  Max: 106   Resp  Min: 18  Max: 24   SpO2  Min: 62 %  Max: 100 %   No Data Recorded   Weight  Min: 59.2 kg (130 lb 8.2 oz)  Max: 59.2 kg (130 lb 8.2 oz)     Flowsheet Rows      First Filed Value   Admission Height  182.9 cm (72\") Documented at 10/14/2019 0047   Admission Weight  61.2 kg (135 lb) Documented at 10/14/2019 004          No intake/output data recorded.  I/O last 3 completed shifts:  In: 200 [IV Piggyback:200]  Out: -     Physical Exam:  General Appearance: alert, appears stated age  Head: normocephalic, without obvious abnormality and atraumatic  Eyes: conjunctivae and sclerae normal and no icterus  Neck: supple and no JVD  Lungs: clear to auscultation and respirations regular  Heart: regular rhythm & normal rate and normal S1, S2  Chest: Wall no abnormalities " observed  Abdomen: normal bowel sounds and soft non-tender  Extremities: moves extremities well, no edema, no cyanosis and no redness  Skin: no bleeding, bruising or rash, turgor normal, color normal and no leasions noted  Neurologic: No focal deficits    Labs:    WBC WBC   Date Value Ref Range Status   10/19/2019 10.60 3.40 - 10.80 10*3/mm3 Final   10/18/2019 12.60 (H) 3.40 - 10.80 10*3/mm3 Final   10/17/2019 19.50 (H) 3.40 - 10.80 10*3/mm3 Final      HGB Hemoglobin   Date Value Ref Range Status   10/19/2019 9.6 (L) 13.0 - 17.7 g/dL Final   10/18/2019 10.2 (L) 13.0 - 17.7 g/dL Final     Comment:     Result checked    10/17/2019 12.6 (L) 13.0 - 17.7 g/dL Final      HCT Hematocrit   Date Value Ref Range Status   10/19/2019 28.9 (L) 37.5 - 51.0 % Final   10/18/2019 31.5 (L) 37.5 - 51.0 % Final   10/17/2019 38.1 37.5 - 51.0 % Final      Platlets No results found for: LABPLAT   MCV MCV   Date Value Ref Range Status   10/19/2019 100.7 (H) 79.0 - 97.0 fL Final   10/18/2019 100.6 (H) 79.0 - 97.0 fL Final   10/17/2019 99.5 (H) 79.0 - 97.0 fL Final          Sodium Sodium   Date Value Ref Range Status   10/19/2019 151 (H) 136 - 145 mmol/L Final   10/18/2019 162 (C) 136 - 145 mmol/L Final   10/17/2019 156 (H) 136 - 145 mmol/L Final      Potassium Potassium   Date Value Ref Range Status   10/19/2019 2.6 (C) 3.5 - 5.2 mmol/L Final   10/18/2019 3.1 (L) 3.5 - 5.2 mmol/L Final   10/17/2019 3.6 3.5 - 5.2 mmol/L Final      Chloride Chloride   Date Value Ref Range Status   10/19/2019 113 (H) 98 - 107 mmol/L Final   10/18/2019 122 (H) 98 - 107 mmol/L Final   10/17/2019 118 (H) 98 - 107 mmol/L Final      CO2 CO2   Date Value Ref Range Status   10/19/2019 23.0 22.0 - 29.0 mmol/L Final   10/18/2019 24.0 22.0 - 29.0 mmol/L Final   10/17/2019 20.0 (L) 22.0 - 29.0 mmol/L Final      BUN BUN   Date Value Ref Range Status   10/19/2019 58 (H) 8 - 23 mg/dL Final   10/18/2019 67 (H) 8 - 23 mg/dL Final   10/17/2019 60 (H) 8 - 23 mg/dL Final       Creatinine Creatinine   Date Value Ref Range Status   10/19/2019 1.60 (H) 0.76 - 1.27 mg/dL Final   10/18/2019 1.51 (H) 0.76 - 1.27 mg/dL Final   10/17/2019 1.18 0.76 - 1.27 mg/dL Final      Calcium Calcium   Date Value Ref Range Status   10/19/2019 8.2 (L) 8.6 - 10.5 mg/dL Final   10/18/2019 9.2 8.6 - 10.5 mg/dL Final   10/17/2019 8.7 8.6 - 10.5 mg/dL Final      PO4 No components found for: PO4   Albumin Albumin   Date Value Ref Range Status   10/19/2019 3.40 (L) 3.50 - 5.20 g/dL Final   10/18/2019 3.90 3.50 - 5.20 g/dL Final   10/17/2019 2.80 (L) 3.50 - 5.20 g/dL Final      Magnesium Magnesium   Date Value Ref Range Status   10/19/2019 2.2 1.6 - 2.4 mg/dL Final   10/18/2019 2.4 1.6 - 2.4 mg/dL Final   10/17/2019 2.2 1.6 - 2.4 mg/dL Final      Uric Acid No components found for: URIC ACID     Imaging Results (last 72 hours)     Procedure Component Value Units Date/Time    XR Chest 1 View [[id number]] Collected:  10/18/19 0828     Updated:  10/18/19 0841    Narrative:       Examination: XR CHEST 1 VW-     Date of Exam: 10/18/2019 7:49 AM     Indication: Deterioration of clinical status.  Unclear etiology;  A41.9-Sepsis, unspecified organism; R65.20-Severe sepsis without septic  shock; J69.0-Pneumonitis due to inhalation of food and vomit;  R73.9-Hyperglycemia, unspecified; N28.9-Disorder of kidney and ureter,  unspecified.     Comparison: Radiograph 10/16/2019, 10/15/2019, 10/14/2019     Technique: 1 view of the chest      Findings:  The heart appears borderline enlarged. An enteric tube is present within  the stomach, incompletely visualized. Patchy airspace disease is seen  within the bilateral lower lobes with a small to moderate-sized  right-sided pleural effusion present. There is mild indistinctness of  the pulmonary vasculature. No acute osseous abnormality identified.       Impression:       No significant changes compared to the previous study. There is  redemonstration of bibasilar airspace disease with a  small to  moderate-sized right-sided pleural effusion, similar as compared to the  previous study, likely related to pneumonia. A component of this may be  related to underlying pulmonary edema.     Electronically Signed By-Arielle Ann On:10/18/2019 8:30 AM  This report was finalized on [id number] by  Arielle Ann, .    XR Abdomen KUB [[id number]] Collected:  10/18/19 0830     Updated:  10/18/19 0841    Narrative:       DATE OF EXAM:  10/18/2019 8:00 AM     PROCEDURE:  XR ABDOMEN KUB-     INDICATIONS:  Enteric tube placement placement; A41.9-Sepsis, unspecified organism;  R65.20-Severe sepsis without septic shock; J69.0-Pneumonitis due to  inhalation of food and vomit; R73.9-Hyperglycemia, unspecified;  N28.9-Disorder of kidney and ureter, unspecified     COMPARISON:  No Comparisons Available     TECHNIQUE:   Single radiographic view of the abdomen was obtained.     FINDINGS:  An enteric tube is present within the stomach. The distal portion of the  tubing is looped within the region of the antrum of the stomach. There  is no evidence of pneumatosis or free air. Evaluation is limited due to  supine technique. No dilated loops of bowel identified. No suspicious  calcifications identified. No significant stool burden identified. No  acute osseous abnormality.        Impression:       Enteric tube within the stomach.     Electronically Signed By-Arielle Ann On:10/18/2019 8:31 AM  This report was finalized on [id number] by  Arielle Ann, .    US Renal Bilateral [[id number]] Collected:  10/17/19 0908     Updated:  10/17/19 0930    Narrative:       Examination: US RENAL BILATERAL-     Date of Exam: 10/17/2019 8:45 AM     Indication: ARF/CRF; A41.9-Sepsis, unspecified organism; R65.20-Severe  sepsis without septic shock; J69.0-Pneumonitis due to inhalation of food  and vomit; R73.9-Hyperglycemia, unspecified; N28.9-Disorder of kidney  and ureter, unspecified.     Comparison: None available.     Technique: Grayscale and  color Doppler ultrasound evaluation of the  kidneys and urinary bladder was performed     Findings:  Multiple simple appearing cysts are seen in both kidneys, measuring up  to 2.9 cm on the right and 2.6 cm on the left. Both kidneys are  otherwise unremarkable in ultrasound appearance. No solid renal mass,  shadowing stone, or hydronephrosis. The right kidney measures 8.5 cm in  length. The left kidney measures 9.1 cm in length. Nonspecific diffuse  urinary bladder wall thickening.       Impression:          1. Simple appearing bilateral renal cysts.  2. Otherwise, unremarkable ultrasound appearance of both kidneys.  3. Diffuse urinary bladder wall thickening, which could reflect chronic  bladder outlet obstruction versus cystitis.     Electronically Signed By-Rk Colon On:10/17/2019 9:15 AM  This report was finalized on [id number] by  Rk Colon, .    XR Chest 1 View [[id number]] Collected:  10/16/19 1206     Updated:  10/16/19 1209    Narrative:       Examination: XR CHEST 1 VW-     Date of Exam: 10/16/2019 10:34 AM     Indication: Pneumothorax; A41.9-Sepsis, unspecified organism;  R65.20-Severe sepsis without septic shock; J69.0-Pneumonitis due to  inhalation of food and vomit; R73.9-Hyperglycemia, unspecified;  N28.9-Disorder of kidney and ureter, unspecified.     Comparison: Radiograph 10/15/2019, 10/14/2019     Technique: 1 view of the chest      Findings:  There is a tiny right-sided pneumothorax with degree of pleural  separation measuring approximately 1 to 2 mm, decreased as compared to  the previous study. Patchy airspace changes are present within the lung  bases bilaterally with a moderate-sized pleural effusion present on the  right. The heart appears mildly enlarged. The pulmonary vasculature  appears indistinct. No acute osseous abnormality identified.       Impression:       Interval decrease in size of right-sided pneumothorax. A tiny  right-sided pneumothorax is still present. Otherwise,  stable exam with  reconstruction patchy airspace disease within the lung bases bilaterally  with a moderate-sized right sided pleural effusion.     Electronically Signed By-Arielle Ann On:10/16/2019 12:07 PM  This report was finalized on [id number] by  Arielle Ann, .          Results for orders placed during the hospital encounter of 10/14/19   XR Abdomen KUB    Narrative DATE OF EXAM:  10/18/2019 12:57 PM     PROCEDURE:  XR ABDOMEN KUB-     INDICATIONS:  NG PLACEMENT; A41.9-Sepsis, unspecified organism; R65.20-Severe sepsis  without septic shock; J69.0-Pneumonitis due to inhalation of food and  vomit; R73.9-Hyperglycemia, unspecified; N28.9-Disorder of kidney and  ureter, unspecified     COMPARISON:  KUB 10/18/2019.     TECHNIQUE:   Single radiographic view of the abdomen was obtained.        FINDINGS:  The history states the study is for injury to or Dobbhoff placement.  However, no enteric tube is seen within the imaged chest or upper  abdomen.     Dense opacity in the right base may present a combination of  consolidated lung and pleural fluid. Ill-defined interstitial  infiltrates are thought to be present bilaterally.     Multilevel prominent osteophytes in the thoracolumbar spine with mild  lumbar curvature toward the left.     Nonspecific but nonobstructive bowel gas pattern in the included upper  abdomen.       Impression    1. No enteric tube is seen within the included field of view of the  chest or upper abdomen. Correlate to the patient's known history.  2. If this study was truly for enteric tube placement, it is conceivable  it could be coiled in the upper throat or mouth. If this is the case,  consider retracting, repositioning and repeat imaging confirmed  placement.     Electronically Signed By-Dr. Mary Hope MD On:10/18/2019 1:21 PM  This report was finalized on [id number] by Dr. Mary Hope MD.   XR Abdomen KUB    Narrative DATE OF EXAM:  10/18/2019 8:00 AM     PROCEDURE:  XR  ABDOMEN KUB-     INDICATIONS:  Enteric tube placement placement; A41.9-Sepsis, unspecified organism;  R65.20-Severe sepsis without septic shock; J69.0-Pneumonitis due to  inhalation of food and vomit; R73.9-Hyperglycemia, unspecified;  N28.9-Disorder of kidney and ureter, unspecified     COMPARISON:  No Comparisons Available     TECHNIQUE:   Single radiographic view of the abdomen was obtained.     FINDINGS:  An enteric tube is present within the stomach. The distal portion of the  tubing is looped within the region of the antrum of the stomach. There  is no evidence of pneumatosis or free air. Evaluation is limited due to  supine technique. No dilated loops of bowel identified. No suspicious  calcifications identified. No significant stool burden identified. No  acute osseous abnormality.        Impression Enteric tube within the stomach.     Electronically Signed By-Arielle Ann On:10/18/2019 8:31 AM  This report was finalized on [id number] by  Arielle Ann, .   XR Chest 1 View    Narrative Examination: XR CHEST 1 VW-     Date of Exam: 10/18/2019 7:49 AM     Indication: Deterioration of clinical status.  Unclear etiology;  A41.9-Sepsis, unspecified organism; R65.20-Severe sepsis without septic  shock; J69.0-Pneumonitis due to inhalation of food and vomit;  R73.9-Hyperglycemia, unspecified; N28.9-Disorder of kidney and ureter,  unspecified.     Comparison: Radiograph 10/16/2019, 10/15/2019, 10/14/2019     Technique: 1 view of the chest      Findings:  The heart appears borderline enlarged. An enteric tube is present within  the stomach, incompletely visualized. Patchy airspace disease is seen  within the bilateral lower lobes with a small to moderate-sized  right-sided pleural effusion present. There is mild indistinctness of  the pulmonary vasculature. No acute osseous abnormality identified.       Impression No significant changes compared to the previous study. There is  redemonstration of bibasilar airspace  disease with a small to  moderate-sized right-sided pleural effusion, similar as compared to the  previous study, likely related to pneumonia. A component of this may be  related to underlying pulmonary edema.     Electronically Signed By-Arielle Ann On:10/18/2019 8:30 AM  This report was finalized on [id number] by  Arielle Ann, .              ASSESSMENT / PLAN      Decubitus ulcer of sacral region, stage 3 (CMS/HCC)    Diabetes mellitus, type II (CMS/HCC)    Dermatitis associated with moisture    Severe sepsis (CMS/HCC)    Aspiration pneumonia (CMS/HCC)    Pneumothorax    1. RENAL FAILURE---------Nonoliguric. Appears to have ARF/ESTEBAN secondary to severe prerenal state and intravascular volume depletion. Hydrate with D5W and d/c Lasix. Check urine and serum studies and renal US. No NSAIDs or IV dye     2. HYPERNATREMIA------Free water deficit of about 4-5 L. Hydrate with D5W. D/C Lasix. Check TSH     3. ACIDOSIS------Metabolic. No elevation in AGAP. Give NaHCO3 and follow. Check lactic     4. ANEMIA-------Check Fe     5. OA/DJD------No NSAIDs. Check uric acid level     6. LEUKOCYTOSIS/?ASPIRATION PNA     7. RIGHT PNEUMOTHORAX (SMALL)/PLEURAL EFFUSION     8. HYPOALBUMINEMIA------Check SPEP. IV Albumin to temporize    Sodium  Better, but still up  Change fluids to 1/2 NS. Glucose high this am, stop D5W  Feeds are off, plans noted for DHT    Eldon Bolton MD  [facility]  [phone]  10/19/19  11:03 AM

## 2019-10-19 NOTE — PROGRESS NOTES
"KPA/PULM/CC PROGRESS NOTE       PATIENT IDENTIFICATION    Name: Julio César Braxton Age: 87 y.o. Sex: male :  4/3/1932 MRN: [id number]  87 y.o. male who was referred for consultation for pneumonia, sepsis and pneumothorax.  Patient is quite obtunded at time of my evaluation and is not able to give me any history.  History was obtained from the chart and talking to patient's nurse at bedside.  I tried to call patient's sister and daughter the 2 numbers listed in the chart but I was unable to get hold off either of the family members.  He is apparently a resident of nursing home and was sent for aspiration pneumonia.  Patient apparently has history of dysphagia but family has refused dietary recommendations.  Patient was noted to have acute hypoxemia and some mental status changes at the nursing home.  He subsequently was transferred to [facility] for further evaluation and management  Patient has received fluid resuscitation in the ER and has been started on broad-spectrum antibiotic therapy    SUBJECTIVE    10/15: More awake and appropriate today.  Currently on 3 L nasal cannula.  Remains on IV fluids.  Not able to provide much history.  10/16:  No new issues overnight.  Remains NPO.  Cont supplemental oxygen, 3.5L.  Resting quietly in bed.  Feels ok  10/17:  No new issues overnight. More lethargic and confused today  10/18: failed swallow study again. Nursing staff unable to place DHT or NGT.  Continues to be lethargic  10/19 no events     OBJECTIVE    /64   Pulse 95   Temp 97 °F (36.1 °C)   Resp 22   Ht 167.6 cm (65.98\")   Wt 59.2 kg (130 lb 8.2 oz)   SpO2 92%   BMI 21.08 kg/m²   Intake/Output last 3 shifts:  I/O last 3 completed shifts:  In: 200 [IV Piggyback:200]  Out: -   Intake/Output this shift:  No intake/output data recorded.  No intake or output data in the 24 hours ending 10/19/19 1038    Constitutional: Chronically ill-appearing, no acute distress, non-toxic appearance   HENT: "  Atraumatic, external ears normal, no nasal discharge   Neck-no JVD, no tenderness, supple   Respiratory:  No respiratory distress, good air entry bilaterally, bibasilar crackles  Cardiovascular:  Normal rate, normal rhythm, no murmurs, no gallops, no rubs   GI:  Soft, nondistended, normal bowel sounds, nontender  :  No costovertebral angle tenderness   Musculoskeletal:  No edema, no tenderness, no deformities. Back- no tenderness  Integument:  Well hydrated, no rash   Neurologic: awakens to voice   Psychiatric: As above    Scheduled Meds:    albuterol 2.5 mg Nebulization Q4H - RT   carvedilol 3.125 mg Oral BID With Meals   digoxin 125 mcg Intravenous Daily   enoxaparin 30 mg Subcutaneous Q24H   hydrocortisone sodium succinate 50 mg Intravenous Q12H   insulin lispro 0-7 Units Subcutaneous 4x Daily With Meals & Nightly   isosorbide mononitrate 30 mg Oral Daily   magic butt paste  Topical TID   pantoprazole 40 mg Oral Q AM   piperacillin-tazobactam 3.375 g Intravenous Q8H   sodium chloride 10 mL Intravenous Q12H       Continuous Infusions:    dextrose 150 mL/hr Last Rate: 150 mL/hr (10/19/19 0310)       PRN Meds:dextrose  •  dextrose  •  glucagon (human recombinant)  •  nitroglycerin  •  potassium chloride **OR** potassium chloride **OR** potassium chloride  •  sodium chloride  •  sodium chloride  •  sodium chloride     Data Review:  Lab Results (last 24 hours)     Procedure Component Value Units Date/Time    Blood Culture - Blood, Arm, Left [[id number]] Collected:  10/18/19 0921    Specimen:  Blood from Arm, Left Updated:  10/19/19 0945     Blood Culture No growth at 24 hours    Comprehensive Metabolic Panel [[id number]]  (Abnormal) Collected:  10/19/19 0616    Specimen:  Blood Updated:  10/19/19 0743     Glucose 575 mg/dL      BUN 58 mg/dL      Creatinine 1.60 mg/dL      Sodium 151 mmol/L      Potassium 2.6 mmol/L      Chloride 113 mmol/L      CO2 23.0 mmol/L      Calcium 8.2 mg/dL      Total Protein 5.2 g/dL       Albumin 3.40 g/dL      ALT (SGPT) 10 U/L      AST (SGOT) 14 U/L      Alkaline Phosphatase 74 U/L      Total Bilirubin 0.8 mg/dL      eGFR Non African Amer 41 mL/min/1.73      Globulin 1.8 gm/dL      A/G Ratio 1.9 g/dL      BUN/Creatinine Ratio 36.3     Anion Gap 15.0 mmol/L     Narrative:       GFR Normal >60  Chronic Kidney Disease <60  Kidney Failure <15    Phosphorus [[id number]]  (Normal) Collected:  10/19/19 0616    Specimen:  Blood Updated:  10/19/19 0716     Phosphorus 3.0 mg/dL     Manual Differential [[id number]]  (Abnormal) Collected:  10/19/19 0424    Specimen:  Blood Updated:  10/19/19 0700     Neutrophil % 77.0 %      Lymphocyte % 7.0 %      Monocyte % 2.0 %      Bands %  12.0 %      Metamyelocyte % 1.0 %      Myelocyte % 1.0 %      Neutrophils Absolute 9.43 10*3/mm3      Lymphocytes Absolute 0.74 10*3/mm3      Monocytes Absolute 0.21 10*3/mm3      Anisocytosis Slight/1+     Polychromasia Slight/1+     Toxic Granulation Slight/1+     Giant Platelets Slight/1+    CBC & Differential [[id number]] Collected:  10/19/19 0424    Specimen:  Blood Updated:  10/19/19 0700    Narrative:       The following orders were created for panel order CBC & Differential.  Procedure                               Abnormality         Status                     ---------                               -----------         ------                     CBC Auto Differential[[id number]]        Abnormal            Final result                 Please view results for these tests on the individual orders.    CBC Auto Differential [[id number]]  (Abnormal) Collected:  10/19/19 0424    Specimen:  Blood Updated:  10/19/19 0700     WBC 10.60 10*3/mm3      RBC 2.87 10*6/mm3      Hemoglobin 9.6 g/dL      Hematocrit 28.9 %      .7 fL      MCH 33.3 pg      MCHC 33.1 g/dL      RDW 15.7 %      RDW-SD 56.4 fl      MPV 9.0 fL      Platelets 148 10*3/mm3     Scan Slide [[id number]] Collected:  10/19/19 0424    Specimen:  Blood Updated:  10/19/19 0700      Scan Slide --     Comment: See Manual Differential Results       Magnesium [[id number]]  (Normal) Collected:  10/19/19 0424    Specimen:  Blood Updated:  10/19/19 0513     Magnesium 2.2 mg/dL     Blood Culture - Blood, Arm, Left [[id number]] Collected:  10/14/19 0121    Specimen:  Blood from Arm, Left Updated:  10/19/19 0130     Blood Culture No growth at 5 days    Blood Culture - Blood, Arm, Right [[id number]] Collected:  10/14/19 0121    Specimen:  Blood from Arm, Right Updated:  10/19/19 0130     Blood Culture No growth at 5 days    Blood Culture - Blood, Arm, Left [[id number]] Collected:  10/18/19 2332    Specimen:  Blood from Arm, Left Updated:  10/19/19 0012    POC Glucose Once [[id number]]  (Abnormal) Collected:  10/18/19 1659    Specimen:  Blood Updated:  10/18/19 1701     Glucose 258 mg/dL      Comment: Serial Number: [id number]:  [id number]       POC Glucose Once [[id number]]  (Abnormal) Collected:  10/18/19 1122    Specimen:  Blood Updated:  10/18/19 1128     Glucose 327 mg/dL      Comment: Serial Number: [id number]:  [id number]                Imaging:  Imaging Results (last 72 hours)     Procedure Component Value Units Date/Time    FL Video Swallow With Speech [[id number]] Collected:  10/15/19 1226     Updated:  10/15/19 1233    Narrative:       DATE OF EXAM:  10/15/2019 12:00 PM     PROCEDURE:  FL VIDEO SWALLOW W SPEECH-     INDICATIONS:  dysphagia; A41.9-Sepsis, unspecified organism; R65.20-Severe sepsis  without septic shock; J69.0-Pneumonitis due to inhalation of food and  vomit; R73.9-Hyperglycemia, unspecified; N28.9-Disorder of kidney and  ureter, unspecified     COMPARISON:  No Comparisons Available     TECHNIQUE:   This examination was performed in conjunction with speech pathology.  Lateral video fluoroscopic evaluation of the swallowing mechanism was  performed while correlate administering to the patient various  consistency food items mixed with barium.     Fluoroscopic Time:   1.3  minutes     FINDINGS:  There was penetration with aspiration with thin barium. There were  residuals with nectar and applesauce consistencies.        Impression:       1.Aspiration with thin barium.  2.Please refer to speech pathology report for further details.     Electronically Signed By-DR. Darnell Montilla MD On:10/15/2019 12:30 PM  This report was finalized on [id number] by DR. Darnell Montilla MD.    XR Chest 1 View [[id number]] Collected:  10/15/19 0712     Updated:  10/15/19 0716    Narrative:       XR CHEST 1 VW-     Date of Exam: 10/15/2019 6:28 AM     Indication: REsp failure; A41.9-Sepsis, unspecified organism;  R65.20-Severe sepsis without septic shock; J69.0-Pneumonitis due to  inhalation of food and vomit; R73.9-Hyperglycemia, unspecified;  N28.9-Disorder of kidney and ureter, unspecified.     Comparison Exams: October 14, 2019     Technique: Single AP chest radiograph     FINDINGS:  A small right apical pneumothorax is unchanged. Hazy bilateral airspace  opacities worse in the right lower lung are unchanged, suggesting  multifocal pneumonia. There are moderate right and small left pleural  effusions. The heart and mediastinal contours appear stable. The osseous  structures appear intact.       Impression:       1.Small right apical pneumothorax is unchanged.  2.Hazy bilateral airspace opacities worse in the right lower lung are  unchanged, suggesting multifocal pneumonia.  3.Moderate right and small left pleural effusions.     Electronically Signed By-DR. Darnell Montilla MD On:10/15/2019 7:14 AM  This report was finalized on [id number] by DR. Darnell Montilla MD.    XR Chest 1 View [[id number]] Collected:  10/14/19 1421     Updated:  10/14/19 1424    Narrative:       DATE OF EXAM:  10/14/2019 1:38 PM     PROCEDURE:  XR CHEST 1 VW-     INDICATIONS:  Pneumothorax; A41.9-Sepsis, unspecified organism; R65.20-Severe sepsis  without septic shock; J69.0-Pneumonitis due to inhalation of food  and  vomit; R73.9-Hyperglycemia, unspecified; N28.9-Disorder of kidney and  ureter, unspecified       COMPARISON:  AP portable chest 10/14/2019.     TECHNIQUE:   Single radiographic view of the chest was obtained.     FINDINGS:  Dense consolidative changes in the bilateral lower lobes, right greater  than left, with small to moderate right and small left pleural  effusions, without significant change. More ill-defined patchy  infiltrates in the right upper lobe and left midlung likewise stable.  Small right apical pneumothorax is unchanged. Stable cardiac  enlargement.       Impression:          1. Stable small right apical pneumothorax compared to earlier today.  2. Multifocal patchy infiltrates in both lungs with more confluent  bibasilar consolidations the appearance of pneumonia, stable.  3. Small to moderate right, small left pleural effusions, unchanged.     Electronically Signed By-Dr. Mary Hope MD On:10/14/2019 2:22 PM  This report was finalized on [id number] by Dr. Mary Hope MD.    XR Chest 1 View [[id number]] Collected:  10/14/19 0832     Updated:  10/14/19 0837    Narrative:       XR CHEST 1 VW-     Date of Exam: 10/14/2019 8:30 AM     Indication: pneumothorax; A41.9-Sepsis, unspecified organism;  R65.20-Severe sepsis without septic shock; J69.0-Pneumonitis due to  inhalation of food and vomit; R73.9-Hyperglycemia, unspecified;  N28.9-Disorder of kidney and ureter, unspecified.     Comparison Exams: Chest radiograph from earlier today     Technique: Single AP chest radiograph     FINDINGS:  A small right apical pneumothorax has slightly improved now measuring  1.8 cm in diameter, previously 2.2 cm. Hazy bilateral airspace opacities  worse in the right lower lung appear slightly improved. There are  moderate right and small left pleural effusions. The heart and  mediastinal contours appear stable. The pulmonary vasculature appears  normal. The osseous structures appear intact.        Impression:       1.Small right apical pneumothorax has slightly improved.  2.Hazy bilateral airspace opacities worse in the right lower lung have  slightly improved, suggesting multifocal pneumonia.  3.Moderate right and small left pleural effusions.     Electronically Signed By-DR. Darnell Montilla MD On:10/14/2019 8:35 AM  This report was finalized on [id number] by DR. Darnell Montilla MD.    XR Chest 1 View [[id number]] Collected:  10/14/19 0734     Updated:  10/14/19 0742    Narrative:       XR CHEST 1 VW-     Date of Exam: 10/14/2019 1:39 AM     Indication: Severe Sepsis Protocol; A41.9-Sepsis, unspecified organism;  R65.20-Severe sepsis without septic shock; J69.0-Pneumonitis due to  inhalation of food and vomit; R73.9-Hyperglycemia, unspecified.     Comparison Exams: September 7, 2019     Technique: Single AP chest radiograph     FINDINGS:  There is a small-to-moderate right apical pneumothorax measuring up to  2.2 cm in diameter. There are hazy bilateral airspace opacities worse in  the right lower lung. There are moderate right and small left pleural  effusions. The heart and mediastinal contours appear stable. The  pulmonary vasculature appears normal. The osseous structures appear  intact.       Impression:       1.Small-to-moderate right apical pneumothorax.  2.Hazy bilateral airspace opacities worse in the right lower lung,  suggesting multifocal pneumonia.  3.Moderate right and small left pleural effusions.     Results were relayed to Dr. Mittal at time of dictation.     Electronically Signed By-DR. Darnell Montilla MD On:10/14/2019 7:39 AM  This report was finalized on [id number] by DR. Darnell Montilla MD.            ASSESSMENT/PLAN:     Decubitus ulcer of sacral region, stage 3 (CMS/HCC)    Diabetes mellitus, type II (CMS/HCC)    Dermatitis associated with moisture    Severe sepsis (CMS/HCC)    Aspiration pneumonia (CMS/HCC)    Pneumothorax  Acute hypoxemic respiratory failure  Aspiration  pneumonia  Right apical pneumothorax. -Etiology unclear.  Likely spontaneous.  Acute encephalopathy.  Likely toxic metabolic encephalopathy.  History of chronic dysphagia  Chronic diastolic congestive heart failure.  EF 20 to 25%  ESTEBAN  Diabetes mellitus with hyperglycemia.  History of hypertension  History of CVA  History of generalized anxiety disorder    PLAN    Right lung infiltrate concerning for aspiration pneumonia along with a small right apical pneumothorax.  Pneumothorax has been stable on serial x-ray monitoring.   Currently he is on 2 L nasal cannula.  Does not appear to be any acute respiratory distress.    Initially on broad-spectrum antibiotic therapy with vancomycin, clindamycin and cefepime. Blood cultures are negative so far.  De-escalated to Zosyn  Video swallow with persistent aspiration.  Speech therapy recommends n.p.o. with alternative means of nutrition.  stress dose steroids.  It can be switched back to p.o. steroids once patient is able to take p.o.  He is on Lantus and sliding scale insulin. We will closely monitor  goals of care with patient's nephew (who is his power of  but not healthcare proxy) on admission.  Unable to get hold of daughter who lives out of town.     IVFs of D5, hypernatremic 156 to 151 today.     TF    Patient was previously under Hospice per nursing    Resp status remains stable.    DNR

## 2019-10-19 NOTE — CONSULTS
GI CONSULT  NOTE:    Referring Provider:  Dr Mittal    Chief complaint: consult for PEG    Subjective    NONE    History of present illness:    Patient is a 87-year-old male with a history of diabetes, CAD/atrial fibrillation/MI, coronary stenting on Plavix, stroke, CHF, cholecystectomy who came to the emergency room on 10/14/2019 with elevated blood sugar of 1035 and change of mental status.  Besides elevated blood sugar patient was also noted to have right pneumonia.  Family at bedside state patient was eating and his normal self last Saturday, 10/12/2019.  Was on a regular diet.  Patient was on hospice at one time.  Family at bedside is unsure of patient's wishes.  Patient knows he is in the hospital and states his name.    Patient did undergo barium video swallow and was noted to have penetration with aspiration of thin barium.    Endo History:  Unknown, nothing in [facility]    Past Medical History:  Past Medical History:   Diagnosis Date   • Atrial fibrillation (CMS/HCC)    • CHF (congestive heart failure) (CMS/HCC)    • Coronary artery disease    • Myocardial infarction (CMS/HCC)    • Neuropathy    • Stroke (CMS/HCC)        Past Surgical History:  Past Surgical History:   Procedure Laterality Date   • ANGIOPLASTY     • BACK SURGERY     • CHOLECYSTECTOMY     • CORONARY STENT PLACEMENT     • SPINAL FUSION         Social History:  Social History     Tobacco Use   • Smoking status: Former Smoker     Types: Cigarettes   • Smokeless tobacco: Never Used   • Tobacco comment: Quit 40 years go   Substance Use Topics   • Alcohol use: No     Frequency: Never   • Drug use: No       Family History:  Family History   Problem Relation Age of Onset   • Coronary artery disease Brother        Medications:  Medications Prior to Admission   Medication Sig Dispense Refill Last Dose   • carvedilol (COREG) 3.125 MG tablet Take 3.125 mg by mouth 2 (Two) Times a Day With Meals.   10/13/2019 at Unknown time   • clopidogrel (PLAVIX) 75 MG  tablet Take 1 tablet by mouth Daily. 30 tablet 0 10/13/2019 at Unknown time   • dexamethasone (DECADRON) 2 MG tablet Take 2 mg by mouth Daily.   10/13/2019 at Unknown time   • furosemide (LASIX) 40 MG tablet Take 40 mg by mouth 2 (Two) Times a Day.   10/13/2019 at Unknown time   • gabapentin (NEURONTIN) 300 MG capsule Take 1 capsule by mouth 3 (Three) Times a Day. 30 capsule 0 10/13/2019 at Unknown time   • isosorbide mononitrate (IMDUR) 30 MG 24 hr tablet Take 1 tablet by mouth Daily. 30 tablet 0 10/13/2019 at Unknown time   • loperamide (IMODIUM) 2 MG capsule Take 2 mg by mouth Daily As Needed for Diarrhea.   10/13/2019 at Unknown time   • Nystatin (MAGIC BUTT PASTE) Apply 1 each topically to the appropriate area as directed 2 (Two) Times a Day. 12 g 0 10/13/2019 at Unknown time   • polyethylene glycol (MIRALAX) packet Take 17 g by mouth Daily.   10/13/2019 at Unknown time   • raNITIdine (ZANTAC) 150 MG tablet Take 150 mg by mouth Daily.   10/13/2019 at Unknown time   • senna (SENOKOT) 8.6 MG tablet tablet Take 2 tablets by mouth 2 (Two) Times a Day.   10/13/2019 at Unknown time   • traZODone (DESYREL) 100 MG tablet Take 100 mg by mouth Every Night.   Past Week at Unknown time   • LORazepam (ATIVAN) 0.5 MG tablet Take 1 tablet by mouth Every 6 (Six) Hours As Needed for Anxiety. 30 tablet 0 Unknown at Unknown time   • nitroglycerin (NITROSTAT) 0.4 MG SL tablet Place 1 tablet under the tongue Every 5 (Five) Minutes As Needed for Chest Pain (Only if SBP Greater Than 100). Take no more than 3 tabs 30 tablet 0 Unknown at Unknown time       Scheduled Meds:  albuterol 2.5 mg Nebulization Q4H - RT   carvedilol 3.125 mg Oral BID With Meals   digoxin 125 mcg Intravenous Daily   enoxaparin 30 mg Subcutaneous Q24H   hydrocortisone sodium succinate 50 mg Intravenous Q12H   insulin lispro 0-7 Units Subcutaneous 4x Daily With Meals & Nightly   isosorbide mononitrate 30 mg Oral Daily   magic butt paste  Topical TID   pantoprazole  40 mg Oral Q AM   piperacillin-tazobactam 3.375 g Intravenous Q8H   sodium chloride 10 mL Intravenous Q12H     Continuous Infusions:  dextrose 150 mL/hr Last Rate: 150 mL/hr (10/19/19 0310)     PRN Meds:.dextrose  •  dextrose  •  glucagon (human recombinant)  •  nitroglycerin  •  potassium chloride **OR** potassium chloride **OR** potassium chloride  •  sodium chloride  •  sodium chloride  •  sodium chloride    ALLERGIES:  Patient has no known allergies.    ROS:  Review of Systems   Constitutional: Positive for activity change and appetite change.   Psychiatric/Behavioral: Positive for confusion.       The following systems were reviewed and negative;  Constitution:  No fevers, chills, no unintentional weight loss  Skin: no rash, no jaundice  Eyes:  No blurry vision, no eye pain  HENT:  No change in hearing or smell  Resp:  No dyspnea or cough  CV:  No chest pain or palpitations  :  No dysuria, hematuria  Musculoskeletal:  No leg cramps or arthralgias  Neuro:  No tremor, no numbness  Psych:  No depression or confsuion    Objective   Resting in hospital bed.  Family at bedside    Vital Signs:   Vitals:    10/19/19 0354 10/19/19 0637 10/19/19 0641 10/19/19 0700   BP:    113/64   Pulse: 95 96  95   Resp: 24 20 20 22   Temp:    97 °F (36.1 °C)   TempSrc:       SpO2: 95% 94%  92%   Weight:    59.2 kg (130 lb 8.2 oz)   Height:           Physical Exam:   General Appearance:    Awake and alert, in no acute distress.  Thin and frail appearing.   Head:    Normocephalic, without obvious abnormality, atraumatic   Eyes:            Conjunctivae normal, anicteric sclera, pupils equal   Ears:    Ears appear intact with no abnormalities noted   Throat:   No oral lesions, no thrush, oral mucosa moist   Neck:   Supple, no JVD   Lungs:     Clear to auscultation bilaterally, respirations regular, even and unlabored    Heart:    Regular rhythm and normal rate, normal S1 and S2, no            Murmur appreciated   Chest Wall:    No  abnormalities observed   Abdomen:     Normal bowel sounds, soft, non-tender, no rebound or guarding, non-distended, no hepatosplenomegaly   Rectal:     Deferred   Extremities:   Moves all extremities, no edema, no cyanosis   Pulses:   Pulses palpable and equal bilaterally   Skin:   No rash, no jaundice, normal palpaion   Lymph nodes:   No cervical, supraclavicular or submandibular palpable adenopathy   Neurologic:   Cranial nerves 2 - 12 grossly intact, no asterixis       Results Review:   I reviewed the patient's labs and imaging.  Lab Results (last 24 hours)     Procedure Component Value Units Date/Time    Blood Culture - Blood, Arm, Left [[id number]] Collected:  10/18/19 0921    Specimen:  Blood from Arm, Left Updated:  10/19/19 0945     Blood Culture No growth at 24 hours    Comprehensive Metabolic Panel [[id number]]  (Abnormal) Collected:  10/19/19 0616    Specimen:  Blood Updated:  10/19/19 0743     Glucose 575 mg/dL      BUN 58 mg/dL      Creatinine 1.60 mg/dL      Sodium 151 mmol/L      Potassium 2.6 mmol/L      Chloride 113 mmol/L      CO2 23.0 mmol/L      Calcium 8.2 mg/dL      Total Protein 5.2 g/dL      Albumin 3.40 g/dL      ALT (SGPT) 10 U/L      AST (SGOT) 14 U/L      Alkaline Phosphatase 74 U/L      Total Bilirubin 0.8 mg/dL      eGFR Non African Amer 41 mL/min/1.73      Globulin 1.8 gm/dL      A/G Ratio 1.9 g/dL      BUN/Creatinine Ratio 36.3     Anion Gap 15.0 mmol/L     Narrative:       GFR Normal >60  Chronic Kidney Disease <60  Kidney Failure <15    Phosphorus [[id number]]  (Normal) Collected:  10/19/19 0616    Specimen:  Blood Updated:  10/19/19 0716     Phosphorus 3.0 mg/dL     Manual Differential [[id number]]  (Abnormal) Collected:  10/19/19 0424    Specimen:  Blood Updated:  10/19/19 0700     Neutrophil % 77.0 %      Lymphocyte % 7.0 %      Monocyte % 2.0 %      Bands %  12.0 %      Metamyelocyte % 1.0 %      Myelocyte % 1.0 %      Neutrophils Absolute 9.43 10*3/mm3      Lymphocytes Absolute  0.74 10*3/mm3      Monocytes Absolute 0.21 10*3/mm3      Anisocytosis Slight/1+     Polychromasia Slight/1+     Toxic Granulation Slight/1+     Giant Platelets Slight/1+    CBC & Differential [[id number]] Collected:  10/19/19 0424    Specimen:  Blood Updated:  10/19/19 0700    Narrative:       The following orders were created for panel order CBC & Differential.  Procedure                               Abnormality         Status                     ---------                               -----------         ------                     CBC Auto Differential[[id number]]        Abnormal            Final result                 Please view results for these tests on the individual orders.    CBC Auto Differential [[id number]]  (Abnormal) Collected:  10/19/19 0424    Specimen:  Blood Updated:  10/19/19 0700     WBC 10.60 10*3/mm3      RBC 2.87 10*6/mm3      Hemoglobin 9.6 g/dL      Hematocrit 28.9 %      .7 fL      MCH 33.3 pg      MCHC 33.1 g/dL      RDW 15.7 %      RDW-SD 56.4 fl      MPV 9.0 fL      Platelets 148 10*3/mm3     Scan Slide [[id number]] Collected:  10/19/19 0424    Specimen:  Blood Updated:  10/19/19 0700     Scan Slide --     Comment: See Manual Differential Results       Magnesium [[id number]]  (Normal) Collected:  10/19/19 0424    Specimen:  Blood Updated:  10/19/19 0513     Magnesium 2.2 mg/dL     Blood Culture - Blood, Arm, Left [[id number]] Collected:  10/14/19 0121    Specimen:  Blood from Arm, Left Updated:  10/19/19 0130     Blood Culture No growth at 5 days    Blood Culture - Blood, Arm, Right [[id number]] Collected:  10/14/19 0121    Specimen:  Blood from Arm, Right Updated:  10/19/19 0130     Blood Culture No growth at 5 days    Blood Culture - Blood, Arm, Left [[id number]] Collected:  10/18/19 2332    Specimen:  Blood from Arm, Left Updated:  10/19/19 0012    POC Glucose Once [[id number]]  (Abnormal) Collected:  10/18/19 1659    Specimen:  Blood Updated:  10/18/19 1701     Glucose 258 mg/dL       Comment: Serial Number: [id number]:  [id number]       POC Glucose Once [[id number]]  (Abnormal) Collected:  10/18/19 1122    Specimen:  Blood Updated:  10/18/19 1128     Glucose 327 mg/dL      Comment: Serial Number: [id number]:  [id number]             Imaging Results (last 24 hours)     Procedure Component Value Units Date/Time    XR Abdomen KUB [[id number]] Collected:  10/18/19 1319     Updated:  10/18/19 1323    Narrative:       DATE OF EXAM:  10/18/2019 12:57 PM     PROCEDURE:  XR ABDOMEN KUB-     INDICATIONS:  NG PLACEMENT; A41.9-Sepsis, unspecified organism; R65.20-Severe sepsis  without septic shock; J69.0-Pneumonitis due to inhalation of food and  vomit; R73.9-Hyperglycemia, unspecified; N28.9-Disorder of kidney and  ureter, unspecified     COMPARISON:  KUB 10/18/2019.     TECHNIQUE:   Single radiographic view of the abdomen was obtained.        FINDINGS:  The history states the study is for injury to or Dobbhoff placement.  However, no enteric tube is seen within the imaged chest or upper  abdomen.     Dense opacity in the right base may present a combination of  consolidated lung and pleural fluid. Ill-defined interstitial  infiltrates are thought to be present bilaterally.     Multilevel prominent osteophytes in the thoracolumbar spine with mild  lumbar curvature toward the left.     Nonspecific but nonobstructive bowel gas pattern in the included upper  abdomen.       Impression:          1. No enteric tube is seen within the included field of view of the  chest or upper abdomen. Correlate to the patient's known history.  2. If this study was truly for enteric tube placement, it is conceivable  it could be coiled in the upper throat or mouth. If this is the case,  consider retracting, repositioning and repeat imaging confirmed  placement.     Electronically Signed By-Dr. Mary Hope MD On:10/18/2019 1:21 PM  This report was finalized on [id number] by Dr. Mary Hope MD.              ASSESSMENT AND PLAN:  Feeding difficulties due to acute illness with weakness and electrolyte abnormalities  Failed video swallow  Hypernatremia  Hypokalemia  Dehydration  Acute kidney injury  Decubitus ulcer of sacral region, stage 3 (CMS/[facility])  Uncontrolled diabetes mellitus, type II (CMS/[facility])  Dermatitis associated with moisture  Severe sepsis (CMS/[facility])  Aspiration pneumonia (CMS/[facility])  History of atrial fibrillation  CAD/MI, coronary stenting on Plavix  History of CVA  History of cholecystectomy    Plan:  Plan discussed with patient and family at bedside.  Plan to correct electrolytes and monitor patient's improvement.  Patient is experiencing less confusion and seems to be improving his strength.  Patient has had unsuccessful attempts at bedside for NG tube placement.  We can consider attempting placement at bedside, if he does not improve.   IV fluids  Glycemic control  Supportive care  Magnesium level is normal at 2.2      I discussed the patients findings and my recommendations with the patient.  Marissa Holman, ANDIE  10/19/19  10:34 AM    Time:

## 2019-10-19 NOTE — PLAN OF CARE
Problem: Fall Risk (Adult)  Goal: Identify Related Risk Factors and Signs and Symptoms  Outcome: Ongoing (interventions implemented as appropriate)    Goal: Absence of Fall  Outcome: Ongoing (interventions implemented as appropriate)      Problem: Patient Care Overview  Goal: Plan of Care Review  Outcome: Ongoing (interventions implemented as appropriate)   10/19/19 0842   Coping/Psychosocial   Plan of Care Reviewed With patient   Plan of Care Review   Progress no change   OTHER   Outcome Summary Pt strict NPO. Plans to discuss with MD/ family PEG tube vs Palliative care in am. No distress noted.        Problem: Skin Injury Risk (Adult)  Goal: Identify Related Risk Factors and Signs and Symptoms  Outcome: Ongoing (interventions implemented as appropriate)    Goal: Skin Health and Integrity  Outcome: Ongoing (interventions implemented as appropriate)      Problem: Pneumonia (Adult)  Goal: Signs and Symptoms of Listed Potential Problems Will be Absent, Minimized or Managed (Pneumonia)  Outcome: Ongoing (interventions implemented as appropriate)      Problem: Nutrition, Imbalanced: Inadequate Oral Intake (Adult)  Goal: Identify Related Risk Factors and Signs and Symptoms  Outcome: Ongoing (interventions implemented as appropriate)    Goal: Improved Oral Intake  Outcome: Ongoing (interventions implemented as appropriate)    Goal: Prevent Further Weight Loss  Outcome: Ongoing (interventions implemented as appropriate)

## 2019-10-19 NOTE — PROGRESS NOTES
LOS: 5 days   Patient Care Team:  Jordan Mittal MD as PCP - General (Family Medicine)  NANCY Mejia MD as PCP - Claims Attributed  Dina Coronado, RN as Community Care Coordinator (Population Health)    Chief Complaint: Unable to respond    Subjective      The patient has been afebrile for the last 24 hours.  The patient is on room air, hemodynamically stable, and is tolerating antimicrobial therapy.    Review of Systems:   Review of Systems   Unable to perform ROS: Dementia        Objective     Vital Signs  Temp:  [97 °F (36.1 °C)-98.2 °F (36.8 °C)] 97 °F (36.1 °C)  Heart Rate:  [] 95  Resp:  [18-24] 22  BP: (107-120)/(34-64) 113/64    Physical Exam:  Physical Exam   Constitutional: He appears well-developed and well-nourished.   Appears chronically ill   HENT:   Head: Normocephalic and atraumatic.   Eyes: Conjunctivae and EOM are normal. Pupils are equal, round, and reactive to light.   Neck: Neck supple.   Cardiovascular: Normal rate, regular rhythm and normal heart sounds.   Pulmonary/Chest: Effort normal.   Crackles in bases   Abdominal: Soft.   Hypoactive bowel sounds   Musculoskeletal:   Degenerative changes   Neurological: He is alert.   Appears to be nonverbal   Skin: Skin is warm and dry.   Stage III decubitus ulcer   Vitals reviewed.       Results Review:    I have reviewed all clinical data, test, lab, and imaging results.     Radiology  Xr Abdomen Kub    Result Date: 10/18/2019  DATE OF EXAM: 10/18/2019 12:57 PM  PROCEDURE: XR ABDOMEN KUB-  INDICATIONS: NG PLACEMENT; A41.9-Sepsis, unspecified organism; R65.20-Severe sepsis without septic shock; J69.0-Pneumonitis due to inhalation of food and vomit; R73.9-Hyperglycemia, unspecified; N28.9-Disorder of kidney and ureter, unspecified  COMPARISON: KUB 10/18/2019.  TECHNIQUE: Single radiographic view of the abdomen was obtained.   FINDINGS: The history states the study is for injury to or Dobbhoff placement. However, no enteric tube is  seen within the imaged chest or upper abdomen.  Dense opacity in the right base may present a combination of consolidated lung and pleural fluid. Ill-defined interstitial infiltrates are thought to be present bilaterally.  Multilevel prominent osteophytes in the thoracolumbar spine with mild lumbar curvature toward the left.  Nonspecific but nonobstructive bowel gas pattern in the included upper abdomen.       1. No enteric tube is seen within the included field of view of the chest or upper abdomen. Correlate to the patient's known history. 2. If this study was truly for enteric tube placement, it is conceivable it could be coiled in the upper throat or mouth. If this is the case, consider retracting, repositioning and repeat imaging confirmed placement.  Electronically Signed By-Dr. Mary Hope MD On:10/18/2019 1:21 PM This report was finalized on [id number] by Dr. Mary Hope MD.      Cardiology      Laboratory  Lab Results (last 24 hours)     Procedure Component Value Units Date/Time    Blood Culture - Blood, Arm, Left [[id number]] Collected:  10/18/19 0921    Specimen:  Blood from Arm, Left Updated:  10/19/19 0945     Blood Culture No growth at 24 hours    Comprehensive Metabolic Panel [[id number]]  (Abnormal) Collected:  10/19/19 0616    Specimen:  Blood Updated:  10/19/19 0743     Glucose 575 mg/dL      BUN 58 mg/dL      Creatinine 1.60 mg/dL      Sodium 151 mmol/L      Potassium 2.6 mmol/L      Chloride 113 mmol/L      CO2 23.0 mmol/L      Calcium 8.2 mg/dL      Total Protein 5.2 g/dL      Albumin 3.40 g/dL      ALT (SGPT) 10 U/L      AST (SGOT) 14 U/L      Alkaline Phosphatase 74 U/L      Total Bilirubin 0.8 mg/dL      eGFR Non African Amer 41 mL/min/1.73      Globulin 1.8 gm/dL      A/G Ratio 1.9 g/dL      BUN/Creatinine Ratio 36.3     Anion Gap 15.0 mmol/L     Narrative:       GFR Normal >60  Chronic Kidney Disease <60  Kidney Failure <15    Phosphorus [[id number]]  (Normal) Collected:  10/19/19  0616    Specimen:  Blood Updated:  10/19/19 0716     Phosphorus 3.0 mg/dL     Manual Differential [[id number]]  (Abnormal) Collected:  10/19/19 0424    Specimen:  Blood Updated:  10/19/19 0700     Neutrophil % 77.0 %      Lymphocyte % 7.0 %      Monocyte % 2.0 %      Bands %  12.0 %      Metamyelocyte % 1.0 %      Myelocyte % 1.0 %      Neutrophils Absolute 9.43 10*3/mm3      Lymphocytes Absolute 0.74 10*3/mm3      Monocytes Absolute 0.21 10*3/mm3      Anisocytosis Slight/1+     Polychromasia Slight/1+     Toxic Granulation Slight/1+     Giant Platelets Slight/1+    CBC & Differential [[id number]] Collected:  10/19/19 0424    Specimen:  Blood Updated:  10/19/19 0700    Narrative:       The following orders were created for panel order CBC & Differential.  Procedure                               Abnormality         Status                     ---------                               -----------         ------                     CBC Auto Differential[[id number]]        Abnormal            Final result                 Please view results for these tests on the individual orders.    CBC Auto Differential [[id number]]  (Abnormal) Collected:  10/19/19 0424    Specimen:  Blood Updated:  10/19/19 0700     WBC 10.60 10*3/mm3      RBC 2.87 10*6/mm3      Hemoglobin 9.6 g/dL      Hematocrit 28.9 %      .7 fL      MCH 33.3 pg      MCHC 33.1 g/dL      RDW 15.7 %      RDW-SD 56.4 fl      MPV 9.0 fL      Platelets 148 10*3/mm3     Scan Slide [[id number]] Collected:  10/19/19 0424    Specimen:  Blood Updated:  10/19/19 0700     Scan Slide --     Comment: See Manual Differential Results       Magnesium [[id number]]  (Normal) Collected:  10/19/19 0424    Specimen:  Blood Updated:  10/19/19 0513     Magnesium 2.2 mg/dL     Blood Culture - Blood, Arm, Left [[id number]] Collected:  10/14/19 0121    Specimen:  Blood from Arm, Left Updated:  10/19/19 0130     Blood Culture No growth at 5 days    Blood Culture - Blood, Arm, Right [[id number]]  Collected:  10/14/19 0121    Specimen:  Blood from Arm, Right Updated:  10/19/19 0130     Blood Culture No growth at 5 days    Blood Culture - Blood, Arm, Left [[id number]] Collected:  10/18/19 2332    Specimen:  Blood from Arm, Left Updated:  10/19/19 0012    POC Glucose Once [[id number]]  (Abnormal) Collected:  10/18/19 1659    Specimen:  Blood Updated:  10/18/19 1701     Glucose 258 mg/dL      Comment: Serial Number: [id number]:  [id number]       POC Glucose Once [[id number]]  (Abnormal) Collected:  10/18/19 1122    Specimen:  Blood Updated:  10/18/19 1128     Glucose 327 mg/dL      Comment: Serial Number: [id number]:  [id number]             Microbiology     Microbiology Results (last 10 days)     Procedure Component Value - Date/Time    Blood Culture - Blood, Arm, Left [[id number]] Collected:  10/18/19 0921    Lab Status:  Preliminary result Specimen:  Blood from Arm, Left Updated:  10/19/19 0945     Blood Culture No growth at 24 hours    Blood Culture - Blood, Arm, Left [[id number]] Collected:  10/14/19 0121    Lab Status:  Final result Specimen:  Blood from Arm, Left Updated:  10/19/19 0130     Blood Culture No growth at 5 days    Blood Culture - Blood, Arm, Right [[id number]] Collected:  10/14/19 0121    Lab Status:  Final result Specimen:  Blood from Arm, Right Updated:  10/19/19 0130     Blood Culture No growth at 5 days          Medication Review:     Hospital Medications (active)       Dose Frequency Start End    albuterol (PROVENTIL) nebulizer solution 0.083% 2.5 mg/3mL 2.5 mg Every 4 Hours - RT 10/17/2019     Sig - Route: Take 2.5 mg by nebulization Every 4 (Four) Hours. - Nebulization    carvedilol (COREG) tablet 3.125 mg 3.125 mg 2 Times Daily With Meals 10/14/2019     Sig - Route: Take 1 tablet by mouth 2 (Two) Times a Day With Meals. - Oral    dextrose (D50W) 25 g/ 50mL Intravenous Solution 25 g 25 g Every 15 Minutes PRN 10/14/2019     Sig - Route: Infuse 50 mL into a venous catheter Every 15  (Fifteen) Minutes As Needed for Low Blood Sugar (Blood Sugar Less Than 70). - Intravenous    dextrose (D5W) 5 % infusion 150 mL/hr Continuous 10/17/2019     Sig - Route: Infuse 150 mL/hr into a venous catheter Continuous. - Intravenous    dextrose (GLUTOSE) oral gel 15 g 15 g Every 15 Minutes PRN 10/14/2019     Sig - Route: Take 15 application by mouth Every 15 (Fifteen) Minutes As Needed for Low Blood Sugar (Blood sugar less than 70). - Oral    digoxin (LANOXIN) injection 125 mcg 125 mcg Daily Digoxin 10/17/2019     Sig - Route: Infuse 0.5 mL into a venous catheter Daily. - Intravenous    enoxaparin (LOVENOX) syringe 30 mg 30 mg Every 24 Hours 10/17/2019     Sig - Route: Inject 0.3 mL under the skin into the appropriate area as directed Daily. - Subcutaneous    glucagon (human recombinant) (GLUCAGEN DIAGNOSTIC) injection 1 mg 1 mg Every 15 Minutes PRN 10/14/2019     Sig - Route: Inject 1 mg under the skin into the appropriate area as directed Every 15 (Fifteen) Minutes As Needed for Low Blood Sugar (Blood Glucose Less Than 70). - Subcutaneous    hydrocortisone sodium succinate (Solu-CORTEF) injection 50 mg 50 mg Every 12 Hours 10/17/2019     Sig - Route: Infuse 50 mg into a venous catheter Every 12 (Twelve) Hours. - Intravenous    insulin lispro (humaLOG) injection 0-7 Units 0-7 Units 4 Times Daily With Meals & Nightly 10/14/2019     Sig - Route: Inject 0-7 Units under the skin into the appropriate area as directed 4 (Four) Times a Day With Meals & at Bedtime. - Subcutaneous    Cosign for Ordering: Accepted by Mikaela Owsuu DO on 10/15/2019  6:19 AM    isosorbide mononitrate (IMDUR) 24 hr tablet 30 mg 30 mg Daily 10/14/2019     Sig - Route: Take 1 tablet by mouth Daily. - Oral    magic butt paste  3 Times Daily 10/14/2019     Sig - Route: Apply  topically to the appropriate area as directed 3 (Three) Times a Day. - Topical    nitroglycerin (NITROSTAT) SL tablet 0.4 mg 0.4 mg Every 5 Minutes PRN 10/14/2019     Sig  "- Route: Place 1 tablet under the tongue Every 5 (Five) Minutes As Needed for Chest Pain (Only if SBP Greater Than 100). - Sublingual    pantoprazole (PROTONIX) EC tablet 40 mg 40 mg Every Early Morning 10/14/2019     Sig - Route: Take 1 tablet by mouth Every Morning. - Oral    piperacillin-tazobactam (ZOSYN) IVPB 3.375 g in 100 mL NS (CD) 3.375 g Every 8 Hours 10/18/2019 10/25/2019    Sig - Route: Infuse 3.375 g into a venous catheter Every 8 (Eight) Hours. - Intravenous    potassium chloride (K-DUR,KLOR-CON) CR tablet 40 mEq 40 mEq As Needed 10/15/2019     Sig - Route: Take 2 tablets by mouth As Needed (Potassium Replacement.  See Admin Instructions). - Oral    Linked Group 1:  \"Or\" Linked Group Details        potassium chloride (KLOR-CON) packet 40 mEq 40 mEq As Needed 10/15/2019     Sig - Route: Take 40 mEq by mouth As Needed (potassium replacement, see admin instructions). - Oral    Linked Group 1:  \"Or\" Linked Group Details        potassium chloride 10 mEq in 100 mL IVPB 10 mEq Every 1 Hour PRN 10/15/2019     Sig - Route: Infuse 100 mL into a venous catheter Every 1 (One) Hour As Needed (Potassium Replacement - See Admin Instructions). - Intravenous    Linked Group 1:  \"Or\" Linked Group Details        sodium chloride 0.9 % flush 10 mL 10 mL As Needed 10/14/2019     Sig - Route: Infuse 10 mL into a venous catheter As Needed for Line Care. - Intravenous    Cosign for Ordering: Accepted by Carlos Eugene MD on 10/14/2019  3:09 AM    sodium chloride 0.9 % flush 10 mL 10 mL As Needed 10/14/2019     Sig - Route: Infuse 10 mL into a venous catheter As Needed for Line Care. - Intravenous    sodium chloride 0.9 % flush 10 mL 10 mL Every 12 Hours Scheduled 10/15/2019     Sig - Route: Infuse 10 mL into a venous catheter Every 12 (Twelve) Hours. - Intravenous    sodium chloride 0.9 % flush 10 mL 10 mL As Needed 10/15/2019     Sig - Route: Infuse 10 mL into a venous catheter As Needed for Line Care (After " Medication Administration). - Intravenous              Assessment/Plan     Antimicrobial Therapy   1.  IV Zosyn     Day 3  2.      Day  3.      Day  4.      Day  5.      Day      Assessment     Aspiration pneumonia.  The patient appears to be hemodynamically stable and currently on 2 L of oxygen via nasal cannula.  No signs of acute distress noted     Probable underlying dementia.  Patient failed swallow study.     Stage III sacral decubitus ulceration     Diabetes mellitus type 2     Plan     Continue IV Zosyn 3.375 g every 8 hours for a total of 7 days  Continue supportive care  Consider wound care nurse consultation  Labs in a.m.      Viviane Billingsley, ANDIE  10/19/19  10:25 AM

## 2019-10-19 NOTE — PROGRESS NOTES
Nutrition Services    Patient Name:  Julio César Braxton  YOB: 1932  MRN: [id number]  Admit Date:  10/14/2019    TF check on. EN support not initiated r/t unsuccessful attempts to place NG at bedside. GI consulted for PEG, however family wants to delay at this time, correct electrolytes & monitor pt improvement. ST is following & is still not appropriate to repeat VFSS. Would recommend tx decision sooner rather than later regarding PEG vs guided NG placement as difficult for patient to get stronger without adequate nutrition. Hypernatremia - 1/2 NS at 100 mL/hr. Hyperglycemia - insulin regimen per primary.    Labs: Na+ 151 H; K+ 2.6 L; Glucose 413 H/575 H; BUN 58 H; Crt 1.6 H; Ca 8.2 L; Alb 3.4 L; Phos 3 WNL; Mg 2.2 WNL; Hgb 9.6 L; Hct 28.9 L    Electronically signed by:  Loni Bravo RD  10/19/19 1:35 PM

## 2019-10-19 NOTE — NURSING NOTE
At 1430, patient went into cardiac arrest and  per DNR order. Second RN verification obtained, family and MDs notified. Will transfer body to [facility]  home per wishes.

## 2019-10-19 NOTE — THERAPY TREATMENT NOTE
"Acute Care - Speech Language Pathology   Swallow Treatment Note  Patrice     Patient Name: Julio César Braxton  : 4/3/1932  MRN: [id number]  Today's Date: 10/19/2019               Admit Date: 10/14/2019      Therapy Treatment  Rehabilitation Treatment Summary     Row Name 10/19/19 1200       Treatment Time/Intention    Discipline  speech language pathologist  -AB    Document Type  re-evaluation;therapy note (daily note)  -AB    Patient/Family Observations  Pt is more awake this date - verbally states name, \"I like water,\" yes, and no. Nephew is at bedside and asks SLP when repeat VFSS will take place. Per nephew and RN, unable to place NG/DHT, so current plan is to repeat VFSS when electrolytes are improved as pt will be \"stronger.\" Discussed with RN and nephew severity of results characterized by pharyngeal weakness versus decreased alertness and concerns regarding repeating within short time window, however will await new orders per plan of care. Nephew appears involved in care and is amenable to teaching for administration of minimal PO trials - oral care and water by toothette secondary to dry, cracked, oral secretions.   -AB    Patient Response to Treatment  Re-eval, dysphagia therapy, and family/pt education performed this date. Oral care performed first, with dry, cracked, sticky secretions coating oral cavity. Once secretions are moisturized and removed from palate, lateral sulci, outer labial structure, and tongue; pt is cued to swallow. As he continues open mouth posture breathing, continued pooling of secretions suggestive of decreased self-management. Water by toothette administered - pt extracts trace water following minimal verbal and tactile cues. Weak swallow is digitally palpated. For ice and water, pt does not form labial seal on spoon, however does appear orally aware. With ice chips, pt has appropriate manipulation primarily held in anterior oral cavity. For water by spoon, immediate and severe " wet weak cough, non-productive. Continued NPO is recommended, although water by toothette and oral care are encouraged due to decreased self-management of secretions. Discussed aspiration risk factor with poor oral care with nephew at bedside. Uncertain of plan of care steps as NG/DHT unable to be placed, however no PO recommendations can be made at this time due to confirmed severity of dysphagia with VFSS results, decreased secretion management, and continued severe s/s aspiration with trace water.   -AB    Recorded by [AB] Florida Brewster, SLP 10/19/19 1306    Row Name          Outcome Summary/Treatment Plan (SLP)    Daily Summary of Progress (SLP)  progress toward functional goals is gradual  -AB    Barriers to Overall Progress (SLP)  Decreased secretion management, oropharyngeal weakness  -AB    Plan for Continued Treatment (SLP)  To be determined per clinical team  -AB    Recorded by [AB] Florida Brewster, JOSEFINA 10/19/19 1306      User Key  (r) = Recorded By, (t) = Taken By, (c) = Cosigned By    Initials Name Effective Dates Discipline    AB Florida Brewster, SLP 06/17/19 -  SLP    Lizet Khan RN 03/01/19 -  Nurse          Outcome Summary  Outcome Summary/Treatment Plan (SLP)  Daily Summary of Progress (SLP): progress toward functional goals is gradual (10/19/19 1200 : Florida Brewster, SLP)  Barriers to Overall Progress (SLP): Decreased secretion management, oropharyngeal weakness (10/19/19 1200 : Florida Brewster, SLP)  Plan for Continued Treatment (SLP): To be determined per clinical team (10/19/19 1200 : Florida Brewster, SLP)      SLP GOALS     Row Name 10/19/19 1300       Oral Nutrition/Hydration Goal 1 (SLP)    Oral Nutrition/Hydration Goal 1, SLP  LTG: Establish therapy plan including PO trials/dysphagia tx.  -AB    Time Frame (Oral Nutrition/Hydration Goal 1, SLP)  by discharge  -AB    Barriers (Oral Nutrition/Hydration Goal 1, SLP)  --    Progress/Outcomes (Oral Nutrition/Hydration Goal 1,  SLP)  goal ongoing  -AB       Oral Nutrition/Hydration Goal 2 (SLP)    Oral Nutrition/Hydration Goal 2, SLP  Pt will complete VFSS to objectively evaluate identified aspiration risk  -AB    Barriers (Oral Nutrition/Hydration Goal 2, SLP)  --    Progress/Outcomes (Oral Nutrition/Hydration Goal 2, SLP)  goal met  -AB      User Key  (r) = Recorded By, (t) = Taken By, (c) = Cosigned By    Initials Name Provider Type    AB Florida Brewster, SLP Speech and Language Pathologist    Zoë Navarrete, SLP Speech and Language Pathologist          EDUCATION  The patient has been educated in the following areas:   NPO rationale.    SLP Recommendation and Plan  Daily Summary of Progress (SLP): progress toward functional goals is gradual  Barriers to Overall Progress (SLP): Decreased secretion management, oropharyngeal weakness  Plan for Continued Treatment (SLP): To be determined per clinical team                       Time Calculation:       Therapy Charges for Today     Code Description Service Date Service Provider Modifiers Qty    [id number] HC ST TREATMENT SWALLOW 4 10/19/2019 Florida Brewster, SLP GN 1                 JOSEFINA Roland  10/19/2019

## 2019-10-23 LAB — BACTERIA SPEC AEROBE CULT: NORMAL

## 2019-10-24 LAB — BACTERIA SPEC AEROBE CULT: NORMAL

## 2020-08-08 NOTE — PROGRESS NOTES
Continued Stay Note   Patrice     Patient Name: Julio César Braxton  MRN: [id number]  Today's Date: 10/18/2019    Admit Date: 10/14/2019    Discharge Plan     Row Name 10/18/19 1723       Plan    Plan  DC Plan: Pending clinical course. Return to [facility] pending bed availability at d/c & ability to accept pt's feeding tube at d/c. No PASRR needed for return to facility. No precert needed. [facility] following d/t being with pt at previous admission.     Plan Comments  SW called POA ([place], [phone]) who confirmed she would like pt to return to [facility] if they can accept with feeding tube (PEG, NG tube). LINO talked with POA about a mini home that [facility] had discussed d/t cost reasons. [facility] mini home in [place], [place]. Plan at this time is for pt to return to [facility] - liaison to check to see if they can accomodate feeding tube needs.      MATEO Salgado    Phone: [phone]  Cell: [phone]  Fax: [phone]  [email or web address]      [email or web address]

## 2023-03-02 NOTE — THERAPY EVALUATION
Mohs Case Number: [id number] Patient Name: Julio César Braxton  : 4/3/1932    MRN: [id number]                              Today's Date: 9/10/2019       Admit Date: 2019    Visit Dx:     ICD-10-CM ICD-9-CM   1. Acute UTI N39.0 599.0   2. Severe sepsis (CMS/HCC) A41.9 038.9    R65.20 995.92   3. Chest pain, unspecified type R07.9 786.50     Patient Active Problem List   Diagnosis   • Stroke (cerebrum) (CMS/HCC)   • CAD in native artery   • LV dysfunction   • Dyspnea on exertion   • Pacemaker   • Decubitus ulcer of trochanteric region of right hip, stage 2   • Pressure injury of sacral region, stage 2   • Atrial fibrillation (CMS/HCC)   • Bradycardia   • Cardiomyopathy (CMS/HCC)   • Diabetes mellitus, type II (CMS/HCC)   • Long term current use of anticoagulant therapy   • PVCs (premature ventricular contractions)   • Second degree heart block   • Essential hypertension   • Hyperlipidemia, mixed   • Acute UTI   • Dermatitis associated with moisture   • Stage 2 skin ulcer of sacral region (CMS/HCC)     Past Medical History:   Diagnosis Date   • CHF (congestive heart failure) (CMS/HCC)    • Coronary artery disease    • Neuropathy    • Stroke (CMS/HCC)      Past Surgical History:   Procedure Laterality Date   • ANGIOPLASTY     • BACK SURGERY     • CHOLECYSTECTOMY     • CORONARY STENT PLACEMENT     • SPINAL FUSION       General Information     Row Name 09/10/19 3677          PT Evaluation Time/Intention    Document Type  evaluation  -CM     Mode of Treatment  physical therapy 86 yo male adm 19 for acute uti. Had code stroke called 19 2* R sided weakness. Symptoms resolved quickly (had R facial drooping & AMS). Dx w/ sepsis. Hx of cva.   -CM     Row Name 09/10/19 9662          General Information    Patient Profile Reviewed?  yes  -CM     Prior Level of Function  independent:;all household mobility;gait uses rw for household distances  -CM     Existing Precautions/Restrictions  fall  -CM     Barriers to Rehab  none identified  -CM     Row  Mohs Case Number: [id number] Name 09/10/19 1657          Relationship/Environment    Lives With  alone  -CM     Row Name 09/10/19 1657          Resource/Environmental Concerns    Current Living Arrangements  home/apartment/condo  -CM     Row Name 09/10/19 1657          Home Main Entrance    Number of Stairs, Main Entrance  none  -CM     Row Name 09/10/19 1657          Stairs Within Home, Primary    Number of Stairs, Within Home, Primary  none  -CM     Row Name 09/10/19 1657          Cognitive Assessment/Intervention- PT/OT    Orientation Status (Cognition)  oriented x 4  -CM     Row Name 09/10/19 1657          Safety Issues, Functional Mobility    Impairments Affecting Function (Mobility)  balance;coordination;endurance/activity tolerance;postural/trunk control;motor control;strength  -CM       User Key  (r) = Recorded By, (t) = Taken By, (c) = Cosigned By    Initials Name Provider Type    Payton Carbajal, PT Physical Therapist        Mobility     Row Name 09/10/19 1659          Bed Mobility Assessment/Treatment    Comment (Bed Mobility)  n/a as pt already in chair  -CM     Row Name 09/10/19 1659          Sit-Stand Transfer    Sit-Stand [place] (Transfers)  minimum assist (75% patient effort)  -CM     Assistive Device (Sit-Stand Transfers)  walker, front-wheeled  -CM     Row Name 09/10/19 1659          Gait/Stairs Assessment/Training    Gait/Stairs Assessment/Training  gait/ambulation independence;gait/ambulation assistive device  -CM     [place] Level (Gait)  minimum assist (75% patient effort)  -CM     Assistive Device (Gait)  walker, front-wheeled  -CM     Distance in Feet (Gait)  80  -CM     Pattern (Gait)  step-to  -CM     Deviations/Abnormal Patterns (Gait)  ataxic;festinating/shuffling;hubert decreased;stride length decreased lateral sway   -CM       User Key  (r) = Recorded By, (t) = Taken By, (c) = Cosigned By    Initials Name Provider Type    Payton Carbajal PT Physical Therapist        Obj/Interventions     Row  Name 09/10/19 1700          General ROM    GENERAL ROM COMMENTS  wfl  -CM     Row Name 09/10/19 1700          MMT (Manual Muscle Testing)    General MMT Comments  UEs 4-/5 L, 3+/5 R; LEs 3+/5 hips, other LE mm 4-/5  -CM     Row Name 09/10/19 1700          Static Sitting Balance    Level of [place] (Unsupported Sitting, Static Balance)  conditional independence  -CM     Sitting Position (Unsupported Sitting, Static Balance)  sitting in chair  -CM     Row Name 09/10/19 1700          Dynamic Sitting Balance    Level of [place], Reaches Outside Midline (Sitting, Dynamic Balance)  conditional independence  -CM     Sitting Position, Reaches Outside Midline (Sitting, Dynamic Balance)  sitting in chair  -CM     Row Name 09/10/19 1700          Static Standing Balance    Level of [place] (Supported Standing, Static Balance)  minimal assist, 75% patient effort  -CM     Assistive Device Utilized (Supported Standing, Static Balance)  walker, rolling  -CM     Row Name 09/10/19 1700          Dynamic Standing Balance    Level of [place], Reaches Outside Midline (Standing, Dynamic Balance)  minimal assist, 75% patient effort  -CM     Assistive Device Utilized (Supported Standing, Dynamic Balance)  walker, rolling  -CM     Row Name 09/10/19 1700          Sensory Assessment/Intervention    Sensory General Assessment  -- hx of peripheral neuropathy  -CM       User Key  (r) = Recorded By, (t) = Taken By, (c) = Cosigned By    Initials Name Provider Type    Payton Carbajal, PT Physical Therapist        Goals/Plan     Row Name 09/10/19 1703          Bed Mobility Goal 1 (PT)    Activity/Assistive Device (Bed Mobility Goal 1, PT)  bed mobility activities, all  -CM     [place] Level/Cues Needed (Bed Mobility Goal 1, PT)  independent  -CM     Time Frame (Bed Mobility Goal 1, PT)  2 weeks  -CM     Row Name 09/10/19 1703          Transfer Goal 1 (PT)    Activity/Assistive Device (Transfer Goal 1, PT)  transfers,  all;walker, rolling  -CM     [place] Level/Cues Needed (Transfer Goal 1, PT)  independent  -CM     Time Frame (Transfer Goal 1, PT)  2 weeks  -CM     Row Name 09/10/19 1703          Gait Training Goal 1 (PT)    Activity/Assistive Device (Gait Training Goal 1, PT)  gait (walking locomotion);assistive device use;walker, rolling  -CM     [place] Level (Gait Training Goal 1, PT)  independent  -CM     Time Frame (Gait Training Goal 1, PT)  2 weeks  -CM       User Key  (r) = Recorded By, (t) = Taken By, (c) = Cosigned By    Initials Name Provider Type    CM Payton Almendarez, PT Physical Therapist        Clinical Impression     Row Name 09/10/19 1701          Pain Assessment    Additional Documentation  Pain Scale: Numbers Pre/Post-Treatment (Group)  -CM     [facility] Name 09/10/19 1701          Pain Scale: Numbers Pre/Post-Treatment    Pain Scale: Numbers, Pretreatment  0/10 - no pain  -CM     Pain Scale: Numbers, Post-Treatment  0/10 - no pain  -CM     Row Name 09/10/19 1701          Plan of Care Review    Plan of Care Reviewed With  patient  -CM     [facility] Name 09/10/19 1701          Physical Therapy Clinical Impression    Patient/Family Goals Statement (PT Clinical Impression)  88 yo male adm for uti, sepsis. Pt unsteady w/ amb. Not safe for home alone as would be high risk for injurious falls. WIll need IP rehab at d/c. Will follow 3xwk.   -CM     Criteria for Skilled Interventions Met (PT Clinical Impression)  yes;treatment indicated  -CM     Rehab Potential (PT Clinical Summary)  good, to achieve stated therapy goals  -CM     Row Name 09/10/19 1701          Vital Signs    Post Systolic BP Rehab  118  -CM     Post Treatment Diastolic BP  59  -CM     Posttreatment Heart Rate (beats/min)  82  -CM     Post SpO2 (%)  96  -CM     O2 Delivery Post Treatment  room air  -CM     Row Name 09/10/19 1701          Positioning and Restraints    Pre-Treatment Position  sitting in chair/recliner  -CM     Post Treatment Position   chair  -     In Chair  notified nsg;sitting;call light within reach;encouraged to call for assist;exit alarm on nsg notified that pt was incontinent of urine at end of rx.   -CM       User Key  (r) = Recorded By, (t) = Taken By, (c) = Cosigned By    Initials Name Provider Type    CM Payton Almendarez, PT Physical Therapist        Outcome Measures     Row Name 09/10/19 1707          Modified Axel Scale    Pre-Stroke Modified [place] Scale  0 - No Symptoms at all.  -CM     Modified Axel Scale  4 - Moderately severe disability.  Unable to walk without assistance, and unable to attend to own bodily needs without assistance.  -CM     Row Name 09/10/19 1707          Functional Assessment    Outcome Measure Options  Modified [place]  -       User Key  (r) = Recorded By, (t) = Taken By, (c) = Cosigned By    Initials Name Provider Type    Payton Carbajal, PT Physical Therapist        Physical Therapy Education     Title: PT OT SLP Therapies (In Progress)     Topic: Physical Therapy (In Progress)     Point: Mobility training (Done)     Learning Progress Summary           Patient Acceptance, E,TB, VU by CM at 9/10/2019  5:04 PM                   Point: Body mechanics (Done)     Learning Progress Summary           Patient Acceptance, E,TB, VU by CM at 9/10/2019  5:04 PM                   Point: Precautions (Done)     Learning Progress Summary           Patient Acceptance, E,TB, VU by CM at 9/10/2019  5:04 PM                               User Key     Initials Effective Dates Name Provider Type Discipline     03/01/19 -  Payton Almendarez, PT Physical Therapist PT              PT Recommendation and Plan  Planned Therapy Interventions (PT Eval): balance training, bed mobility training, gait training, patient/family education, motor coordination training, postural re-education, strengthening, transfer training  Outcome Summary/Treatment Plan (PT)  Anticipated Discharge Disposition (PT): inpatient rehabilitation  facility, skilled nursing facility  Plan of Care Reviewed With: patient  Outcome Summary: 88 yo male adm for uti, sepsis. Not safe for home alone, as pt's ambulation is unsteady & he is high risk for injurious falls. Will need IP rehab at d/c. Will follow 3xwk.      Time Calculation:   PT Charges     Row Name 09/10/19 1708             Time Calculation    Start Time  1516  -CM      Stop Time  1554  -CM      Time Calculation (min)  38 min  -CM      PT Received On  09/10/19  -CM      PT - Next Appointment  09/12/19  -CM      PT Goal Re-Cert Due Date  09/24/19  -CM         Time Calculation- PT    Total Timed Code Minutes- PT  25 minute(s)  -CM        User Key  (r) = Recorded By, (t) = Taken By, (c) = Cosigned By    Initials Name Provider Type    Payton Carbajal, PT Physical Therapist        Therapy Charges for Today     Code Description Service Date Service Provider Modifiers Qty    46779893969 HC PT EVAL MOD COMPLEXITY 4 9/10/2019 Payton Almendarez, PT GP 1    34929841114 HC GAIT TRAINING EA 15 MIN 9/10/2019 Payton Almendarez, PT GP 1    40327366813 HC PT THERAPEUTIC ACT EA 15 MIN 9/10/2019 Payton Almendarez, PT GP 1          PT G-Codes  Outcome Measure Options: Modified [place]  Modified Axel Scale: 4 - Moderately severe disability.  Unable to walk without assistance, and unable to attend to own bodily needs without assistance.    Payton Almendarez PT  9/10/2019